# Patient Record
Sex: MALE
[De-identification: names, ages, dates, MRNs, and addresses within clinical notes are randomized per-mention and may not be internally consistent; named-entity substitution may affect disease eponyms.]

---

## 2018-03-28 ENCOUNTER — APPOINTMENT (OUTPATIENT)
Dept: UROLOGY | Facility: CLINIC | Age: 83
End: 2018-03-28
Payer: MEDICARE

## 2018-03-28 VITALS
HEIGHT: 70 IN | BODY MASS INDEX: 31.5 KG/M2 | HEART RATE: 74 BPM | OXYGEN SATURATION: 95 % | WEIGHT: 220 LBS | SYSTOLIC BLOOD PRESSURE: 132 MMHG | DIASTOLIC BLOOD PRESSURE: 72 MMHG | TEMPERATURE: 97.8 F

## 2018-03-28 PROCEDURE — 51798 US URINE CAPACITY MEASURE: CPT

## 2018-03-28 PROCEDURE — 99213 OFFICE O/P EST LOW 20 MIN: CPT

## 2018-03-30 ENCOUNTER — MESSAGE (OUTPATIENT)
Age: 83
End: 2018-03-30

## 2018-03-30 LAB — PSA SERPL-MCNC: 6.77 NG/ML

## 2018-04-25 ENCOUNTER — APPOINTMENT (OUTPATIENT)
Dept: NUCLEAR MEDICINE | Facility: IMAGING CENTER | Age: 83
End: 2018-04-25
Payer: MEDICARE

## 2018-04-25 ENCOUNTER — OUTPATIENT (OUTPATIENT)
Dept: OUTPATIENT SERVICES | Facility: HOSPITAL | Age: 83
LOS: 1 days | End: 2018-04-25
Payer: COMMERCIAL

## 2018-04-25 ENCOUNTER — APPOINTMENT (OUTPATIENT)
Dept: CT IMAGING | Facility: IMAGING CENTER | Age: 83
End: 2018-04-25
Payer: MEDICARE

## 2018-04-25 DIAGNOSIS — R97.20 ELEVATED PROSTATE SPECIFIC ANTIGEN [PSA]: ICD-10-CM

## 2018-04-25 DIAGNOSIS — C61 MALIGNANT NEOPLASM OF PROSTATE: ICD-10-CM

## 2018-04-25 PROCEDURE — 78320: CPT | Mod: 26

## 2018-04-25 PROCEDURE — A9561: CPT

## 2018-04-25 PROCEDURE — 78306 BONE IMAGING WHOLE BODY: CPT | Mod: 26

## 2018-04-25 PROCEDURE — 78999 UNLISTED MISC PX DX NUC MED: CPT

## 2018-04-25 PROCEDURE — 74176 CT ABD & PELVIS W/O CONTRAST: CPT | Mod: 26

## 2018-04-25 PROCEDURE — 74176 CT ABD & PELVIS W/O CONTRAST: CPT

## 2018-04-25 PROCEDURE — 78306 BONE IMAGING WHOLE BODY: CPT

## 2018-04-30 ENCOUNTER — APPOINTMENT (OUTPATIENT)
Dept: UROLOGY | Facility: CLINIC | Age: 83
End: 2018-04-30
Payer: MEDICARE

## 2018-04-30 VITALS
SYSTOLIC BLOOD PRESSURE: 140 MMHG | WEIGHT: 220 LBS | BODY MASS INDEX: 31.5 KG/M2 | OXYGEN SATURATION: 96 % | HEIGHT: 70 IN | TEMPERATURE: 98.2 F | HEART RATE: 74 BPM | DIASTOLIC BLOOD PRESSURE: 70 MMHG | RESPIRATION RATE: 16 BRPM

## 2018-04-30 PROCEDURE — 99213 OFFICE O/P EST LOW 20 MIN: CPT

## 2018-05-14 ENCOUNTER — APPOINTMENT (OUTPATIENT)
Dept: UROLOGY | Facility: CLINIC | Age: 83
End: 2018-05-14
Payer: MEDICARE

## 2018-05-14 VITALS
DIASTOLIC BLOOD PRESSURE: 88 MMHG | TEMPERATURE: 98 F | BODY MASS INDEX: 31.5 KG/M2 | WEIGHT: 220 LBS | HEIGHT: 70 IN | SYSTOLIC BLOOD PRESSURE: 146 MMHG

## 2018-05-14 PROCEDURE — 96402 CHEMO HORMON ANTINEOPL SQ/IM: CPT

## 2018-05-14 RX ORDER — ALLOPURINOL 100 MG/1
100 TABLET ORAL
Refills: 0 | Status: ACTIVE | COMMUNITY

## 2018-05-14 RX ORDER — LEUPROLIDE ACETATE 30 MG
30 KIT INTRAMUSCULAR
Qty: 1 | Refills: 0 | Status: COMPLETED | OUTPATIENT
Start: 2018-05-14

## 2018-05-18 ENCOUNTER — APPOINTMENT (OUTPATIENT)
Dept: UROLOGY | Facility: CLINIC | Age: 83
End: 2018-05-18

## 2018-09-14 ENCOUNTER — APPOINTMENT (OUTPATIENT)
Dept: UROLOGY | Facility: CLINIC | Age: 83
End: 2018-09-14
Payer: MEDICARE

## 2018-09-14 ENCOUNTER — OUTPATIENT (OUTPATIENT)
Dept: OUTPATIENT SERVICES | Facility: HOSPITAL | Age: 83
LOS: 1 days | End: 2018-09-14
Payer: COMMERCIAL

## 2018-09-14 VITALS — RESPIRATION RATE: 18 BRPM | DIASTOLIC BLOOD PRESSURE: 77 MMHG | HEART RATE: 80 BPM | SYSTOLIC BLOOD PRESSURE: 167 MMHG

## 2018-09-14 PROCEDURE — 96402 CHEMO HORMON ANTINEOPL SQ/IM: CPT

## 2018-09-14 PROCEDURE — 99213 OFFICE O/P EST LOW 20 MIN: CPT

## 2018-09-14 RX ORDER — LEUPROLIDE ACETATE 30 MG
30 KIT INTRAMUSCULAR
Qty: 1 | Refills: 0 | Status: COMPLETED | OUTPATIENT
Start: 2018-09-14

## 2018-09-14 RX ORDER — LEUPROLIDE ACETATE 30 MG
30 KIT INTRAMUSCULAR
Qty: 1 | Refills: 3 | Status: COMPLETED | OUTPATIENT
Start: 2018-09-14 | End: 2018-09-14

## 2018-09-14 RX ADMIN — LEUPROLIDE ACETATE 0 MG: KIT at 00:00

## 2018-09-18 DIAGNOSIS — R97.21 RISING PSA FOLLOWING TREATMENT FOR MALIGNANT NEOPLASM OF PROSTATE: ICD-10-CM

## 2018-09-18 DIAGNOSIS — C61 MALIGNANT NEOPLASM OF PROSTATE: ICD-10-CM

## 2019-01-18 ENCOUNTER — APPOINTMENT (OUTPATIENT)
Dept: UROLOGY | Facility: CLINIC | Age: 84
End: 2019-01-18
Payer: MEDICARE

## 2019-01-18 ENCOUNTER — APPOINTMENT (OUTPATIENT)
Dept: UROLOGY | Facility: CLINIC | Age: 84
End: 2019-01-18

## 2019-01-18 ENCOUNTER — OUTPATIENT (OUTPATIENT)
Dept: OUTPATIENT SERVICES | Facility: HOSPITAL | Age: 84
LOS: 1 days | End: 2019-01-18
Payer: COMMERCIAL

## 2019-01-18 ENCOUNTER — MEDICATION RENEWAL (OUTPATIENT)
Age: 84
End: 2019-01-18

## 2019-01-18 VITALS — SYSTOLIC BLOOD PRESSURE: 173 MMHG | DIASTOLIC BLOOD PRESSURE: 91 MMHG | HEART RATE: 87 BPM | TEMPERATURE: 98.4 F

## 2019-01-18 PROCEDURE — 99213 OFFICE O/P EST LOW 20 MIN: CPT

## 2019-01-18 PROCEDURE — 96402 CHEMO HORMON ANTINEOPL SQ/IM: CPT

## 2019-01-18 RX ORDER — LEUPROLIDE ACETATE 30 MG
30 KIT INTRAMUSCULAR
Qty: 1 | Refills: 3 | Status: COMPLETED | OUTPATIENT
Start: 2019-01-18 | End: 2019-01-18

## 2019-01-18 RX ORDER — LEUPROLIDE ACETATE 30 MG
30 KIT INTRAMUSCULAR
Qty: 1 | Refills: 0 | Status: COMPLETED | OUTPATIENT
Start: 2019-01-18

## 2019-01-18 RX ADMIN — LEUPROLIDE ACETATE 0 MG: KIT at 00:00

## 2019-01-18 NOTE — ASSESSMENT
[FreeTextEntry1] : to get Lupron q 4 m today \par will get labs to eval bone disease, sugar and psa /testosterone\par rtc 4 magain for lupron

## 2019-01-18 NOTE — HISTORY OF PRESENT ILLNESS
[FreeTextEntry1] : wanted to notify  me of new dx of DM on insulin\par feels related to poor diet durinjg Holidays\par started Lurpon May 2018 and has been fine until now \par no luts except frequency relATED To fluids\par no hematuria or bowel c/o or blood in stool \par some arthritis throughjout body but states resolves with movement

## 2019-01-18 NOTE — PHYSICAL EXAM
[General Appearance - Well Developed] : well developed [General Appearance - Well Nourished] : well nourished [Normal Appearance] : normal appearance [Well Groomed] : well groomed [General Appearance - In No Acute Distress] : no acute distress

## 2019-01-22 DIAGNOSIS — C61 MALIGNANT NEOPLASM OF PROSTATE: ICD-10-CM

## 2019-01-22 DIAGNOSIS — R35.0 FREQUENCY OF MICTURITION: ICD-10-CM

## 2019-01-23 LAB
ALBUMIN SERPL ELPH-MCNC: 4.6 G/DL
ALP BLD-CCNC: 123 U/L
ALT SERPL-CCNC: 33 U/L
ANION GAP SERPL CALC-SCNC: 15 MMOL/L
APPEARANCE: CLEAR
AST SERPL-CCNC: 28 U/L
BACTERIA: NEGATIVE
BILIRUB SERPL-MCNC: 0.3 MG/DL
BILIRUBIN URINE: NEGATIVE
BLOOD URINE: NEGATIVE
BUN SERPL-MCNC: 12 MG/DL
CALCIUM SERPL-MCNC: 9.5 MG/DL
CHLORIDE SERPL-SCNC: 97 MMOL/L
CO2 SERPL-SCNC: 27 MMOL/L
COLOR: YELLOW
CREAT SERPL-MCNC: 1.11 MG/DL
GLUCOSE QUALITATIVE U: 1000 MG/DL
GLUCOSE SERPL-MCNC: 408 MG/DL
HYALINE CASTS: 0 /LPF
KETONES URINE: ABNORMAL
LEUKOCYTE ESTERASE URINE: NEGATIVE
MICROSCOPIC-UA: NORMAL
NITRITE URINE: NEGATIVE
PH URINE: 5.5
POTASSIUM SERPL-SCNC: 4 MMOL/L
PROT SERPL-MCNC: 7.7 G/DL
PROTEIN URINE: 30 MG/DL
RED BLOOD CELLS URINE: 1 /HPF
SODIUM SERPL-SCNC: 139 MMOL/L
SPECIFIC GRAVITY URINE: 1.04
SQUAMOUS EPITHELIAL CELLS: 0 /HPF
TESTOST SERPL-MCNC: 16.8 NG/DL
UROBILINOGEN URINE: NEGATIVE MG/DL
WHITE BLOOD CELLS URINE: 1 /HPF

## 2019-01-28 LAB — PSA SERPL-MCNC: 0.03 NG/ML

## 2019-05-24 ENCOUNTER — OUTPATIENT (OUTPATIENT)
Dept: OUTPATIENT SERVICES | Facility: HOSPITAL | Age: 84
LOS: 1 days | End: 2019-05-24
Payer: COMMERCIAL

## 2019-05-24 ENCOUNTER — APPOINTMENT (OUTPATIENT)
Dept: UROLOGY | Facility: CLINIC | Age: 84
End: 2019-05-24
Payer: MEDICARE

## 2019-05-24 DIAGNOSIS — R35.0 FREQUENCY OF MICTURITION: ICD-10-CM

## 2019-05-24 PROCEDURE — 99214 OFFICE O/P EST MOD 30 MIN: CPT

## 2019-05-24 PROCEDURE — 96402 CHEMO HORMON ANTINEOPL SQ/IM: CPT

## 2019-05-24 RX ORDER — LEUPROLIDE ACETATE 30 MG
30 KIT INTRAMUSCULAR
Qty: 1 | Refills: 3 | Status: COMPLETED | OUTPATIENT
Start: 2019-05-24 | End: 2019-05-24

## 2019-05-24 RX ORDER — LEUPROLIDE ACETATE 30 MG
30 KIT INTRAMUSCULAR
Qty: 1 | Refills: 0 | Status: COMPLETED | OUTPATIENT
Start: 2019-05-24

## 2019-05-24 RX ADMIN — LEUPROLIDE ACETATE 0 MG: KIT at 00:00

## 2019-05-24 NOTE — ASSESSMENT
[FreeTextEntry1] : patient with pca and rising psa - to 6.77\par begun on lupron q 4m\par now one year of ADT and psa down to 0.03\par tolerating meds \par cont meds q 4m \par in f/u psa and urine and Testosteone\par to see PCP re: elevated sugar and need for change in DM meds

## 2019-05-24 NOTE — HISTORY OF PRESENT ILLNESS
[FreeTextEntry1] : patient with hx of pca and rising psa \par now on Lupron q 4m and tolerating meds\par some hot flahses but no marked weigh tgain or luts\par BM ok \par HBA1C elevcated by report and this was seen on last lab and urine whchh patient was notified by letter to reviewe with PCP \par ;Latest PSA ( Jan 2019)= 0.03 and Testosterone = 16, PSA down from 6.77\par now one year of Lupron

## 2019-05-24 NOTE — PHYSICAL EXAM
[General Appearance - Well Developed] : well developed [Normal Appearance] : normal appearance [General Appearance - Well Nourished] : well nourished [General Appearance - In No Acute Distress] : no acute distress [Well Groomed] : well groomed [Abdomen Soft] : soft [Abdomen Tenderness] : non-tender [] : no hepato-splenomegaly [Abdomen Mass (___ Cm)] : no abdominal mass palpated [Abdomen Hernia] : no hernia was discovered [Costovertebral Angle Tenderness] : no ~M costovertebral angle tenderness [Cervical Lymph Nodes Enlarged Posterior Bilaterally] : posterior cervical [Supraclavicular Lymph Nodes Enlarged Bilaterally] : supraclavicular [Cervical Lymph Nodes Enlarged Anterior Bilaterally] : anterior cervical

## 2019-06-03 DIAGNOSIS — R35.1 NOCTURIA: ICD-10-CM

## 2019-06-03 DIAGNOSIS — C61 MALIGNANT NEOPLASM OF PROSTATE: ICD-10-CM

## 2019-10-18 ENCOUNTER — APPOINTMENT (OUTPATIENT)
Dept: UROLOGY | Facility: CLINIC | Age: 84
End: 2019-10-18
Payer: MEDICARE

## 2019-10-18 ENCOUNTER — OUTPATIENT (OUTPATIENT)
Dept: OUTPATIENT SERVICES | Facility: HOSPITAL | Age: 84
LOS: 1 days | End: 2019-10-18
Payer: COMMERCIAL

## 2019-10-18 VITALS
DIASTOLIC BLOOD PRESSURE: 83 MMHG | TEMPERATURE: 98 F | RESPIRATION RATE: 16 BRPM | SYSTOLIC BLOOD PRESSURE: 167 MMHG | HEART RATE: 73 BPM

## 2019-10-18 DIAGNOSIS — R35.0 FREQUENCY OF MICTURITION: ICD-10-CM

## 2019-10-18 PROCEDURE — 96402 CHEMO HORMON ANTINEOPL SQ/IM: CPT

## 2019-10-18 PROCEDURE — 99213 OFFICE O/P EST LOW 20 MIN: CPT

## 2019-10-18 RX ORDER — LEUPROLIDE ACETATE 45 MG
45 KIT INTRAMUSCULAR
Qty: 1 | Refills: 0 | Status: COMPLETED | OUTPATIENT
Start: 2019-10-18

## 2019-10-18 RX ORDER — LEUPROLIDE ACETATE 30 MG
30 KIT INTRAMUSCULAR
Qty: 1 | Refills: 3 | Status: COMPLETED | OUTPATIENT
Start: 2019-10-18 | End: 2019-10-18

## 2019-10-18 RX ADMIN — LEUPROLIDE ACETATE 0 MG: KIT at 00:00

## 2019-10-18 NOTE — PHYSICAL EXAM
[General Appearance - Well Nourished] : well nourished [General Appearance - Well Developed] : well developed [Normal Appearance] : normal appearance [General Appearance - In No Acute Distress] : no acute distress [Well Groomed] : well groomed [Cervical Lymph Nodes Enlarged Anterior Bilaterally] : anterior cervical [Cervical Lymph Nodes Enlarged Posterior Bilaterally] : posterior cervical [Supraclavicular Lymph Nodes Enlarged Bilaterally] : supraclavicular [FreeTextEntry1] : breeast soft , no masses felt,  not tender

## 2019-10-18 NOTE — HISTORY OF PRESENT ILLNESS
[FreeTextEntry1] : Tolerating hormone\par no hot flashes but some breast growth-not tender\par nocturia 2 + due to fluids taken in day\par no blood in urine or stool \par eating wel;l scott OK \par

## 2019-10-19 ENCOUNTER — TRANSCRIPTION ENCOUNTER (OUTPATIENT)
Age: 84
End: 2019-10-19

## 2019-10-21 LAB — PSA SERPL-MCNC: 0.02 NG/ML

## 2019-10-29 DIAGNOSIS — R35.1 NOCTURIA: ICD-10-CM

## 2019-10-29 DIAGNOSIS — C61 MALIGNANT NEOPLASM OF PROSTATE: ICD-10-CM

## 2020-02-21 ENCOUNTER — APPOINTMENT (OUTPATIENT)
Dept: UROLOGY | Facility: CLINIC | Age: 85
End: 2020-02-21
Payer: MEDICARE

## 2020-02-21 ENCOUNTER — APPOINTMENT (OUTPATIENT)
Dept: UROLOGY | Facility: CLINIC | Age: 85
End: 2020-02-21

## 2020-02-21 ENCOUNTER — OUTPATIENT (OUTPATIENT)
Dept: OUTPATIENT SERVICES | Facility: HOSPITAL | Age: 85
LOS: 1 days | End: 2020-02-21
Payer: MEDICARE

## 2020-02-21 DIAGNOSIS — R35.0 FREQUENCY OF MICTURITION: ICD-10-CM

## 2020-02-21 PROCEDURE — 96402 CHEMO HORMON ANTINEOPL SQ/IM: CPT

## 2020-02-21 PROCEDURE — 96402U: CUSTOM | Mod: NC

## 2020-02-21 RX ORDER — LEUPROLIDE ACETATE 30 MG
30 KIT INTRAMUSCULAR
Qty: 1 | Refills: 0 | Status: COMPLETED | OUTPATIENT
Start: 2020-02-21

## 2020-02-21 RX ORDER — LEUPROLIDE ACETATE 30 MG
30 KIT INTRAMUSCULAR
Qty: 1 | Refills: 0 | Status: COMPLETED | OUTPATIENT
Start: 2020-02-21 | End: 2020-02-21

## 2020-02-21 RX ADMIN — LEUPROLIDE ACETATE 0 MG: KIT at 00:00

## 2020-02-27 DIAGNOSIS — C61 MALIGNANT NEOPLASM OF PROSTATE: ICD-10-CM

## 2020-06-12 ENCOUNTER — APPOINTMENT (OUTPATIENT)
Dept: UROLOGY | Facility: CLINIC | Age: 85
End: 2020-06-12

## 2020-06-19 ENCOUNTER — APPOINTMENT (OUTPATIENT)
Dept: UROLOGY | Facility: CLINIC | Age: 85
End: 2020-06-19
Payer: MEDICARE

## 2020-06-19 ENCOUNTER — OUTPATIENT (OUTPATIENT)
Dept: OUTPATIENT SERVICES | Facility: HOSPITAL | Age: 85
LOS: 1 days | End: 2020-06-19
Payer: COMMERCIAL

## 2020-06-19 VITALS — TEMPERATURE: 97.3 F

## 2020-06-19 DIAGNOSIS — R35.0 FREQUENCY OF MICTURITION: ICD-10-CM

## 2020-06-19 PROCEDURE — 96402 CHEMO HORMON ANTINEOPL SQ/IM: CPT

## 2020-06-19 PROCEDURE — 99213 OFFICE O/P EST LOW 20 MIN: CPT

## 2020-06-19 RX ORDER — LEUPROLIDE ACETATE 30 MG
30 KIT INTRAMUSCULAR
Qty: 1 | Refills: 0 | Status: COMPLETED | OUTPATIENT
Start: 2020-06-15 | End: 2020-06-19

## 2020-06-19 RX ORDER — LEUPROLIDE ACETATE 30 MG
30 KIT INTRAMUSCULAR
Qty: 1 | Refills: 0 | Status: COMPLETED | OUTPATIENT
Start: 2020-06-19

## 2020-06-19 RX ADMIN — LEUPROLIDE ACETATE 30 MG: KIT at 00:00

## 2020-06-19 NOTE — HISTORY OF PRESENT ILLNESS
[FreeTextEntry1] : patient her for f/u\par on Luporn 30 mg\par seen last in Oct 2019 and psa tdone at that time \par new c/o some edema lower extrem with ruddiness of skin \par no fever or hematuria\par no bowel c/o or blood in stool \par weight stable\par no N/V \par to see PCP next week

## 2020-06-19 NOTE — ASSESSMENT
[FreeTextEntry1] : patietn on Lupron for rising psa aftr pca treatmnet with RT 2011\par psa low \par now with c/o LE edema and ruddiness of skin suggestive of venous stasis\par to see PCP to discuss bnext week\par will get psa , Test and CMP today \par no LUTS a bother\par Lupron given by nusring today ( 30 mg) \par to rtc for lupron again 4m and exam in 8 months

## 2020-06-19 NOTE — PHYSICAL EXAM
[General Appearance - Well Developed] : well developed [General Appearance - Well Nourished] : well nourished [Normal Appearance] : normal appearance [Well Groomed] : well groomed [General Appearance - In No Acute Distress] : no acute distress [Abdomen Tenderness] : non-tender [Abdomen Soft] : soft [] : no hepato-splenomegaly [Costovertebral Angle Tenderness] : no ~M costovertebral angle tenderness [Abdomen Hernia] : no hernia was discovered [Abdomen Mass (___ Cm)] : no abdominal mass palpated [Rectal Exam - Rectum] : digital rectal exam was normal [Prostate Tenderness] : the prostate was not tender [Prostate Enlargement] : the prostate was not enlarged [No Prostate Nodules] : no prostate nodules [Prostate Size ___ gm] : prostate size [unfilled] gm [Cervical Lymph Nodes Enlarged Posterior Bilaterally] : posterior cervical [Supraclavicular Lymph Nodes Enlarged Bilaterally] : supraclavicular [Cervical Lymph Nodes Enlarged Anterior Bilaterally] : anterior cervical

## 2020-06-22 LAB
ALBUMIN SERPL ELPH-MCNC: 4.2 G/DL
ALP BLD-CCNC: 86 U/L
ALT SERPL-CCNC: 33 U/L
ANION GAP SERPL CALC-SCNC: 18 MMOL/L
AST SERPL-CCNC: 33 U/L
BILIRUB SERPL-MCNC: 0.2 MG/DL
BUN SERPL-MCNC: 18 MG/DL
CALCIUM SERPL-MCNC: 9.1 MG/DL
CHLORIDE SERPL-SCNC: 98 MMOL/L
CO2 SERPL-SCNC: 25 MMOL/L
CREAT SERPL-MCNC: 1.19 MG/DL
GLUCOSE SERPL-MCNC: 211 MG/DL
POTASSIUM SERPL-SCNC: 3.7 MMOL/L
PROT SERPL-MCNC: 7 G/DL
PSA SERPL-MCNC: 0.07 NG/ML
SODIUM SERPL-SCNC: 141 MMOL/L
TESTOST SERPL-MCNC: 3.4 NG/DL

## 2020-06-24 DIAGNOSIS — C61 MALIGNANT NEOPLASM OF PROSTATE: ICD-10-CM

## 2020-07-30 ENCOUNTER — APPOINTMENT (OUTPATIENT)
Dept: WOUND CARE | Facility: CLINIC | Age: 85
End: 2020-07-30
Payer: MEDICARE

## 2020-07-30 VITALS — HEIGHT: 70 IN | WEIGHT: 216 LBS | BODY MASS INDEX: 30.92 KG/M2 | TEMPERATURE: 97.4 F

## 2020-07-30 DIAGNOSIS — B35.3 TINEA PEDIS: ICD-10-CM

## 2020-07-30 DIAGNOSIS — L30.9 DERMATITIS, UNSPECIFIED: ICD-10-CM

## 2020-07-30 PROCEDURE — 99203 OFFICE O/P NEW LOW 30 MIN: CPT

## 2020-07-30 NOTE — PHYSICAL EXAM
[0] : left 0 [Ankle Swelling (On Exam)] : present [Ankle Swelling Bilaterally] : bilaterally  [FreeTextEntry1] : chronic venous stasis changes to legs [de-identified] : scaly white patches on medial Left arch and on lateral aspect of left heel, on distal tips of plantar toes, Right plantar medial arch, thickened discolored dystrophic nails of hallux + 5th toe bilateral

## 2020-07-30 NOTE — HISTORY OF PRESENT ILLNESS
[FreeTextEntry1] : 84M presents to clinic - referred here by Dr Ng for his left foot wound which has closed. Pt states that he has had Left medial arch wound for over 6 months. Started as red itchy dots. First saw a doctor virtually in April who prescribed a medication for athletes foot but patient states condition worsened. His wound opened up about 2 months ago on that spot and he started applying betamethasone cream and clotrimazole which helped. Has tried unna boot but made it worse.

## 2020-07-30 NOTE — ASSESSMENT
[FreeTextEntry1] : 84M with Left foot plaques \par --itchy white patches on left foot; no open wounds on feet\par -shave biopsy performed of site - sent for fungal culture \par -continue using betamethasone and clotrimazole cream daily; instructed to saran wrap + socks on feet after applying at nights\par -likely tinea pedis; if no improvement recommend tissue biopsy \par -return in 2 weeks

## 2020-08-13 ENCOUNTER — APPOINTMENT (OUTPATIENT)
Dept: WOUND CARE | Facility: CLINIC | Age: 85
End: 2020-08-13
Payer: MEDICARE

## 2020-08-13 DIAGNOSIS — B35.3 TINEA PEDIS: ICD-10-CM

## 2020-08-13 PROCEDURE — 99213 OFFICE O/P EST LOW 20 MIN: CPT

## 2020-08-13 NOTE — ASSESSMENT
[FreeTextEntry1] : 84M with Left foot plaques \par - itchy white patches on left foot; no open wounds on feet\par - fungal culture showing candida\par -continue using betamethasone and clotrimazole cream daily; instructed to saran wrap + socks on feet after applying at nights\par - pt sees improvement of the ichy and scaling skin since using topical creams \par - return in PRN

## 2020-08-13 NOTE — PHYSICAL EXAM
[0] : right 0 [Ankle Swelling (On Exam)] : present [Ankle Swelling Bilaterally] : bilaterally  [Please See PDF for [employer]] : Please See PDF for [employer]. [de-identified] : scaly white patches on medial Left arch and on lateral aspect of left heel, on distal tips of plantar toes, Right plantar medial arch, thickened discolored dystrophic nails of hallux + 5th toe bilateral [FreeTextEntry1] : chronic venous stasis changes to legs

## 2020-08-13 NOTE — HISTORY OF PRESENT ILLNESS
[FreeTextEntry1] : 84M presents to clinic - he has healed the open wound to LF medial arch wound. Has been improving to ichy scaling pedal skin after using topical creams. Jonnie F/C/N/V/SOB

## 2020-08-27 LAB — FUNGUS SPEC CULT ORG #8: ABNORMAL

## 2020-10-30 ENCOUNTER — APPOINTMENT (OUTPATIENT)
Dept: UROLOGY | Facility: CLINIC | Age: 85
End: 2020-10-30

## 2020-12-04 ENCOUNTER — APPOINTMENT (OUTPATIENT)
Dept: UROLOGY | Facility: CLINIC | Age: 85
End: 2020-12-04
Payer: MEDICARE

## 2020-12-04 VITALS
BODY MASS INDEX: 32.07 KG/M2 | DIASTOLIC BLOOD PRESSURE: 84 MMHG | WEIGHT: 224 LBS | SYSTOLIC BLOOD PRESSURE: 166 MMHG | TEMPERATURE: 97.6 F | HEART RATE: 73 BPM | HEIGHT: 70 IN

## 2020-12-04 PROCEDURE — 99072 ADDL SUPL MATRL&STAF TM PHE: CPT

## 2020-12-04 PROCEDURE — 99213 OFFICE O/P EST LOW 20 MIN: CPT

## 2020-12-04 NOTE — PHYSICAL EXAM
[General Appearance - Well Developed] : well developed [General Appearance - Well Nourished] : well nourished [Normal Appearance] : normal appearance [Well Groomed] : well groomed [General Appearance - In No Acute Distress] : no acute distress [Abdomen Soft] : soft [Abdomen Tenderness] : non-tender [] : no hepato-splenomegaly [Abdomen Mass (___ Cm)] : no abdominal mass palpated [Abdomen Hernia] : no hernia was discovered [Costovertebral Angle Tenderness] : no ~M costovertebral angle tenderness [Edema] : no peripheral edema [Normal Station and Gait] : the gait and station were normal for the patient's age [FreeTextEntry1] : no spinal tenderness [Cervical Lymph Nodes Enlarged Posterior Bilaterally] : posterior cervical [Cervical Lymph Nodes Enlarged Anterior Bilaterally] : anterior cervical

## 2020-12-04 NOTE — HISTORY OF PRESENT ILLNESS
INTERVAL HISTORY:  Seen at bedside; no new changes from nursing overnight.    No Known Allergies      VITAL SIGNS:  Vital Signs Last 24 Hrs  T(C): 37, Max: 37 (04-22 @ 12:24)  T(F): 98.6, Max: 98.6 (04-22 @ 12:24)  HR: 98 (76 - 103)  BP: 113/62 (108/51 - 140/71)  BP(mean): 78 (67 - 91)  RR: 98 (13 - 98)  SpO2: 100% (100% - 100%)    PHYSICAL EXAMINATION:  General: Well-developed, well nourished, in no acute distress.  Eyes: Conjunctiva and sclera clear.  Neurologic:  - Mental Status:  Awake and alert; flat affect; speaks Creole.  - Cranial Nerves: Pupil 3 mm reactive; + EOMI; no facial.  - Motor:  Symmetric and spontaneous throughout Normal muscle bulk and tone throughout.  - Reflexes:  1+ throughout  - Sensory:  Withdrawals throughout.  - Plantars:  Withdrawal.    MEDS:  MEDICATIONS  (STANDING):  insulin lispro (HumaLOG) corrective regimen sliding scale  SubCutaneous Before meals and at bedtime  dextrose 5%. 1000milliLiter(s) IV Continuous <Continuous>  dextrose 50% Injectable 12.5Gram(s) IV Push once  dextrose 50% Injectable 25Gram(s) IV Push once  dextrose 50% Injectable 25Gram(s) IV Push once  aspirin 325milliGRAM(s) Oral daily  atorvastatin 20milliGRAM(s) Oral at bedtime  donepezil 10milliGRAM(s) Oral at bedtime  amLODIPine   Tablet 10milliGRAM(s) Oral daily  heparin  Injectable 5000Unit(s) SubCutaneous every 8 hours  timolol 0.5% Solution 1Drop(s) Both EYES every 12 hours  brimonidine 0.2% Ophthalmic Solution 1Drop(s) Both EYES every 12 hours  risperiDONE   Tablet 1milliGRAM(s) Oral two times a day  LORazepam   Injectable 1milliGRAM(s) IV Push once    MEDICATIONS  (PRN):  dextrose Gel 1Dose(s) Oral once PRN Blood Glucose LESS THAN 70 milliGRAM(s)/deciLiter  glucagon  Injectable 1milliGRAM(s) IntraMuscular once PRN Glucose <70 milliGRAM(s)/deciLiter  acetaminophen   Tablet. 650milliGRAM(s) Oral every 6 hours PRN Headache or Bodyache  haloperidol    Injectable 5milliGRAM(s) IntraMuscular every 6 hours PRN Agitation      LABS:                          13.0   10.3  )-----------( 304      ( 22 Apr 2017 08:33 )             36.7     04-22    134<L>  |  96<L>  |  13.0  ----------------------------<  252<H>  3.8   |  28.0  |  0.79    Ca    9.8      22 Apr 2017 08:33            RADIOLOGY & ADDITIONAL STUDIES:      MRA head/neck - Pending.    IMPRESSION:  Pontine CVA. [FreeTextEntry1] : patient known to me from [facility]\par hx of pca s/p RT 2011\par PSA ( 10/2015): 0.5\par PSA ( 10/2016): 0.9\par PSA ( 03/2018): 6.05\par \par PSA started 2018 for risjng psa to 7 after RT 2011 and Lupron started\par held on Oct 2020 and psa now to compare\par \par June  psa - 0.07\par \par denies sxs of weight loss , bone pain , no LUTS

## 2020-12-04 NOTE — ASSESSMENT
[FreeTextEntry1] : rising psa after RT\par treated with Lupron \par wanted to hold due to fatigue\par will chjeckl psa today \par if stabe-- cont to hold\par rtc 6m

## 2020-12-07 LAB
ALBUMIN SERPL ELPH-MCNC: 4.5 G/DL
ALP BLD-CCNC: 85 U/L
ALT SERPL-CCNC: 25 U/L
ANION GAP SERPL CALC-SCNC: 13 MMOL/L
AST SERPL-CCNC: 21 U/L
BILIRUB SERPL-MCNC: 0.2 MG/DL
BUN SERPL-MCNC: 14 MG/DL
CALCIUM SERPL-MCNC: 9.4 MG/DL
CHLORIDE SERPL-SCNC: 104 MMOL/L
CO2 SERPL-SCNC: 26 MMOL/L
CREAT SERPL-MCNC: 1.08 MG/DL
GLUCOSE SERPL-MCNC: 126 MG/DL
POTASSIUM SERPL-SCNC: 4 MMOL/L
PROT SERPL-MCNC: 7.3 G/DL
PSA SERPL-MCNC: 0.09 NG/ML
SODIUM SERPL-SCNC: 143 MMOL/L

## 2021-06-18 ENCOUNTER — APPOINTMENT (OUTPATIENT)
Dept: UROLOGY | Facility: CLINIC | Age: 86
End: 2021-06-18
Payer: MEDICARE

## 2021-06-18 PROCEDURE — 99214 OFFICE O/P EST MOD 30 MIN: CPT

## 2021-06-18 NOTE — HISTORY OF PRESENT ILLNESS
[FreeTextEntry1] : patent with hx of pca \par treated with RT in 2011\par due to recurrence of psa - was begun on Lupron \par however c/o some side effects and wanted to stop\par last Lupron held on  OCT 2020\par f/u PSA remained low at 0.09 ( dec 2020) from 0.07 ( june 2020)\par no new c/o\par no luts a bother\par no blood in urine or stool \par weight stable \par

## 2021-06-18 NOTE — ASSESSMENT
[FreeTextEntry1] : checkpsa today \par no luts a bother\par if psa remains stable- cont to hold Lupron \par cont to check psa q 6m --due again - dec 2020

## 2021-06-18 NOTE — PHYSICAL EXAM
[General Appearance - Well Developed] : well developed [General Appearance - Well Nourished] : well nourished [Normal Appearance] : normal appearance [Well Groomed] : well groomed [General Appearance - In No Acute Distress] : no acute distress [Abdomen Soft] : soft [Abdomen Tenderness] : non-tender [] : no hepato-splenomegaly [Abdomen Hernia] : no hernia was discovered [Abdomen Mass (___ Cm)] : no abdominal mass palpated [Costovertebral Angle Tenderness] : no ~M costovertebral angle tenderness [FreeTextEntry1] : obese [Cervical Lymph Nodes Enlarged Posterior Bilaterally] : posterior cervical [Cervical Lymph Nodes Enlarged Anterior Bilaterally] : anterior cervical [Supraclavicular Lymph Nodes Enlarged Bilaterally] : supraclavicular

## 2021-06-21 LAB — PSA SERPL-MCNC: 0.2 NG/ML

## 2021-10-22 ENCOUNTER — APPOINTMENT (OUTPATIENT)
Dept: UROLOGY | Facility: CLINIC | Age: 86
End: 2021-10-22
Payer: MEDICARE

## 2021-10-22 PROCEDURE — 99214 OFFICE O/P EST MOD 30 MIN: CPT

## 2021-10-22 NOTE — PHYSICAL EXAM
[General Appearance - Well Developed] : well developed [Normal Appearance] : normal appearance [General Appearance - Well Nourished] : well nourished [Well Groomed] : well groomed [General Appearance - In No Acute Distress] : no acute distress [Prostate Enlargement] : the prostate was not enlarged [Prostate Tenderness] : the prostate was not tender [No Prostate Nodules] : no prostate nodules [FreeTextEntry1] : no evid of recurrence on rectal exam

## 2021-10-22 NOTE — ASSESSMENT
[FreeTextEntry1] : PSA and Testosterone today \par cathryn call with results\par if slow rise -- can cont to follow or restart hormones as intermittent therapy\par no other c/o \par

## 2021-10-22 NOTE — HISTORY OF PRESENT ILLNESS
[FreeTextEntry1] : kacy here for repeat psa \par hx of RT for pca 2011 \par lupron started due to rising psa \par held in Oct 2020 for side effects and psa followed q3-6 m\par slow rise and recent psa of 0.2 ( june 2021)\par no meds for luts\par nobowek issues\par no blood in urine or stoon \par weight stable\par

## 2021-10-25 LAB
PSA SERPL-MCNC: 0.28 NG/ML
TESTOST SERPL-MCNC: 184 NG/DL

## 2022-03-04 ENCOUNTER — APPOINTMENT (OUTPATIENT)
Dept: UROLOGY | Facility: CLINIC | Age: 87
End: 2022-03-04
Payer: MEDICARE

## 2022-03-04 PROCEDURE — 99214 OFFICE O/P EST MOD 30 MIN: CPT

## 2022-03-04 NOTE — PHYSICAL EXAM
[General Appearance - Well Developed] : well developed [General Appearance - Well Nourished] : well nourished [Normal Appearance] : normal appearance [Well Groomed] : well groomed [General Appearance - In No Acute Distress] : no acute distress [Abdomen Soft] : soft [Abdomen Tenderness] : non-tender [] : no hepato-splenomegaly [Abdomen Mass (___ Cm)] : no abdominal mass palpated [Abdomen Hernia] : no hernia was discovered [Costovertebral Angle Tenderness] : no ~M costovertebral angle tenderness [FreeTextEntry1] : no spinal or hip pain  [No Palpable Adenopathy] : no palpable adenopathy

## 2022-03-04 NOTE — HISTORY OF PRESENT ILLNESS
[FreeTextEntry1] : patient here for f/u of psa \par hx of pca RT 2011 on Lupron for recurrence of psa \par due to side effects- on hold since last q 4m shot in June 2020\par f/u psa was 0.2 and Oct 2021 : 0.28 with Testosterone - 184\par doing well off lupron \par no weight changes\par no bowel issues \par no blood in urine or stool\par no LUTS \par good water intake \par no bone pain

## 2022-03-04 NOTE — ASSESSMENT
[FreeTextEntry1] : patient with pca \par s/p RT 2-011\par lupron for recurrence of psa\par held June 2020\par psa stable at 0.2\par cathryn check today \par cont q 6m

## 2022-03-07 LAB
ANION GAP SERPL CALC-SCNC: 15 MMOL/L
BUN SERPL-MCNC: 16 MG/DL
CALCIUM SERPL-MCNC: 9.4 MG/DL
CHLORIDE SERPL-SCNC: 104 MMOL/L
CO2 SERPL-SCNC: 26 MMOL/L
CREAT SERPL-MCNC: 1.3 MG/DL
EGFR: 54 ML/MIN/1.73M2
GLUCOSE SERPL-MCNC: 51 MG/DL
POTASSIUM SERPL-SCNC: 4 MMOL/L
PSA SERPL-MCNC: 0.55 NG/ML
SODIUM SERPL-SCNC: 145 MMOL/L
TESTOST SERPL-MCNC: 170 NG/DL

## 2022-07-08 ENCOUNTER — APPOINTMENT (OUTPATIENT)
Dept: UROLOGY | Facility: CLINIC | Age: 87
End: 2022-07-08

## 2022-07-08 DIAGNOSIS — R35.1 NOCTURIA: ICD-10-CM

## 2022-07-08 PROCEDURE — 99214 OFFICE O/P EST MOD 30 MIN: CPT

## 2022-07-08 NOTE — PHYSICAL EXAM
[General Appearance - Well Developed] : well developed [General Appearance - Well Nourished] : well nourished [Normal Appearance] : normal appearance [Well Groomed] : well groomed [General Appearance - In No Acute Distress] : no acute distress [Abdomen Soft] : soft [Abdomen Tenderness] : non-tender [] : no hepato-splenomegaly [Abdomen Mass (___ Cm)] : no abdominal mass palpated [Abdomen Hernia] : no hernia was discovered [Costovertebral Angle Tenderness] : no ~M costovertebral angle tenderness [FreeTextEntry1] : pvr- 42 ml  [No Palpable Adenopathy] : no palpable adenopathy

## 2022-07-08 NOTE — ASSESSMENT
[FreeTextEntry1] : patient with hx of pca \par s/p RT 2011 with rising psa \par placed on ADT but c/o side effects \par stopped in June 2020 and psa folllowed\par recent psa 0.55 up from 0.2\par here for repeat psa and f/u:\par 1- check urine\par 2- check psa\par 3- pvr low at 42 ml \par 4- discussed if psa cont to rise - consider restarting ADT or see colleague for further management

## 2022-07-08 NOTE — HISTORY OF PRESENT ILLNESS
[FreeTextEntry1] : patient with hx of pca \par s/p RT 2011 with rising psa \par placed on ADT but c/o side effects \par stopped in June 2020 and psa folllowed\par recent psa 0.55 up from 0.2\par here for repeat psa and f/u:

## 2022-07-13 LAB
APPEARANCE: CLEAR
BACTERIA: NEGATIVE
BILIRUBIN URINE: NEGATIVE
BLOOD URINE: NEGATIVE
COLOR: NORMAL
GLUCOSE QUALITATIVE U: ABNORMAL
HYALINE CASTS: 1 /LPF
KETONES URINE: NEGATIVE
LEUKOCYTE ESTERASE URINE: NEGATIVE
MICROSCOPIC-UA: NORMAL
NITRITE URINE: NEGATIVE
PH URINE: 6
PROTEIN URINE: ABNORMAL
PSA SERPL-MCNC: 1.4 NG/ML
RED BLOOD CELLS URINE: 1 /HPF
SPECIFIC GRAVITY URINE: 1.02
SQUAMOUS EPITHELIAL CELLS: 0 /HPF
UROBILINOGEN URINE: NORMAL
WHITE BLOOD CELLS URINE: 1 /HPF

## 2022-08-16 ENCOUNTER — APPOINTMENT (OUTPATIENT)
Dept: UROLOGY | Facility: CLINIC | Age: 87
End: 2022-08-16

## 2022-08-16 VITALS
WEIGHT: 218 LBS | BODY MASS INDEX: 33.04 KG/M2 | HEIGHT: 68 IN | DIASTOLIC BLOOD PRESSURE: 94 MMHG | HEART RATE: 87 BPM | SYSTOLIC BLOOD PRESSURE: 181 MMHG | RESPIRATION RATE: 17 BRPM

## 2022-08-16 DIAGNOSIS — Z87.891 PERSONAL HISTORY OF NICOTINE DEPENDENCE: ICD-10-CM

## 2022-08-16 LAB
PSA SERPL-MCNC: 1.9 NG/ML
TESTOST SERPL-MCNC: 188 NG/DL

## 2022-08-16 PROCEDURE — 99214 OFFICE O/P EST MOD 30 MIN: CPT

## 2022-08-16 RX ORDER — METOPROLOL TARTRATE 100 MG/1
100 TABLET, FILM COATED ORAL
Refills: 0 | Status: COMPLETED | COMMUNITY
End: 2022-08-16

## 2022-08-16 RX ORDER — LANCETS 28 GAUGE
EACH MISCELLANEOUS
Qty: 200 | Refills: 0 | Status: DISCONTINUED | COMMUNITY
Start: 2022-08-01

## 2022-08-16 RX ORDER — ISOPROPYL ALCOHOL 70 ML/100ML
70 SWAB TOPICAL
Qty: 100 | Refills: 0 | Status: DISCONTINUED | COMMUNITY
Start: 2022-08-11

## 2022-08-16 RX ORDER — INSULIN GLARGINE 100 [IU]/ML
100 INJECTION, SOLUTION SUBCUTANEOUS
Qty: 15 | Refills: 0 | Status: DISCONTINUED | COMMUNITY
Start: 2022-08-01

## 2022-08-16 RX ORDER — NIFEDIPINE 90 MG/1
90 TABLET, EXTENDED RELEASE ORAL
Refills: 0 | Status: COMPLETED | COMMUNITY
End: 2022-08-16

## 2022-08-16 RX ORDER — BLOOD SUGAR DIAGNOSTIC
STRIP MISCELLANEOUS
Qty: 200 | Refills: 0 | Status: DISCONTINUED | COMMUNITY
Start: 2022-08-01

## 2022-08-16 RX ORDER — IBUPROFEN 600 MG/1
600 TABLET ORAL
Qty: 90 | Refills: 0 | Status: DISCONTINUED | COMMUNITY
Start: 2022-08-01

## 2022-08-16 RX ORDER — BICALUTAMIDE 50 MG/1
50 TABLET ORAL DAILY
Qty: 21 | Refills: 0 | Status: COMPLETED | COMMUNITY
Start: 2018-04-30 | End: 2022-08-16

## 2022-08-16 RX ORDER — TAMSULOSIN HYDROCHLORIDE 0.4 MG/1
0.4 CAPSULE ORAL
Refills: 0 | Status: COMPLETED | COMMUNITY
End: 2022-08-16

## 2022-08-16 RX ORDER — GLIPIZIDE AND METFORMIN HYDROCHLORIDE 5; 500 MG/1; MG/1
5-500 TABLET, FILM COATED ORAL
Qty: 360 | Refills: 0 | Status: DISCONTINUED | COMMUNITY
Start: 2022-07-25

## 2022-08-16 RX ORDER — LEUPROLIDE ACETATE 30 MG
30 KIT INTRAMUSCULAR
Qty: 1 | Refills: 0 | Status: COMPLETED | OUTPATIENT
Start: 2019-10-18 | End: 2022-08-16

## 2022-08-16 RX ORDER — CLOBETASOL PROPIONATE 0.5 MG/G
0.05 OINTMENT TOPICAL
Qty: 60 | Refills: 0 | Status: DISCONTINUED | COMMUNITY
Start: 2022-08-02

## 2022-09-07 ENCOUNTER — APPOINTMENT (OUTPATIENT)
Dept: UROLOGY | Facility: CLINIC | Age: 87
End: 2022-09-07

## 2022-09-20 ENCOUNTER — APPOINTMENT (OUTPATIENT)
Dept: UROLOGY | Facility: CLINIC | Age: 87
End: 2022-09-20

## 2022-09-20 DIAGNOSIS — E11.40 TYPE 2 DIABETES MELLITUS WITH DIABETIC NEUROPATHY, UNSPECIFIED: ICD-10-CM

## 2022-09-20 DIAGNOSIS — R97.20 ELEVATED PROSTATE, SPECIFIC ANTIGEN [PSA]: ICD-10-CM

## 2022-10-07 ENCOUNTER — APPOINTMENT (OUTPATIENT)
Dept: UROLOGY | Facility: CLINIC | Age: 87
End: 2022-10-07

## 2022-10-07 ENCOUNTER — OUTPATIENT (OUTPATIENT)
Dept: OUTPATIENT SERVICES | Facility: HOSPITAL | Age: 87
LOS: 1 days | End: 2022-10-07
Payer: MEDICARE

## 2022-10-07 DIAGNOSIS — R35.0 FREQUENCY OF MICTURITION: ICD-10-CM

## 2022-10-07 PROCEDURE — 99213 OFFICE O/P EST LOW 20 MIN: CPT | Mod: 25

## 2022-10-07 PROCEDURE — 96402U: CUSTOM | Mod: NC

## 2022-10-07 PROCEDURE — 96402 CHEMO HORMON ANTINEOPL SQ/IM: CPT

## 2022-10-07 RX ORDER — LEUPROLIDE ACETATE 45 MG/.375ML
45 INJECTION, SUSPENSION, EXTENDED RELEASE SUBCUTANEOUS
Qty: 1 | Refills: 0 | Status: COMPLETED | OUTPATIENT
Start: 2022-10-07 | End: 2022-10-07

## 2022-10-07 RX ORDER — LEUPROLIDE ACETATE 45 MG/.375ML
45 INJECTION, SUSPENSION, EXTENDED RELEASE SUBCUTANEOUS
Refills: 0 | Status: COMPLETED | OUTPATIENT
Start: 2022-10-07

## 2022-10-07 RX ADMIN — LEUPROLIDE ACETATE 0 MG: KIT SUBCUTANEOUS at 00:00

## 2022-10-10 DIAGNOSIS — C61 MALIGNANT NEOPLASM OF PROSTATE: ICD-10-CM

## 2023-02-03 ENCOUNTER — INPATIENT (INPATIENT)
Facility: HOSPITAL | Age: 88
LOS: 2 days | Discharge: HOME | End: 2023-02-06
Attending: HOSPITALIST | Admitting: HOSPITALIST
Payer: MEDICARE

## 2023-02-03 VITALS
RESPIRATION RATE: 16 BRPM | HEIGHT: 68 IN | WEIGHT: 214.07 LBS | HEART RATE: 82 BPM | TEMPERATURE: 98 F | SYSTOLIC BLOOD PRESSURE: 200 MMHG | OXYGEN SATURATION: 98 % | DIASTOLIC BLOOD PRESSURE: 98 MMHG

## 2023-02-03 LAB
ALBUMIN SERPL ELPH-MCNC: 3.6 G/DL — SIGNIFICANT CHANGE UP (ref 3.3–5)
ALP SERPL-CCNC: 97 U/L — SIGNIFICANT CHANGE UP (ref 40–120)
ALT FLD-CCNC: 22 U/L — SIGNIFICANT CHANGE UP (ref 12–78)
ANION GAP SERPL CALC-SCNC: 10 MMOL/L — SIGNIFICANT CHANGE UP (ref 5–17)
APPEARANCE UR: CLEAR — SIGNIFICANT CHANGE UP
APTT BLD: 22.7 SEC — LOW (ref 27.5–35.5)
AST SERPL-CCNC: 30 U/L — SIGNIFICANT CHANGE UP (ref 15–37)
BASOPHILS # BLD AUTO: 0.02 K/UL — SIGNIFICANT CHANGE UP (ref 0–0.2)
BASOPHILS NFR BLD AUTO: 0.2 % — SIGNIFICANT CHANGE UP (ref 0–2)
BILIRUB SERPL-MCNC: 0.5 MG/DL — SIGNIFICANT CHANGE UP (ref 0.2–1.2)
BILIRUB UR-MCNC: NEGATIVE — SIGNIFICANT CHANGE UP
BUN SERPL-MCNC: 12 MG/DL — SIGNIFICANT CHANGE UP (ref 7–23)
CALCIUM SERPL-MCNC: 8.8 MG/DL — SIGNIFICANT CHANGE UP (ref 8.5–10.1)
CHLORIDE SERPL-SCNC: 99 MMOL/L — SIGNIFICANT CHANGE UP (ref 96–108)
CO2 SERPL-SCNC: 26 MMOL/L — SIGNIFICANT CHANGE UP (ref 22–31)
COLOR SPEC: YELLOW — SIGNIFICANT CHANGE UP
CREAT SERPL-MCNC: 1.43 MG/DL — HIGH (ref 0.5–1.3)
DIFF PNL FLD: ABNORMAL
EGFR: 48 ML/MIN/1.73M2 — LOW
EOSINOPHIL # BLD AUTO: 0.02 K/UL — SIGNIFICANT CHANGE UP (ref 0–0.5)
EOSINOPHIL NFR BLD AUTO: 0.2 % — SIGNIFICANT CHANGE UP (ref 0–6)
FLUAV AG NPH QL: SIGNIFICANT CHANGE UP
FLUBV AG NPH QL: SIGNIFICANT CHANGE UP
GLUCOSE SERPL-MCNC: 234 MG/DL — HIGH (ref 70–99)
GLUCOSE UR QL: 1000 MG/DL
HCT VFR BLD CALC: 39.2 % — SIGNIFICANT CHANGE UP (ref 39–50)
HGB BLD-MCNC: 12.3 G/DL — LOW (ref 13–17)
IMM GRANULOCYTES NFR BLD AUTO: 0.3 % — SIGNIFICANT CHANGE UP (ref 0–0.9)
INR BLD: 1.02 RATIO — SIGNIFICANT CHANGE UP (ref 0.88–1.16)
KETONES UR-MCNC: ABNORMAL
LEUKOCYTE ESTERASE UR-ACNC: NEGATIVE — SIGNIFICANT CHANGE UP
LYMPHOCYTES # BLD AUTO: 1.68 K/UL — SIGNIFICANT CHANGE UP (ref 1–3.3)
LYMPHOCYTES # BLD AUTO: 16.7 % — SIGNIFICANT CHANGE UP (ref 13–44)
MAGNESIUM SERPL-MCNC: 1.7 MG/DL — SIGNIFICANT CHANGE UP (ref 1.6–2.6)
MCHC RBC-ENTMCNC: 22.4 PG — LOW (ref 27–34)
MCHC RBC-ENTMCNC: 31.4 G/DL — LOW (ref 32–36)
MCV RBC AUTO: 71.3 FL — LOW (ref 80–100)
MONOCYTES # BLD AUTO: 0.76 K/UL — SIGNIFICANT CHANGE UP (ref 0–0.9)
MONOCYTES NFR BLD AUTO: 7.6 % — SIGNIFICANT CHANGE UP (ref 2–14)
NEUTROPHILS # BLD AUTO: 7.55 K/UL — HIGH (ref 1.8–7.4)
NEUTROPHILS NFR BLD AUTO: 75 % — SIGNIFICANT CHANGE UP (ref 43–77)
NITRITE UR-MCNC: NEGATIVE — SIGNIFICANT CHANGE UP
NRBC # BLD: 0 /100 WBCS — SIGNIFICANT CHANGE UP (ref 0–0)
NT-PROBNP SERPL-SCNC: 128 PG/ML — SIGNIFICANT CHANGE UP (ref 0–450)
PH UR: 6.5 — SIGNIFICANT CHANGE UP (ref 5–8)
PLATELET # BLD AUTO: 270 K/UL — SIGNIFICANT CHANGE UP (ref 150–400)
POTASSIUM SERPL-MCNC: 4.4 MMOL/L — SIGNIFICANT CHANGE UP (ref 3.5–5.3)
POTASSIUM SERPL-SCNC: 4.4 MMOL/L — SIGNIFICANT CHANGE UP (ref 3.5–5.3)
PROT SERPL-MCNC: 7.9 GM/DL — SIGNIFICANT CHANGE UP (ref 6–8.3)
PROT UR-MCNC: 500 MG/DL
PROTHROM AB SERPL-ACNC: 12.3 SEC — SIGNIFICANT CHANGE UP (ref 10.5–13.4)
RBC # BLD: 5.5 M/UL — SIGNIFICANT CHANGE UP (ref 4.2–5.8)
RBC # FLD: 17.1 % — HIGH (ref 10.3–14.5)
RBC CASTS # UR COMP ASSIST: SIGNIFICANT CHANGE UP /HPF (ref 0–4)
SARS-COV-2 RNA SPEC QL NAA+PROBE: SIGNIFICANT CHANGE UP
SODIUM SERPL-SCNC: 135 MMOL/L — SIGNIFICANT CHANGE UP (ref 135–145)
SP GR SPEC: 1.01 — SIGNIFICANT CHANGE UP (ref 1.01–1.02)
TROPONIN I, HIGH SENSITIVITY RESULT: 20.8 NG/L — SIGNIFICANT CHANGE UP
TSH SERPL-MCNC: 1.78 UIU/ML — SIGNIFICANT CHANGE UP (ref 0.36–3.74)
UROBILINOGEN FLD QL: NEGATIVE MG/DL — SIGNIFICANT CHANGE UP
WBC # BLD: 10.06 K/UL — SIGNIFICANT CHANGE UP (ref 3.8–10.5)
WBC # FLD AUTO: 10.06 K/UL — SIGNIFICANT CHANGE UP (ref 3.8–10.5)
WBC UR QL: NEGATIVE — SIGNIFICANT CHANGE UP

## 2023-02-03 PROCEDURE — 99223 1ST HOSP IP/OBS HIGH 75: CPT

## 2023-02-03 PROCEDURE — 70450 CT HEAD/BRAIN W/O DYE: CPT | Mod: 26,MA

## 2023-02-03 PROCEDURE — 71045 X-RAY EXAM CHEST 1 VIEW: CPT | Mod: 26

## 2023-02-03 PROCEDURE — 93010 ELECTROCARDIOGRAM REPORT: CPT | Mod: 76

## 2023-02-03 PROCEDURE — 99285 EMERGENCY DEPT VISIT HI MDM: CPT

## 2023-02-03 RX ORDER — MECLIZINE HCL 12.5 MG
50 TABLET ORAL ONCE
Refills: 0 | Status: COMPLETED | OUTPATIENT
Start: 2023-02-03 | End: 2023-02-03

## 2023-02-03 RX ORDER — AMLODIPINE BESYLATE 2.5 MG/1
10 TABLET ORAL ONCE
Refills: 0 | Status: COMPLETED | OUTPATIENT
Start: 2023-02-03 | End: 2023-02-03

## 2023-02-03 RX ADMIN — Medication 50 MILLIGRAM(S): at 20:21

## 2023-02-03 NOTE — ED ADULT NURSE NOTE - ED STAT RN HANDOFF DETAILS
pt admitted, inform receiving nurse to update the wife for the room number once pt has a room to the floor. report given to JESSICA Browne.

## 2023-02-03 NOTE — ED ADULT TRIAGE NOTE - CHIEF COMPLAINT QUOTE
Patient c/o vomiting, nausea and generalized weakness since yesterday. Patient did received 4th Covid shot. B/P 200/98 patient did not take medications today  hx HTN, DM, enlarged prostate

## 2023-02-03 NOTE — ED ADULT NURSE NOTE - NS ED NURSE RECORD ANOTHER HT AND WT
Patient returned to ED. Bedside report received from [facility].  Patient to remain flat until 2100     Awilda Fine RN  10/13/22 2017 Yes

## 2023-02-03 NOTE — ED ADULT NURSE NOTE - OBJECTIVE STATEMENT
Pt alert and oriented x4, endorsing nausea/ vomiting, headache and dizziness x1 days. Denies abdominal pain, chest pain, SOB, diarrhea, fever, chills, dysuria. PMH HTN, Prostate CA. Hypertensive in triage, did not take BP medication due to vomiting. Pt alert and oriented x4, endorsing nausea/ vomiting, headache and dizziness x1 days. Denies abdominal pain, chest pain, SOB, diarrhea, fever, chills, dysuria. PMH HTN, Diabetes, Prostate CA. Hypertensive in triage, did not take BP medication due to vomiting. No facial droop, steady gait, moving all extremities. Pt alert and oriented x4, endorsing nausea/ vomiting, headache and dizziness x1 days. Denies abdominal pain, chest pain, SOB, diarrhea, fever, chills, dysuria. PMH HTN, Diabetes, Prostate CA. Hypertensive in triage, did not take BP medication due to vomiting. No facial droop, moving all extremities, speech is clear, no vision changes.

## 2023-02-03 NOTE — ED PROVIDER NOTE - OBJECTIVE STATEMENT
87m hx htn, dm pw loss of balance since yesterday afternoon around 1pm. assd with some vomiting. no cough, no fever, no chills. pt denies slurred speech, weakness, sensory loss, confusion, vision loss. comes in with spouse.

## 2023-02-03 NOTE — ED PROVIDER NOTE - PHYSICAL EXAMINATION
Gen: Alert, NAD  Head: NC, AT   Eyes: PERRL, EOMI, normal lids/conjunctiva  ENT: normal hearing, patent oropharynx without erythema/exudate, uvula midline  Neck: supple, no tenderness, Trachea midline  Pulm: Bilateral BS, normal resp effort, no wheeze/stridor/retractions  CV: RRR, no M/R/G, 2+ radial and dp pulses bl, no edema  Abd: soft, NT/ND, +BS, no hepatosplenomegaly  Mskel: extremities x4 with normal ROM and no joint effusions. no ctl spine ttp.   Skin: no rash, no bruising   Neuro: AAOx3, no sensory/motor deficits, CN 2-12 intact. finger to nose intact bl. gait unstable Gen: Alert, NAD  Head: NC, AT   Eyes: PERRL, EOMI, normal lids/conjunctiva  ENT: diminished hearing, patent oropharynx without erythema/exudate, uvula midline  Neck: supple, no tenderness, Trachea midline  Pulm: Bilateral BS, normal resp effort, no wheeze/stridor/retractions  CV: RRR, no M/R/G, 2+ radial and dp pulses bl, no edema  Abd: soft, NT/ND, +BS, no hepatosplenomegaly  Mskel: extremities x4 with normal ROM and no joint effusions. no ctl spine ttp.   Skin: no rash, no bruising   Neuro: AAOx3, no sensory/motor deficits, CN 2-12 intact. finger to nose intact bl. gait unstable

## 2023-02-03 NOTE — ED PROVIDER NOTE - CLINICAL SUMMARY MEDICAL DECISION MAKING FREE TEXT BOX
pt pw loss of balance. concern for cerebellar cva vs peripheral process. given age and risk factors and inability to walk, will need to admit  I read ekg as nsr rate 83, no st elevation or depression, qtc 458, narrow qrs, normal axis. pt pw loss of balance. concern for cerebellar cva vs peripheral process. given age and risk factors and inability to walk, will need to admit  I read ekg as nsr rate 83, no st elevation or depression, qtc 458, narrow qrs, normal axis.  labs reassuring. ct head reassuring. ok for admit to floor, rule out cva.

## 2023-02-04 DIAGNOSIS — R26.2 DIFFICULTY IN WALKING, NOT ELSEWHERE CLASSIFIED: ICD-10-CM

## 2023-02-04 DIAGNOSIS — R42 DIZZINESS AND GIDDINESS: ICD-10-CM

## 2023-02-04 DIAGNOSIS — I16.1 HYPERTENSIVE EMERGENCY: ICD-10-CM

## 2023-02-04 DIAGNOSIS — E11.9 TYPE 2 DIABETES MELLITUS WITHOUT COMPLICATIONS: ICD-10-CM

## 2023-02-04 DIAGNOSIS — N17.9 ACUTE KIDNEY FAILURE, UNSPECIFIED: ICD-10-CM

## 2023-02-04 LAB — GLUCOSE BLDC GLUCOMTR-MCNC: 161 MG/DL — HIGH (ref 70–99)

## 2023-02-04 PROCEDURE — 93306 TTE W/DOPPLER COMPLETE: CPT | Mod: 26

## 2023-02-04 RX ORDER — ONDANSETRON 8 MG/1
4 TABLET, FILM COATED ORAL EVERY 8 HOURS
Refills: 0 | Status: DISCONTINUED | OUTPATIENT
Start: 2023-02-04 | End: 2023-02-06

## 2023-02-04 RX ORDER — INSULIN LISPRO 100/ML
VIAL (ML) SUBCUTANEOUS
Refills: 0 | Status: DISCONTINUED | OUTPATIENT
Start: 2023-02-04 | End: 2023-02-06

## 2023-02-04 RX ORDER — MECLIZINE HCL 12.5 MG
25 TABLET ORAL EVERY 8 HOURS
Refills: 0 | Status: DISCONTINUED | OUTPATIENT
Start: 2023-02-04 | End: 2023-02-06

## 2023-02-04 RX ORDER — LANOLIN ALCOHOL/MO/W.PET/CERES
3 CREAM (GRAM) TOPICAL AT BEDTIME
Refills: 0 | Status: DISCONTINUED | OUTPATIENT
Start: 2023-02-04 | End: 2023-02-06

## 2023-02-04 RX ORDER — DEXTROSE 50 % IN WATER 50 %
15 SYRINGE (ML) INTRAVENOUS ONCE
Refills: 0 | Status: DISCONTINUED | OUTPATIENT
Start: 2023-02-04 | End: 2023-02-06

## 2023-02-04 RX ORDER — GLUCAGON INJECTION, SOLUTION 0.5 MG/.1ML
1 INJECTION, SOLUTION SUBCUTANEOUS ONCE
Refills: 0 | Status: DISCONTINUED | OUTPATIENT
Start: 2023-02-04 | End: 2023-02-06

## 2023-02-04 RX ORDER — ENOXAPARIN SODIUM 100 MG/ML
40 INJECTION SUBCUTANEOUS EVERY 24 HOURS
Refills: 0 | Status: DISCONTINUED | OUTPATIENT
Start: 2023-02-04 | End: 2023-02-06

## 2023-02-04 RX ORDER — SODIUM CHLORIDE 9 MG/ML
1000 INJECTION, SOLUTION INTRAVENOUS
Refills: 0 | Status: DISCONTINUED | OUTPATIENT
Start: 2023-02-04 | End: 2023-02-06

## 2023-02-04 RX ORDER — AMLODIPINE BESYLATE 2.5 MG/1
10 TABLET ORAL DAILY
Refills: 0 | Status: DISCONTINUED | OUTPATIENT
Start: 2023-02-04 | End: 2023-02-06

## 2023-02-04 RX ORDER — ACETAMINOPHEN 500 MG
975 TABLET ORAL ONCE
Refills: 0 | Status: COMPLETED | OUTPATIENT
Start: 2023-02-04 | End: 2023-02-04

## 2023-02-04 RX ORDER — DEXTROSE 50 % IN WATER 50 %
25 SYRINGE (ML) INTRAVENOUS ONCE
Refills: 0 | Status: DISCONTINUED | OUTPATIENT
Start: 2023-02-04 | End: 2023-02-06

## 2023-02-04 RX ORDER — ACETAMINOPHEN 500 MG
650 TABLET ORAL EVERY 6 HOURS
Refills: 0 | Status: DISCONTINUED | OUTPATIENT
Start: 2023-02-04 | End: 2023-02-06

## 2023-02-04 RX ADMIN — AMLODIPINE BESYLATE 10 MILLIGRAM(S): 2.5 TABLET ORAL at 05:58

## 2023-02-04 RX ADMIN — ENOXAPARIN SODIUM 40 MILLIGRAM(S): 100 INJECTION SUBCUTANEOUS at 05:58

## 2023-02-04 RX ADMIN — Medication 975 MILLIGRAM(S): at 01:21

## 2023-02-04 RX ADMIN — Medication 975 MILLIGRAM(S): at 00:45

## 2023-02-04 RX ADMIN — Medication 0.2 MILLIGRAM(S): at 00:02

## 2023-02-04 RX ADMIN — AMLODIPINE BESYLATE 10 MILLIGRAM(S): 2.5 TABLET ORAL at 00:02

## 2023-02-04 NOTE — H&P ADULT - NSHPPHYSICALEXAM_GEN_ALL_CORE
constitutional: NAD AAOx3  HEENT: PERRLA EOMI  CV: RRR S1S2  Pulm: CTA b/l  GI: soft nontender nondistended + BS   Neuro: CN II-XII grossly intact   Musculoskeletal: no pedal edema or calf tenderness b/l

## 2023-02-04 NOTE — CHART NOTE - NSCHARTNOTEFT_GEN_A_CORE
88 yo m hx htn, dm presenting due to loss of balance associated with n/v since yesterday afternoon around 1pm. describes spinning sensation. no recent illness, no ear fullness, no change in vision. denies f/c, cp, palpitations, abd pain, focal weakness, numbness, h/a. aao x 3. initial bp 200/98. ct head no acute pathology. labs significant for creat 1.43 no past labs to compare. given amlodipine 10, clonidine 0.2 and meclizine 50 in ed.   Today , patient report improvement in vertigo   TTE- 1. Left ventricular ejection fraction, by visual estimation, is 70 to   75%.   2. Hyperdynamic global left ventricular systolic function.   3. Trace mitral valve regurgitation.   4. Estimated pulmonary artery systolic pressure is 42.9 mmHg assuming a   right atrial pressure of 5 mmHg, which is consistent with mild pulmonary   hypertension.    MRI - pending     .  LABS:                         12.3   10.06 )-----------( 270      ( 2023 20:15 )             39.2     02-    135  |  99  |  12  ----------------------------<  234<H>  4.4   |  26  |  1.43<H>    Ca    8.8      2023 20:15  Mg     1.7     02-    TPro  7.9  /  Alb  3.6  /  TBili  0.5  /  DBili  x   /  AST  30  /  ALT  22  /  AlkPhos  97  02-03    PT/INR - ( 2023 21:20 )   PT: 12.3 sec;   INR: 1.02 ratio         PTT - ( 2023 21:20 )  PTT:22.7 sec  Urinalysis Basic - ( 2023 20:15 )    Color: Yellow / Appearance: Clear / S.010 / pH: x  Gluc: x / Ketone: Trace  / Bili: Negative / Urobili: Negative mg/dL   Blood: x / Protein: 500 mg/dL / Nitrite: Negative   Leuk Esterase: Negative / RBC: 0-2 /HPF / WBC Negative   Sq Epi: x / Non Sq Epi: x / Bacteria: x                RADIOLOGY, EKG & ADDITIONAL TESTS: Reviewed.     .  VITAL SIGNS:  T(C): 36.6 (23 @ 16:16), Max: 37.2 (23 @ 02:04)  T(F): 97.9 (23 @ 16:16), Max: 98.9 (23 @ 02:04)  HR: 75 (23 @ 16:16) (60 - 83)  BP: 138/80 (23 @ 16:16) (108/67 - 200/100)  BP(mean): --  RR: 18 (23 @ 16:16) (15 - 18)  SpO2: 95% (23 @ 16:16) (95% - 100%)  Wt(kg): --    Physical exam unchanged

## 2023-02-04 NOTE — PATIENT PROFILE ADULT - FALL HARM RISK - HARM RISK INTERVENTIONS

## 2023-02-04 NOTE — H&P ADULT - HISTORY OF PRESENT ILLNESS
87m hx htn, dm pw loss of balance since yesterday afternoon around 1pm. assd with some vomiting. no cough, no fever, no chills. pt denies slurred speech, weakness, sensory loss, confusion, vision loss. comes in with spouse. ct head (-) for acute pathology  this is an 88 yo m hx htn, dm presenting due to loss of balance associated with n/v since yesterday afternoon around 1pm. describes spinning sensation. no recent illness, no ear fullness, no change in vision. denies f/c, cp, palpitations, abd pain, focal weakness, numbness, h/a. aao x 3. initial bp 200/98. ct head no acute pathology. labs significant for creat 1.43 no past labs to compare. given amlodipine 10, clonidine 0.2 and meclizine 50 in ed.

## 2023-02-04 NOTE — H&P ADULT - ASSESSMENT
r/o cva  gait impairment   vertigo   htn emergency   DM2  tino vs ckd   -mr brain   -telemetry  -check tsh, lipid panel, a1c  -sart asa 81d. cw statin   -cw home rotvasc 10, hold lisinoptil bc unsure if pt has tino or ckd  -iss bgm   -lovenox ppx, dm diet    this is an 88 yo m hx htn, dm presenting due to loss of balance associated with n/v since yesterday afternoon around 1pm. describes spinning sensation. no recent illness, no ear fullness, no change in vision. denies f/c, cp, palpitations, abd pain, focal weakness, numbness, h/a. aao x 3. initial bp 200/98. ct head no acute pathology. labs significant for creat 1.43 no past labs to compare. given amlodipine 10, clonidine 0.2 and meclizine 50 in ed.     r/o cva  gait impairment   vertigo   htn emergency   DM2  tino vs ckd   -mr brain   -telemetry  -check tsh, lipid panel, a1c  -start asa 81d. cw statin   -cw home rnorvasc 10, hold lisinoptil bc unsure if pt has tino or ckd  -iss bgm   -lovenox ppx, dm diet

## 2023-02-05 DIAGNOSIS — Z29.9 ENCOUNTER FOR PROPHYLACTIC MEASURES, UNSPECIFIED: ICD-10-CM

## 2023-02-05 LAB
A1C WITH ESTIMATED AVERAGE GLUCOSE RESULT: 7.2 % — HIGH (ref 4–5.6)
ALBUMIN SERPL ELPH-MCNC: 3.4 G/DL — SIGNIFICANT CHANGE UP (ref 3.3–5)
ALP SERPL-CCNC: 95 U/L — SIGNIFICANT CHANGE UP (ref 40–120)
ALT FLD-CCNC: 29 U/L — SIGNIFICANT CHANGE UP (ref 12–78)
ANION GAP SERPL CALC-SCNC: 12 MMOL/L — SIGNIFICANT CHANGE UP (ref 5–17)
APPEARANCE UR: CLEAR — SIGNIFICANT CHANGE UP
AST SERPL-CCNC: 21 U/L — SIGNIFICANT CHANGE UP (ref 15–37)
BASOPHILS # BLD AUTO: 0.05 K/UL — SIGNIFICANT CHANGE UP (ref 0–0.2)
BASOPHILS NFR BLD AUTO: 0.5 % — SIGNIFICANT CHANGE UP (ref 0–2)
BILIRUB SERPL-MCNC: 0.4 MG/DL — SIGNIFICANT CHANGE UP (ref 0.2–1.2)
BILIRUB UR-MCNC: NEGATIVE — SIGNIFICANT CHANGE UP
BUN SERPL-MCNC: 26 MG/DL — HIGH (ref 7–23)
CALCIUM SERPL-MCNC: 8.8 MG/DL — SIGNIFICANT CHANGE UP (ref 8.5–10.1)
CHLORIDE SERPL-SCNC: 105 MMOL/L — SIGNIFICANT CHANGE UP (ref 96–108)
CHOLEST SERPL-MCNC: 182 MG/DL — SIGNIFICANT CHANGE UP
CO2 SERPL-SCNC: 25 MMOL/L — SIGNIFICANT CHANGE UP (ref 22–31)
COLOR SPEC: YELLOW — SIGNIFICANT CHANGE UP
CREAT SERPL-MCNC: 1.86 MG/DL — HIGH (ref 0.5–1.3)
DIFF PNL FLD: ABNORMAL
EGFR: 35 ML/MIN/1.73M2 — LOW
EOSINOPHIL # BLD AUTO: 0.45 K/UL — SIGNIFICANT CHANGE UP (ref 0–0.5)
EOSINOPHIL NFR BLD AUTO: 4.3 % — SIGNIFICANT CHANGE UP (ref 0–6)
ESTIMATED AVERAGE GLUCOSE: 160 MG/DL — HIGH (ref 68–114)
GLUCOSE BLDC GLUCOMTR-MCNC: 255 MG/DL — HIGH (ref 70–99)
GLUCOSE SERPL-MCNC: 285 MG/DL — HIGH (ref 70–99)
GLUCOSE UR QL: 100 MG/DL
HCT VFR BLD CALC: 38.8 % — LOW (ref 39–50)
HDLC SERPL-MCNC: 40 MG/DL — LOW
HGB BLD-MCNC: 11.9 G/DL — LOW (ref 13–17)
IMM GRANULOCYTES NFR BLD AUTO: 0.4 % — SIGNIFICANT CHANGE UP (ref 0–0.9)
KETONES UR-MCNC: NEGATIVE — SIGNIFICANT CHANGE UP
LEUKOCYTE ESTERASE UR-ACNC: NEGATIVE — SIGNIFICANT CHANGE UP
LIPID PNL WITH DIRECT LDL SERPL: 104 MG/DL — HIGH
LYMPHOCYTES # BLD AUTO: 2.42 K/UL — SIGNIFICANT CHANGE UP (ref 1–3.3)
LYMPHOCYTES # BLD AUTO: 23.1 % — SIGNIFICANT CHANGE UP (ref 13–44)
MAGNESIUM SERPL-MCNC: 1.7 MG/DL — SIGNIFICANT CHANGE UP (ref 1.6–2.6)
MCHC RBC-ENTMCNC: 22.5 PG — LOW (ref 27–34)
MCHC RBC-ENTMCNC: 30.7 G/DL — LOW (ref 32–36)
MCV RBC AUTO: 73.5 FL — LOW (ref 80–100)
MONOCYTES # BLD AUTO: 0.97 K/UL — HIGH (ref 0–0.9)
MONOCYTES NFR BLD AUTO: 9.3 % — SIGNIFICANT CHANGE UP (ref 2–14)
NEUTROPHILS # BLD AUTO: 6.53 K/UL — SIGNIFICANT CHANGE UP (ref 1.8–7.4)
NEUTROPHILS NFR BLD AUTO: 62.4 % — SIGNIFICANT CHANGE UP (ref 43–77)
NITRITE UR-MCNC: NEGATIVE — SIGNIFICANT CHANGE UP
NON HDL CHOLESTEROL: 141 MG/DL — HIGH
NRBC # BLD: 0 /100 WBCS — SIGNIFICANT CHANGE UP (ref 0–0)
PH UR: 6 — SIGNIFICANT CHANGE UP (ref 5–8)
PHOSPHATE SERPL-MCNC: 3.2 MG/DL — SIGNIFICANT CHANGE UP (ref 2.5–4.5)
PLATELET # BLD AUTO: 332 K/UL — SIGNIFICANT CHANGE UP (ref 150–400)
POTASSIUM SERPL-MCNC: 3.2 MMOL/L — LOW (ref 3.5–5.3)
POTASSIUM SERPL-SCNC: 3.2 MMOL/L — LOW (ref 3.5–5.3)
PROT SERPL-MCNC: 7.4 GM/DL — SIGNIFICANT CHANGE UP (ref 6–8.3)
PROT UR-MCNC: 500 MG/DL
RBC # BLD: 5.28 M/UL — SIGNIFICANT CHANGE UP (ref 4.2–5.8)
RBC # FLD: 17.2 % — HIGH (ref 10.3–14.5)
RBC CASTS # UR COMP ASSIST: SIGNIFICANT CHANGE UP /HPF (ref 0–4)
SODIUM SERPL-SCNC: 142 MMOL/L — SIGNIFICANT CHANGE UP (ref 135–145)
SP GR SPEC: 1.02 — SIGNIFICANT CHANGE UP (ref 1.01–1.02)
TRIGL SERPL-MCNC: 187 MG/DL — HIGH
TSH SERPL-MCNC: 1.93 UU/ML — SIGNIFICANT CHANGE UP (ref 0.36–3.74)
UROBILINOGEN FLD QL: NEGATIVE MG/DL — SIGNIFICANT CHANGE UP
WBC # BLD: 10.46 K/UL — SIGNIFICANT CHANGE UP (ref 3.8–10.5)
WBC # FLD AUTO: 10.46 K/UL — SIGNIFICANT CHANGE UP (ref 3.8–10.5)
WBC UR QL: SIGNIFICANT CHANGE UP

## 2023-02-05 PROCEDURE — 70551 MRI BRAIN STEM W/O DYE: CPT | Mod: 26

## 2023-02-05 PROCEDURE — 99232 SBSQ HOSP IP/OBS MODERATE 35: CPT

## 2023-02-05 RX ORDER — SODIUM CHLORIDE 9 MG/ML
1000 INJECTION, SOLUTION INTRAVENOUS
Refills: 0 | Status: DISCONTINUED | OUTPATIENT
Start: 2023-02-05 | End: 2023-02-06

## 2023-02-05 RX ORDER — POTASSIUM CHLORIDE 20 MEQ
40 PACKET (EA) ORAL ONCE
Refills: 0 | Status: COMPLETED | OUTPATIENT
Start: 2023-02-05 | End: 2023-02-05

## 2023-02-05 RX ADMIN — AMLODIPINE BESYLATE 10 MILLIGRAM(S): 2.5 TABLET ORAL at 05:13

## 2023-02-05 RX ADMIN — SODIUM CHLORIDE 75 MILLILITER(S): 9 INJECTION, SOLUTION INTRAVENOUS at 20:09

## 2023-02-05 RX ADMIN — Medication 40 MILLIEQUIVALENT(S): at 12:47

## 2023-02-05 RX ADMIN — ENOXAPARIN SODIUM 40 MILLIGRAM(S): 100 INJECTION SUBCUTANEOUS at 05:13

## 2023-02-05 NOTE — PHYSICAL THERAPY INITIAL EVALUATION ADULT - ADDITIONAL COMMENTS
Pt states he lives with his spouse in a PH, 1 threshold step to enter no HRs and resides on main level. Pt states he is independent with ADL's and functional mobility using a straight cane.

## 2023-02-05 NOTE — PROGRESS NOTE ADULT - PROBLEM SELECTOR PLAN 2
pos BPV   Will check MRI to eval for central vertigo  MR- No acute intracranial hemorrhage, acute infarction, extra-axial fluid   collection or hydrocephalus.

## 2023-02-05 NOTE — PHYSICAL THERAPY INITIAL EVALUATION ADULT - PERTINENT HX OF CURRENT PROBLEM, REHAB EVAL
86 yo m hx htn, dm presenting due to loss of balance associated with n/v since yesterday afternoon around 1pm. describes spinning sensation. no recent illness, no ear fullness, no change in vision. denies f/c, cp, palpitations, abd pain, focal weakness, numbness, h/a. aao x 3. initial bp 200/98. ct head no acute pathology. labs significant for creat 1.43 no past labs to compare. given amlodipine 10, clonidine 0.2 and meclizine 50 in ed.

## 2023-02-05 NOTE — PROGRESS NOTE ADULT - ASSESSMENT
this is an 86 yo m hx htn, dm presenting due to loss of balance associated with n/v since yesterday afternoon around 1pm. describes spinning sensation. no recent illness, no ear fullness, no change in vision. denies f/c, cp, palpitations, abd pain, focal weakness, numbness, h/a. aao x 3. initial bp 200/98. ct head no acute pathology. labs significant for creat 1.43 no past labs to compare. given amlodipine 10, clonidine 0.2 and meclizine 50 in ed.     r/o cva  gait impairment   vertigo   htn emergency   DM2  tino vs ckd   -mr brain   -telemetry  -check tsh, lipid panel, a1c  -start asa 81d. cw statin   -cw home rnorvasc 10, hold lisinoptil bc unsure if pt has tino or ckd  -iss bgm   -lovenox ppx, dm diet

## 2023-02-05 NOTE — PROGRESS NOTE ADULT - SUBJECTIVE AND OBJECTIVE BOX
Medicine Progress Note    Patient is a 87y old  Male who presents with a chief complaint of     SUBJECTIVE / OVERNIGHT EVENTS:  no events , vertigo improving with Meclizine   Pending MRI to rule out central vertigo         MEDICATIONS  (STANDING):  amLODIPine   Tablet 10 milliGRAM(s) Oral daily  dextrose 5%. 1000 milliLiter(s) (50 mL/Hr) IV Continuous <Continuous>  dextrose 50% Injectable 25 Gram(s) IV Push once  enoxaparin Injectable 40 milliGRAM(s) SubCutaneous every 24 hours  glucagon  Injectable 1 milliGRAM(s) IntraMuscular once  insulin lispro (ADMELOG) corrective regimen sliding scale   SubCutaneous three times a day before meals    MEDICATIONS  (PRN):  acetaminophen     Tablet .. 650 milliGRAM(s) Oral every 6 hours PRN Temp greater or equal to 38C (100.4F), Mild Pain (1 - 3)  aluminum hydroxide/magnesium hydroxide/simethicone Suspension 30 milliLiter(s) Oral every 4 hours PRN Dyspepsia  dextrose Oral Gel 15 Gram(s) Oral once PRN Blood Glucose LESS THAN 70 milliGRAM(s)/deciliter  meclizine 25 milliGRAM(s) Oral every 8 hours PRN Dizziness  melatonin 3 milliGRAM(s) Oral at bedtime PRN Insomnia  ondansetron Injectable 4 milliGRAM(s) IV Push every 8 hours PRN Nausea and/or Vomiting    CAPILLARY BLOOD GLUCOSE        I&O's Summary      PHYSICAL EXAM:  Vital Signs Last 24 Hrs  T(C): 37.1 (2023 11:27), Max: 37.2 (2023 23:01)  T(F): 98.7 (2023 11:27), Max: 98.9 (2023 23:01)  HR: 74 (2023 11:27) (70 - 79)  BP: 146/71 (2023 11:27) (146/71 - 177/91)  BP(mean): --  RR: 18 (2023 11:27) (16 - 24)  SpO2: 95% (2023 11:27) (95% - 98%)    Parameters below as of 2023 11:27  Patient On (Oxygen Delivery Method): room air      CONSTITUTIONAL: NAD,  ENMT: Moist oral mucosa, no pharyngeal injection or exudates;   RESPIRATORY: Normal respiratory effort; lungs are clear to auscultation bilaterally  CARDIOVASCULAR: Regular rate and rhythm, normal S1 and S2,; No lower extremity edema;   ABDOMEN: Nontender to palpation, normoactive bowel sounds, no rebound/guarding;   PSYCH: A+O to person, place, and time; affect appropriate  NEUROLOGY: CN 2-12 are intact and symmetric; no gross sensory deficits   SKIN: No rashes; no palpable lesions    LABS:                        11.9   10.46 )-----------( 332      ( 2023 09:17 )             38.8     02-05    142  |  105  |  26<H>  ----------------------------<  285<H>  3.2<L>   |  25  |  1.86<H>    Ca    8.8      2023 09:17  Phos  3.2     02-05  Mg     1.7     02-05    TPro  7.4  /  Alb  3.4  /  TBili  0.4  /  DBili  x   /  AST  21  /  ALT  29  /  AlkPhos  95  02-05    PT/INR - ( 2023 21:20 )   PT: 12.3 sec;   INR: 1.02 ratio         PTT - ( 2023 21:20 )  PTT:22.7 sec      Urinalysis Basic - ( 2023 20:15 )    Color: Yellow / Appearance: Clear / S.010 / pH: x  Gluc: x / Ketone: Trace  / Bili: Negative / Urobili: Negative mg/dL   Blood: x / Protein: 500 mg/dL / Nitrite: Negative   Leuk Esterase: Negative / RBC: 0-2 /HPF / WBC Negative   Sq Epi: x / Non Sq Epi: x / Bacteria: x            RADIOLOGY & ADDITIONAL TESTS:  Imaging from Last 24 Hours:    Electrocardiogram/QTc Interval:    COORDINATION OF CARE:  Care Discussed with Consultants/Other Providers:

## 2023-02-06 ENCOUNTER — TRANSCRIPTION ENCOUNTER (OUTPATIENT)
Age: 88
End: 2023-02-06

## 2023-02-06 VITALS
DIASTOLIC BLOOD PRESSURE: 83 MMHG | RESPIRATION RATE: 18 BRPM | TEMPERATURE: 99 F | SYSTOLIC BLOOD PRESSURE: 164 MMHG | HEART RATE: 79 BPM | OXYGEN SATURATION: 93 %

## 2023-02-06 LAB
A1C WITH ESTIMATED AVERAGE GLUCOSE RESULT: 7.1 % — HIGH (ref 4–5.6)
ALBUMIN SERPL ELPH-MCNC: 3.2 G/DL — LOW (ref 3.3–5)
ALP SERPL-CCNC: 87 U/L — SIGNIFICANT CHANGE UP (ref 40–120)
ALT FLD-CCNC: 24 U/L — SIGNIFICANT CHANGE UP (ref 12–78)
ANION GAP SERPL CALC-SCNC: 9 MMOL/L — SIGNIFICANT CHANGE UP (ref 5–17)
AST SERPL-CCNC: 19 U/L — SIGNIFICANT CHANGE UP (ref 15–37)
BASOPHILS # BLD AUTO: 0.03 K/UL — SIGNIFICANT CHANGE UP (ref 0–0.2)
BASOPHILS NFR BLD AUTO: 0.4 % — SIGNIFICANT CHANGE UP (ref 0–2)
BILIRUB SERPL-MCNC: 0.4 MG/DL — SIGNIFICANT CHANGE UP (ref 0.2–1.2)
BUN SERPL-MCNC: 24 MG/DL — HIGH (ref 7–23)
CALCIUM SERPL-MCNC: 8.8 MG/DL — SIGNIFICANT CHANGE UP (ref 8.5–10.1)
CHLORIDE SERPL-SCNC: 105 MMOL/L — SIGNIFICANT CHANGE UP (ref 96–108)
CO2 SERPL-SCNC: 29 MMOL/L — SIGNIFICANT CHANGE UP (ref 22–31)
CREAT ?TM UR-MCNC: 136 MG/DL — SIGNIFICANT CHANGE UP
CREAT SERPL-MCNC: 1.54 MG/DL — HIGH (ref 0.5–1.3)
EGFR: 44 ML/MIN/1.73M2 — LOW
EOSINOPHIL # BLD AUTO: 0.43 K/UL — SIGNIFICANT CHANGE UP (ref 0–0.5)
EOSINOPHIL NFR BLD AUTO: 5.1 % — SIGNIFICANT CHANGE UP (ref 0–6)
ESTIMATED AVERAGE GLUCOSE: 157 MG/DL — HIGH (ref 68–114)
GLUCOSE BLDC GLUCOMTR-MCNC: 197 MG/DL — HIGH (ref 70–99)
GLUCOSE SERPL-MCNC: 190 MG/DL — HIGH (ref 70–99)
HCT VFR BLD CALC: 36.7 % — LOW (ref 39–50)
HGB BLD-MCNC: 11.6 G/DL — LOW (ref 13–17)
IMM GRANULOCYTES NFR BLD AUTO: 0.6 % — SIGNIFICANT CHANGE UP (ref 0–0.9)
LYMPHOCYTES # BLD AUTO: 2.01 K/UL — SIGNIFICANT CHANGE UP (ref 1–3.3)
LYMPHOCYTES # BLD AUTO: 23.8 % — SIGNIFICANT CHANGE UP (ref 13–44)
MCHC RBC-ENTMCNC: 22.6 PG — LOW (ref 27–34)
MCHC RBC-ENTMCNC: 31.6 G/DL — LOW (ref 32–36)
MCV RBC AUTO: 71.5 FL — LOW (ref 80–100)
MONOCYTES # BLD AUTO: 0.96 K/UL — HIGH (ref 0–0.9)
MONOCYTES NFR BLD AUTO: 11.4 % — SIGNIFICANT CHANGE UP (ref 2–14)
NEUTROPHILS # BLD AUTO: 4.95 K/UL — SIGNIFICANT CHANGE UP (ref 1.8–7.4)
NEUTROPHILS NFR BLD AUTO: 58.7 % — SIGNIFICANT CHANGE UP (ref 43–77)
NRBC # BLD: 0 /100 WBCS — SIGNIFICANT CHANGE UP (ref 0–0)
OSMOLALITY UR: 549 MOSM/KG — SIGNIFICANT CHANGE UP (ref 50–1200)
PLATELET # BLD AUTO: 306 K/UL — SIGNIFICANT CHANGE UP (ref 150–400)
POTASSIUM SERPL-MCNC: 3.1 MMOL/L — LOW (ref 3.5–5.3)
POTASSIUM SERPL-SCNC: 3.1 MMOL/L — LOW (ref 3.5–5.3)
PROT ?TM UR-MCNC: 214 MG/DL — HIGH (ref 0–12)
PROT SERPL-MCNC: 6.8 GM/DL — SIGNIFICANT CHANGE UP (ref 6–8.3)
PROT/CREAT UR-RTO: 1.6 RATIO — HIGH (ref 0–0.2)
RBC # BLD: 5.13 M/UL — SIGNIFICANT CHANGE UP (ref 4.2–5.8)
RBC # FLD: 17.1 % — HIGH (ref 10.3–14.5)
SODIUM SERPL-SCNC: 143 MMOL/L — SIGNIFICANT CHANGE UP (ref 135–145)
SODIUM UR-SCNC: 72 MMOL/L — SIGNIFICANT CHANGE UP
WBC # BLD: 8.43 K/UL — SIGNIFICANT CHANGE UP (ref 3.8–10.5)
WBC # FLD AUTO: 8.43 K/UL — SIGNIFICANT CHANGE UP (ref 3.8–10.5)

## 2023-02-06 PROCEDURE — 99239 HOSP IP/OBS DSCHRG MGMT >30: CPT

## 2023-02-06 RX ORDER — POTASSIUM CHLORIDE 20 MEQ
40 PACKET (EA) ORAL EVERY 4 HOURS
Refills: 0 | Status: DISCONTINUED | OUTPATIENT
Start: 2023-02-06 | End: 2023-02-06

## 2023-02-06 RX ORDER — AMLODIPINE BESYLATE 2.5 MG/1
1 TABLET ORAL
Qty: 30 | Refills: 0
Start: 2023-02-06 | End: 2023-03-07

## 2023-02-06 RX ORDER — MECLIZINE HCL 12.5 MG
1 TABLET ORAL
Qty: 90 | Refills: 0
Start: 2023-02-06 | End: 2023-03-07

## 2023-02-06 RX ADMIN — Medication 1: at 09:13

## 2023-02-06 RX ADMIN — ENOXAPARIN SODIUM 40 MILLIGRAM(S): 100 INJECTION SUBCUTANEOUS at 05:44

## 2023-02-06 RX ADMIN — AMLODIPINE BESYLATE 10 MILLIGRAM(S): 2.5 TABLET ORAL at 05:44

## 2023-02-06 RX ADMIN — Medication 40 MILLIEQUIVALENT(S): at 13:31

## 2023-02-06 NOTE — DISCHARGE NOTE PROVIDER - PROVIDER TOKENS
FREE:[LAST:[karthikeyan],FIRST:[],PHONE:[(   )    -],FAX:[(   )    -],ADDRESS:[dr napier [facility] physicians]]

## 2023-02-06 NOTE — DISCHARGE NOTE PROVIDER - NSDCFUSCHEDAPPT_GEN_ALL_CORE_FT
Duarte Daniels  [facility] Physician [facility]  UROLOGY [address]  Scheduled Appointment: 04/07/2023

## 2023-02-06 NOTE — DISCHARGE NOTE PROVIDER - NSDCMRMEDTOKEN_GEN_ALL_CORE_FT
amLODIPine 10 mg oral tablet: 1 tab(s) orally once a day  meclizine 25 mg oral tablet: 1 tab(s) orally every 8 hours, As needed, Dizziness  Out patient PT : Outpatient Vestibular physical therapy   dx: vertigo

## 2023-02-06 NOTE — DISCHARGE NOTE PROVIDER - NSDCCPCAREPLAN_GEN_ALL_CORE_FT
PRINCIPAL DISCHARGE DIAGNOSIS  Diagnosis: Vertigo  Assessment and Plan of Treatment: please follow up with your primary care doctor Andry      SECONDARY DISCHARGE DIAGNOSES  Diagnosis: DM2 (diabetes mellitus, type 2)  Assessment and Plan of Treatment:     Diagnosis: KIRBY (acute kidney injury)  Assessment and Plan of Treatment:     Diagnosis: Hypertensive emergency  Assessment and Plan of Treatment:

## 2023-02-06 NOTE — DISCHARGE NOTE PROVIDER - HOSPITAL COURSE
HPI:  this is an 88 yo m hx htn, dm presenting due to loss of balance associated with n/v since yesterday afternoon around 1pm. describes spinning sensation. no recent illness, no ear fullness, no change in vision. denies f/c, cp, palpitations, abd pain, focal weakness, numbness, h/a. aao x 3. initial bp 200/98. ct head no acute pathology. labs significant for creat 1.43 no past labs to compare. given amlodipine 10, clonidine 0.2 and meclizine 50 in ed.  (04 Feb 2023 03:07)    this is an 88 yo m hx htn, dm presenting due to loss of balance associated with n/v since yesterday afternoon around 1pm. describes spinning sensation. no recent illness, no ear fullness, no change in vision. denies f/c, cp, palpitations, abd pain, focal weakness, numbness, h/a. aao x 3. initial bp 200/98. ct head no acute pathology. labs significant for creat 1.43 no past labs to compare. given amlodipine 10, clonidine 0.2 and meclizine 50 in ed.     r/o cva  gait impairment   vertigo   htn emergency   DM2  kirby vs ckd   -mr brain   -telemetry  -check tsh, lipid panel, a1c  -start asa 81d. cw statin   -cw home rnorvasc 10, hold lisinoptil bc unsure if pt has kirby or ckd  -iss bgm   -lovenox ppx, dm diet        Problem/Plan - 1:  ·  Problem: Impaired ambulation.   ·  Plan: PT eval - Vestibular rehab.     Problem/Plan - 2:  ·  Problem: Vertigo.   ·  Plan: pos BPV   Will check MRI to eval for central vertigo  MR- No acute intracranial hemorrhage, acute infarction, extra-axial fluid   collection or hydrocephalus.     Problem/Plan - 3:  ·  Problem: Hypertensive emergency.   ·  Plan: BP better after starting BP meds   c/w Amlodipine 10 mg for now.     Problem/Plan - 4:  ·  Problem: DM2 (diabetes mellitus, type 2).   ·  Plan: check HB a1C  ISS , FS titration.     Problem/Plan - 5:  ·  Problem: KIRBY (acute kidney injury).   ·  Plan: Unknown baseline   check urine lytes , urine prot/cr ratio   IVF.     Problem/Plan - 6:  ·  Problem: Need for prophylactic measure.      Problem/Plan - 7:  ·  Problem: R/O CVA (cerebrovascular accident).   ·  Plan: ruled out   TTE- mild pulm HTN.

## 2023-02-06 NOTE — DISCHARGE NOTE PROVIDER - CARE PROVIDER_API CALL
dr dr karthikeyan napier [facility] physicians  Phone: (   )    -  Fax: (   )    -  Follow Up Time:

## 2023-02-06 NOTE — DISCHARGE NOTE NURSING/CASE MANAGEMENT/SOCIAL WORK - PATIENT PORTAL LINK FT
You can access the FollowMyHealth Patient Portal offered by [facility] by registering at the following website: [email or web address]. By joining [facility]’s FollowMyHealth portal, you will also be able to view your health information using other applications (apps) compatible with our system.

## 2023-02-09 DIAGNOSIS — E11.9 TYPE 2 DIABETES MELLITUS WITHOUT COMPLICATIONS: ICD-10-CM

## 2023-02-09 DIAGNOSIS — I16.1 HYPERTENSIVE EMERGENCY: ICD-10-CM

## 2023-02-09 DIAGNOSIS — I10 ESSENTIAL (PRIMARY) HYPERTENSION: ICD-10-CM

## 2023-02-09 DIAGNOSIS — R11.2 NAUSEA WITH VOMITING, UNSPECIFIED: ICD-10-CM

## 2023-02-09 DIAGNOSIS — R42 DIZZINESS AND GIDDINESS: ICD-10-CM

## 2023-02-09 DIAGNOSIS — N17.9 ACUTE KIDNEY FAILURE, UNSPECIFIED: ICD-10-CM

## 2023-02-09 DIAGNOSIS — Z20.822 CONTACT WITH AND (SUSPECTED) EXPOSURE TO COVID-19: ICD-10-CM

## 2023-02-09 DIAGNOSIS — R26.9 UNSPECIFIED ABNORMALITIES OF GAIT AND MOBILITY: ICD-10-CM

## 2023-04-07 ENCOUNTER — APPOINTMENT (OUTPATIENT)
Dept: UROLOGY | Facility: CLINIC | Age: 88
End: 2023-04-07
Payer: MEDICARE

## 2023-04-07 ENCOUNTER — OUTPATIENT (OUTPATIENT)
Dept: OUTPATIENT SERVICES | Facility: HOSPITAL | Age: 88
LOS: 1 days | End: 2023-04-07
Payer: MEDICARE

## 2023-04-07 VITALS
OXYGEN SATURATION: 96 % | SYSTOLIC BLOOD PRESSURE: 186 MMHG | HEART RATE: 98 BPM | DIASTOLIC BLOOD PRESSURE: 75 MMHG | HEIGHT: 68 IN | WEIGHT: 209 LBS | BODY MASS INDEX: 31.67 KG/M2 | TEMPERATURE: 98 F

## 2023-04-07 PROCEDURE — 96402 CHEMO HORMON ANTINEOPL SQ/IM: CPT

## 2023-04-07 PROCEDURE — 99213 OFFICE O/P EST LOW 20 MIN: CPT | Mod: 25

## 2023-04-07 PROCEDURE — 96402U: CUSTOM | Mod: NC

## 2023-04-07 RX ORDER — LEUPROLIDE ACETATE 45 MG/.375ML
45 INJECTION, SUSPENSION, EXTENDED RELEASE SUBCUTANEOUS
Refills: 0 | Status: COMPLETED | OUTPATIENT
Start: 2023-04-07

## 2023-04-07 RX ORDER — LEUPROLIDE ACETATE 45 MG/.375ML
45 INJECTION, SUSPENSION, EXTENDED RELEASE SUBCUTANEOUS
Qty: 1 | Refills: 0 | Status: COMPLETED | OUTPATIENT
Start: 2023-04-07 | End: 2023-04-07

## 2023-04-07 RX ADMIN — LEUPROLIDE ACETATE 45 MG: KIT SUBCUTANEOUS at 00:00

## 2023-04-09 NOTE — HISTORY OF PRESENT ILLNESS
[FreeTextEntry1] : Mike Curran returns to the office today.  He is an 87-year-old man with history of prostate cancer originally treated in 2011 with radiation therapy.  Follow-up PSA levels had indicated recurrent disease and he had been on androgen deprivation therapy for several years.  In 2020 that was discontinued and in follow-up his PSA level was observed to be increased and last year in August it was 1.9 ng/mL, up from 0.55 in March.  Because of that fairly significant velocity and doubling time I recommended that we restart the androgen deprivation therapy which was done 6 months ago.  He now presents for follow-up.\par \par Other medical issues that have been recent for the patient include development of vertigo for which she was hospitalized and ultimately recommended to undergo physical therapy which he is currently doing.\par \par He reports urinating without difficulty and has no dysuria, hesitancy, or sensation of incomplete emptying.  He denies urgency or urge incontinence.  \par

## 2023-04-09 NOTE — ASSESSMENT
[FreeTextEntry1] : Repeat PSA level today shows a decrease from 1.9 down to 0.8 ng/mL.  The measure testosterone also shows a level of 8.8 ng/dL indicative of effective suppression of testosterone production.  The decrease in PSA is reasonable but ideally it would be even further decrease.  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  We will see how his next PSA level fares in about 6 months before considering any other treatment but we may refer him to medical oncology if the PSA shows any evidence of increase over the next 6 months.\par \par

## 2023-04-09 NOTE — LETTER BODY
[Dear  ___] : Dear  [unfilled], [Courtesy Letter:] : I had the pleasure of seeing your patient, [unfilled], in my office today. [Please see my note below.] : Please see my note below. [FreeTextEntry2] : Mat Wilde MD\par [address]\[place] [address] [FreeTextEntry3] : Sincerely,\par \par \par \par \par Duarte Daniels MD, FACS\par Chief of Urology, [facility]\par  of Urology\par Director of Robotic and Laparoscopic Surgery\par [facility]\par \par [facility]\par [address] [place] [address]\[place] [address]\par P: [phone]\par F: [phone]\par [facility]

## 2023-04-09 NOTE — PHYSICAL EXAM
[General Appearance - Well Developed] : well developed [General Appearance - Well Nourished] : well nourished [Normal Appearance] : normal appearance [Well Groomed] : well groomed [General Appearance - In No Acute Distress] : no acute distress [Abdomen Soft] : soft [Costovertebral Angle Tenderness] : no ~M costovertebral angle tenderness [Abdomen Tenderness] : non-tender [Urethral Meatus] : meatus normal [Urinary Bladder Findings] : the bladder was normal on palpation [Scrotum] : the scrotum was normal [Testes Mass (___cm)] : there were no testicular masses [Edema] : no peripheral edema [] : no respiratory distress [Respiration, Rhythm And Depth] : normal respiratory rhythm and effort [Exaggerated Use Of Accessory Muscles For Inspiration] : no accessory muscle use [Oriented To Time, Place, And Person] : oriented to person, place, and time [Affect] : the affect was normal [Mood] : the mood was normal [Normal Station and Gait] : the gait and station were normal for the patient's age [Not Anxious] : not anxious [No Focal Deficits] : no focal deficits [No Palpable Adenopathy] : no palpable adenopathy

## 2023-04-10 DIAGNOSIS — C61 MALIGNANT NEOPLASM OF PROSTATE: ICD-10-CM

## 2023-04-10 LAB
PSA SERPL-MCNC: 0.82 NG/ML
TESTOST SERPL-MCNC: 8.8 NG/DL

## 2023-10-04 PROBLEM — N40.1 BENIGN LOCALIZED HYPERPLASIA OF PROSTATE WITH URINARY OBSTRUCTION: Status: ACTIVE | Noted: 2022-08-16

## 2023-10-10 ENCOUNTER — APPOINTMENT (OUTPATIENT)
Dept: UROLOGY | Facility: CLINIC | Age: 88
End: 2023-10-10
Payer: MEDICARE

## 2023-10-10 VITALS
OXYGEN SATURATION: 95 % | TEMPERATURE: 98.5 F | HEART RATE: 80 BPM | SYSTOLIC BLOOD PRESSURE: 188 MMHG | DIASTOLIC BLOOD PRESSURE: 94 MMHG | RESPIRATION RATE: 17 BRPM

## 2023-10-10 DIAGNOSIS — N40.1 BENIGN PROSTATIC HYPERPLASIA WITH LOWER URINARY TRACT SYMPMS: ICD-10-CM

## 2023-10-10 DIAGNOSIS — N13.8 BENIGN PROSTATIC HYPERPLASIA WITH LOWER URINARY TRACT SYMPMS: ICD-10-CM

## 2023-10-10 PROCEDURE — 99213 OFFICE O/P EST LOW 20 MIN: CPT

## 2023-10-11 LAB
PSA SERPL-MCNC: 1.92 NG/ML
TESTOST SERPL-MCNC: 9.2 NG/DL

## 2024-01-25 ENCOUNTER — APPOINTMENT (OUTPATIENT)
Dept: UROLOGY | Facility: CLINIC | Age: 89
End: 2024-01-25
Payer: MEDICARE

## 2024-01-25 DIAGNOSIS — R97.21 RISING PSA FOLLOWING TREATMENT FOR MALIGNANT NEOPLASM OF PROSTATE: ICD-10-CM

## 2024-01-25 PROCEDURE — G2211 COMPLEX E/M VISIT ADD ON: CPT

## 2024-01-25 PROCEDURE — 99213 OFFICE O/P EST LOW 20 MIN: CPT

## 2024-01-27 LAB
PSA SERPL-MCNC: 4.11 NG/ML
TESTOST SERPL-MCNC: 3.5 NG/DL

## 2024-02-02 ENCOUNTER — OUTPATIENT (OUTPATIENT)
Dept: OUTPATIENT SERVICES | Facility: HOSPITAL | Age: 89
LOS: 1 days | Discharge: HOME | End: 2024-02-02

## 2024-02-02 DIAGNOSIS — C61 MALIGNANT NEOPLASM OF PROSTATE: ICD-10-CM

## 2024-02-12 ENCOUNTER — APPOINTMENT (OUTPATIENT)
Dept: HEMATOLOGY ONCOLOGY | Facility: CLINIC | Age: 89
End: 2024-02-12
Payer: MEDICARE

## 2024-02-12 ENCOUNTER — RESULT REVIEW (OUTPATIENT)
Age: 89
End: 2024-02-12

## 2024-02-12 VITALS
BODY MASS INDEX: 31.35 KG/M2 | HEIGHT: 68.9 IN | HEART RATE: 83 BPM | TEMPERATURE: 97.9 F | DIASTOLIC BLOOD PRESSURE: 83 MMHG | SYSTOLIC BLOOD PRESSURE: 184 MMHG | WEIGHT: 211.64 LBS | OXYGEN SATURATION: 94 % | RESPIRATION RATE: 16 BRPM

## 2024-02-12 LAB
ALBUMIN SERPL ELPH-MCNC: 4.7 G/DL
ALP BLD-CCNC: 127 U/L
ALT SERPL-CCNC: 22 U/L
ANION GAP SERPL CALC-SCNC: 15 MMOL/L
AST SERPL-CCNC: 27 U/L
BASOPHILS # BLD AUTO: 0.03 K/UL — SIGNIFICANT CHANGE UP (ref 0–0.2)
BASOPHILS NFR BLD AUTO: 0.3 % — SIGNIFICANT CHANGE UP (ref 0–2)
BILIRUB SERPL-MCNC: 0.3 MG/DL
BUN SERPL-MCNC: 19 MG/DL
CALCIUM SERPL-MCNC: 9.8 MG/DL
CHLORIDE SERPL-SCNC: 96 MMOL/L
CO2 SERPL-SCNC: 26 MMOL/L
CREAT SERPL-MCNC: 1.42 MG/DL
EGFR: 48 ML/MIN/1.73M2
EOSINOPHIL # BLD AUTO: 0.29 K/UL — SIGNIFICANT CHANGE UP (ref 0–0.5)
EOSINOPHIL NFR BLD AUTO: 3.1 % — SIGNIFICANT CHANGE UP (ref 0–6)
GLUCOSE SERPL-MCNC: 194 MG/DL
HCT VFR BLD CALC: 38.1 % — LOW (ref 39–50)
HGB BLD-MCNC: 12 G/DL — LOW (ref 13–17)
IMM GRANULOCYTES NFR BLD AUTO: 0.8 % — SIGNIFICANT CHANGE UP (ref 0–0.9)
LYMPHOCYTES # BLD AUTO: 2.27 K/UL — SIGNIFICANT CHANGE UP (ref 1–3.3)
LYMPHOCYTES # BLD AUTO: 24.6 % — SIGNIFICANT CHANGE UP (ref 13–44)
MCHC RBC-ENTMCNC: 22.5 PG — LOW (ref 27–34)
MCHC RBC-ENTMCNC: 31.5 G/DL — LOW (ref 32–36)
MCV RBC AUTO: 71.5 FL — LOW (ref 80–100)
MONOCYTES # BLD AUTO: 0.8 K/UL — SIGNIFICANT CHANGE UP (ref 0–0.9)
MONOCYTES NFR BLD AUTO: 8.7 % — SIGNIFICANT CHANGE UP (ref 2–14)
NEUTROPHILS # BLD AUTO: 5.75 K/UL — SIGNIFICANT CHANGE UP (ref 1.8–7.4)
NEUTROPHILS NFR BLD AUTO: 62.5 % — SIGNIFICANT CHANGE UP (ref 43–77)
NRBC # BLD: 0 /100 WBCS — SIGNIFICANT CHANGE UP (ref 0–0)
PLATELET # BLD AUTO: 294 K/UL — SIGNIFICANT CHANGE UP (ref 150–400)
POTASSIUM SERPL-SCNC: 4.3 MMOL/L
PROT SERPL-MCNC: 7.5 G/DL
RBC # BLD: 5.33 M/UL — SIGNIFICANT CHANGE UP (ref 4.2–5.8)
RBC # FLD: 17.2 % — HIGH (ref 10.3–14.5)
SODIUM SERPL-SCNC: 137 MMOL/L
WBC # BLD: 9.21 K/UL — SIGNIFICANT CHANGE UP (ref 3.8–10.5)
WBC # FLD AUTO: 9.21 K/UL — SIGNIFICANT CHANGE UP (ref 3.8–10.5)

## 2024-02-12 PROCEDURE — 99205 OFFICE O/P NEW HI 60 MIN: CPT

## 2024-02-12 NOTE — REVIEW OF SYSTEMS
[Incontinence] : incontinence [Difficulty Walking] : difficulty walking [Negative] : Gastrointestinal [Joint Pain] : no joint pain [Muscle Pain] : no muscle pain [Confused] : no confusion [Dizziness] : no dizziness [Insomnia] : no insomnia [Depression] : no depression [Hot Flashes] : no hot flashes [Muscle Weakness] : no muscle weakness [Easy Bleeding] : no tendency for easy bleeding [Easy Bruising] : no tendency for easy bruising [FreeTextEntry8] : +Nocturia [de-identified] : +Neuropathy b/l foot

## 2024-02-12 NOTE — HISTORY OF PRESENT ILLNESS
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  Pt was initially treated with radiation in 2011 for his prostate cancer.  4/25/2018 bone scan showed no evidence of bone metastasis.  CT a/p showed small left para-aortic nodes questionable significance, two punctate right lower lobe pulmonary nodules. PSA  3/4/22  0.55 7/8/22  1.40 8/16/22 1.90  10/7/2022 He developed recurrent disease and had been started on androgen deprivation therapy with Dr. Daniels. He reports an interval history of having developed vertigo and he was hospitalized briefly for that reason. He is following up with neurology to determine what may have caused the vertigo. It is not yet clear.  PSA 4/7/23  0.82    testosterone 8.8 10/10/23 1.92    testosterone  9.2 1/25/24  4.11   testosterone 3.5   He is here today to determine his current PSA level and then make a decision regarding reinitiation of the androgen deprivation.  As per patient, he has limited physical function with very little walk inside his house with a cane. He feels fine, denies any pain and gross hematuria.  [de-identified] : prostate adenocarcinoma

## 2024-02-12 NOTE — ASSESSMENT
[FreeTextEntry1] : 88 years old man with PMHx of DM2, HTN, and diagnosed as prostate cancer s/p radiation in 2011, PSA elevated to 1.9 in August 2022, started ADT in October 2022. His PSA nicki was 0.82 in April 2023, elevated PSA 1.93 in October, 4.11 in Jan 2024 while under castration level of testosterone. He has castration resistant prostate cancer.   I had a lengthy discussion with the patient and his wife regarding his cancer status and further management.   He will need CT chest/abdomen/pelvis and whole-body bone scan to understand his current cancer status. Depending on the finding, if he has no metastasis on images, he has m0CRPC. The standard of care is darolutamide vs enzalutamide vs apalutamide. However, enzalutamide and apalutamide both develop more CNS symptoms including fatigue, dizziness and loss of appetite given its BBB penetration. He may start darolutamide. If his images show metastasis, he has mCRPC. He will start abiraterone/prednisone in the setting of normal cardiac function givens its side effects including hypertension and fluid retention. Abiraterone may cause liver damage. Will return to this clinic after all work ups are done. His many questions were answered in full to his satisfaction.

## 2024-02-13 LAB
FERRITIN SERPL-MCNC: 90 NG/ML
FOLATE SERPL-MCNC: 9.6 NG/ML
IRON SATN MFR SERPL: 24 %
IRON SERPL-MCNC: 69 UG/DL
TIBC SERPL-MCNC: 293 UG/DL
UIBC SERPL-MCNC: 224 UG/DL
VIT B12 SERPL-MCNC: 350 PG/ML

## 2024-03-05 ENCOUNTER — OUTPATIENT (OUTPATIENT)
Dept: OUTPATIENT SERVICES | Facility: HOSPITAL | Age: 89
LOS: 1 days | End: 2024-03-05
Payer: MEDICARE

## 2024-03-05 ENCOUNTER — APPOINTMENT (OUTPATIENT)
Dept: CT IMAGING | Facility: IMAGING CENTER | Age: 89
End: 2024-03-05
Payer: MEDICARE

## 2024-03-05 DIAGNOSIS — Z00.8 ENCOUNTER FOR OTHER GENERAL EXAMINATION: ICD-10-CM

## 2024-03-05 PROCEDURE — 71250 CT THORAX DX C-: CPT | Mod: 26

## 2024-03-05 PROCEDURE — 74176 CT ABD & PELVIS W/O CONTRAST: CPT

## 2024-03-05 PROCEDURE — 71250 CT THORAX DX C-: CPT

## 2024-03-05 PROCEDURE — 74176 CT ABD & PELVIS W/O CONTRAST: CPT | Mod: 26

## 2024-03-11 ENCOUNTER — APPOINTMENT (OUTPATIENT)
Dept: NUCLEAR MEDICINE | Facility: IMAGING CENTER | Age: 89
End: 2024-03-11
Payer: MEDICARE

## 2024-03-11 ENCOUNTER — OUTPATIENT (OUTPATIENT)
Dept: OUTPATIENT SERVICES | Facility: HOSPITAL | Age: 89
LOS: 1 days | End: 2024-03-11
Payer: MEDICARE

## 2024-03-11 ENCOUNTER — RESULT REVIEW (OUTPATIENT)
Age: 89
End: 2024-03-11

## 2024-03-11 DIAGNOSIS — Z00.8 ENCOUNTER FOR OTHER GENERAL EXAMINATION: ICD-10-CM

## 2024-03-11 DIAGNOSIS — C61 MALIGNANT NEOPLASM OF PROSTATE: ICD-10-CM

## 2024-03-11 PROCEDURE — 78306 BONE IMAGING WHOLE BODY: CPT | Mod: 26

## 2024-03-11 PROCEDURE — 78830 RP LOCLZJ TUM SPECT W/CT 1: CPT | Mod: 26

## 2024-03-11 PROCEDURE — 78830 RP LOCLZJ TUM SPECT W/CT 1: CPT

## 2024-03-11 PROCEDURE — 78306 BONE IMAGING WHOLE BODY: CPT

## 2024-03-11 PROCEDURE — A9561: CPT

## 2024-03-25 ENCOUNTER — RESULT REVIEW (OUTPATIENT)
Age: 89
End: 2024-03-25

## 2024-03-25 ENCOUNTER — APPOINTMENT (OUTPATIENT)
Dept: HEMATOLOGY ONCOLOGY | Facility: CLINIC | Age: 89
End: 2024-03-25
Payer: MEDICARE

## 2024-03-25 VITALS
DIASTOLIC BLOOD PRESSURE: 88 MMHG | HEART RATE: 79 BPM | SYSTOLIC BLOOD PRESSURE: 198 MMHG | WEIGHT: 215.39 LBS | TEMPERATURE: 99 F | OXYGEN SATURATION: 93 % | BODY MASS INDEX: 31.9 KG/M2 | RESPIRATION RATE: 16 BRPM

## 2024-03-25 LAB
BASOPHILS # BLD AUTO: 0.05 K/UL — SIGNIFICANT CHANGE UP (ref 0–0.2)
BASOPHILS NFR BLD AUTO: 0.5 % — SIGNIFICANT CHANGE UP (ref 0–2)
EOSINOPHIL # BLD AUTO: 0.33 K/UL — SIGNIFICANT CHANGE UP (ref 0–0.5)
EOSINOPHIL NFR BLD AUTO: 3.4 % — SIGNIFICANT CHANGE UP (ref 0–6)
HCT VFR BLD CALC: 38.6 % — LOW (ref 39–50)
HGB BLD-MCNC: 12.1 G/DL — LOW (ref 13–17)
IMM GRANULOCYTES NFR BLD AUTO: 0.6 % — SIGNIFICANT CHANGE UP (ref 0–0.9)
LYMPHOCYTES # BLD AUTO: 2.36 K/UL — SIGNIFICANT CHANGE UP (ref 1–3.3)
LYMPHOCYTES # BLD AUTO: 24.7 % — SIGNIFICANT CHANGE UP (ref 13–44)
MCHC RBC-ENTMCNC: 22.6 PG — LOW (ref 27–34)
MCHC RBC-ENTMCNC: 31.3 G/DL — LOW (ref 32–36)
MCV RBC AUTO: 72.1 FL — LOW (ref 80–100)
MONOCYTES # BLD AUTO: 0.84 K/UL — SIGNIFICANT CHANGE UP (ref 0–0.9)
MONOCYTES NFR BLD AUTO: 8.8 % — SIGNIFICANT CHANGE UP (ref 2–14)
NEUTROPHILS # BLD AUTO: 5.93 K/UL — SIGNIFICANT CHANGE UP (ref 1.8–7.4)
NEUTROPHILS NFR BLD AUTO: 62 % — SIGNIFICANT CHANGE UP (ref 43–77)
NRBC # BLD: 0 /100 WBCS — SIGNIFICANT CHANGE UP (ref 0–0)
PLATELET # BLD AUTO: 304 K/UL — SIGNIFICANT CHANGE UP (ref 150–400)
RBC # BLD: 5.35 M/UL — SIGNIFICANT CHANGE UP (ref 4.2–5.8)
RBC # FLD: 17.7 % — HIGH (ref 10.3–14.5)
WBC # BLD: 9.57 K/UL — SIGNIFICANT CHANGE UP (ref 3.8–10.5)
WBC # FLD AUTO: 9.57 K/UL — SIGNIFICANT CHANGE UP (ref 3.8–10.5)

## 2024-03-25 PROCEDURE — 99214 OFFICE O/P EST MOD 30 MIN: CPT

## 2024-03-25 NOTE — HISTORY OF PRESENT ILLNESS
[Disease: _____________________] : Disease: [unfilled] [Date: ____________] : Patient's last distress assessment performed on [unfilled]. [1 - Distress Level] : Distress Level: 1 [de-identified] : Mike Curran is 88 years old man with PMHx of DM2, HTN, and prostate cancer presenting for initial evaluation of CRPC.  Pt was initially treated with radiation in 2011 for his prostate cancer.  4/25/2018 bone scan showed no evidence of bone metastasis.  CT a/p showed small left para-aortic nodes questionable significance, two punctate right lower lobe pulmonary nodules. PSA  3/4/22  0.55 7/8/22  1.40 8/16/22 1.90  10/7/2022 He developed recurrent disease and had been started on androgen deprivation therapy with Dr. Daniels. He reports an interval history of having developed vertigo and he was hospitalized briefly for that reason. He is following up with neurology to determine what may have caused the vertigo. It is not yet clear.  PSA 4/7/23  0.82    testosterone 8.8 10/10/23 1.92    testosterone  9.2 1/25/24  4.11   testosterone 3.5   He is here today to determine his current PSA level and then make a decision regarding reinitiation of the androgen deprivation.  As per patient, he has limited physical function with very little walk inside his house with a cane. He feels fine, denies any pain and gross hematuria.  [de-identified] : 3/5/24 CT cap showed No lymphadenopathy on the current exam. Previously referenced left para-aortic lymph nodes are markedly smaller on the current study. New 4 to 5 cm mixed lytic and sclerotic lesion involving the right iliac bone. 3/11/24 bone scan showed Findings suggestive of osseous metastasis in the right posterior ileum. Nonspecific increased uptake in the left femoral head. This is suspicious for osteonecrosis although degenerative/injury related changes or even metastasis may have a similar appearance. Report chronic lower back pain, otherwise feels fine, denies fatigue. He has bilateral feet numbness and tingling. Sometimes weak urine flow. Nocturia 0 to 3. Denies gross hematuria.  [de-identified] : prostate adenocarcinoma

## 2024-03-25 NOTE — ASSESSMENT
[FreeTextEntry1] : 88 years old man with PMHx of DM2, HTN, and diagnosed as prostate cancer s/p radiation in 2011, PSA elevated to 1.9 in August 2022, started ADT in October 2022. His PSA nicki was 0.82 in April 2023, elevated PSA 1.93 in October, 4.11 in Jan 2024 while under castration level of testosterone. He has castration resistant prostate cancer.   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 His images show metastasis, he has mCRPC. He will start abiraterone/prednisone in the setting of normal cardiac function given its side effects including hypertension and fluid retention. Abiraterone may cause liver damage. It seems his eligard inj is overdue, will contact Dr. Daniels's office. Will monitor his PSA and potentially add abiraterone next visit. Alternatively, he may be referred to Rad onc for oligometastasis given his age and potential AEs from systemic treatment.

## 2024-03-25 NOTE — REVIEW OF SYSTEMS
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

## 2024-03-26 LAB
ALBUMIN SERPL ELPH-MCNC: 4.6 G/DL
ALP BLD-CCNC: 118 U/L
ALT SERPL-CCNC: 14 U/L
ANION GAP SERPL CALC-SCNC: 18 MMOL/L
AST SERPL-CCNC: 19 U/L
BILIRUB SERPL-MCNC: 0.2 MG/DL
BUN SERPL-MCNC: 20 MG/DL
CALCIUM SERPL-MCNC: 9.5 MG/DL
CHLORIDE SERPL-SCNC: 98 MMOL/L
CO2 SERPL-SCNC: 24 MMOL/L
CREAT SERPL-MCNC: 1.53 MG/DL
EGFR: 44 ML/MIN/1.73M2
GLUCOSE SERPL-MCNC: 195 MG/DL
POTASSIUM SERPL-SCNC: 4.3 MMOL/L
PROT SERPL-MCNC: 7.6 G/DL
PSA SERPL-MCNC: 4.39 NG/ML
SODIUM SERPL-SCNC: 140 MMOL/L
TESTOST SERPL-MCNC: 11 NG/DL

## 2024-04-11 PROBLEM — C61 ADENOCARCINOMA OF PROSTATE: Status: ACTIVE | Noted: 2018-03-28

## 2024-04-11 NOTE — PHYSICAL EXAM
[Normal Appearance] : normal appearance [Well Groomed] : well groomed Home [General Appearance - In No Acute Distress] : no acute distress [Edema] : no peripheral edema [Respiration, Rhythm And Depth] : normal respiratory rhythm and effort [Exaggerated Use Of Accessory Muscles For Inspiration] : no accessory muscle use [Abdomen Soft] : soft [Abdomen Tenderness] : non-tender [Costovertebral Angle Tenderness] : no ~M costovertebral angle tenderness [Urinary Bladder Findings] : the bladder was normal on palpation [Normal Station and Gait] : the gait and station were normal for the patient's age [] : no rash [No Focal Deficits] : no focal deficits [Oriented To Time, Place, And Person] : oriented to person, place, and time [Affect] : the affect was normal [Mood] : the mood was normal [No Palpable Adenopathy] : no palpable adenopathy

## 2024-04-16 ENCOUNTER — APPOINTMENT (OUTPATIENT)
Dept: UROLOGY | Facility: CLINIC | Age: 89
End: 2024-04-16
Payer: MEDICARE

## 2024-04-16 ENCOUNTER — OUTPATIENT (OUTPATIENT)
Dept: OUTPATIENT SERVICES | Facility: HOSPITAL | Age: 89
LOS: 1 days | End: 2024-04-16
Payer: MEDICARE

## 2024-04-16 ENCOUNTER — APPOINTMENT (OUTPATIENT)
Dept: UROLOGY | Facility: CLINIC | Age: 89
End: 2024-04-16

## 2024-04-16 DIAGNOSIS — R35.0 FREQUENCY OF MICTURITION: ICD-10-CM

## 2024-04-16 DIAGNOSIS — C61 MALIGNANT NEOPLASM OF PROSTATE: ICD-10-CM

## 2024-04-16 PROCEDURE — 96402 CHEMO HORMON ANTINEOPL SQ/IM: CPT

## 2024-04-16 PROCEDURE — ZZZZZ: CPT

## 2024-04-16 PROCEDURE — 99213 OFFICE O/P EST LOW 20 MIN: CPT | Mod: 25

## 2024-04-16 RX ORDER — LEUPROLIDE ACETATE 45 MG/.375ML
45 INJECTION, SUSPENSION, EXTENDED RELEASE SUBCUTANEOUS
Refills: 0 | Status: COMPLETED | OUTPATIENT
Start: 2024-04-16

## 2024-04-16 RX ORDER — LEUPROLIDE ACETATE 45 MG/.375ML
45 INJECTION, SUSPENSION, EXTENDED RELEASE SUBCUTANEOUS
Qty: 1 | Refills: 0 | Status: COMPLETED | OUTPATIENT
Start: 2024-04-16 | End: 2024-04-16

## 2024-04-16 RX ADMIN — LEUPROLIDE ACETATE 0 MG: 45 INJECTION, SUSPENSION, EXTENDED RELEASE SUBCUTANEOUS at 00:00

## 2024-04-20 NOTE — LETTER BODY
[Dear  ___] : Dear  [unfilled], [Courtesy Letter:] : I had the pleasure of seeing your patient, [unfilled], in my office today. [Please see my note below.] : Please see my note below. [FreeTextEntry2] : Mat Wilde [address] [place] [address] [FreeTextEntry3] : Sincerely,      Duarte Daniels MD, FACS Director of Urology Services, [facility] Chief of Urology, [facility]  of Urology   [facility], [place] [id number] P: [phone] F: [phone] [facility]

## 2024-04-20 NOTE — ASSESSMENT
[FreeTextEntry1] : We discussed the ongoing importance of the androgen deprivation therapy and controlling his prostate cancer chronically to prevent development of symptomatic bone metastasis or other metastases that could potentially impact on organ function.  He had tolerated the leuprolide well in the past and had simply been overdue for it.    I reviewed side effects of androgen deprivation therapy again with him today in the office.  He is familiar given his prior utilization and agreed to have a new injection today.  He was injected today with 45 mg leuprolide after this discussion.  He will continue to follow-up with Dr. Contreras in medical oncology for more frequent evaluations given the new use of the abiraterone and prednisone.  He will have blood work done on a routine basis as well to assess liver function on the medication.  I expect his PSA will respond nicely to the reinitiation of treatment and he will have blood work done in the near future with Dr. Contreras.  The patient will follow-up again in 6 months for repeat leuprolide injection at that time.  At this point he is comfortable from a urinary perspective and has no current medication need for any urinary symptoms.

## 2024-04-20 NOTE — HISTORY OF PRESENT ILLNESS
[FreeTextEntry1] : Mike Curran returns to the office today.  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  He developed recurrent disease and had been started on androgen deprivation therapy.  He is here today for ongoing follow-up on the prostate cancer.  Last Eligard was April 2023, PSA in March was 4.39ng/mL.   He is following up with Dr. Contreras, he has one right iliac bone lesion with metastasis.  He has also been started on abiraterone and prednisone.  His PSA was seen to be 4.39 in March as noted.  He is currently overdue for leuprolide injection.

## 2024-04-22 DIAGNOSIS — C61 MALIGNANT NEOPLASM OF PROSTATE: ICD-10-CM

## 2024-04-25 ENCOUNTER — OUTPATIENT (OUTPATIENT)
Dept: OUTPATIENT SERVICES | Facility: HOSPITAL | Age: 89
LOS: 1 days | Discharge: HOME | End: 2024-04-25

## 2024-04-25 DIAGNOSIS — C61 MALIGNANT NEOPLASM OF PROSTATE: ICD-10-CM

## 2024-05-05 ENCOUNTER — NON-APPOINTMENT (OUTPATIENT)
Age: 89
End: 2024-05-05

## 2024-05-06 ENCOUNTER — RESULT REVIEW (OUTPATIENT)
Age: 89
End: 2024-05-06

## 2024-05-06 ENCOUNTER — APPOINTMENT (OUTPATIENT)
Dept: HEMATOLOGY ONCOLOGY | Facility: CLINIC | Age: 89
End: 2024-05-06
Payer: MEDICARE

## 2024-05-06 VITALS
SYSTOLIC BLOOD PRESSURE: 209 MMHG | HEART RATE: 76 BPM | OXYGEN SATURATION: 97 % | TEMPERATURE: 98.7 F | BODY MASS INDEX: 31.87 KG/M2 | DIASTOLIC BLOOD PRESSURE: 102 MMHG | WEIGHT: 215.17 LBS | RESPIRATION RATE: 16 BRPM

## 2024-05-06 LAB
BASOPHILS # BLD AUTO: 0.06 K/UL — SIGNIFICANT CHANGE UP (ref 0–0.2)
BASOPHILS NFR BLD AUTO: 0.6 % — SIGNIFICANT CHANGE UP (ref 0–2)
EOSINOPHIL # BLD AUTO: 0.43 K/UL — SIGNIFICANT CHANGE UP (ref 0–0.5)
EOSINOPHIL NFR BLD AUTO: 4.3 % — SIGNIFICANT CHANGE UP (ref 0–6)
HCT VFR BLD CALC: 36.8 % — LOW (ref 39–50)
HGB BLD-MCNC: 11.3 G/DL — LOW (ref 13–17)
IMM GRANULOCYTES NFR BLD AUTO: 0.5 % — SIGNIFICANT CHANGE UP (ref 0–0.9)
LYMPHOCYTES # BLD AUTO: 2.67 K/UL — SIGNIFICANT CHANGE UP (ref 1–3.3)
LYMPHOCYTES # BLD AUTO: 26.8 % — SIGNIFICANT CHANGE UP (ref 13–44)
MCHC RBC-ENTMCNC: 22.7 PG — LOW (ref 27–34)
MCHC RBC-ENTMCNC: 30.7 G/DL — LOW (ref 32–36)
MCV RBC AUTO: 74 FL — LOW (ref 80–100)
MONOCYTES # BLD AUTO: 0.9 K/UL — SIGNIFICANT CHANGE UP (ref 0–0.9)
MONOCYTES NFR BLD AUTO: 9 % — SIGNIFICANT CHANGE UP (ref 2–14)
NEUTROPHILS # BLD AUTO: 5.85 K/UL — SIGNIFICANT CHANGE UP (ref 1.8–7.4)
NEUTROPHILS NFR BLD AUTO: 58.8 % — SIGNIFICANT CHANGE UP (ref 43–77)
NRBC # BLD: 0 /100 WBCS — SIGNIFICANT CHANGE UP (ref 0–0)
PLATELET # BLD AUTO: 279 K/UL — SIGNIFICANT CHANGE UP (ref 150–400)
RBC # BLD: 4.97 M/UL — SIGNIFICANT CHANGE UP (ref 4.2–5.8)
RBC # FLD: 17.8 % — HIGH (ref 10.3–14.5)
WBC # BLD: 9.96 K/UL — SIGNIFICANT CHANGE UP (ref 3.8–10.5)
WBC # FLD AUTO: 9.96 K/UL — SIGNIFICANT CHANGE UP (ref 3.8–10.5)

## 2024-05-06 PROCEDURE — 99214 OFFICE O/P EST MOD 30 MIN: CPT

## 2024-05-06 RX ORDER — ABIRATERONE ACETATE 500 MG/1
500 TABLET, FILM COATED ORAL
Qty: 60 | Refills: 5 | Status: ACTIVE | COMMUNITY
Start: 2024-05-06 | End: 1900-01-01

## 2024-05-06 RX ORDER — PREDNISONE 5 MG/1
5 TABLET ORAL DAILY
Qty: 30 | Refills: 5 | Status: ACTIVE | COMMUNITY
Start: 2024-05-06 | End: 1900-01-01

## 2024-05-08 NOTE — REVIEW OF SYSTEMS
[Incontinence] : incontinence [Difficulty Walking] : difficulty walking [Joint Pain] : joint pain [Negative] : Eyes [Dysphagia] : no dysphagia [Odynophagia] : no odynophagia [Muscle Pain] : no muscle pain [Confused] : no confusion [Dizziness] : no dizziness [Insomnia] : no insomnia [Depression] : no depression [Hot Flashes] : no hot flashes [Muscle Weakness] : no muscle weakness [Easy Bleeding] : no tendency for easy bleeding [Easy Bruising] : no tendency for easy bruising [FreeTextEntry4] : chronic toothache [de-identified] : +Neuropathy b/l foot. Headaches [FreeTextEntry8] : +Nocturia

## 2024-05-08 NOTE — ASSESSMENT
[FreeTextEntry1] : 88 years old man with PMHx of DM2, HTN, and diagnosed as prostate cancer s/p radiation in 2011, PSA elevated to 1.9 in August 2022, started ADT in October 2022. His PSA nicki was 0.82 in April 2023, elevated PSA 1.93 in October, 4.11 in Jan 2024 while under castration level of testosterone. He has castration resistant prostate cancer.   He does have one right iliac bone lesion with metastasis. His images show metastasis, he has mCRPC. He was planned for and ordered to start abiraterone/prednisone in the setting of normal cardiac function given its side effects including hypertension and fluid retention. Abiraterone may cause liver damage. He is getting Eligard Q6 months and his last dose was 4/16/24, had apparently been overdue for it (although pt states he had gotten it in October 2023).  - Abiraterone/prednisone prescribed to pt. However, given uncontrolled HTN, I told pt to strictly not start those meds until BP is improved. Pt is on 3 meds for HTN and will f/u with his PCP Dr. Mat Wilde - As an alternative, could consider a referral to Rad onc for oligometastasis given his age and potential AEs from systemic treatment - Monitor PSA as well - Will obtain a dental referral (notes toothache) prior to starting Xgeva  Uncontrolled HTN - As noted above, pt on 3 BP meds, needs PCP f/u - Emphasized to eliminate salt from diet. Discussed if he develops worsening headache, change in vision, or CP/SOB to go to the ED.  RTC in 5 weeks  Patient seen with and plan discussed with Dr. Zhu Aryles Hedjar, MD, PGY-6 Hematology/Oncology Fellow [facility] I have participated in history collection, physical exam and making assessment/plan through his whole clinic visit. I also reviewed and edited the note.

## 2024-05-08 NOTE — HISTORY OF PRESENT ILLNESS
[Disease: _____________________] : Disease: [unfilled] [Date: ____________] : Patient's last distress assessment performed on [unfilled]. [1 - Distress Level] : Distress Level: 1 [de-identified] : Mike Curran is 88 years old man with PMHx of DM2, HTN, and prostate cancer presenting for initial evaluation of CRPC.  Pt was initially treated with radiation in 2011 for his prostate cancer.  4/25/2018 bone scan showed no evidence of bone metastasis.  CT a/p showed small left para-aortic nodes questionable significance, two punctate right lower lobe pulmonary nodules. PSA  3/4/22  0.55 7/8/22  1.40 8/16/22 1.90  10/7/2022 He developed recurrent disease and had been started on androgen deprivation therapy with Dr. Daniels. He reports an interval history of having developed vertigo and he was hospitalized briefly for that reason. He is following up with neurology to determine what may have caused the vertigo. It is not yet clear.  PSA 4/7/23  0.82    testosterone 8.8 10/10/23 1.92    testosterone  9.2 1/25/24  4.11   testosterone 3.5   He is here today to determine his current PSA level and then make a decision regarding reinitiation of the androgen deprivation.  As per patient, he has limited physical function with very little walk inside his house with a cane. He feels fine, denies any pain and gross hematuria.   3/5/24 CT C/A/P showed no lymphadenopathy on the current exam. Previously referenced left para-aortic lymph nodes are markedly smaller on the current study. New 4 to 5 cm mixed lytic and sclerotic lesion involving the right iliac bone. 3/11/24 bone scan showed Findings suggestive of osseous metastasis in the right posterior ileum. Nonspecific increased uptake in the left femoral head. This is suspicious for osteonecrosis although degenerative/injury related changes or even metastasis may have a similar appearance. Report chronic lower back pain, otherwise feels fine, denies fatigue. He has bilateral feet numbness and tingling. Sometimes weak urine flow. Nocturia 0 to 3. Denies gross hematuria.  [de-identified] : prostate adenocarcinoma  [de-identified] : 5/6/24: Patient presents for follow up. He notes generalized pains. He also has had mild headaches for some time. No vision changes. ROS is otherwise unchanged from previously. He denies CP or SOB. He notes he eats a diet with salty foods and processed foods. He takes furosemide, 20, amlodipine 10, and lisinopril 40 mg QD for BP and takes his meds daily. He denies missing his BP pills.

## 2024-05-15 ENCOUNTER — OUTPATIENT (OUTPATIENT)
Dept: OUTPATIENT SERVICES | Facility: HOSPITAL | Age: 89
LOS: 1 days | Discharge: HOME | End: 2024-05-15
Payer: MEDICARE

## 2024-05-15 LAB
ALBUMIN SERPL ELPH-MCNC: 4.4 G/DL
ALP BLD-CCNC: 131 U/L
ALT SERPL-CCNC: 18 U/L
ANION GAP SERPL CALC-SCNC: 16 MMOL/L
AST SERPL-CCNC: 23 U/L
BILIRUB SERPL-MCNC: 0.2 MG/DL
BUN SERPL-MCNC: 23 MG/DL
CALCIUM SERPL-MCNC: 9.3 MG/DL
CHLORIDE SERPL-SCNC: 100 MMOL/L
CO2 SERPL-SCNC: 26 MMOL/L
CREAT SERPL-MCNC: 1.53 MG/DL
EGFR: 44 ML/MIN/1.73M2
GLUCOSE SERPL-MCNC: 179 MG/DL
POTASSIUM SERPL-SCNC: 4.1 MMOL/L
PROT SERPL-MCNC: 7.4 G/DL
PSA SERPL-MCNC: 6.21 NG/ML
SODIUM SERPL-SCNC: 142 MMOL/L
TESTOST SERPL-MCNC: 9.4 NG/DL

## 2024-05-16 ENCOUNTER — APPOINTMENT (OUTPATIENT)
Dept: NEPHROLOGY | Facility: CLINIC | Age: 89
End: 2024-05-16

## 2024-05-28 PROBLEM — Z92.3 HISTORY OF RADIATION THERAPY: Status: RESOLVED | Noted: 2024-05-28 | Resolved: 2024-05-28

## 2024-05-29 ENCOUNTER — APPOINTMENT (OUTPATIENT)
Dept: RADIATION ONCOLOGY | Facility: CLINIC | Age: 89
End: 2024-05-29
Payer: MEDICARE

## 2024-05-29 VITALS
SYSTOLIC BLOOD PRESSURE: 209 MMHG | BODY MASS INDEX: 32.58 KG/M2 | RESPIRATION RATE: 16 BRPM | TEMPERATURE: 98.78 F | HEART RATE: 72 BPM | HEIGHT: 68 IN | OXYGEN SATURATION: 95 % | DIASTOLIC BLOOD PRESSURE: 101 MMHG | WEIGHT: 215 LBS

## 2024-05-29 DIAGNOSIS — G62.9 POLYNEUROPATHY, UNSPECIFIED: ICD-10-CM

## 2024-05-29 DIAGNOSIS — Z92.3 PERSONAL HISTORY OF IRRADIATION: ICD-10-CM

## 2024-05-29 DIAGNOSIS — Z86.69 PERSONAL HISTORY OF OTHER DISEASES OF THE NERVOUS SYSTEM AND SENSE ORGANS: ICD-10-CM

## 2024-05-29 DIAGNOSIS — Z23 ENCOUNTER FOR IMMUNIZATION: ICD-10-CM

## 2024-05-29 PROCEDURE — 99204 OFFICE O/P NEW MOD 45 MIN: CPT

## 2024-05-29 RX ORDER — MECLIZINE HYDROCHLORIDE 25 MG/1
25 TABLET, CHEWABLE ORAL
Refills: 0 | Status: DISCONTINUED | COMMUNITY
End: 2024-05-29

## 2024-05-29 RX ORDER — GLIPIZIDE AND METFORMIN HYDROCHLORIDE 5; 500 MG/1; MG/1
5-500 TABLET, FILM COATED ORAL
Refills: 0 | Status: ACTIVE | COMMUNITY

## 2024-05-29 RX ORDER — UREA 10 %
500 (27 MG) LOTION (ML) TOPICAL
Refills: 0 | Status: ACTIVE | COMMUNITY

## 2024-05-29 RX ORDER — PRIMIDONE 125 MG/1
125 TABLET ORAL
Refills: 0 | Status: ACTIVE | COMMUNITY

## 2024-05-29 NOTE — HISTORY OF PRESENT ILLNESS
[FreeTextEntry1] : Mr. Curran is an 88-year-old male who presents in consultation for consideration of radiation therapy.   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 Meyer)   PSA Trend:  2012 - 2016: PSA remained 0.2 to 0.94 ng/mL 3/1/18 - 6.5  5/2018 - started on Lupron injections 8/21/18 - 0.12  2019 - 2021: PSA remained less than 1.0 (off Lupron - last shot was 6/19/20) 7/8/22 - 1.40 8/16/22 - 1.90                                   Testosterone 188.0 ng/dL 10/2022 - Restarted on Lupron injections.  4/7/23 - 0.82                                     Testosterone 8.8 ng/dL 10/10/23 - 1.92  1/25/24 - 4.11                                   Testosterone 3.5 ng/dL 3/25/24 - 4.39                                   Testosterone 11.0 ng/dL 5/6/24 - 6.21                                     Testosterone 9.4 ng/dL  HPI: Mr. 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  He received radiation therapy at that time.  He continued to follow with Dr. Santos (urology) with PSA monitoring.  In March of 2018 he was found to have elevated PSA to 6.5 ng/mL, CT and Bone scan done.  4/25/18 - Bone Scan: No scan evidence of osseous metastasis. Degenerative disease in the spine and major joints, prominent in the shoulders. Mildly increased uptake in the right maxilla and left submandibular region probably related to dental process.   4/25/18 - CT A/P: Small left para-aortic lymph nodes questionable significance. Punctuate right lower lobe pulmonary nodule. No evidence of dominant enlarged adenopathy or bony lesions can be seen.   4/30/18 - saw Dr. Santos (urology) in follow up, recommended beginning on hormonal therapy.  5/14/18 - started on Lupron Injections with Dr. Santos (urology), continued on through 6/19/20.  In September 2020 when he was due for his next shot decision was made to stop the injections due to complaints of side effects and monitor the PSA.    8/2022- PSA 1.9 ng/mL. Due to rate of increase recommended to resume ADT, seen by Dr. Daniels (urology).  10/2022 - Restarted on Lupron injections   10/10/23 -saw Dr. Daniels (urology) in follow up. Last Lupron shot was 4/7/23.  Patient has not completed 1 year of androgen deprivation therapy for what was seen to be biochemical recurrence. His PSA had gone up from 0.09 up to 1.9. It has not come back down, and we will be rechecking the PSA level today to reassess. If his testosterone level and PSA levels both remain very low, we can probably use a strategy of intermittent androgen deprivation therapy versus just simple discontinuation moving forward. If he does not see further PSA decrease or if he develops increase in the future, we may restart ADT.   1/25/24 - saw Dr. Daniels (urology) in follow up. Last Lupron shot was 4/7/23.  PSA now 4.11 ng/mL. His testosterone level was also checked and found to remain castrate at 3.5. 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 Will be referring him to medical oncology and would recommend that we continue on with the androgen deprivation therapy as part of his overall treatment plan.   3/5/24 - CT C/A/P: No lymphadenopathy on the current exam. Previously referenced left para-aortic lymph nodes are markedly smaller on the current study. New 4 to 5 cm mixed lytic and sclerotic lesion involving the right iliac bone. Please see the dedicated bone scan report from the study on 3/11/2024 for additional information regarding the osseous structures.  3/11/24 - Bone Scan: Findings suggestive of osseous metastasis in the right posterior ileum. Nonspecific increased uptake in the left femoral head. This is suspicious for osteonecrosis although degenerative/injury related changes or even metastasis may have a similar appearance.  3/25/24 - saw Dr. Contreras ([facility]) in consultation for castration resistant prostate cancer. Recent scans reviewed. 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 His images show metastasis, he has mCRPC. He will start abiraterone/prednisone in the setting of normal cardiac function given its side effects including hypertension and fluid retention. Abiraterone may cause liver damage. It seems his eligard inj is overdue, will contact Dr. Daniels's office. Will monitor his PSA and potentially add abiraterone next visit. Alternatively, he may be referred to Rad onc for oligometastatic disease given age and potential AEs from systemic treatment.     4/16/24 - Received Eligard injection (45 mg) with Dr. Daniels (urology).   5/6/24 - saw Dr. Contreras ([facility]) in follow up.  To start on Abiraterone/Prednisone once his blood pressure has improved. To follow up with PCP Dr. Wilde. As an alternative, could consider a referral to Rad onc for oligometastasis given his age and potential AEs from systemic treatment. To obtain dental clearance prior to starting on Xgeva.   5/29/24 - presents in consultation for discussion of radiation therapy options.  Mr. Curran is feeling well today.  He denies any pain in his right hip.  Complains of neuropathy pain in his feet, especially with walking, uses a cane to assist with ambulation.  Blood pressure remains elevated, has been started on Metoprolol recently by PCP Dr. Wilde.  He has urinary frequency, urgency, nocturia, decreased stream.  Also, with complaints of diarrhea (chronic).  IPSS 28 / QOL 5 / EPIC-CP 25 (ED portion not completed). He states he is seeing nephrology tomorrow and next follow up with Dr. Contreras ([facility]) is 6/21/24.

## 2024-05-29 NOTE — REVIEW OF SYSTEMS
[Patient Intake Form Reviewed] : Patient intake form was reviewed [Fatigue] : fatigue [Loss of Hearing] : loss of hearing [Lower Ext Edema] : lower extremity edema [Shortness Of Breath] : shortness of breath [Diarrhea] : diarrhea [Nocturia] : nocturia [Urinary Frequency] : urinary frequency [IPSS Score (0-40): ___] : IPSS score: [unfilled] [EPIC-CP Score (0-60): ___] : EPIC-CP score: [unfilled] [Negative] : Heme/Lymph [Constipation: Grade 0] : Constipation: Grade 0 [Diarrhea: Grade 1 - Increase of <4 stools per day over baseline; mild increase in ostomy output compared to baseline] : Diarrhea: Grade 1 - Increase of <4 stools per day over baseline; mild increase in ostomy output compared to baseline [Hematuria: Grade 0] : Hematuria: Grade 0 [Urinary Incontinence: Grade 1 - Occasional (e.g., with coughing, sneezing, etc.), pads not indicated] : Urinary Incontinence: Grade 1 - Occasional (e.g., with coughing, sneezing, etc.), pads not indicated [Urinary Retention: Grade 1 - Urinary, suprapubic or intermittent catheter placement not indicated; able to void with some residual] : Urinary Retention: Grade 1 - Urinary, suprapubic or intermittent catheter placement not indicated; able to void with some residual [Urinary Tract Pain: Grade 0] : Urinary Tract Pain: Grade 0 [Urinary Urgency: Grade 1 - Present] : Urinary Urgency: Grade 1 - Present [Urinary Frequency: Grade 1 - Present] : Urinary Frequency: Grade 1 - Present [Chest Pain] : no chest pain [Palpitations] : no palpitations [Cough] : no cough [Constipation] : no constipation [FreeTextEntry4] : bilateral hearing aids  [FreeTextEntry8] : QOL 5  [de-identified] : peripheral neuropathy, uses cane to assist ambulation. Periodic headaches.

## 2024-05-29 NOTE — REASON FOR VISIT
[Consideration for Non-Curative Therapy] : consideration for non-curative therapy for prostate cancer [Spouse] : spouse

## 2024-05-29 NOTE — DISEASE MANAGEMENT
[Biopsy] : Patient had a biopsy on [7(4+3)] : Template Biopsy [place] Score: 7(4+3) [Radiation Therapy] : Radiation Therapy [Treatment with radiation therapy] : Treatment with radiation therapy [Treatment with androgen ablation] : Treatment with androgen ablation [EBRT] : EBRT [BiopsyDate] : 4/13/2011 [TotalPositiveCores] : 6 [MaxCoreInvolvement] : 90 [IV] : IV [RadiationCompletedDate] : 9/19/2011 [EBRTDose] : 8100cGy [EBRTFractions] : 45 [ADTStartedDate] : 5/2018 [FreeTextEntry1] : 3/2018 - had elevation in PSA, started on Lupron injections 5/14/2018. 10/2020 - stopped Lupron injections and PSA monitored.    8/2022- PSA 1.9 ng/mL. Due to rate of increase recommended to resume ADT.  10/2022 - 4/7/23: Lupron injections  4/2024 - mCRPC with bone mets.   Resumed Eligard (Lupron) injections and seen by [facility].

## 2024-05-29 NOTE — VITALS
[Maximal Pain Intensity: 8/10] : 8/10 [Least Pain Intensity: 2/10] : 2/10 [Pain Description/Quality: ___] : Pain description/quality: [unfilled] [Pain Duration: ___] : Pain duration: [unfilled] [Pain Location: ___] : Pain Location: [unfilled] [OTC] : OTC [80: Normal activity with effort; some signs or symptoms of disease.] : 80: Normal activity with effort; some signs or symptoms of disease.  [ECOG Performance Status: 2 - Ambulatory and capable of all self care but unable to carry out any work activities] : Performance Status: 2 - Ambulatory and capable of all self care but unable to carry out any work activities. Up and about more than 50% of waking hours [Date: ____________] : Patient's last distress assessment performed on [unfilled]. [3 - Distress Level] : Distress Level: 3 [Patient given social work contact information and resource sheet] : Patient was given social work contact information and resource sheet

## 2024-05-30 ENCOUNTER — APPOINTMENT (OUTPATIENT)
Dept: NEPHROLOGY | Facility: CLINIC | Age: 89
End: 2024-05-30
Payer: MEDICARE

## 2024-05-30 VITALS — HEART RATE: 63 BPM | SYSTOLIC BLOOD PRESSURE: 180 MMHG | DIASTOLIC BLOOD PRESSURE: 90 MMHG

## 2024-05-30 VITALS
DIASTOLIC BLOOD PRESSURE: 98 MMHG | OXYGEN SATURATION: 95 % | HEIGHT: 68 IN | SYSTOLIC BLOOD PRESSURE: 207 MMHG | WEIGHT: 215 LBS | HEART RATE: 65 BPM | BODY MASS INDEX: 32.58 KG/M2 | TEMPERATURE: 207.14 F

## 2024-05-30 DIAGNOSIS — N18.30 CHRONIC KIDNEY DISEASE, STAGE 3 UNSPECIFIED: ICD-10-CM

## 2024-05-30 DIAGNOSIS — I10 ESSENTIAL (PRIMARY) HYPERTENSION: ICD-10-CM

## 2024-05-30 DIAGNOSIS — R80.9 PROTEINURIA, UNSPECIFIED: ICD-10-CM

## 2024-05-30 PROCEDURE — G2211 COMPLEX E/M VISIT ADD ON: CPT

## 2024-05-30 PROCEDURE — 99205 OFFICE O/P NEW HI 60 MIN: CPT

## 2024-05-30 RX ORDER — PNV NO.95/FERROUS FUM/FOLIC AC 28MG-0.8MG
TABLET ORAL
Refills: 0 | Status: DISCONTINUED | COMMUNITY
End: 2024-05-30

## 2024-05-30 RX ORDER — METOPROLOL SUCCINATE 25 MG/1
25 TABLET, EXTENDED RELEASE ORAL
Refills: 0 | Status: DISCONTINUED | COMMUNITY
End: 2024-05-30

## 2024-05-30 RX ORDER — OLMESARTAN MEDOXOMIL / AMLODIPINE BESYLATE / HYDROCHLOROTHIAZIDE 40; 10; 25 MG/1; MG/1; MG/1
40-10-25 TABLET, FILM COATED ORAL DAILY
Qty: 90 | Refills: 3 | Status: ACTIVE | COMMUNITY
Start: 2024-05-30 | End: 1900-01-01

## 2024-05-30 RX ORDER — AMLODIPINE BESYLATE 10 MG/1
10 TABLET ORAL
Qty: 90 | Refills: 0 | Status: DISCONTINUED | COMMUNITY
Start: 2022-07-25 | End: 2024-05-30

## 2024-05-30 RX ORDER — FUROSEMIDE 20 MG/1
20 TABLET ORAL
Refills: 0 | Status: DISCONTINUED | COMMUNITY
End: 2024-05-30

## 2024-05-30 RX ORDER — LISINOPRIL 40 MG/1
40 TABLET ORAL
Refills: 0 | Status: DISCONTINUED | COMMUNITY
End: 2024-05-30

## 2024-05-30 NOTE — CONSULT LETTER
[Dear  ___] : Dear  [unfilled], [Courtesy Letter:] : I had the pleasure of seeing your patient, [unfilled], in my office today. [Please see my note below.] : Please see my note below. [Sincerely,] : Sincerely, [FreeTextEntry3] : Kamron Lantigua MD Nephrology [DrKarolyn  ___] : Dr. LEON [DrKarolyn ___] : Dr. LEON

## 2024-05-30 NOTE — HISTORY OF PRESENT ILLNESS
[FreeTextEntry1] : Contacts:  Dr. Mat Wilde (PCP)  Dr. Lucila Contreras (oncology)  Dr. Duarte Daniels (urology) Dr. Simona Gray (radiation oncology) ------------------------------------------------------------------------------- Problem List:  - Hypertension - Diabetes mellitus (diagnosed about 2010) - Hyperlipidemia - Gout - Prostate cancer with metastases  ------------------------------------------------------------------------------- HPI: [comes with wife] Mr. Curran is a 88 year old man with chronic kidney disease stage III, hypertension, diabetes mellitus, hyperlipidemia, gout, and prostate cancer, here for kidney evaluation and management.  Mr. Curran has had hypertension for some time, though notes that it has previously been well-controlled. Only in the last few months has it risen quite high and become rather uncontrolled. Review of [facility] EHR shows near controlled BP readings in 2018, but elevated to 160-170s since. This year, 2024, it has been particularly high.  He takes "Advil once in a while", whenever his head hurts. But he notes that his head hurts "all the time".  He was recently started on metoprolol XL 25mg given such high BP readings. He has been on lisinopril and amlodipine for some time. He also notes being on furosemide for "5 or 6 years", as he had some leg edema back then.  Mr. Curran notes that he sometimes sleeps well at night and other times not so well. He eats home-cooked most of the time, but takeout about twice per week. He eats [place] food, including ox tail, chicken, etc. His wife notes that he doesn't have the taste for many vegetables of late. He also no longer likes fish.  No dizziness/lightheadedness, but does get headaches since the last year. (He notes having sustained a fall in December 2023, whereupon he hit his head.) No chest pain or dyspnea. Has leg swelling. No dysuria, hematuria.   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   Former smoker, quite >15 years ago  Family History:  Father had asthma  Mother had diabetes and hypertension  No known history of renal disease, hematuria, proteinuria, ESRD, dialysis, transplant  ------------------------------------------------------------------------------- Allergies: NKDA  Medications:   Lisinopril 40mg daily  Amlodipine 10mg daily  Metoprolol XL 25mg daily  Furosemide 20mg daily  Allopurinol 100mg daily  Glipizide/metformin 5-500mg BID  Primidone 125mg at bedtime  Magnesium gluconate  ------------------------------------------------------------------------------- Physical Exam:   183/95, 64  178/84, 62   Gen: NAD, well-appearing  HEENT: Supple neck  Pulm: CTA  CV: RRR  Back: No spinal or CVA terndeness  Abd: +BS, soft; obese abdomen  UE: Warm, FROM, intact strength  LE: Warm, FROM, intact strength; + edema  Neuro: No focal deficits  Psych: Normal affect  Skin: Warm, dry 	 ------------------------------------------------------------------------------- Labs/Studies:   2024-05-06    142 | 100 | 23   ------------------< 179 Ca 9.3 eGFR 44   4.1 |  26 | 1.53    Albumin 4.4   2023-02-05 UPCR 1.6 g/g   2023-05-06 Hgb 11.3  2023-02-05   2023-02-05 HbA1c 7.2

## 2024-05-30 NOTE — ASSESSMENT
[FreeTextEntry1] :  Mr. Curran is a 88 year old man with chronic kidney disease stage III, hypertension, diabetes mellitus, hyperlipidemia, gout, and prostate cancer, here for kidney evaluation and management.  Mr. Curran has CKD III, with an episode of mild KIRYB back in February 2023. At that time he also had significant proteinuria, although this has not been reassessed since. I did not focus much on CKD today given the severity of his hypertension, which requires more prompt attention.  Mr. Curran has long-standing hypertension, which I assume has been uncontrolled for some time. That said, it has significantly worsened this year for unclear reasons. He has been taking quite a bit of ibuprofen, which could be contributing, and I suggested acetaminophen instead. (And while the latter can also contribute to hypertension, its effect is likely less pronounced that NSAIDs.)  Will make some antihypertensive changes today. I suspect that with the changes and the cessation of NSAIDs, the BP will improve. It will likely not get to ideal state -- that will require additional meds -- but it'll be a start.  CKD III with proteinuria - Continue RAASi - May be candidate for SGLT2i - Recheck kidney indices  Hypertension, uncontrolled - STOP lisinopril - STOP amlodipine - STOP metoprolol - STOP furosemide - START olmesartan-amlodipine-hydrochlorothiazide 40-10-25mg daily - Check renin and aldosterone levels - Likely candidate for mineralocorticoid receptor antagonist  DM: Maintain current regimen  Hyperlipidemia: Unclear why not on statin; address at future visits  Gout: Controlled   PLAN: - STOP lisinopril - STOP amlodipine - STOP metoprolol - STOP furosemide - START olmesartan-amlodipine-hydrochlorothiazide 40-10-25mg daily - Request that Dr. Contreras checks a "Basic Metabolic Panel" on the late June appt. - Come back to the office in 4 weeks (after Dr. Contreras)   Discussed importance of healthful habits, including physical activity/exercise and improvements in diet.  I spent a total of 60 minutes on this encounter.

## 2024-06-10 LAB
ALBUMIN SERPL ELPH-MCNC: 4.5 G/DL
ALDOSTERONE SERUM: 27.4 NG/DL
ANION GAP SERPL CALC-SCNC: 18 MMOL/L
APPEARANCE: CLEAR
BACTERIA: NEGATIVE /HPF
BILIRUBIN URINE: NEGATIVE
BLOOD URINE: NEGATIVE
BUN SERPL-MCNC: 25 MG/DL
CALCIUM SERPL-MCNC: 9.1 MG/DL
CALCIUM SERPL-MCNC: 9.1 MG/DL
CAST: 1 /LPF
CHLORIDE SERPL-SCNC: 98 MMOL/L
CHOLEST SERPL-MCNC: 172 MG/DL
CO2 SERPL-SCNC: 24 MMOL/L
COLOR: YELLOW
CREAT SERPL-MCNC: 1.51 MG/DL
CREAT SPEC-SCNC: 39 MG/DL
CREAT SPEC-SCNC: 39 MG/DL
CREAT/PROT UR: 1.9 RATIO
CYSTATIN C SERPL-MCNC: 1.48 MG/L
EGFR: 44 ML/MIN/1.73M2
EPITHELIAL CELLS: 0 /HPF
ESTIMATED AVERAGE GLUCOSE: 154 MG/DL
GFR/BSA.PRED SERPLBLD CYS-BASED-ARV: 42 ML/MIN/1.73M2
GLUCOSE QUALITATIVE U: NEGATIVE MG/DL
GLUCOSE SERPL-MCNC: 79 MG/DL
HBA1C MFR BLD HPLC: 7 %
HCT VFR BLD CALC: 40 %
HDLC SERPL-MCNC: 42 MG/DL
HGB BLD-MCNC: 11.9 G/DL
KETONES URINE: NEGATIVE MG/DL
LDLC SERPL CALC-MCNC: 99 MG/DL
LEUKOCYTE ESTERASE URINE: NEGATIVE
MCHC RBC-ENTMCNC: 22 PG
MCHC RBC-ENTMCNC: 29.8 GM/DL
MCV RBC AUTO: 74.1 FL
MICROALBUMIN 24H UR DL<=1MG/L-MCNC: 48.2 MG/DL
MICROALBUMIN/CREAT 24H UR-RTO: 1239 MG/G
MICROSCOPIC-UA: NORMAL
NITRITE URINE: NEGATIVE
NONHDLC SERPL-MCNC: 130 MG/DL
PARATHYROID HORMONE INTACT: 146 PG/ML
PH URINE: 6
PHOSPHATE SERPL-MCNC: 3.1 MG/DL
PLATELET # BLD AUTO: 354 K/UL
POTASSIUM SERPL-SCNC: 4.1 MMOL/L
PROT UR-MCNC: 75 MG/DL
PROTEIN URINE: 100 MG/DL
RBC # BLD: 5.4 M/UL
RBC # FLD: 17.9 %
RED BLOOD CELLS URINE: 1 /HPF
RENIN ACTIVITY, PLASMA: 0.55 NG/ML/HR
SODIUM SERPL-SCNC: 140 MMOL/L
SPECIFIC GRAVITY URINE: 1.01
TRIGL SERPL-MCNC: 177 MG/DL
URATE SERPL-MCNC: 6.2 MG/DL
UROBILINOGEN URINE: 0.2 MG/DL
WBC # FLD AUTO: 10.45 K/UL
WHITE BLOOD CELLS URINE: 0 /HPF

## 2024-06-17 ENCOUNTER — APPOINTMENT (OUTPATIENT)
Dept: NUCLEAR MEDICINE | Facility: IMAGING CENTER | Age: 89
End: 2024-06-17
Payer: MEDICARE

## 2024-06-17 ENCOUNTER — OUTPATIENT (OUTPATIENT)
Dept: OUTPATIENT SERVICES | Facility: HOSPITAL | Age: 89
LOS: 1 days | End: 2024-06-17
Payer: MEDICARE

## 2024-06-17 DIAGNOSIS — C61 MALIGNANT NEOPLASM OF PROSTATE: ICD-10-CM

## 2024-06-17 PROCEDURE — 78816 PET IMAGE W/CT FULL BODY: CPT

## 2024-06-17 PROCEDURE — 78816 PET IMAGE W/CT FULL BODY: CPT | Mod: 26

## 2024-06-17 PROCEDURE — A9595: CPT

## 2024-06-17 PROCEDURE — 78816 PET IMAGE W/CT FULL BODY: CPT | Mod: 26,76

## 2024-06-21 ENCOUNTER — RESULT REVIEW (OUTPATIENT)
Age: 89
End: 2024-06-21

## 2024-06-21 ENCOUNTER — APPOINTMENT (OUTPATIENT)
Dept: HEMATOLOGY ONCOLOGY | Facility: CLINIC | Age: 89
End: 2024-06-21
Payer: MEDICARE

## 2024-06-21 VITALS
HEART RATE: 78 BPM | RESPIRATION RATE: 15 BRPM | BODY MASS INDEX: 32.69 KG/M2 | DIASTOLIC BLOOD PRESSURE: 91 MMHG | OXYGEN SATURATION: 92 % | SYSTOLIC BLOOD PRESSURE: 167 MMHG | WEIGHT: 214.99 LBS | TEMPERATURE: 96.9 F

## 2024-06-21 DIAGNOSIS — C61 MALIGNANT NEOPLASM OF PROSTATE: ICD-10-CM

## 2024-06-21 LAB
BASOPHILS # BLD AUTO: 0.06 K/UL — SIGNIFICANT CHANGE UP (ref 0–0.2)
BASOPHILS NFR BLD AUTO: 0.5 % — SIGNIFICANT CHANGE UP (ref 0–2)
EOSINOPHIL # BLD AUTO: 1.18 K/UL — HIGH (ref 0–0.5)
EOSINOPHIL NFR BLD AUTO: 9.9 % — HIGH (ref 0–6)
HCT VFR BLD CALC: 36.8 % — LOW (ref 39–50)
HGB BLD-MCNC: 11.7 G/DL — LOW (ref 13–17)
IMM GRANULOCYTES NFR BLD AUTO: 0.9 % — SIGNIFICANT CHANGE UP (ref 0–0.9)
LYMPHOCYTES # BLD AUTO: 2.66 K/UL — SIGNIFICANT CHANGE UP (ref 1–3.3)
LYMPHOCYTES # BLD AUTO: 22.4 % — SIGNIFICANT CHANGE UP (ref 13–44)
MCHC RBC-ENTMCNC: 23 PG — LOW (ref 27–34)
MCHC RBC-ENTMCNC: 31.8 G/DL — LOW (ref 32–36)
MCV RBC AUTO: 72.3 FL — LOW (ref 80–100)
MONOCYTES # BLD AUTO: 0.96 K/UL — HIGH (ref 0–0.9)
MONOCYTES NFR BLD AUTO: 8.1 % — SIGNIFICANT CHANGE UP (ref 2–14)
NEUTROPHILS # BLD AUTO: 6.9 K/UL — SIGNIFICANT CHANGE UP (ref 1.8–7.4)
NEUTROPHILS NFR BLD AUTO: 58.2 % — SIGNIFICANT CHANGE UP (ref 43–77)
NRBC # BLD: 0 /100 WBCS — SIGNIFICANT CHANGE UP (ref 0–0)
PLATELET # BLD AUTO: 320 K/UL — SIGNIFICANT CHANGE UP (ref 150–400)
RBC # BLD: 5.09 M/UL — SIGNIFICANT CHANGE UP (ref 4.2–5.8)
RBC # FLD: 16.9 % — HIGH (ref 10.3–14.5)
WBC # BLD: 11.87 K/UL — HIGH (ref 3.8–10.5)
WBC # FLD AUTO: 11.87 K/UL — HIGH (ref 3.8–10.5)

## 2024-06-21 PROCEDURE — 99214 OFFICE O/P EST MOD 30 MIN: CPT

## 2024-06-21 PROCEDURE — G2211 COMPLEX E/M VISIT ADD ON: CPT

## 2024-06-21 NOTE — ASSESSMENT
[FreeTextEntry1] : 88 years old man with PMHx of DM2, HTN, and diagnosed as prostate cancer s/p radiation in 2011, PSA elevated to 1.9 in August 2022, started ADT in October 2022. His PSA nicki was 0.82 in April 2023, elevated PSA 1.93 in October, 4.11 in Jan 2024 while under castration level of testosterone. He has castration resistant prostate cancer.   He does have one right iliac bone lesion with metastasis. His images show metastasis, he has mCRPC. He was planned for and ordered to start abiraterone/prednisone in the setting of normal cardiac function given its side effects including hypertension and fluid retention. Abiraterone may cause liver damage. He is getting Eligard Q6 months and his last dose was 4/16/24, had apparently been overdue for it (although pt states he had gotten it in October 2023).  - Abiraterone/prednisone prescribed to pt. However, given uncontrolled HTN, I told pt to strictly not start those meds until BP is improved. Pt is on 3 meds for HTN and will f/u with his PCP Dr. Mat Wilde - As an alternative, could consider a referral to Rad onc for oligometastasis given his age and potential AEs from systemic treatment - Monitor PSA as well - Will obtain a dental referral (notes toothache) prior to starting Xgeva  Uncontrolled HTN - stopping lisinopril, amlodipine, metoprolol and furosemide, then start olmesartan-amlodipine-hydrocholorothiazide 40-10-25mg daily.  - Emphasized to eliminate salt from diet. Discussed if he develops worsening headache, change in vision, or CP/SOB to go to the ED.  RTC in 6 weeks

## 2024-06-21 NOTE — REVIEW OF SYSTEMS
[Fever] : no fever [Fatigue] : fatigue [Recent Change In Weight] : ~T no recent weight change [Dysphagia] : no dysphagia [Odynophagia] : no odynophagia [Muscle Pain] : no muscle pain [Confused] : no confusion [Dizziness] : no dizziness [Insomnia] : no insomnia [Depression] : no depression [Hot Flashes] : no hot flashes [Muscle Weakness] : no muscle weakness [Easy Bleeding] : no tendency for easy bleeding [Easy Bruising] : no tendency for easy bruising [FreeTextEntry4] : chronic toothache [de-identified] : +Neuropathy b/l foot. Headaches [FreeTextEntry8] : +Nocturia

## 2024-06-21 NOTE — HISTORY OF PRESENT ILLNESS
[de-identified] : Mike Curran is 88 years old man with PMHx of DM2, HTN, and prostate cancer presenting for initial evaluation of CRPC.  Pt was initially treated with radiation in 2011 for his prostate cancer.  4/25/2018 bone scan showed no evidence of bone metastasis.  CT a/p showed small left para-aortic nodes questionable significance, two punctate right lower lobe pulmonary nodules. PSA  3/4/22  0.55 7/8/22  1.40 8/16/22 1.90  10/7/2022 He developed recurrent disease and had been started on androgen deprivation therapy with Dr. Daniels. He reports an interval history of having developed vertigo and he was hospitalized briefly for that reason. He is following up with neurology to determine what may have caused the vertigo. It is not yet clear.  PSA 4/7/23  0.82    testosterone 8.8 10/10/23 1.92    testosterone  9.2 1/25/24  4.11   testosterone 3.5   He is here today to determine his current PSA level and then make a decision regarding reinitiation of the androgen deprivation.  As per patient, he has limited physical function with very little walk inside his house with a cane. He feels fine, denies any pain and gross hematuria.   3/5/24 CT C/A/P showed no lymphadenopathy on the current exam. Previously referenced left para-aortic lymph nodes are markedly smaller on the current study. New 4 to 5 cm mixed lytic and sclerotic lesion involving the right iliac bone. 3/11/24 bone scan showed Findings suggestive of osseous metastasis in the right posterior ileum. Nonspecific increased uptake in the left femoral head. This is suspicious for osteonecrosis although degenerative/injury related changes or even metastasis may have a similar appearance. Report chronic lower back pain, otherwise feels fine, denies fatigue. He has bilateral feet numbness and tingling. Sometimes weak urine flow. Nocturia 0 to 3. Denies gross hematuria.  [de-identified] : prostate adenocarcinoma  [de-identified] : 5/6/24: Patient presents for follow up. He notes generalized pains. He also has had mild headaches for some time. No vision changes. ROS is otherwise unchanged from previously. He denies CP or SOB. He notes he eats a diet with salty foods and processed foods. He takes furosemide, 20, amlodipine 10, and lisinopril 40 mg QD for BP and takes his meds daily. He denies missing his BP pills.  6/17/24 PSMA PET showed 1.  Numerous osseous metastases involving the axial and appendicular skeleton. 2.  Ill-defined, mild uptake in the prostate gland, nonspecific.  6/21/24 reports he has changed his blood pressure med by stopping lisinopril, amlodipine, metoprolol and furosemide, then start olmesartan-amlodipine-hydrocholorothiazide 40-10-25mg daily. His BP this AM at home 160s/86. He feels tired, denies hot flash and sweating. He has normal urination. Nocturia 0-2. He has had vertigo Feb 2023 on isacc.

## 2024-06-22 LAB
ALBUMIN SERPL ELPH-MCNC: 4.4 G/DL
ALP BLD-CCNC: 129 U/L
ALT SERPL-CCNC: 13 U/L
ANION GAP SERPL CALC-SCNC: 19 MMOL/L
AST SERPL-CCNC: 19 U/L
BILIRUB SERPL-MCNC: 0.2 MG/DL
BUN SERPL-MCNC: 31 MG/DL
CALCIUM SERPL-MCNC: 9.4 MG/DL
CHLORIDE SERPL-SCNC: 95 MMOL/L
CO2 SERPL-SCNC: 23 MMOL/L
CREAT SERPL-MCNC: 2.18 MG/DL
EGFR: 29 ML/MIN/1.73M2
GLUCOSE SERPL-MCNC: 126 MG/DL
POTASSIUM SERPL-SCNC: 3.7 MMOL/L
PROT SERPL-MCNC: 7.4 G/DL
PSA SERPL-MCNC: 10.3 NG/ML
SODIUM SERPL-SCNC: 137 MMOL/L

## 2024-06-23 DIAGNOSIS — C79.51 SECONDARY MALIGNANT NEOPLASM OF BONE: ICD-10-CM

## 2024-06-24 ENCOUNTER — NON-APPOINTMENT (OUTPATIENT)
Age: 89
End: 2024-06-24

## 2024-07-08 ENCOUNTER — NON-APPOINTMENT (OUTPATIENT)
Age: 89
End: 2024-07-08

## 2024-07-08 PROCEDURE — 77263 THER RADIOLOGY TX PLNG CPLX: CPT

## 2024-07-08 PROCEDURE — 77290 THER RAD SIMULAJ FIELD CPLX: CPT | Mod: 26

## 2024-07-15 PROCEDURE — 77307 TELETHX ISODOSE PLAN CPLX: CPT | Mod: 26

## 2024-07-15 PROCEDURE — 77334 RADIATION TREATMENT AID(S): CPT | Mod: 26

## 2024-07-26 ENCOUNTER — APPOINTMENT (OUTPATIENT)
Dept: NEPHROLOGY | Facility: CLINIC | Age: 89
End: 2024-07-26
Payer: MEDICARE

## 2024-07-26 VITALS
HEIGHT: 68 IN | OXYGEN SATURATION: 95 % | WEIGHT: 210 LBS | SYSTOLIC BLOOD PRESSURE: 186 MMHG | DIASTOLIC BLOOD PRESSURE: 94 MMHG | TEMPERATURE: 97.7 F | BODY MASS INDEX: 31.83 KG/M2 | HEART RATE: 72 BPM

## 2024-07-26 DIAGNOSIS — E26.09 OTHER PRIMARY HYPERALDOSTERONISM: ICD-10-CM

## 2024-07-26 DIAGNOSIS — I10 ESSENTIAL (PRIMARY) HYPERTENSION: ICD-10-CM

## 2024-07-26 DIAGNOSIS — N18.30 CHRONIC KIDNEY DISEASE, STAGE 3 UNSPECIFIED: ICD-10-CM

## 2024-07-26 DIAGNOSIS — R80.9 PROTEINURIA, UNSPECIFIED: ICD-10-CM

## 2024-07-26 PROCEDURE — G2211 COMPLEX E/M VISIT ADD ON: CPT

## 2024-07-26 PROCEDURE — 99215 OFFICE O/P EST HI 40 MIN: CPT

## 2024-07-26 RX ORDER — SPIRONOLACTONE 25 MG/1
25 TABLET ORAL
Qty: 90 | Refills: 3 | Status: ACTIVE | COMMUNITY
Start: 2024-07-26 | End: 1900-01-01

## 2024-07-26 NOTE — HISTORY OF PRESENT ILLNESS
[FreeTextEntry1] : Contacts: Dr. Mat Wilde (PCP) Dr. Lucila Contreras (oncology) Dr. Duarte Daniels (urology) Dr. 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 Curran is a 88 year old man with chronic kidney disease stage III, hypertension, diabetes mellitus, hyperlipidemia, gout, and prostate cancer, here for kidney evaluation and management.  Last saw Mr. Curran in May 2024, at which time we did the following:  - STOP lisinopril - STOP amlodipine - STOP metoprolol - STOP furosemide - START olmesartan-amlodipine-hydrochlorothiazide 40-10-25mg daily  Home BPs have remained high, between 150-170 systolic mostly.  He continues to have headaches, for which he is now taking Tylenol instead of Advil.  Starting radiation therapy next week.  No dizziness/lightheadedness, but does get headaches since the last year. (He notes having sustained a fall in December 2023, whereupon he hit his head.) No chest pain or dyspnea. Has leg swelling. No dysuria, hematuria.  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 Former smoker, quite >15 years ago  Family History: Father had asthma Mother had diabetes and hypertension No known history of renal disease, hematuria, proteinuria, ESRD, dialysis, transplant  ------------------------------------------------------------------------------- Allergies: NKDA  Medications:  Lisinopril 40mg daily Amlodipine 10mg daily Metoprolol XL 25mg daily Furosemide 20mg daily  Allopurinol 100mg daily Glipizide/metformin 5-500mg BID Primidone 125mg at bedtime Magnesium gluconate  ------------------------------------------------------------------------------- Physical Exam:  183/95, 73  Gen: NAD, well-appearing HEENT: Supple neck Pulm: CTA CV: RRR Back: No spinal or CVA terndeness Abd: +BS, soft; obese abdomen UE: Warm, FROM, intact strength LE: Warm, FROM, intact strength; + edema Neuro: No focal deficits Psych: Normal affect Skin: Warm, dry  ------------------------------------------------------------------------------- Labs/Studies:  2024-06-21  137 | 95 | 31 ------------------< 126 Ca 9.4 3.7 | 23 | 2.18  Albumin 4.4  Kidney trend 2024-06-21 SCr 2.18 (eGFR 29) 2024-05-30 SCr 1.51 (eGFR 44; Cystatin-C 1.48, eGFR 42) 2024-05-06 SCr 1.53 (eGFR 44) 2024-03-25 SCr 1.53 (eGFR 44) 2024-02-12 SCr 1.42 (eGFR 48) 2022-03-04 SCr 1.30 (eGFR 54)  2024-05-30 UACR 1239 mg/g  2024-05-30 UPCR 1.9 g/g 2023-02-05 UPCR 1.6 g/g  2024-06-21 Hgb 11.7 2024-05-30 LDL 99 2024-05-30 HbA1c 7  2024-05-30 Renin activity 0.550 2024-05-30 Aldosterone 27.4

## 2024-07-26 NOTE — ASSESSMENT
[FreeTextEntry1] :  Mr. Curran is a 88 year old man with chronic kidney disease stage III, hypertension, diabetes mellitus, hyperlipidemia, gout, and prostate cancer, here for kidney evaluation and management.  Mr. Curran has CKD III, with an episode of mild KIRBY back in February 2023. He has significantly elevated urine protein and albumin. Recent worsening of creatinine in setting of medication adjustments. Will recheck labs today.  Mr. Curran has long-standing hypertension, which has been uncontrolled of late. 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 Further testing is not warranted, and he would not want surgery, and therefore treatment with a mineralocorticoid receptor antagonist (MRA) such as spironolactone is warranted.   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 Contreras)   Discussed importance of healthful habits, including physical activity/exercise and improvements in diet.  I spent a total of 40 minutes on this encounter.

## 2024-07-29 PROCEDURE — 77280 THER RAD SIMULAJ FIELD SMPL: CPT | Mod: 26

## 2024-07-31 LAB
ALBUMIN SERPL ELPH-MCNC: 4.6 G/DL
ANION GAP SERPL CALC-SCNC: 19 MMOL/L
BUN SERPL-MCNC: 30 MG/DL
CALCIUM SERPL-MCNC: 9.8 MG/DL
CHLORIDE SERPL-SCNC: 100 MMOL/L
CO2 SERPL-SCNC: 22 MMOL/L
CREAT SERPL-MCNC: 1.7 MG/DL
CREAT SPEC-SCNC: 28 MG/DL
CYSTATIN C SERPL-MCNC: 1.74 MG/L
EGFR: 39 ML/MIN/1.73M2
GFR/BSA.PRED SERPLBLD CYS-BASED-ARV: 34 ML/MIN/1.73M2
GLUCOSE SERPL-MCNC: 81 MG/DL
MICROALBUMIN 24H UR DL<=1MG/L-MCNC: 17.5 MG/DL
MICROALBUMIN/CREAT 24H UR-RTO: 631 MG/G
PHOSPHATE SERPL-MCNC: 3 MG/DL
POTASSIUM SERPL-SCNC: 4.8 MMOL/L
SODIUM SERPL-SCNC: 140 MMOL/L

## 2024-08-01 PROCEDURE — 77427 RADIATION TX MANAGEMENT X5: CPT

## 2024-08-05 ENCOUNTER — NON-APPOINTMENT (OUTPATIENT)
Age: 89
End: 2024-08-05

## 2024-08-05 NOTE — DISEASE MANAGEMENT
[IV] : IV [Clinical] : TNM Stage: c [TTNM] : x [NTNM] : x [MTNM] : 1 [de-identified] : 1200 cGy [de-identified] : 2000 cGy [de-identified] : Right ilium

## 2024-08-05 NOTE — DISEASE MANAGEMENT
[IV] : IV [Clinical] : TNM Stage: c [TTNM] : x [NTNM] : x [MTNM] : 1 [de-identified] : 1200 cGy [de-identified] : 2000 cGy [de-identified] : Right ilium

## 2024-08-05 NOTE — REVIEW OF SYSTEMS
Urinary Tract Infections in Women  Urinary tract infections (UTIs) are most often caused by bacteria (germs). These bacteria enter the urinary tract. The bacteria may come from outside the body. Or they may travel from the skin outside the rectum or vagina into the urethra. Female anatomy makes it easier for bacteria from the bowel to enter a woman’s urinary tract, which is the most common source of UTI. This means women develop UTIs more often than men. Pain in or around the urinary tract is a common UTI symptom. But the only way to know for sure if you have a UTI for the health care provider to test your urine. The two tests that may be done are the urinalysis and urine culture.  Types of UTIs  · Cystitis: A bladder infection (cystitis) is the most common UTI in women. You may have urgent or frequent urination. You may also have pain, burning when you urinate, and bloody urine.  · Urethritis: This is an inflamed urethra, which is the tube that carries urine from the bladder to outside the body. You may have lower stomach or back pain. You may also have urgent or frequent urination.  · Pyelonephritis: This is a kidney infection. If not treated, it can be serious and damage your kidneys. In severe cases, you may be hospitalized. You may have a fever and lower back pain.  Medications to treat a UTI  Most UTIs are treated with antibiotics. These kill the bacteria. The length of time you need to take them depends on the type of infection. It may be as short as 3 days. If you have repeated UTIs, a low-dose antibiotic may be needed for several months. Take antibiotics exactly as directed. Don’t stop taking them until all of the medication is gone. If you stop taking the antibiotic too soon, the infection may not go away, and you may develop a resistance to the antibiotic. This can make it much harder to treat.  Lifestyle changes to treat and prevent UTIs  The lifestyle changes below will help get rid of your UTI. They  may also help prevent future UTIs.  · Drink plenty of fluids. This includes water, juice, or other caffeine-free drinks. Fluids help flush bacteria out of your body.  · Empty your bladder. Always empty your bladder when you feel the urge to urinate. And always urinate before going to sleep. Urine that stays in your bladder can lead to infection. Try to urinate before and after sex as well.  · Practice good personal hygiene. Wipe yourself from front to back after using the toilet. This helps keep bacteria from getting into the urethra.  · Use condoms during sex. These help prevent UTIs caused by sexually transmitted bacteria. Also, avoid using spermicides during sex. These can increase the risk of UTIs. Choose other forms of birth control instead. For women who tend to get UTIs after sex, a low-dose of a preventive antibiotic may be used. Be sure to discuss this option with your health care provider.  · Follow up with your health care provider as directed. He or she may test to make sure the infection has cleared. If necessary, additional treatment may be started.  © 8590-0796 [employer]. [address] [place] [address]. All rights reserved. This information is not intended as a substitute for professional medical care. Always follow your healthcare professional's instructions.         [Patient Intake Form Reviewed] : Patient intake form was reviewed [Fatigue] : fatigue [Loss of Hearing] : loss of hearing [Lower Ext Edema] : lower extremity edema [Shortness Of Breath] : shortness of breath [Diarrhea] : diarrhea [Nocturia] : nocturia [Urinary Frequency] : urinary frequency [IPSS Score (0-40): ___] : IPSS score: [unfilled] [EPIC-CP Score (0-60): ___] : EPIC-CP score: [unfilled] [Negative] : Heme/Lymph [Constipation: Grade 0] : Constipation: Grade 0 [Diarrhea: Grade 1 - Increase of <4 stools per day over baseline; mild increase in ostomy output compared to baseline] : Diarrhea: Grade 1 - Increase of <4 stools per day over baseline; mild increase in ostomy output compared to baseline [Hematuria: Grade 0] : Hematuria: Grade 0 [Urinary Incontinence: Grade 1 - Occasional (e.g., with coughing, sneezing, etc.), pads not indicated] : Urinary Incontinence: Grade 1 - Occasional (e.g., with coughing, sneezing, etc.), pads not indicated [Urinary Retention: Grade 1 - Urinary, suprapubic or intermittent catheter placement not indicated; able to void with some residual] : Urinary Retention: Grade 1 - Urinary, suprapubic or intermittent catheter placement not indicated; able to void with some residual [Urinary Tract Pain: Grade 0] : Urinary Tract Pain: Grade 0 [Urinary Urgency: Grade 1 - Present] : Urinary Urgency: Grade 1 - Present [Urinary Frequency: Grade 1 - Present] : Urinary Frequency: Grade 1 - Present [Chest Pain] : no chest pain [Palpitations] : no palpitations [Cough] : no cough [Constipation] : no constipation [FreeTextEntry4] : bilateral hearing aids  [FreeTextEntry8] : QOL 5  [de-identified] : peripheral neuropathy, uses cane to assist ambulation. Periodic headaches.  [Fatigue: Grade 1 - Fatigue relieved by rest] : Fatigue: Grade 1 - Fatigue relieved by rest [Urinary Tract Pain: Grade 1 - Mild pain] : Urinary Tract Pain: Grade 1 - Mild pain [Pruritus: Grade 0] : Pruritus: Grade 0 [Skin Hyperpigmentation: Grade 0] : Skin Hyperpigmentation: Grade 0 [Dermatitis Radiation: Grade 0] : Dermatitis Radiation: Grade 0

## 2024-08-05 NOTE — REASON FOR VISIT
[Routine On-Treatment] : a routine on-treatment visit for [Bone Metastasis] : bone metastasis [Prostate Cancer] : prostate cancer [Spouse] : spouse

## 2024-08-05 NOTE — HISTORY OF PRESENT ILLNESS
[FreeTextEntry1] : Mr. Curran is an 88-year-old male who presents for an on treatment visit.  He is accompanied by his wife for today's visit.    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 Meyer) 8/1/24 - started on RADIATION Therapy to RIGHT Ilium, 2000 cGy in 5 fx   PSA Trend: 2012 - 2016: PSA remained 0.2 to 0.94 ng/mL 3/1/18 - 6.5 5/2018 - started on Lupron injections 8/21/18 - 0.12 2019 - 2021: PSA remained less than 1.0 (off Lupron - last shot was 6/19/20) 7/8/22 - 1.40 8/16/22 - 1.90     Testosterone 188.0 ng/dL 10/2022 - Restarted on Lupron injections. 4/7/23 - 0.82     Testosterone 8.8 ng/dL 10/10/23 - 1.92 1/25/24 - 4.11     Testosterone 3.5 ng/dL 3/25/24 - 4.39     Testosterone 11.0 ng/dL 5/6/24 - 6.21     Testosterone 9.4 ng/dL 6/21/24- 10.30 ng/mL  HPI: Mr. Curran was diagnosed with Adenocarcinoma of the Prostate, Thurmond score 4+3=7, in 2011. He received radiation therapy at that time. He continued to follow with Dr. Santos (urology) with PSA monitoring. In March of 2018 he was found to have elevated PSA to 6.5 ng/mL, CT and Bone scan done.  4/25/18 - Bone Scan: No scan evidence of osseous metastasis. Degenerative disease in the spine and major joints, prominent in the shoulders. Mildly increased uptake in the right maxilla and left submandibular region probably related to dental process.  4/25/18 - CT A/P: Small left para-aortic lymph nodes questionable significance. Punctuate right lower lobe pulmonary nodule. No evidence of dominant enlarged adenopathy or bony lesions can be seen.  4/30/18 - saw Dr. Santos (urology) in follow up, recommended beginning on hormonal therapy. 5/14/18 - started on Lupron Injections with Dr. Santos (urology), continued on through 6/19/20. In September 2020 when he was due for his next shot decision was made to stop the injections due to complaints of side effects and monitor the PSA.  8/2022- PSA 1.9 ng/mL. Due to rate of increase recommended to resume ADT, seen by Dr. Daniels (urology).  10/2022 - Restarted on Lupron injections  10/10/23 -saw Dr. Daniels (urology) in follow up. Last Lupron shot was 4/7/23. Patient has not completed 1 year of androgen deprivation therapy for what was seen to be biochemical recurrence. His PSA had gone up from 0.09 up to 1.9. It has not come back down, and we will be rechecking the PSA level today to reassess. If his testosterone level and PSA levels both remain very low, we can probably use a strategy of intermittent androgen deprivation therapy versus just simple discontinuation moving forward. If he does not see further PSA decrease or if he develops increase in the future, we may restart ADT.  1/25/24 - saw Dr. Daniels (urology) in follow up. Last Lupron shot was 4/7/23. PSA now 4.11 ng/mL. His testosterone level was also checked and found to remain castrate at 3.5. 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 Will be referring him to medical oncology and would recommend that we continue on with the androgen deprivation therapy as part of his overall treatment plan.  3/5/24 - CT C/A/P: No lymphadenopathy on the current exam. Previously referenced left para-aortic lymph nodes are markedly smaller on the current study. New 4 to 5 cm mixed lytic and sclerotic lesion involving the right iliac bone. Please see the dedicated bone scan report from the study on 3/11/2024 for additional information regarding the osseous structures.  3/11/24 - Bone Scan: Findings suggestive of osseous metastasis in the right posterior ileum. Nonspecific increased uptake in the left femoral head. This is suspicious for osteonecrosis although degenerative/injury related changes or even metastasis may have a similar appearance.  3/25/24 - saw Dr. Contreras ([facility]) in consultation for castration resistant prostate cancer. Recent scans reviewed. 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 His images show metastasis, he has mCRPC. He will start abiraterone/prednisone in the setting of normal cardiac function given its side effects including hypertension and fluid retention. Abiraterone may cause liver damage. It seems his eligard inj is overdue, will contact Dr. Daniels's office. Will monitor his PSA and potentially add abiraterone next visit. Alternatively, he may be referred to Rad onc for oligometastatic disease given age and potential AEs from systemic treatment.  4/16/24 - Received Eligard injection (45 mg) with Dr. Daniels (urology).  5/6/24 - saw Dr. Contreras ([facility]) in follow up. To start on Abiraterone/Prednisone once his blood pressure has improved. To follow up with PCP Dr. Wilde. As an alternative, could consider a referral to Rad onc for oligometastasis given his age and potential AEs from systemic treatment. To obtain dental clearance prior to starting on Xgeva.  5/29/24 - presented in consultation for discussion of radiation therapy options. Mr. Curran is feeling well today. He denies any pain in his right hip. Complains of neuropathy pain in his feet, especially with walking, uses a cane to assist with ambulation. Blood pressure remains elevated, has been started on Metoprolol recently by PCP Dr. Wilde. He has urinary frequency, urgency, nocturia, decreased stream. Also, with complaints of diarrhea (chronic). IPSS 28 / QOL 5 / EPIC-CP 25 (ED portion not completed). He states he is seeing nephrology tomorrow and next follow up with Dr. Contreras ([facility]) is 6/21/24.  Reviewed the imaging together and plan some radiation to area of oligometastatic disease, since he is not currently able to take abiraterone since his blood pressure is poorly controlled. Suggested a PET scan to delineate the lesion better and to look for any additional lesions that could be treated with radiation therapy simultaneously. We discussed radiation using SBRT to the lesion 27Gy in 3 fractions. A 3D technique could be used treating 20Gy in 5 fractions, if a PET is not able to be obtained. We have discussed logistics and possible acute and late side effects in detail, and he has signed consent. He will return for simulation.  6/17/24: PSMA PET: numerous foci of increased osseous uptake including the calvarium, bilateral proximal humeri, left scapula, ribs, spine, pelvic bones, and bilateral femora. The previously identified sclerotic lesion in the right iliac demonstrates SUV 31.3.   6/21/24- presented to med onc (Dr. Contreras) for follow up. PSA had increased. Abiraterone/prednisone was held due to the patient's uncontrolled HTN.  7/26/24- presented to nephrology for follow up regarding elevated BUN/Cr. He was started on spironolactone 25 mg daily and is to follow up in 4 weeks.   8/1/24 - started on RADIATION Therapy to RIGHT Ilium, 2000 cGy in 5 fx   8/5/24: OTV fx 3/5 completed. Mr. Dee is doing well with treatment.  He has tiredness, states he goes home after the treatment and sleeps.  Denies any pain. He has some burring with urination, unsure when it started, if persists will discuss with MedOnc at his appointment on Friday.  Reinforced possible side effects of treatment. Discharge instructions given and PTE scheduled for 9/16/24. He has an appointment with Med Onc 8/9/24.

## 2024-08-05 NOTE — REVIEW OF SYSTEMS
[Patient Intake Form Reviewed] : Patient intake form was reviewed [Fatigue] : fatigue [Loss of Hearing] : loss of hearing [Lower Ext Edema] : lower extremity edema [Shortness Of Breath] : shortness of breath [Diarrhea] : diarrhea [Nocturia] : nocturia [Urinary Frequency] : urinary frequency [IPSS Score (0-40): ___] : IPSS score: [unfilled] [EPIC-CP Score (0-60): ___] : EPIC-CP score: [unfilled] [Negative] : Heme/Lymph [Constipation: Grade 0] : Constipation: Grade 0 [Diarrhea: Grade 1 - Increase of <4 stools per day over baseline; mild increase in ostomy output compared to baseline] : Diarrhea: Grade 1 - Increase of <4 stools per day over baseline; mild increase in ostomy output compared to baseline [Hematuria: Grade 0] : Hematuria: Grade 0 [Urinary Incontinence: Grade 1 - Occasional (e.g., with coughing, sneezing, etc.), pads not indicated] : Urinary Incontinence: Grade 1 - Occasional (e.g., with coughing, sneezing, etc.), pads not indicated [Urinary Retention: Grade 1 - Urinary, suprapubic or intermittent catheter placement not indicated; able to void with some residual] : Urinary Retention: Grade 1 - Urinary, suprapubic or intermittent catheter placement not indicated; able to void with some residual [Urinary Tract Pain: Grade 0] : Urinary Tract Pain: Grade 0 [Urinary Urgency: Grade 1 - Present] : Urinary Urgency: Grade 1 - Present [Urinary Frequency: Grade 1 - Present] : Urinary Frequency: Grade 1 - Present [Chest Pain] : no chest pain [Palpitations] : no palpitations [Cough] : no cough [Constipation] : no constipation [FreeTextEntry4] : bilateral hearing aids  [FreeTextEntry8] : QOL 5  [de-identified] : peripheral neuropathy, uses cane to assist ambulation. Periodic headaches.  [Fatigue: Grade 1 - Fatigue relieved by rest] : Fatigue: Grade 1 - Fatigue relieved by rest [Urinary Tract Pain: Grade 1 - Mild pain] : Urinary Tract Pain: Grade 1 - Mild pain [Pruritus: Grade 0] : Pruritus: Grade 0 [Skin Hyperpigmentation: Grade 0] : Skin Hyperpigmentation: Grade 0 [Dermatitis Radiation: Grade 0] : Dermatitis Radiation: Grade 0

## 2024-08-05 NOTE — HISTORY OF PRESENT ILLNESS
[FreeTextEntry1] : Mr. Curran is an 88-year-old male who presents for an on treatment visit.  He is accompanied by his wife for today's visit.    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 Meyer) 8/1/24 - started on RADIATION Therapy to RIGHT Ilium, 2000 cGy in 5 fx   PSA Trend: 2012 - 2016: PSA remained 0.2 to 0.94 ng/mL 3/1/18 - 6.5 5/2018 - started on Lupron injections 8/21/18 - 0.12 2019 - 2021: PSA remained less than 1.0 (off Lupron - last shot was 6/19/20) 7/8/22 - 1.40 8/16/22 - 1.90     Testosterone 188.0 ng/dL 10/2022 - Restarted on Lupron injections. 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 He received radiation therapy at that time. He continued to follow with Dr. Santos (urology) with PSA monitoring. In March of 2018 he was found to have elevated PSA to 6.5 ng/mL, CT and Bone scan done.  4/25/18 - Bone Scan: No scan evidence of osseous metastasis. Degenerative disease in the spine and major joints, prominent in the shoulders. Mildly increased uptake in the right maxilla and left submandibular region probably related to dental process.  4/25/18 - CT A/P: Small left para-aortic lymph nodes questionable significance. Punctuate right lower lobe pulmonary nodule. No evidence of dominant enlarged adenopathy or bony lesions can be seen.  4/30/18 - saw Dr. Santos (urology) in follow up, recommended beginning on hormonal therapy. 5/14/18 - started on Lupron Injections with Dr. Santos (urology), continued on through 6/19/20. In September 2020 when he was due for his next shot decision was made to stop the injections due to complaints of side effects and monitor the PSA.  8/2022- PSA 1.9 ng/mL. Due to rate of increase recommended to resume ADT, seen by Dr. Daniels (urology).  10/2022 - Restarted on Lupron injections  10/10/23 -saw Dr. Daniels (urology) in follow up. Last Lupron shot was 4/7/23. Patient has not completed 1 year of androgen deprivation therapy for what was seen to be biochemical recurrence. His PSA had gone up from 0.09 up to 1.9. It has not come back down, and we will be rechecking the PSA level today to reassess. If his testosterone level and PSA levels both remain very low, we can probably use a strategy of intermittent androgen deprivation therapy versus just simple discontinuation moving forward. If he does not see further PSA decrease or if he develops increase in the future, we may restart ADT.  1/25/24 - saw Dr. Daniels (urology) in follow up. Last Lupron shot was 4/7/23. PSA now 4.11 ng/mL. His testosterone level was also checked and found to remain castrate at 3.5. 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 Will be referring him to medical oncology and would recommend that we continue on with the androgen deprivation therapy as part of his overall treatment plan.  3/5/24 - CT C/A/P: No lymphadenopathy on the current exam. Previously referenced left para-aortic lymph nodes are markedly smaller on the current study. New 4 to 5 cm mixed lytic and sclerotic lesion involving the right iliac bone. Please see the dedicated bone scan report from the study on 3/11/2024 for additional information regarding the osseous structures.  3/11/24 - Bone Scan: Findings suggestive of osseous metastasis in the right posterior ileum. Nonspecific increased uptake in the left femoral head. This is suspicious for osteonecrosis although degenerative/injury related changes or even metastasis may have a similar appearance.  3/25/24 - saw Dr. Contreras ([facility]) in consultation for castration resistant prostate cancer. Recent scans reviewed. 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 His images show metastasis, he has mCRPC. He will start abiraterone/prednisone in the setting of normal cardiac function given its side effects including hypertension and fluid retention. Abiraterone may cause liver damage. It seems his eligard inj is overdue, will contact Dr. Daniels's office. Will monitor his PSA and potentially add abiraterone next visit. Alternatively, he may be referred to Rad onc for oligometastatic disease given age and potential AEs from systemic treatment.  4/16/24 - Received Eligard injection (45 mg) with Dr. Daniels (urology).  5/6/24 - saw Dr. Contreras ([facility]) in follow up. To start on Abiraterone/Prednisone once his blood pressure has improved. To follow up with PCP Dr. Wilde. As an alternative, could consider a referral to Rad onc for oligometastasis given his age and potential AEs from systemic treatment. To obtain dental clearance prior to starting on Xgeva.  5/29/24 - presented in consultation for discussion of radiation therapy options. Mr. Curran is feeling well today. He denies any pain in his right hip. Complains of neuropathy pain in his feet, especially with walking, uses a cane to assist with ambulation. Blood pressure remains elevated, has been started on Metoprolol recently by PCP Dr. Wilde. He has urinary frequency, urgency, nocturia, decreased stream. Also, with complaints of diarrhea (chronic). IPSS 28 / QOL 5 / EPIC-CP 25 (ED portion not completed). He states he is seeing nephrology tomorrow and next follow up with Dr. Contreras ([facility]) is 6/21/24.  Reviewed the imaging together and plan some radiation to area of oligometastatic disease, since he is not currently able to take abiraterone since his blood pressure is poorly controlled. Suggested a PET scan to delineate the lesion better and to look for any additional lesions that could be treated with radiation therapy simultaneously. We discussed radiation using SBRT to the lesion 27Gy in 3 fractions. A 3D technique could be used treating 20Gy in 5 fractions, if a PET is not able to be obtained. We have discussed logistics and possible acute and late side effects in detail, and he has signed consent. He will return for simulation.  6/17/24: PSMA PET: numerous foci of increased osseous uptake including the calvarium, bilateral proximal humeri, left scapula, ribs, spine, pelvic bones, and bilateral femora. The previously identified sclerotic lesion in the right iliac demonstrates SUV 31.3.   6/21/24- presented to med onc (Dr. Contreras) for follow up. PSA had increased. Abiraterone/prednisone was held due to the patient's uncontrolled HTN.  7/26/24- presented to nephrology for follow up regarding elevated BUN/Cr. He was started on spironolactone 25 mg daily and is to follow up in 4 weeks.   8/1/24 - started on RADIATION Therapy to RIGHT Ilium, 2000 cGy in 5 fx   8/5/24: OTV fx 3/5 completed. Mr. Dee is doing well with treatment.  He has tiredness, states he goes home after the treatment and sleeps.  Denies any pain. He has some burring with urination, unsure when it started, if persists will discuss with MedOnc at his appointment on Friday.  Reinforced possible side effects of treatment. Discharge instructions given and PTE scheduled for 9/16/24. He has an appointment with Med Onc 8/9/24.

## 2024-08-05 NOTE — DISEASE MANAGEMENT
[IV] : IV [Clinical] : TNM Stage: c [TTNM] : x [NTNM] : x [MTNM] : 1 [de-identified] : 1200 cGy [de-identified] : 2000 cGy [de-identified] : Right ilium

## 2024-08-05 NOTE — REVIEW OF SYSTEMS
[Patient Intake Form Reviewed] : Patient intake form was reviewed [Fatigue] : fatigue [Loss of Hearing] : loss of hearing [Lower Ext Edema] : lower extremity edema [Shortness Of Breath] : shortness of breath [Diarrhea] : diarrhea [Nocturia] : nocturia [Urinary Frequency] : urinary frequency [IPSS Score (0-40): ___] : IPSS score: [unfilled] [EPIC-CP Score (0-60): ___] : EPIC-CP score: [unfilled] [Negative] : Heme/Lymph [Constipation: Grade 0] : Constipation: Grade 0 [Diarrhea: Grade 1 - Increase of <4 stools per day over baseline; mild increase in ostomy output compared to baseline] : Diarrhea: Grade 1 - Increase of <4 stools per day over baseline; mild increase in ostomy output compared to baseline [Hematuria: Grade 0] : Hematuria: Grade 0 [Urinary Incontinence: Grade 1 - Occasional (e.g., with coughing, sneezing, etc.), pads not indicated] : Urinary Incontinence: Grade 1 - Occasional (e.g., with coughing, sneezing, etc.), pads not indicated [Urinary Retention: Grade 1 - Urinary, suprapubic or intermittent catheter placement not indicated; able to void with some residual] : Urinary Retention: Grade 1 - Urinary, suprapubic or intermittent catheter placement not indicated; able to void with some residual [Urinary Tract Pain: Grade 0] : Urinary Tract Pain: Grade 0 [Urinary Urgency: Grade 1 - Present] : Urinary Urgency: Grade 1 - Present [Urinary Frequency: Grade 1 - Present] : Urinary Frequency: Grade 1 - Present [Chest Pain] : no chest pain [Palpitations] : no palpitations [Cough] : no cough [Constipation] : no constipation [FreeTextEntry4] : bilateral hearing aids  [FreeTextEntry8] : QOL 5  [de-identified] : peripheral neuropathy, uses cane to assist ambulation. Periodic headaches.  [Fatigue: Grade 1 - Fatigue relieved by rest] : Fatigue: Grade 1 - Fatigue relieved by rest [Urinary Tract Pain: Grade 1 - Mild pain] : Urinary Tract Pain: Grade 1 - Mild pain [Pruritus: Grade 0] : Pruritus: Grade 0 [Skin Hyperpigmentation: Grade 0] : Skin Hyperpigmentation: Grade 0 [Dermatitis Radiation: Grade 0] : Dermatitis Radiation: Grade 0

## 2024-08-05 NOTE — HISTORY OF PRESENT ILLNESS
[FreeTextEntry1] : Mr. Curran is an 88-year-old male who presents for an on treatment visit.  He is accompanied by his wife for today's visit.    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 Meyer) 8/1/24 - started on RADIATION Therapy to RIGHT Ilium, 2000 cGy in 5 fx   PSA Trend: 2012 - 2016: PSA remained 0.2 to 0.94 ng/mL 3/1/18 - 6.5 5/2018 - started on Lupron injections 8/21/18 - 0.12 2019 - 2021: PSA remained less than 1.0 (off Lupron - last shot was 6/19/20) 7/8/22 - 1.40 8/16/22 - 1.90     Testosterone 188.0 ng/dL 10/2022 - Restarted on Lupron injections. 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 He received radiation therapy at that time. He continued to follow with Dr. Santos (urology) with PSA monitoring. In March of 2018 he was found to have elevated PSA to 6.5 ng/mL, CT and Bone scan done.  4/25/18 - Bone Scan: No scan evidence of osseous metastasis. Degenerative disease in the spine and major joints, prominent in the shoulders. Mildly increased uptake in the right maxilla and left submandibular region probably related to dental process.  4/25/18 - CT A/P: Small left para-aortic lymph nodes questionable significance. Punctuate right lower lobe pulmonary nodule. No evidence of dominant enlarged adenopathy or bony lesions can be seen.  4/30/18 - saw Dr. Santos (urology) in follow up, recommended beginning on hormonal therapy. 5/14/18 - started on Lupron Injections with Dr. Santos (urology), continued on through 6/19/20. In September 2020 when he was due for his next shot decision was made to stop the injections due to complaints of side effects and monitor the PSA.  8/2022- PSA 1.9 ng/mL. Due to rate of increase recommended to resume ADT, seen by Dr. Daniels (urology).  10/2022 - Restarted on Lupron injections  10/10/23 -saw Dr. Daniels (urology) in follow up. Last Lupron shot was 4/7/23. Patient has not completed 1 year of androgen deprivation therapy for what was seen to be biochemical recurrence. His PSA had gone up from 0.09 up to 1.9. It has not come back down, and we will be rechecking the PSA level today to reassess. If his testosterone level and PSA levels both remain very low, we can probably use a strategy of intermittent androgen deprivation therapy versus just simple discontinuation moving forward. If he does not see further PSA decrease or if he develops increase in the future, we may restart ADT.  1/25/24 - saw Dr. Daniels (urology) in follow up. Last Lupron shot was 4/7/23. PSA now 4.11 ng/mL. His testosterone level was also checked and found to remain castrate at 3.5. 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 Will be referring him to medical oncology and would recommend that we continue on with the androgen deprivation therapy as part of his overall treatment plan.  3/5/24 - CT C/A/P: No lymphadenopathy on the current exam. Previously referenced left para-aortic lymph nodes are markedly smaller on the current study. New 4 to 5 cm mixed lytic and sclerotic lesion involving the right iliac bone. Please see the dedicated bone scan report from the study on 3/11/2024 for additional information regarding the osseous structures.  3/11/24 - Bone Scan: Findings suggestive of osseous metastasis in the right posterior ileum. Nonspecific increased uptake in the left femoral head. This is suspicious for osteonecrosis although degenerative/injury related changes or even metastasis may have a similar appearance.  3/25/24 - saw Dr. Contreras ([facility]) in consultation for castration resistant prostate cancer. Recent scans reviewed. 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 His images show metastasis, he has mCRPC. He will start abiraterone/prednisone in the setting of normal cardiac function given its side effects including hypertension and fluid retention. Abiraterone may cause liver damage. It seems his eligard inj is overdue, will contact Dr. Daniels's office. Will monitor his PSA and potentially add abiraterone next visit. Alternatively, he may be referred to Rad onc for oligometastatic disease given age and potential AEs from systemic treatment.  4/16/24 - Received Eligard injection (45 mg) with Dr. Daniels (urology).  5/6/24 - saw Dr. Contreras ([facility]) in follow up. To start on Abiraterone/Prednisone once his blood pressure has improved. To follow up with PCP Dr. Wilde. As an alternative, could consider a referral to Rad onc for oligometastasis given his age and potential AEs from systemic treatment. To obtain dental clearance prior to starting on Xgeva.  5/29/24 - presented in consultation for discussion of radiation therapy options. Mr. Curran is feeling well today. He denies any pain in his right hip. Complains of neuropathy pain in his feet, especially with walking, uses a cane to assist with ambulation. Blood pressure remains elevated, has been started on Metoprolol recently by PCP Dr. Wilde. He has urinary frequency, urgency, nocturia, decreased stream. Also, with complaints of diarrhea (chronic). IPSS 28 / QOL 5 / EPIC-CP 25 (ED portion not completed). He states he is seeing nephrology tomorrow and next follow up with Dr. Contreras ([facility]) is 6/21/24.  Reviewed the imaging together and plan some radiation to area of oligometastatic disease, since he is not currently able to take abiraterone since his blood pressure is poorly controlled. Suggested a PET scan to delineate the lesion better and to look for any additional lesions that could be treated with radiation therapy simultaneously. We discussed radiation using SBRT to the lesion 27Gy in 3 fractions. A 3D technique could be used treating 20Gy in 5 fractions, if a PET is not able to be obtained. We have discussed logistics and possible acute and late side effects in detail, and he has signed consent. He will return for simulation.  6/17/24: PSMA PET: numerous foci of increased osseous uptake including the calvarium, bilateral proximal humeri, left scapula, ribs, spine, pelvic bones, and bilateral femora. The previously identified sclerotic lesion in the right iliac demonstrates SUV 31.3.   6/21/24- presented to med onc (Dr. Contreras) for follow up. PSA had increased. Abiraterone/prednisone was held due to the patient's uncontrolled HTN.  7/26/24- presented to nephrology for follow up regarding elevated BUN/Cr. He was started on spironolactone 25 mg daily and is to follow up in 4 weeks.   8/1/24 - started on RADIATION Therapy to RIGHT Ilium, 2000 cGy in 5 fx   8/5/24: OTV fx 3/5 completed. Mr. Dee is doing well with treatment.  He has tiredness, states he goes home after the treatment and sleeps.  Denies any pain. He has some burring with urination, unsure when it started, if persists will discuss with MedOnc at his appointment on Friday.  Reinforced possible side effects of treatment. Discharge instructions given and PTE scheduled for 9/16/24. He has an appointment with Med Onc 8/9/24.

## 2024-08-06 ENCOUNTER — OUTPATIENT (OUTPATIENT)
Dept: OUTPATIENT SERVICES | Facility: HOSPITAL | Age: 89
LOS: 1 days | Discharge: HOME | End: 2024-08-06

## 2024-08-06 DIAGNOSIS — C61 MALIGNANT NEOPLASM OF PROSTATE: ICD-10-CM

## 2024-08-09 ENCOUNTER — LABORATORY RESULT (OUTPATIENT)
Age: 89
End: 2024-08-09

## 2024-08-09 ENCOUNTER — RESULT REVIEW (OUTPATIENT)
Age: 89
End: 2024-08-09

## 2024-08-09 ENCOUNTER — NON-APPOINTMENT (OUTPATIENT)
Age: 89
End: 2024-08-09

## 2024-08-09 ENCOUNTER — APPOINTMENT (OUTPATIENT)
Dept: HEMATOLOGY ONCOLOGY | Facility: CLINIC | Age: 89
End: 2024-08-09

## 2024-08-09 PROBLEM — R19.7 DIARRHEA: Status: ACTIVE | Noted: 2024-08-09

## 2024-08-09 PROBLEM — R30.0 DYSURIA: Status: ACTIVE | Noted: 2024-08-09

## 2024-08-09 PROBLEM — Z76.89 REFERRAL OF PATIENT: Status: ACTIVE | Noted: 2024-08-09

## 2024-08-09 LAB
BASOPHILS # BLD AUTO: 0.04 K/UL — SIGNIFICANT CHANGE UP (ref 0–0.2)
BASOPHILS NFR BLD AUTO: 0.6 % — SIGNIFICANT CHANGE UP (ref 0–2)
EOSINOPHIL # BLD AUTO: 0.32 K/UL — SIGNIFICANT CHANGE UP (ref 0–0.5)
EOSINOPHIL NFR BLD AUTO: 4.4 % — SIGNIFICANT CHANGE UP (ref 0–6)
HCT VFR BLD CALC: 34 % — LOW (ref 39–50)
HGB BLD-MCNC: 10.6 G/DL — LOW (ref 13–17)
IMM GRANULOCYTES NFR BLD AUTO: 1.1 % — HIGH (ref 0–0.9)
LYMPHOCYTES # BLD AUTO: 1.15 K/UL — SIGNIFICANT CHANGE UP (ref 1–3.3)
LYMPHOCYTES # BLD AUTO: 15.8 % — SIGNIFICANT CHANGE UP (ref 13–44)
MCHC RBC-ENTMCNC: 22.9 PG — LOW (ref 27–34)
MCHC RBC-ENTMCNC: 31.2 G/DL — LOW (ref 32–36)
MCV RBC AUTO: 73.6 FL — LOW (ref 80–100)
MONOCYTES # BLD AUTO: 0.6 K/UL — SIGNIFICANT CHANGE UP (ref 0–0.9)
MONOCYTES NFR BLD AUTO: 8.3 % — SIGNIFICANT CHANGE UP (ref 2–14)
NEUTROPHILS # BLD AUTO: 5.08 K/UL — SIGNIFICANT CHANGE UP (ref 1.8–7.4)
NEUTROPHILS NFR BLD AUTO: 69.8 % — SIGNIFICANT CHANGE UP (ref 43–77)
NRBC # BLD: 0 /100 WBCS — SIGNIFICANT CHANGE UP (ref 0–0)
PLATELET # BLD AUTO: 278 K/UL — SIGNIFICANT CHANGE UP (ref 150–400)
RBC # BLD: 4.62 M/UL — SIGNIFICANT CHANGE UP (ref 4.2–5.8)
RBC # FLD: 17.2 % — HIGH (ref 10.3–14.5)
WBC # BLD: 7.27 K/UL — SIGNIFICANT CHANGE UP (ref 3.8–10.5)
WBC # FLD AUTO: 7.27 K/UL — SIGNIFICANT CHANGE UP (ref 3.8–10.5)

## 2024-08-09 PROCEDURE — G2211 COMPLEX E/M VISIT ADD ON: CPT

## 2024-08-09 PROCEDURE — 99215 OFFICE O/P EST HI 40 MIN: CPT

## 2024-08-09 NOTE — REVIEW OF SYSTEMS
[Fatigue] : fatigue [Incontinence] : incontinence [Joint Pain] : joint pain [Difficulty Walking] : difficulty walking [Negative] : Gastrointestinal [Fever] : no fever [Recent Change In Weight] : ~T no recent weight change [Dysphagia] : no dysphagia [Odynophagia] : no odynophagia [Muscle Pain] : no muscle pain [Confused] : no confusion [Dizziness] : no dizziness [Insomnia] : no insomnia [Depression] : no depression [Hot Flashes] : no hot flashes [Muscle Weakness] : no muscle weakness [Easy Bleeding] : no tendency for easy bleeding [Easy Bruising] : no tendency for easy bruising [FreeTextEntry4] : chronic toothache [FreeTextEntry8] : +Nocturia [de-identified] : +Neuropathy b/l foot. Headaches

## 2024-08-09 NOTE — REVIEW OF SYSTEMS
[Fatigue] : fatigue [Incontinence] : incontinence [Joint Pain] : joint pain [Difficulty Walking] : difficulty walking [Negative] : Gastrointestinal [Fever] : no fever [Recent Change In Weight] : ~T no recent weight change [Dysphagia] : no dysphagia [Odynophagia] : no odynophagia [Muscle Pain] : no muscle pain [Confused] : no confusion [Dizziness] : no dizziness [Insomnia] : no insomnia [Depression] : no depression [Hot Flashes] : no hot flashes [Muscle Weakness] : no muscle weakness [Easy Bleeding] : no tendency for easy bleeding [Easy Bruising] : no tendency for easy bruising [FreeTextEntry4] : chronic toothache [FreeTextEntry8] : +Nocturia [de-identified] : +Neuropathy b/l foot. Headaches

## 2024-08-09 NOTE — HISTORY OF PRESENT ILLNESS
[Disease: _____________________] : Disease: [unfilled] [Date: ____________] : Patient's last distress assessment performed on [unfilled]. [1 - Distress Level] : Distress Level: 1 [de-identified] : Mike Curran is 88 years old man with PMHx of DM2, HTN, and prostate cancer presenting for initial evaluation of CRPC.  Pt was initially treated with radiation in 2011 for his prostate cancer.  4/25/2018 bone scan showed no evidence of bone metastasis.  CT a/p showed small left para-aortic nodes questionable significance, two punctate right lower lobe pulmonary nodules. PSA  3/4/22  0.55 7/8/22  1.40 8/16/22 1.90  10/7/2022 He developed recurrent disease and had been started on androgen deprivation therapy with Dr. Daniels. He reports an interval history of having developed vertigo and he was hospitalized briefly for that reason. He is following up with neurology to determine what may have caused the vertigo. It is not yet clear.  PSA 4/7/23  0.82    testosterone 8.8 10/10/23 1.92    testosterone  9.2 1/25/24  4.11   testosterone 3.5   He is here today to determine his current PSA level and then make a decision regarding reinitiation of the androgen deprivation.  As per patient, he has limited physical function with very little walk inside his house with a cane. He feels fine, denies any pain and gross hematuria.   3/5/24 CT C/A/P showed no lymphadenopathy on the current exam. Previously referenced left para-aortic lymph nodes are markedly smaller on the current study. New 4 to 5 cm mixed lytic and sclerotic lesion involving the right iliac bone. 3/11/24 bone scan showed Findings suggestive of osseous metastasis in the right posterior ileum. Nonspecific increased uptake in the left femoral head. This is suspicious for osteonecrosis although degenerative/injury related changes or even metastasis may have a similar appearance. Report chronic lower back pain, otherwise feels fine, denies fatigue. He has bilateral feet numbness and tingling. Sometimes weak urine flow. Nocturia 0 to 3. Denies gross hematuria.  [de-identified] : prostate adenocarcinoma  [de-identified] : 5/6/24: Patient presents for follow up. He notes generalized pains. He also has had mild headaches for some time. No vision changes. ROS is otherwise unchanged from previously. He denies CP or SOB. He notes he eats a diet with salty foods and processed foods. He takes furosemide, 20, amlodipine 10, and lisinopril 40 mg QD for BP and takes his meds daily. He denies missing his BP pills.  6/17/24 PSMA PET showed 1.  Numerous osseous metastases involving the axial and appendicular skeleton. 2.  Ill-defined, mild uptake in the prostate gland, nonspecific.  6/21/24 reports he has changed his blood pressure med by stopping lisinopril, amlodipine, metoprolol and furosemide, then start olmesartan-amlodipine-hydrocholorothiazide 40-10-25mg daily. His BP this AM at home 160s/86. He feels tired, denies hot flash and sweating. He has normal urination. Nocturia 0-2. He has had vertigo Feb 2023 on isacc.   8/9/24: pt cane dependent. Not feeling well due to fatigue post 6 RDT on Wednesday. Endorses burning sensation during urination. Urine color is light tea, not cloudy, no blood. No pain on urination. Denies SOB, no bilateral swelling but with vertigo and uses OTC med Leandro to manage it. Appetite is good, maintaining his weight however with occ diarrhea.

## 2024-08-09 NOTE — ASSESSMENT
[FreeTextEntry1] : 88 years old man with PMHx of DM2, HTN, and diagnosed as prostate cancer s/p radiation in 2011, PSA elevated to 1.9 in August 2022, started ADT in October 2022. His PSA nicki was 0.82 in April 2023, elevated PSA 1.93 in October, 4.11 in Jan 2024 while under castration level of testosterone. He has castration resistant prostate cancer.   He does have one right iliac bone lesion with metastasis. His images show metastasis, he has mCRPC. He was planned for and ordered to start abiraterone/prednisone in the setting of normal cardiac function given its side effects including hypertension and fluid retention. Abiraterone may cause liver damage. He is getting Eligard Q6 months and his last dose was 4/16/24, had apparently been overdue for it (although pt states he had gotten it in October 2023).  PLAN  - Abiraterone/prednisone prescribed to pt. However, given uncontrolled HTN [172/84] I told pt to strictly not start those meds until BP is improved. Pt is on 3 meds for HTN and will f/u with his PCP Dr. Mat Wilde.  - As an alternative, could consider a referral to Rad onc for oligometastasis given his age and potential AEs from systemic treatment. Now s/p completion of RDT on 8/7/2. Continue to f/u with Dr. Gray RAD onc on 9/16/24.  - Monitor PSA as well  - Will obtain a dental referral (notes toothache) prior to starting Xgeva  Uncontrolled HTN - stopping lisinopril, amlodipine, metoprolol and furosemide, then start olmesartan-amlodipine-hydrocholorothiazide 40-10-25mg daily.   - Continue with spironolactone 25mg daily however, only took it once and misplaced the reminder as a result unable to continue until end of August. Pt to follow up with Dr. Lantigua on 8/15/24  - Emphasized to eliminate salt from diet. Discussed if he develops worsening headache, change in vision, or CP/SOB to go to the ED.  Diarrhea - pt to consider taking PO Mg every other day as oppose to daily given diarrhea and to minimize Milk intake.   Dysuria r/o UTI  - Will obtain UA & Urinae c/s  SW -Refer to SW for assistance with Homecare nurses Aide.  RTC in 6 weeks

## 2024-08-09 NOTE — HISTORY OF PRESENT ILLNESS
[Disease: _____________________] : Disease: [unfilled] [Date: ____________] : Patient's last distress assessment performed on [unfilled]. [1 - Distress Level] : Distress Level: 1 [de-identified] : Mike Curran is 88 years old man with PMHx of DM2, HTN, and prostate cancer presenting for initial evaluation of CRPC.  Pt was initially treated with radiation in 2011 for his prostate cancer.  4/25/2018 bone scan showed no evidence of bone metastasis.  CT a/p showed small left para-aortic nodes questionable significance, two punctate right lower lobe pulmonary nodules. PSA  3/4/22  0.55 7/8/22  1.40 8/16/22 1.90  10/7/2022 He developed recurrent disease and had been started on androgen deprivation therapy with Dr. Daniels. He reports an interval history of having developed vertigo and he was hospitalized briefly for that reason. He is following up with neurology to determine what may have caused the vertigo. It is not yet clear.  PSA 4/7/23  0.82    testosterone 8.8 10/10/23 1.92    testosterone  9.2 1/25/24  4.11   testosterone 3.5   He is here today to determine his current PSA level and then make a decision regarding reinitiation of the androgen deprivation.  As per patient, he has limited physical function with very little walk inside his house with a cane. He feels fine, denies any pain and gross hematuria.   3/5/24 CT C/A/P showed no lymphadenopathy on the current exam. Previously referenced left para-aortic lymph nodes are markedly smaller on the current study. New 4 to 5 cm mixed lytic and sclerotic lesion involving the right iliac bone. 3/11/24 bone scan showed Findings suggestive of osseous metastasis in the right posterior ileum. Nonspecific increased uptake in the left femoral head. This is suspicious for osteonecrosis although degenerative/injury related changes or even metastasis may have a similar appearance. Report chronic lower back pain, otherwise feels fine, denies fatigue. He has bilateral feet numbness and tingling. Sometimes weak urine flow. Nocturia 0 to 3. Denies gross hematuria.  [de-identified] : prostate adenocarcinoma  [de-identified] : 5/6/24: Patient presents for follow up. He notes generalized pains. He also has had mild headaches for some time. No vision changes. ROS is otherwise unchanged from previously. He denies CP or SOB. He notes he eats a diet with salty foods and processed foods. He takes furosemide, 20, amlodipine 10, and lisinopril 40 mg QD for BP and takes his meds daily. He denies missing his BP pills.  6/17/24 PSMA PET showed 1.  Numerous osseous metastases involving the axial and appendicular skeleton. 2.  Ill-defined, mild uptake in the prostate gland, nonspecific.  6/21/24 reports he has changed his blood pressure med by stopping lisinopril, amlodipine, metoprolol and furosemide, then start olmesartan-amlodipine-hydrocholorothiazide 40-10-25mg daily. His BP this AM at home 160s/86. He feels tired, denies hot flash and sweating. He has normal urination. Nocturia 0-2. He has had vertigo Feb 2023 on isacc.   8/9/24: pt cane dependent. Not feeling well due to fatigue post 6 RDT on Wednesday. Endorses burning sensation during urination. Urine color is light tea, not cloudy, no blood. No pain on urination. Denies SOB, no bilateral swelling but with vertigo and uses OTC med Leandro to manage it. Appetite is good, maintaining his weight however with occ diarrhea.

## 2024-08-15 ENCOUNTER — APPOINTMENT (OUTPATIENT)
Dept: NEPHROLOGY | Facility: CLINIC | Age: 89
End: 2024-08-15
Payer: MEDICARE

## 2024-08-15 VITALS
HEIGHT: 68 IN | DIASTOLIC BLOOD PRESSURE: 90 MMHG | OXYGEN SATURATION: 96 % | HEART RATE: 72 BPM | TEMPERATURE: 97.9 F | SYSTOLIC BLOOD PRESSURE: 180 MMHG

## 2024-08-15 VITALS — HEART RATE: 71 BPM | DIASTOLIC BLOOD PRESSURE: 89 MMHG | SYSTOLIC BLOOD PRESSURE: 159 MMHG

## 2024-08-15 DIAGNOSIS — E78.5 HYPERLIPIDEMIA, UNSPECIFIED: ICD-10-CM

## 2024-08-15 DIAGNOSIS — E26.09 OTHER PRIMARY HYPERALDOSTERONISM: ICD-10-CM

## 2024-08-15 DIAGNOSIS — N18.30 CHRONIC KIDNEY DISEASE, STAGE 3 UNSPECIFIED: ICD-10-CM

## 2024-08-15 DIAGNOSIS — I10 ESSENTIAL (PRIMARY) HYPERTENSION: ICD-10-CM

## 2024-08-15 DIAGNOSIS — E11.40 TYPE 2 DIABETES MELLITUS WITH DIABETIC NEUROPATHY, UNSPECIFIED: ICD-10-CM

## 2024-08-15 DIAGNOSIS — R80.9 PROTEINURIA, UNSPECIFIED: ICD-10-CM

## 2024-08-15 PROCEDURE — G2211 COMPLEX E/M VISIT ADD ON: CPT

## 2024-08-15 PROCEDURE — 99215 OFFICE O/P EST HI 40 MIN: CPT

## 2024-08-15 NOTE — HISTORY OF PRESENT ILLNESS
[FreeTextEntry1] : Contacts: Dr. Mat Wilde (PCP) Dr. Lucila Contreras (oncology) Dr. Duarte Daniels (urology) Dr. 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 Curran is a 88 year old man with chronic kidney disease stage III, hypertension, diabetes mellitus, hyperlipidemia, gout, and prostate cancer, here for kidney evaluation and management.  Last saw Mr. Curran in July 2024, at which time we started spironolactone.  As he was rushing to today's appointment, Mr. Curran did not take him morning medications. He also seems to have run out of his olmesartan-amlodipine-hydrochlorothiazide early (last took about 4 days ago). In speaking with pharmacist during this visit, the next refill will be available in 2 days (August 17).  He recently completed his last radiation treatment. He has been having some mild dysuria, but a recent U/A didn't show any evidence of infection.  No dizziness/lightheadedness, but does get headaches since the last year. (He notes having sustained a fall in December 2023, whereupon he hit his head.) No chest pain or dyspnea. Has leg swelling. No hematuria. He is tired.   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 Former smoker, quite >15 years ago  Family History: Father had asthma Mother had diabetes and hypertension No known history of renal disease, hematuria, proteinuria, ESRD, dialysis, transplant  ------------------------------------------------------------------------------- Allergies: NKDA  Medications:  Olmesartan-amlodipine-hydrochlorothiazide 40-10-25mg daily Spironolactone 25mg daily Allopurinol 100mg daily Glipizide/metformin 5-500mg x2 tablets BID Primidone 125mg at bedtime Magnesium gluconate 500mg every other day Meclizine 25mg as needed  ------------------------------------------------------------------------------- Physical Exam:  159/86 159/92  Gen: NAD, well-appearing HEENT: Supple neck Pulm: CTA CV: RRR Back: No spinal or CVA terndeness Abd: +BS, soft; obese abdomen UE: Warm, FROM LE: Warm, FROM, intact strength; small edema Neuro: No focal deficits Psych: Normal affect Skin: Warm, dry  ------------------------------------------------------------------------------- Labs/Studies:  2024-08-09  137 | 100 | 26 ------------------< 170 Ca 9 5.2 |  20 | 1.81  Albumin 4.3  Kidney trend 2024-08-09 SCr 1.81 (eGFR 36) 2024-07-26 SCr 1.70 (eGFR 39; Cystatin-C 1.74, eGFR 34) 2024-06-21 SCr 2.18 (eGFR 29) 2024-05-30 SCr 1.51 (eGFR 44; Cystatin-C 1.48, eGFR 42) 2024-05-06 SCr 1.53 (eGFR 44) 2024-03-25 SCr 1.53 (eGFR 44) 2024-02-12 SCr 1.42 (eGFR 48) 2022-03-04 SCr 1.30 (eGFR 54)  2024-07-26 UACR 631 mg/g 2024-05-30 UACR 1239 mg/g  2024-05-30 UPCR 1.9 g/g 2023-02-05 UPCR 1.6 g/g  2024-08-09 Hgb 10.6 2024-05-30 LDL 99 2024-05-30 HbA1c 7  2024-05-30 Renin activity 0.550 2024-05-30 Aldosterone 27.4

## 2024-08-15 NOTE — ASSESSMENT
[FreeTextEntry1] :  Mr. Curran is a 88 year old man with chronic kidney disease stage III, hypertension, diabetes mellitus, hyperlipidemia, gout, and prostate cancer, here for kidney evaluation and management.  Mr. Curran has CKD III, with an episode of mild KIRBY back in February 2023. He has significantly elevated urine protein/albumin.  Mr. Curran has long-standing hypertension, which has been uncontrolled of late. 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 We are forgoing additional testing, and treating with mineralocorticoid receptor antagonist (MRA). His blood pressure is much improved -- down 40 mmHg, on average -- but still needs some work.  CKD III with proteinuria - Continue RAASi - May be candidate for SGLT2i; defer for today given some confusion already about medications plus recent dysuria  Hypertension, uncontrolled with findings consistent with primary aldosteronism - Continue olmesartan-amlodipine-hydrochlorothiazide 40-10-25mg daily - Continue spironolactone 25mg daily  DM: Reduce dose of glipizide/metformin from 2 pills BID to 1 pill BID given eGFR/kidney function  Hyperlipidemia: Unclear why not on statin; address at future visits  Gout: Controlled   PLAN: - No changes today - Follow up in about 1 month   Discussed importance of healthful habits, including physical activity/exercise and improvements in diet.  I spent a total of 40 minutes on this encounter.

## 2024-08-23 ENCOUNTER — NON-APPOINTMENT (OUTPATIENT)
Age: 89
End: 2024-08-23

## 2024-09-12 ENCOUNTER — APPOINTMENT (OUTPATIENT)
Dept: NEPHROLOGY | Facility: CLINIC | Age: 89
End: 2024-09-12
Payer: MEDICARE

## 2024-09-12 VITALS — DIASTOLIC BLOOD PRESSURE: 75 MMHG | SYSTOLIC BLOOD PRESSURE: 140 MMHG | HEART RATE: 77 BPM

## 2024-09-12 VITALS
SYSTOLIC BLOOD PRESSURE: 160 MMHG | DIASTOLIC BLOOD PRESSURE: 79 MMHG | WEIGHT: 206 LBS | OXYGEN SATURATION: 94 % | HEIGHT: 68 IN | TEMPERATURE: 97.8 F | BODY MASS INDEX: 31.22 KG/M2 | HEART RATE: 76 BPM

## 2024-09-12 DIAGNOSIS — N18.30 CHRONIC KIDNEY DISEASE, STAGE 3 UNSPECIFIED: ICD-10-CM

## 2024-09-12 DIAGNOSIS — I10 ESSENTIAL (PRIMARY) HYPERTENSION: ICD-10-CM

## 2024-09-12 DIAGNOSIS — R80.9 PROTEINURIA, UNSPECIFIED: ICD-10-CM

## 2024-09-12 DIAGNOSIS — E26.09 OTHER PRIMARY HYPERALDOSTERONISM: ICD-10-CM

## 2024-09-12 DIAGNOSIS — E78.5 HYPERLIPIDEMIA, UNSPECIFIED: ICD-10-CM

## 2024-09-12 PROCEDURE — 99215 OFFICE O/P EST HI 40 MIN: CPT

## 2024-09-12 PROCEDURE — G2211 COMPLEX E/M VISIT ADD ON: CPT

## 2024-09-12 NOTE — HISTORY OF PRESENT ILLNESS
[FreeTextEntry1] : Contacts: Dr. Mat Wilde (PCP) Dr. Lucila Contreras (oncology) Dr. Duarte Daniels (urology) Dr. 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 Curran is a 88 year old man with chronic kidney disease stage III, hypertension, diabetes mellitus, hyperlipidemia, gout, and prostate cancer, here for kidney evaluation and management.  Last saw Mr. Curran in August 2024. At that time, he had run out of some of his antihypertensives. Today, he notes that he is taking all as prescribed. He is feeling well without complaints.  No dizziness/lightheadedness, but does get headaches since the last year. (He notes having sustained a fall in December 2023, whereupon he hit his head.) No chest pain or dyspnea. Has leg swelling. No hematuria. He is tired.  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 Former smoker, quite >15 years ago  Family History: Father had asthma Mother had diabetes and hypertension No known history of renal disease, hematuria, proteinuria, ESRD, dialysis, transplant  ------------------------------------------------------------------------------- Allergies: NKDA  Medications:  Olmesartan-amlodipine-hydrochlorothiazide 40-10-25mg daily Spironolactone 25mg daily Allopurinol 100mg daily Glipizide/metformin 5-500mg x1 tablet BID Primidone 125mg at bedtime Magnesium gluconate 500mg every other day Meclizine 25mg as needed  ------------------------------------------------------------------------------- Physical Exam:  135/75, 76 147/75, 80 139/76, 74  Gen: NAD, well-appearing HEENT: Supple neck Pulm: CTA CV: RRR Back: No spinal or CVA terndeness Abd: +BS, soft; obese abdomen UE: Warm, FROM LE: Warm, FROM, intact strength; small edema Neuro: No focal deficits Psych: Normal affect Skin: Warm, dry  ------------------------------------------------------------------------------- Labs/Studies:  2024-08-09  137 | 100 | 26 ------------------< 170 Ca 9 5.2 | 20 | 1.81  Albumin 4.3  Kidney trend 2024-08-09 SCr 1.81 (eGFR 36) 2024-07-26 SCr 1.70 (eGFR 39; Cystatin-C 1.74, eGFR 34) 2024-06-21 SCr 2.18 (eGFR 29) 2024-05-30 SCr 1.51 (eGFR 44; Cystatin-C 1.48, eGFR 42) 2024-05-06 SCr 1.53 (eGFR 44) 2024-03-25 SCr 1.53 (eGFR 44) 2024-02-12 SCr 1.42 (eGFR 48) 2022-03-04 SCr 1.30 (eGFR 54)  2024-07-26 UACR 631 mg/g 2024-05-30 UACR 1239 mg/g  2024-05-30 UPCR 1.9 g/g 2023-02-05 UPCR 1.6 g/g  2024-08-09 Hgb 10.6 2024-05-30 LDL 99 2024-05-30 HbA1c 7  2024-05-30 Renin activity 0.550 2024-05-30 Aldosterone 27.4

## 2024-09-12 NOTE — ASSESSMENT
[FreeTextEntry1] : . Mr. Curran is a 88 year old man with chronic kidney disease stage III, hypertension, diabetes mellitus, hyperlipidemia, gout, and prostate cancer, here for kidney evaluation and management.  Mr. Curran has CKD III, with an episode of mild KIRBY back in February 2023. He has significantly elevated urine protein/albumin.  Mr. 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 His blood pressure, while still imperfect, is down 70 mmHg from his peak! Importantly, his blood pressure is much better when checked properly (5 minutes rest, 2-3 measurements); I believe he has some degree of white coat effect.  CKD III with proteinuria - Continue RAASi - May be candidate for SGLT2i; defer for today  Hypertension, uncontrolled with findings consistent with primary aldosteronism - Continue olmesartan-amlodipine-hydrochlorothiazide 40-10-25mg daily - Continue spironolactone 25mg daily  DM: Continue reduced dose of glipizide/metformin (1 pill BID) given eGFR/kidney function  Hyperlipidemia: Unclear why not on statin; address at future visits as we are currenty focused on HTN management  Gout: Controlled   PLAN: - No changes today - Labs with Dr. Contreras - Follow up in about 2-3 months   Discussed importance of healthful habits, including physical activity/exercise and improvements in diet.  I spent a total of 40 minutes on this encounter.

## 2024-09-16 ENCOUNTER — APPOINTMENT (OUTPATIENT)
Dept: RADIATION ONCOLOGY | Facility: CLINIC | Age: 89
End: 2024-09-16
Payer: MEDICARE

## 2024-09-16 VITALS
OXYGEN SATURATION: 97 % | HEART RATE: 75 BPM | DIASTOLIC BLOOD PRESSURE: 88 MMHG | BODY MASS INDEX: 31.32 KG/M2 | RESPIRATION RATE: 22 BRPM | WEIGHT: 206 LBS | TEMPERATURE: 97 F | SYSTOLIC BLOOD PRESSURE: 176 MMHG

## 2024-09-16 PROCEDURE — G2211 COMPLEX E/M VISIT ADD ON: CPT

## 2024-09-16 PROCEDURE — 99024 POSTOP FOLLOW-UP VISIT: CPT

## 2024-09-16 NOTE — REVIEW OF SYSTEMS
[Loss of Hearing] : loss of hearing [Shortness Of Breath] : shortness of breath [SOB on Exertion] : shortness of breath during exertion [Diarrhea] : diarrhea [Nocturia] : nocturia [Urinary Frequency] : urinary frequency [IPSS Score (0-40): ___] : IPSS score: [unfilled] [EPIC-CP Score (0-60): ___] : EPIC-CP score: [unfilled] [Constipation: Grade 0] : Constipation: Grade 0 [Diarrhea: Grade 1 - Increase of <4 stools per day over baseline; mild increase in ostomy output compared to baseline] : Diarrhea: Grade 1 - Increase of <4 stools per day over baseline; mild increase in ostomy output compared to baseline [Proctitis: Grade 0] : Proctitis: Grade 0 [Rectal Pain: Grade 0] : Rectal Pain: Grade 0 [Hematuria: Grade 0] : Hematuria: Grade 0 [Urinary Incontinence: Grade 0] : Urinary Incontinence: Grade 0  [Urinary Retention: Grade 1 - Urinary, suprapubic or intermittent catheter placement not indicated; able to void with some residual] : Urinary Retention: Grade 1 - Urinary, suprapubic or intermittent catheter placement not indicated; able to void with some residual [Urinary Tract Pain: Grade 1 - Mild pain] : Urinary Tract Pain: Grade 1 - Mild pain [Urinary Urgency: Grade 1 - Present] : Urinary Urgency: Grade 1 - Present [Urinary Frequency: Grade 1 - Present] : Urinary Frequency: Grade 1 - Present [Fatigue: Grade 1 - Fatigue relieved by rest] : Fatigue: Grade 1 - Fatigue relieved by rest [Pruritus: Grade 0] : Pruritus: Grade 0 [Skin Hyperpigmentation: Grade 0] : Skin Hyperpigmentation: Grade 0 [Dermatitis Radiation: Grade 0] : Dermatitis Radiation: Grade 0 [Cough] : no cough [Constipation] : no constipation [FreeTextEntry4] : bilateral hearing aids  [FreeTextEntry8] : QOL 2 [FreeTextEntry9] : nocturia 5x

## 2024-09-16 NOTE — VITALS
[Maximal Pain Intensity: 0/10] : 0/10 [Least Pain Intensity: 0/10] : 0/10 [NoTreatment Scheduled] : no treatment scheduled [Maximal Pain Intensity: 5/10] : 5/10 [Pain Duration: ___] : Pain duration: [unfilled] [Pain Location: ___] : Pain Location: [unfilled] [OTC] : OTC [80: Normal activity with effort; some signs or symptoms of disease.] : 80: Normal activity with effort; some signs or symptoms of disease.  [ECOG Performance Status: 2 - Ambulatory and capable of all self care but unable to carry out any work activities] : Performance Status: 2 - Ambulatory and capable of all self care but unable to carry out any work activities. Up and about more than 50% of waking hours

## 2024-09-16 NOTE — HISTORY OF PRESENT ILLNESS
[FreeTextEntry1] : Mr. Curran is an 88-year-old male who presents for post treatment evaluation appointment.  He is accompanied by his wife for today's visit.    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 Meyer) 8/1/24 - 8/7/24: Received RADIATION Therapy to RIGHT Ilium, 2000 cGy in 5 fx   PSA Trend: 2012 - 2016: PSA remained 0.2 to 0.94 ng/mL 3/1/18 - 6.5 5/2018 - started on Lupron injections 8/21/18 - 0.12 2019 - 2021: PSA remained less than 1.0 (off Lupron - last shot was 6/19/20) 7/8/22 - 1.40 8/16/22 - 1.90     Testosterone 188.0 ng/dL 10/2022 - *Restarted on Lupron injections. 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 He received radiation therapy at that time. He continued to follow with Dr. Santos (urology) with PSA monitoring. In March of 2018 he was found to have elevated PSA to 6.5 ng/mL, CT and Bone scan done.  4/25/18 - Bone Scan: No scan evidence of osseous metastasis. Degenerative disease in the spine and major joints, prominent in the shoulders. Mildly increased uptake in the right maxilla and left submandibular region probably related to dental process.  4/25/18 - CT A/P: Small left para-aortic lymph nodes questionable significance. Punctuate right lower lobe pulmonary nodule. No evidence of dominant enlarged adenopathy or bony lesions can be seen.  4/30/18 - saw Dr. Santos (urology) in follow up, recommended beginning on hormonal therapy. 5/14/18 - started on Lupron Injections with Dr. Santos (urology), continued on through 6/19/20. In September 2020 when he was due for his next shot decision was made to stop the injections due to complaints of side effects and monitor the PSA.  8/2022- PSA 1.9 ng/mL. Due to rate of increase recommended to resume ADT, seen by Dr. Daniels (urology).  10/2022 - Restarted on Lupron injections  10/10/23 -saw Dr. Daniels (urology) in follow up. Last Lupron shot was 4/7/23. Patient has not completed 1 year of androgen deprivation therapy for what was seen to be biochemical recurrence. His PSA had gone up from 0.09 up to 1.9. It has not come back down, and we will be rechecking the PSA level today to reassess. If his testosterone level and PSA levels both remain very low, we can probably use a strategy of intermittent androgen deprivation therapy versus just simple discontinuation moving forward. If he does not see further PSA decrease or if he develops increase in the future, we may restart ADT.  1/25/24 - saw Dr. Daniels (urology) in follow up. Last Lupron shot was 4/7/23. PSA now 4.11 ng/mL. His testosterone level was also checked and found to remain castrate at 3.5. 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 Will be referring him to medical oncology and would recommend that we continue on with the androgen deprivation therapy as part of his overall treatment plan.  3/5/24 - CT C/A/P: No lymphadenopathy on the current exam. Previously referenced left para-aortic lymph nodes are markedly smaller on the current study. New 4 to 5 cm mixed lytic and sclerotic lesion involving the right iliac bone. Please see the dedicated bone scan report from the study on 3/11/2024 for additional information regarding the osseous structures.  3/11/24 - Bone Scan: Findings suggestive of osseous metastasis in the right posterior ileum. Nonspecific increased uptake in the left femoral head. This is suspicious for osteonecrosis although degenerative/injury related changes or even metastasis may have a similar appearance.  3/25/24 - saw Dr. Contreras ([facility]) in consultation for castration resistant prostate cancer. Recent scans reviewed. 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 His images show metastasis, he has mCRPC. He will start abiraterone/prednisone in the setting of normal cardiac function given its side effects including hypertension and fluid retention. Abiraterone may cause liver damage. It seems his eligard inj is overdue, will contact Dr. Daniels's office. Will monitor his PSA and potentially add abiraterone next visit. Alternatively, he may be referred to Rad onc for oligometastatic disease given age and potential AEs from systemic treatment.  4/16/24 - Received Eligard injection (45 mg) with Dr. Daniels (urology).  5/6/24 - saw Dr. Contreras ([facility]) in follow up. To start on Abiraterone/Prednisone once his blood pressure has improved. To follow up with PCP Dr. Wilde. As an alternative, could consider a referral to Rad onc for oligometastasis given his age and potential AEs from systemic treatment. To obtain dental clearance prior to starting on Xgeva.  5/29/24 - presented in consultation for discussion of radiation therapy options. Mr. Curran is feeling well today. He denies any pain in his right hip. Complains of neuropathy pain in his feet, especially with walking, uses a cane to assist with ambulation. Blood pressure remains elevated, has been started on Metoprolol recently by PCP Dr. Wilde. He has urinary frequency, urgency, nocturia, decreased stream. Also, with complaints of diarrhea (chronic). IPSS 28 / QOL 5 / EPIC-CP 25 (ED portion not completed). He states he is seeing nephrology tomorrow and next follow up with Dr. Contreras ([facility]) is 6/21/24.  Reviewed the imaging together and plan some radiation to area of oligometastatic disease, since he is not currently able to take abiraterone since his blood pressure is poorly controlled. Suggested a PET scan to delineate the lesion better and to look for any additional lesions that could be treated with radiation therapy simultaneously. We discussed radiation using SBRT to the lesion 27Gy in 3 fractions. A 3D technique could be used treating 20Gy in 5 fractions, if a PET is not able to be obtained. We have discussed logistics and possible acute and late side effects in detail, and he has signed consent. He will return for simulation.  6/17/24: PSMA PET: numerous foci of increased osseous uptake including the calvarium, bilateral proximal humeri, left scapula, ribs, spine, pelvic bones, and bilateral femora. The previously identified sclerotic lesion in the right iliac demonstrates SUV 31.3.   6/21/24- presented to med onc (Dr. Contreras) for follow up. PSA had increased. Abiraterone/prednisone was held due to the patient's uncontrolled HTN.  7/26/24- presented to nephrology for follow up regarding elevated BUN/Cr. He was started on spironolactone 25 mg daily and is to follow up in 4 weeks.   8/1/24 - 8/7/24: Received RADIATION Therapy to RIGHT Ilium, 2000 cGy in 5 fx   9/16/24 - presents for post treatment evaluation appointment. Mr. Curran is doing alright since completing his radiation therapy.  He states burning with urination has improved but is still there, nocturia of 5x.  No hematuria.  He has generalized aches/pain, especially when laying down, takes Tylenol prn.  No new bowel issues.   He continues to follow with Dr. Contreras ([facility]) last seen 8/9/24 with next visit on 9/27/24.  Continues on Eligard injections with Dr. Daniels (urology) next injection on 10/24/24. IPSS 22 / QOL 2 / EPIC-CP 17 (ED portion not completed)

## 2024-09-16 NOTE — HISTORY OF PRESENT ILLNESS
[FreeTextEntry1] : Mr. Curran is an 88-year-old male who presents for post treatment evaluation appointment.  He is accompanied by his wife for today's visit.    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 Meyer) 8/1/24 - 8/7/24: Received RADIATION Therapy to RIGHT Ilium, 2000 cGy in 5 fx   PSA Trend: 2012 - 2016: PSA remained 0.2 to 0.94 ng/mL 3/1/18 - 6.5 5/2018 - started on Lupron injections 8/21/18 - 0.12 2019 - 2021: PSA remained less than 1.0 (off Lupron - last shot was 6/19/20) 7/8/22 - 1.40 8/16/22 - 1.90     Testosterone 188.0 ng/dL 10/2022 - *Restarted on Lupron injections. 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 He received radiation therapy at that time. He continued to follow with Dr. Santos (urology) with PSA monitoring. In March of 2018 he was found to have elevated PSA to 6.5 ng/mL, CT and Bone scan done.  4/25/18 - Bone Scan: No scan evidence of osseous metastasis. Degenerative disease in the spine and major joints, prominent in the shoulders. Mildly increased uptake in the right maxilla and left submandibular region probably related to dental process.  4/25/18 - CT A/P: Small left para-aortic lymph nodes questionable significance. Punctuate right lower lobe pulmonary nodule. No evidence of dominant enlarged adenopathy or bony lesions can be seen.  4/30/18 - saw Dr. Santos (urology) in follow up, recommended beginning on hormonal therapy. 5/14/18 - started on Lupron Injections with Dr. Santos (urology), continued on through 6/19/20. In September 2020 when he was due for his next shot decision was made to stop the injections due to complaints of side effects and monitor the PSA.  8/2022- PSA 1.9 ng/mL. Due to rate of increase recommended to resume ADT, seen by Dr. Daniels (urology).  10/2022 - Restarted on Lupron injections  10/10/23 -saw Dr. Daniels (urology) in follow up. Last Lupron shot was 4/7/23. Patient has not completed 1 year of androgen deprivation therapy for what was seen to be biochemical recurrence. His PSA had gone up from 0.09 up to 1.9. It has not come back down, and we will be rechecking the PSA level today to reassess. If his testosterone level and PSA levels both remain very low, we can probably use a strategy of intermittent androgen deprivation therapy versus just simple discontinuation moving forward. If he does not see further PSA decrease or if he develops increase in the future, we may restart ADT.  1/25/24 - saw Dr. Daniels (urology) in follow up. Last Lupron shot was 4/7/23. PSA now 4.11 ng/mL. His testosterone level was also checked and found to remain castrate at 3.5. 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 Will be referring him to medical oncology and would recommend that we continue on with the androgen deprivation therapy as part of his overall treatment plan.  3/5/24 - CT C/A/P: No lymphadenopathy on the current exam. Previously referenced left para-aortic lymph nodes are markedly smaller on the current study. New 4 to 5 cm mixed lytic and sclerotic lesion involving the right iliac bone. Please see the dedicated bone scan report from the study on 3/11/2024 for additional information regarding the osseous structures.  3/11/24 - Bone Scan: Findings suggestive of osseous metastasis in the right posterior ileum. Nonspecific increased uptake in the left femoral head. This is suspicious for osteonecrosis although degenerative/injury related changes or even metastasis may have a similar appearance.  3/25/24 - saw Dr. Contreras ([facility]) in consultation for castration resistant prostate cancer. Recent scans reviewed. 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 His images show metastasis, he has mCRPC. He will start abiraterone/prednisone in the setting of normal cardiac function given its side effects including hypertension and fluid retention. Abiraterone may cause liver damage. It seems his eligard inj is overdue, will contact Dr. Daniels's office. Will monitor his PSA and potentially add abiraterone next visit. Alternatively, he may be referred to Rad onc for oligometastatic disease given age and potential AEs from systemic treatment.  4/16/24 - Received Eligard injection (45 mg) with Dr. Daniels (urology).  5/6/24 - saw Dr. Contreras ([facility]) in follow up. To start on Abiraterone/Prednisone once his blood pressure has improved. To follow up with PCP Dr. Wilde. As an alternative, could consider a referral to Rad onc for oligometastasis given his age and potential AEs from systemic treatment. To obtain dental clearance prior to starting on Xgeva.  5/29/24 - presented in consultation for discussion of radiation therapy options. Mr. Curran is feeling well today. He denies any pain in his right hip. Complains of neuropathy pain in his feet, especially with walking, uses a cane to assist with ambulation. Blood pressure remains elevated, has been started on Metoprolol recently by PCP Dr. Wilde. He has urinary frequency, urgency, nocturia, decreased stream. Also, with complaints of diarrhea (chronic). IPSS 28 / QOL 5 / EPIC-CP 25 (ED portion not completed). He states he is seeing nephrology tomorrow and next follow up with Dr. Contreras ([facility]) is 6/21/24.  Reviewed the imaging together and plan some radiation to area of oligometastatic disease, since he is not currently able to take abiraterone since his blood pressure is poorly controlled. Suggested a PET scan to delineate the lesion better and to look for any additional lesions that could be treated with radiation therapy simultaneously. We discussed radiation using SBRT to the lesion 27Gy in 3 fractions. A 3D technique could be used treating 20Gy in 5 fractions, if a PET is not able to be obtained. We have discussed logistics and possible acute and late side effects in detail, and he has signed consent. He will return for simulation.  6/17/24: PSMA PET: numerous foci of increased osseous uptake including the calvarium, bilateral proximal humeri, left scapula, ribs, spine, pelvic bones, and bilateral femora. The previously identified sclerotic lesion in the right iliac demonstrates SUV 31.3.   6/21/24- presented to med onc (Dr. Contreras) for follow up. PSA had increased. Abiraterone/prednisone was held due to the patient's uncontrolled HTN.  7/26/24- presented to nephrology for follow up regarding elevated BUN/Cr. He was started on spironolactone 25 mg daily and is to follow up in 4 weeks.   8/1/24 - 8/7/24: Received RADIATION Therapy to RIGHT Ilium, 2000 cGy in 5 fx   9/16/24 - presents for post treatment evaluation appointment. Mr. Curran is doing alright since completing his radiation therapy.  He states burning with urination has improved but is still there, nocturia of 5x.  No hematuria.  He has generalized aches/pain, especially when laying down, takes Tylenol prn.  No new bowel issues.   He continues to follow with Dr. Contreras ([facility]) last seen 8/9/24 with next visit on 9/27/24.  Continues on Eligard injections with Dr. Daniels (urology) next injection on 10/24/24. IPSS 22 / QOL 2 / EPIC-CP 17 (ED portion not completed)

## 2024-09-16 NOTE — DISEASE MANAGEMENT
[Clinical] : TNM Stage: c [Biopsy] : Patient had a biopsy on [7(4+3)] : Template Biopsy [place] Score: 7(4+3) [IV] : IV [Radiation Therapy] : Radiation Therapy [Treatment with radiation therapy] : Treatment with radiation therapy [EBRT] : EBRT [Treatment with androgen ablation] : Treatment with androgen ablation [TTNM] : x [NTNM] : x [MTNM] : 1 [BiopsyDate] : 4/13/2011 [TotalPositiveCores] : 6 [MaxCoreInvolvement] : 90 [RadiationCompletedDate] : 9/19/2011 [EBRTDose] : 8100cGy [EBRTFractions] : 45 [ADTStartedDate] : 5/2018 [FreeTextEntry1] : 3/2018 - had elevation in PSA, started on Lupron injections 5/14/2018. 10/2020 - stopped Lupron injections and PSA monitored.    8/2022- PSA 1.9 ng/mL. Due to rate of increase recommended to resume ADT.  10/2022 - 4/7/23: Lupron injections  4/2024 - mCRPC with bone mets.   Resumed Eligard (Lupron) injections and seen by [facility].

## 2024-09-16 NOTE — DISEASE MANAGEMENT
[Clinical] : TNM Stage: c [Biopsy] : Patient had a biopsy on [7(4+3)] : Template Biopsy [place] Score: 7(4+3) [IV] : IV [Radiation Therapy] : Radiation Therapy [Treatment with radiation therapy] : Treatment with radiation therapy [EBRT] : EBRT [Treatment with androgen ablation] : Treatment with androgen ablation [TTNM] : x [NTNM] : x [MTNM] : 1 [BiopsyDate] : 4/13/2011 [TotalPositiveCores] : 6 [MaxCoreInvolvement] : 90 [RadiationCompletedDate] : 9/19/2011 [EBRTDose] : 8100cGy [EBRTFractions] : 45 [ADTStartedDate] : 5/2018 [FreeTextEntry1] : 3/2018 - had elevation in PSA, started on Lupron injections 5/14/2018. 10/2020 - stopped Lupron injections and PSA monitored.    8/2022- PSA 1.9 ng/mL. Due to rate of increase recommended to resume ADT.  10/2022 - 4/7/23: Lupron injections  4/2024 - mCRPC with bone mets.   Resumed Eligard (Lupron) injections and seen by [facility].

## 2024-09-27 ENCOUNTER — RESULT REVIEW (OUTPATIENT)
Age: 89
End: 2024-09-27

## 2024-09-27 ENCOUNTER — APPOINTMENT (OUTPATIENT)
Dept: HEMATOLOGY ONCOLOGY | Facility: CLINIC | Age: 89
End: 2024-09-27
Payer: MEDICARE

## 2024-09-27 VITALS
WEIGHT: 212.06 LBS | HEART RATE: 78 BPM | TEMPERATURE: 97.3 F | RESPIRATION RATE: 18 BRPM | SYSTOLIC BLOOD PRESSURE: 161 MMHG | BODY MASS INDEX: 32.25 KG/M2 | OXYGEN SATURATION: 97 % | DIASTOLIC BLOOD PRESSURE: 79 MMHG

## 2024-09-27 DIAGNOSIS — C61 MALIGNANT NEOPLASM OF PROSTATE: ICD-10-CM

## 2024-09-27 LAB
BASOPHILS # BLD AUTO: 0.04 K/UL — SIGNIFICANT CHANGE UP (ref 0–0.2)
BASOPHILS NFR BLD AUTO: 0.4 % — SIGNIFICANT CHANGE UP (ref 0–2)
EOSINOPHIL # BLD AUTO: 0.62 K/UL — HIGH (ref 0–0.5)
EOSINOPHIL NFR BLD AUTO: 6.2 % — HIGH (ref 0–6)
HCT VFR BLD CALC: 33.1 % — LOW (ref 39–50)
HGB BLD-MCNC: 10.2 G/DL — LOW (ref 13–17)
IMM GRANULOCYTES NFR BLD AUTO: 1.7 % — HIGH (ref 0–0.9)
LYMPHOCYTES # BLD AUTO: 1.67 K/UL — SIGNIFICANT CHANGE UP (ref 1–3.3)
LYMPHOCYTES # BLD AUTO: 16.8 % — SIGNIFICANT CHANGE UP (ref 13–44)
MCHC RBC-ENTMCNC: 23.1 PG — LOW (ref 27–34)
MCHC RBC-ENTMCNC: 30.8 G/DL — LOW (ref 32–36)
MCV RBC AUTO: 74.9 FL — LOW (ref 80–100)
MONOCYTES # BLD AUTO: 0.98 K/UL — HIGH (ref 0–0.9)
MONOCYTES NFR BLD AUTO: 9.8 % — SIGNIFICANT CHANGE UP (ref 2–14)
NEUTROPHILS # BLD AUTO: 6.47 K/UL — SIGNIFICANT CHANGE UP (ref 1.8–7.4)
NEUTROPHILS NFR BLD AUTO: 65.1 % — SIGNIFICANT CHANGE UP (ref 43–77)
NRBC # BLD: 0 /100 WBCS — SIGNIFICANT CHANGE UP (ref 0–0)
PLATELET # BLD AUTO: 351 K/UL — SIGNIFICANT CHANGE UP (ref 150–400)
PSA SERPL-MCNC: 14.3 NG/ML
PSA SERPL-MCNC: 16.2 NG/ML
RBC # BLD: 4.42 M/UL — SIGNIFICANT CHANGE UP (ref 4.2–5.8)
RBC # FLD: 18 % — HIGH (ref 10.3–14.5)
WBC # BLD: 9.95 K/UL — SIGNIFICANT CHANGE UP (ref 3.8–10.5)
WBC # FLD AUTO: 9.95 K/UL — SIGNIFICANT CHANGE UP (ref 3.8–10.5)

## 2024-09-27 PROCEDURE — 99214 OFFICE O/P EST MOD 30 MIN: CPT

## 2024-09-27 PROCEDURE — G2211 COMPLEX E/M VISIT ADD ON: CPT

## 2024-09-27 NOTE — ASSESSMENT
[FreeTextEntry1] : 88 years old man with PMHx of DM2, HTN, and diagnosed as prostate cancer s/p radiation in 2011, PSA elevated to 1.9 in August 2022, started ADT in October 2022. His PSA nicki was 0.82 in April 2023, elevated PSA 1.93 in October, 4.11 in Jan 2024 while under castration level of testosterone. He has castration resistant prostate cancer.   He does have one right iliac bone lesion with metastasis. His images show metastasis, he has mCRPC. He was planned for and ordered to start abiraterone/prednisone in the setting of normal cardiac function given its side effects including hypertension and fluid retention. Abiraterone may cause liver damage. He is getting Eligard Q6 months and his last dose was 4/16/24, had apparently been overdue for it (although pt states he had gotten it in October 2023).  PLAN  - Abiraterone/prednisone prescribed to pt. However, given uncontrolled HTN [172/84] I told pt to strictly not start those meds until BP is improved. Pt is on 3 meds for HTN and will f/u with his PCP Dr. Mat Wilde. -continue ADT with Dr. Daniels  - As an alternative, could consider a referral to Rad onc for oligometastasis given his age and potential AEs from systemic treatment. Now s/p completion of RT on 8/7/2. His PSA on 9/18 was 14.3 with slight improvement, however, not much. Will discuss with Dr. Gray for possible RT  - Will obtain a dental referral (notes toothache) prior to starting Xgeva  Uncontrolled HTN - stopping lisinopril, amlodipine, metoprolol and furosemide, then start olmesartan-amlodipine-hydrocholorothiazide 40-10-25mg daily.   - Continue with spironolactone 25mg daily however, only took it once and misplaced the reminder as a result unable to continue until end of August. Pt to follow up with Dr. Lantigua on 8/15/24  - Emphasized to eliminate salt from diet. Discussed if he develops worsening headache, change in vision, or CP/SOB to go to the ED.  SW -Refer to SW for assistance with Homecare nurses Aide.  RTC in 6 weeks

## 2024-09-27 NOTE — REVIEW OF SYSTEMS
[Fatigue] : fatigue [Incontinence] : incontinence [Joint Pain] : joint pain [Difficulty Walking] : difficulty walking [Negative] : Gastrointestinal [Fever] : no fever [Recent Change In Weight] : ~T no recent weight change [Dysphagia] : no dysphagia [Odynophagia] : no odynophagia [Muscle Pain] : no muscle pain [Confused] : no confusion [Dizziness] : no dizziness [Insomnia] : no insomnia [Depression] : no depression [Hot Flashes] : no hot flashes [Muscle Weakness] : no muscle weakness [Easy Bleeding] : no tendency for easy bleeding [Easy Bruising] : no tendency for easy bruising [FreeTextEntry4] : chronic toothache [FreeTextEntry8] : +Nocturia [de-identified] : +Neuropathy b/l foot. Headaches

## 2024-09-27 NOTE — HISTORY OF PRESENT ILLNESS
[Disease: _____________________] : Disease: [unfilled] [Date: ____________] : Patient's last distress assessment performed on [unfilled]. [1 - Distress Level] : Distress Level: 1 [de-identified] : Mike Curran is 88 years old man with PMHx of DM2, HTN, and prostate cancer presenting for initial evaluation of CRPC.  Pt was initially treated with radiation in 2011 for his prostate cancer.  4/25/2018 bone scan showed no evidence of bone metastasis.  CT a/p showed small left para-aortic nodes questionable significance, two punctate right lower lobe pulmonary nodules. PSA  3/4/22  0.55 7/8/22  1.40 8/16/22 1.90  10/7/2022 He developed recurrent disease and had been started on androgen deprivation therapy with Dr. Daniels. He reports an interval history of having developed vertigo and he was hospitalized briefly for that reason. He is following up with neurology to determine what may have caused the vertigo. It is not yet clear.  PSA 4/7/23  0.82    testosterone 8.8 10/10/23 1.92    testosterone  9.2 1/25/24  4.11   testosterone 3.5   He is here today to determine his current PSA level and then make a decision regarding reinitiation of the androgen deprivation.  As per patient, he has limited physical function with very little walk inside his house with a cane. He feels fine, denies any pain and gross hematuria.   3/5/24 CT C/A/P showed no lymphadenopathy on the current exam. Previously referenced left para-aortic lymph nodes are markedly smaller on the current study. New 4 to 5 cm mixed lytic and sclerotic lesion involving the right iliac bone. 3/11/24 bone scan showed Findings suggestive of osseous metastasis in the right posterior ileum. Nonspecific increased uptake in the left femoral head. This is suspicious for osteonecrosis although degenerative/injury related changes or even metastasis may have a similar appearance. Report chronic lower back pain, otherwise feels fine, denies fatigue. He has bilateral feet numbness and tingling. Sometimes weak urine flow. Nocturia 0 to 3. Denies gross hematuria.  [de-identified] : prostate adenocarcinoma  [de-identified] : 5/6/24: Patient presents for follow up. He notes generalized pains. He also has had mild headaches for some time. No vision changes. ROS is otherwise unchanged from previously. He denies CP or SOB. He notes he eats a diet with salty foods and processed foods. He takes furosemide, 20, amlodipine 10, and lisinopril 40 mg QD for BP and takes his meds daily. He denies missing his BP pills.  6/17/24 PSMA PET showed 1.  Numerous osseous metastases involving the axial and appendicular skeleton. 2.  Ill-defined, mild uptake in the prostate gland, nonspecific.  6/21/24 reports he has changed his blood pressure med by stopping lisinopril, amlodipine, metoprolol and furosemide, then start olmesartan-amlodipine-hydrocholorothiazide 40-10-25mg daily. His BP this AM at home 160s/86. He feels tired, denies hot flash and sweating. He has normal urination. Nocturia 0-2. He has had vertigo Feb 2023 on isacc.   8/9/24: pt cane dependent. Not feeling well due to fatigue post 6 RDT on Wednesday. Endorses burning sensation during urination. Urine color is light tea, not cloudy, no blood. No pain on urination. Denies SOB, no bilateral swelling but with vertigo and uses OTC med Leandro to manage it. Appetite is good, maintaining his weight however with occ diarrhea.   9/27/24 reports shortness of breath on exertion, denies chest pain. Also endorse no fatigue/leg swelling/

## 2024-09-29 LAB
ALBUMIN SERPL ELPH-MCNC: 4.5 G/DL
ALP BLD-CCNC: 192 U/L
ALT SERPL-CCNC: 11 U/L
ANION GAP SERPL CALC-SCNC: 19 MMOL/L
AST SERPL-CCNC: 16 U/L
BILIRUB SERPL-MCNC: 0.2 MG/DL
BUN SERPL-MCNC: 36 MG/DL
CALCIUM SERPL-MCNC: 9.8 MG/DL
CHLORIDE SERPL-SCNC: 101 MMOL/L
CO2 SERPL-SCNC: 20 MMOL/L
CREAT SERPL-MCNC: 2.17 MG/DL
EGFR: 29 ML/MIN/1.73M2
GLUCOSE SERPL-MCNC: 65 MG/DL
POTASSIUM SERPL-SCNC: 5.3 MMOL/L
PROT SERPL-MCNC: 8 G/DL
SODIUM SERPL-SCNC: 140 MMOL/L

## 2024-10-24 ENCOUNTER — APPOINTMENT (OUTPATIENT)
Dept: UROLOGY | Facility: CLINIC | Age: 89
End: 2024-10-24
Payer: MEDICARE

## 2024-10-24 ENCOUNTER — OUTPATIENT (OUTPATIENT)
Dept: OUTPATIENT SERVICES | Facility: HOSPITAL | Age: 89
LOS: 1 days | End: 2024-10-24
Payer: MEDICARE

## 2024-10-24 VITALS
RESPIRATION RATE: 17 BRPM | HEART RATE: 77 BPM | DIASTOLIC BLOOD PRESSURE: 75 MMHG | SYSTOLIC BLOOD PRESSURE: 187 MMHG | TEMPERATURE: 97.3 F

## 2024-10-24 DIAGNOSIS — C61 MALIGNANT NEOPLASM OF PROSTATE: ICD-10-CM

## 2024-10-24 DIAGNOSIS — R35.0 FREQUENCY OF MICTURITION: ICD-10-CM

## 2024-10-24 PROCEDURE — 96402U: CUSTOM | Mod: NC

## 2024-10-24 PROCEDURE — 99213 OFFICE O/P EST LOW 20 MIN: CPT | Mod: 25

## 2024-10-24 PROCEDURE — 96402 CHEMO HORMON ANTINEOPL SQ/IM: CPT

## 2024-10-24 RX ORDER — LEUPROLIDE ACETATE 45 MG/.375ML
45 INJECTION, SUSPENSION, EXTENDED RELEASE SUBCUTANEOUS
Refills: 0 | Status: COMPLETED | OUTPATIENT
Start: 2024-10-24

## 2024-10-24 RX ORDER — LEUPROLIDE ACETATE 45 MG/.375ML
45 INJECTION, SUSPENSION, EXTENDED RELEASE SUBCUTANEOUS
Qty: 1 | Refills: 0 | Status: COMPLETED | OUTPATIENT
Start: 2024-10-24 | End: 2024-10-24

## 2024-10-24 RX ADMIN — LEUPROLIDE ACETATE 0 MG: 45 INJECTION, SUSPENSION, EXTENDED RELEASE SUBCUTANEOUS at 00:00

## 2024-10-28 DIAGNOSIS — C61 MALIGNANT NEOPLASM OF PROSTATE: ICD-10-CM

## 2024-11-05 ENCOUNTER — OUTPATIENT (OUTPATIENT)
Dept: OUTPATIENT SERVICES | Facility: HOSPITAL | Age: 89
LOS: 1 days | Discharge: HOME | End: 2024-11-05

## 2024-11-05 DIAGNOSIS — C61 MALIGNANT NEOPLASM OF PROSTATE: ICD-10-CM

## 2024-11-11 ENCOUNTER — RESULT REVIEW (OUTPATIENT)
Age: 89
End: 2024-11-11

## 2024-11-11 ENCOUNTER — APPOINTMENT (OUTPATIENT)
Dept: HEMATOLOGY ONCOLOGY | Facility: CLINIC | Age: 89
End: 2024-11-11
Payer: MEDICARE

## 2024-11-11 VITALS
SYSTOLIC BLOOD PRESSURE: 170 MMHG | WEIGHT: 206.35 LBS | OXYGEN SATURATION: 92 % | RESPIRATION RATE: 18 BRPM | TEMPERATURE: 97.1 F | HEART RATE: 94 BPM | DIASTOLIC BLOOD PRESSURE: 92 MMHG | BODY MASS INDEX: 31.38 KG/M2

## 2024-11-11 DIAGNOSIS — C61 MALIGNANT NEOPLASM OF PROSTATE: ICD-10-CM

## 2024-11-11 LAB
BASOPHILS # BLD AUTO: 0.07 K/UL — SIGNIFICANT CHANGE UP (ref 0–0.2)
BASOPHILS NFR BLD AUTO: 0.7 % — SIGNIFICANT CHANGE UP (ref 0–2)
EOSINOPHIL # BLD AUTO: 0.64 K/UL — HIGH (ref 0–0.5)
EOSINOPHIL NFR BLD AUTO: 6.6 % — HIGH (ref 0–6)
HCT VFR BLD CALC: 35 % — LOW (ref 39–50)
HGB BLD-MCNC: 10.7 G/DL — LOW (ref 13–17)
IMM GRANULOCYTES NFR BLD AUTO: 0.9 % — SIGNIFICANT CHANGE UP (ref 0–0.9)
LYMPHOCYTES # BLD AUTO: 1.68 K/UL — SIGNIFICANT CHANGE UP (ref 1–3.3)
LYMPHOCYTES # BLD AUTO: 17.4 % — SIGNIFICANT CHANGE UP (ref 13–44)
MCHC RBC-ENTMCNC: 23.3 PG — LOW (ref 27–34)
MCHC RBC-ENTMCNC: 30.6 G/DL — LOW (ref 32–36)
MCV RBC AUTO: 76.3 FL — LOW (ref 80–100)
MONOCYTES # BLD AUTO: 0.79 K/UL — SIGNIFICANT CHANGE UP (ref 0–0.9)
MONOCYTES NFR BLD AUTO: 8.2 % — SIGNIFICANT CHANGE UP (ref 2–14)
NEUTROPHILS # BLD AUTO: 6.41 K/UL — SIGNIFICANT CHANGE UP (ref 1.8–7.4)
NEUTROPHILS NFR BLD AUTO: 66.2 % — SIGNIFICANT CHANGE UP (ref 43–77)
NRBC # BLD: 0 /100 WBCS — SIGNIFICANT CHANGE UP (ref 0–0)
NRBC BLD-RTO: 0 /100 WBCS — SIGNIFICANT CHANGE UP (ref 0–0)
PLATELET # BLD AUTO: 341 K/UL — SIGNIFICANT CHANGE UP (ref 150–400)
RBC # BLD: 4.59 M/UL — SIGNIFICANT CHANGE UP (ref 4.2–5.8)
RBC # FLD: 17.7 % — HIGH (ref 10.3–14.5)
WBC # BLD: 9.68 K/UL — SIGNIFICANT CHANGE UP (ref 3.8–10.5)
WBC # FLD AUTO: 9.68 K/UL — SIGNIFICANT CHANGE UP (ref 3.8–10.5)

## 2024-11-11 PROCEDURE — 99214 OFFICE O/P EST MOD 30 MIN: CPT

## 2024-11-11 PROCEDURE — G2211 COMPLEX E/M VISIT ADD ON: CPT

## 2024-11-12 LAB
ALBUMIN SERPL ELPH-MCNC: 4.4 G/DL
ALP BLD-CCNC: 264 U/L
ALT SERPL-CCNC: 12 U/L
ANION GAP SERPL CALC-SCNC: 17 MMOL/L
AST SERPL-CCNC: 19 U/L
BILIRUB SERPL-MCNC: 0.2 MG/DL
BUN SERPL-MCNC: 29 MG/DL
CALCIUM SERPL-MCNC: 9.9 MG/DL
CHLORIDE SERPL-SCNC: 102 MMOL/L
CO2 SERPL-SCNC: 22 MMOL/L
CREAT SERPL-MCNC: 2.12 MG/DL
EGFR: 29 ML/MIN/1.73M2
GLUCOSE SERPL-MCNC: 103 MG/DL
POTASSIUM SERPL-SCNC: 4.6 MMOL/L
PROT SERPL-MCNC: 8 G/DL
PSA SERPL-MCNC: 17.8 NG/ML
SODIUM SERPL-SCNC: 141 MMOL/L
TESTOST SERPL-MCNC: <2.5 NG/DL

## 2024-12-03 ENCOUNTER — LABORATORY RESULT (OUTPATIENT)
Age: 89
End: 2024-12-03

## 2024-12-06 ENCOUNTER — APPOINTMENT (OUTPATIENT)
Dept: NEPHROLOGY | Facility: CLINIC | Age: 89
End: 2024-12-06
Payer: MEDICARE

## 2024-12-06 VITALS
BODY MASS INDEX: 31.22 KG/M2 | TEMPERATURE: 97.8 F | OXYGEN SATURATION: 94 % | HEIGHT: 68 IN | DIASTOLIC BLOOD PRESSURE: 78 MMHG | SYSTOLIC BLOOD PRESSURE: 163 MMHG | WEIGHT: 206 LBS | HEART RATE: 92 BPM

## 2024-12-06 VITALS — DIASTOLIC BLOOD PRESSURE: 77 MMHG | HEART RATE: 88 BPM | SYSTOLIC BLOOD PRESSURE: 141 MMHG

## 2024-12-06 DIAGNOSIS — N18.30 CHRONIC KIDNEY DISEASE, STAGE 3 UNSPECIFIED: ICD-10-CM

## 2024-12-06 DIAGNOSIS — E26.09 OTHER PRIMARY HYPERALDOSTERONISM: ICD-10-CM

## 2024-12-06 DIAGNOSIS — I10 ESSENTIAL (PRIMARY) HYPERTENSION: ICD-10-CM

## 2024-12-06 DIAGNOSIS — R80.9 PROTEINURIA, UNSPECIFIED: ICD-10-CM

## 2024-12-06 DIAGNOSIS — E78.5 HYPERLIPIDEMIA, UNSPECIFIED: ICD-10-CM

## 2024-12-06 PROCEDURE — 99215 OFFICE O/P EST HI 40 MIN: CPT

## 2024-12-06 PROCEDURE — G2211 COMPLEX E/M VISIT ADD ON: CPT

## 2024-12-06 RX ORDER — NEBIVOLOL 5 MG/1
5 TABLET ORAL
Qty: 90 | Refills: 3 | Status: ACTIVE | COMMUNITY
Start: 2024-12-06 | End: 1900-01-01

## 2024-12-25 PROBLEM — Z92.21 HISTORY OF ANTINEOPLASTIC THERAPY: Status: ACTIVE | Noted: 2024-12-25

## 2024-12-25 PROBLEM — Z79.818 ANDROGEN DEPRIVATION THERAPY: Status: ACTIVE | Noted: 2024-12-25

## 2024-12-30 ENCOUNTER — INPATIENT (INPATIENT)
Facility: HOSPITAL | Age: 89
LOS: 17 days | Discharge: NURSING, REHAB OR LONG-TERM CARE | End: 2025-01-17
Attending: INTERNAL MEDICINE | Admitting: INTERNAL MEDICINE
Payer: MEDICARE

## 2024-12-30 ENCOUNTER — APPOINTMENT (OUTPATIENT)
Dept: HEMATOLOGY ONCOLOGY | Facility: CLINIC | Age: 89
End: 2024-12-30

## 2024-12-30 ENCOUNTER — NON-APPOINTMENT (OUTPATIENT)
Age: 89
End: 2024-12-30

## 2024-12-30 VITALS
RESPIRATION RATE: 15 BRPM | TEMPERATURE: 99 F | WEIGHT: 203.05 LBS | SYSTOLIC BLOOD PRESSURE: 179 MMHG | OXYGEN SATURATION: 96 % | HEART RATE: 65 BPM | DIASTOLIC BLOOD PRESSURE: 95 MMHG

## 2024-12-30 DIAGNOSIS — Z79.818 LONG TERM (CURRENT) USE OF OTHER AGENTS AFFECTING ESTROGEN RECEPTORS AND ESTROGEN LEVELS: ICD-10-CM

## 2024-12-30 DIAGNOSIS — I10 ESSENTIAL (PRIMARY) HYPERTENSION: ICD-10-CM

## 2024-12-30 DIAGNOSIS — C61 MALIGNANT NEOPLASM OF PROSTATE: ICD-10-CM

## 2024-12-30 DIAGNOSIS — E16.2 HYPOGLYCEMIA, UNSPECIFIED: ICD-10-CM

## 2024-12-30 DIAGNOSIS — Z92.21 PERSONAL HISTORY OF ANTINEOPLASTIC CHEMOTHERAPY: ICD-10-CM

## 2024-12-30 LAB
ADD ON TEST-SPECIMEN IN LAB: SIGNIFICANT CHANGE UP
ADD ON TEST-SPECIMEN IN LAB: SIGNIFICANT CHANGE UP
ALBUMIN SERPL ELPH-MCNC: 4.2 G/DL — SIGNIFICANT CHANGE UP (ref 3.3–5)
ALP SERPL-CCNC: 170 U/L — HIGH (ref 40–120)
ALT FLD-CCNC: 9 U/L — SIGNIFICANT CHANGE UP (ref 4–41)
ANION GAP SERPL CALC-SCNC: 14 MMOL/L — SIGNIFICANT CHANGE UP (ref 7–14)
ANISOCYTOSIS BLD QL: SLIGHT — SIGNIFICANT CHANGE UP
AST SERPL-CCNC: 25 U/L — SIGNIFICANT CHANGE UP (ref 4–40)
BASOPHILS # BLD AUTO: 0 K/UL — SIGNIFICANT CHANGE UP (ref 0–0.2)
BASOPHILS NFR BLD AUTO: 0 % — SIGNIFICANT CHANGE UP (ref 0–2)
BILIRUB SERPL-MCNC: <0.2 MG/DL — SIGNIFICANT CHANGE UP (ref 0.2–1.2)
BLOOD GAS VENOUS COMPREHENSIVE RESULT: SIGNIFICANT CHANGE UP
BUN SERPL-MCNC: 44 MG/DL — HIGH (ref 7–23)
CALCIUM SERPL-MCNC: 9.8 MG/DL — SIGNIFICANT CHANGE UP (ref 8.4–10.5)
CHLORIDE SERPL-SCNC: 106 MMOL/L — SIGNIFICANT CHANGE UP (ref 98–107)
CO2 SERPL-SCNC: 22 MMOL/L — SIGNIFICANT CHANGE UP (ref 22–31)
CREAT SERPL-MCNC: 2 MG/DL — HIGH (ref 0.5–1.3)
EGFR: 32 ML/MIN/1.73M2 — LOW
EOSINOPHIL # BLD AUTO: 0.38 K/UL — SIGNIFICANT CHANGE UP (ref 0–0.5)
EOSINOPHIL NFR BLD AUTO: 3.5 % — SIGNIFICANT CHANGE UP (ref 0–6)
GLUCOSE BLDC GLUCOMTR-MCNC: 152 MG/DL — HIGH (ref 70–99)
GLUCOSE SERPL-MCNC: 82 MG/DL — SIGNIFICANT CHANGE UP (ref 70–99)
HCT VFR BLD CALC: 30.9 % — LOW (ref 39–50)
HGB BLD-MCNC: 9.7 G/DL — LOW (ref 13–17)
IANC: 8.05 K/UL — HIGH (ref 1.8–7.4)
LYMPHOCYTES # BLD AUTO: 0.67 K/UL — LOW (ref 1–3.3)
LYMPHOCYTES # BLD AUTO: 6.2 % — LOW (ref 13–44)
MANUAL SMEAR VERIFICATION: SIGNIFICANT CHANGE UP
MCHC RBC-ENTMCNC: 22.8 PG — LOW (ref 27–34)
MCHC RBC-ENTMCNC: 31.4 G/DL — LOW (ref 32–36)
MCV RBC AUTO: 72.7 FL — LOW (ref 80–100)
MICROCYTES BLD QL: SIGNIFICANT CHANGE UP
MONOCYTES # BLD AUTO: 0.86 K/UL — SIGNIFICANT CHANGE UP (ref 0–0.9)
MONOCYTES NFR BLD AUTO: 8 % — SIGNIFICANT CHANGE UP (ref 2–14)
NEUTROPHILS # BLD AUTO: 8 K/UL — HIGH (ref 1.8–7.4)
NEUTROPHILS NFR BLD AUTO: 73.4 % — SIGNIFICANT CHANGE UP (ref 43–77)
NEUTS BAND # BLD: 0.9 % — SIGNIFICANT CHANGE UP (ref 0–6)
OVALOCYTES BLD QL SMEAR: SLIGHT — SIGNIFICANT CHANGE UP
PLAT MORPH BLD: NORMAL — SIGNIFICANT CHANGE UP
PLATELET # BLD AUTO: 316 K/UL — SIGNIFICANT CHANGE UP (ref 150–400)
PLATELET COUNT - ESTIMATE: NORMAL — SIGNIFICANT CHANGE UP
POIKILOCYTOSIS BLD QL AUTO: SLIGHT — SIGNIFICANT CHANGE UP
POTASSIUM SERPL-MCNC: 4.8 MMOL/L — SIGNIFICANT CHANGE UP (ref 3.5–5.3)
POTASSIUM SERPL-SCNC: 4.8 MMOL/L — SIGNIFICANT CHANGE UP (ref 3.5–5.3)
PROT SERPL-MCNC: 7.7 G/DL — SIGNIFICANT CHANGE UP (ref 6–8.3)
RBC # BLD: 4.25 M/UL — SIGNIFICANT CHANGE UP (ref 4.2–5.8)
RBC # FLD: 16.6 % — HIGH (ref 10.3–14.5)
RBC BLD AUTO: NORMAL — SIGNIFICANT CHANGE UP
SODIUM SERPL-SCNC: 142 MMOL/L — SIGNIFICANT CHANGE UP (ref 135–145)
TROPONIN T, HIGH SENSITIVITY RESULT: 33 NG/L — SIGNIFICANT CHANGE UP
VARIANT LYMPHS # BLD: 8 % — HIGH (ref 0–6)
WBC # BLD: 10.77 K/UL — HIGH (ref 3.8–10.5)
WBC # FLD AUTO: 10.77 K/UL — HIGH (ref 3.8–10.5)

## 2024-12-30 PROCEDURE — 99223 1ST HOSP IP/OBS HIGH 75: CPT

## 2024-12-30 PROCEDURE — 71045 X-RAY EXAM CHEST 1 VIEW: CPT | Mod: 26

## 2024-12-30 RX ORDER — HYDRALAZINE HYDROCHLORIDE 10 MG/1
25 TABLET ORAL DAILY
Refills: 0 | Status: DISCONTINUED | OUTPATIENT
Start: 2024-12-30 | End: 2024-12-31

## 2024-12-30 RX ORDER — ISOSORBIDE MONONITRATE 120 MG
60 TABLET, EXTENDED RELEASE 24 HR ORAL DAILY
Refills: 0 | Status: DISCONTINUED | OUTPATIENT
Start: 2024-12-30 | End: 2024-12-31

## 2024-12-30 RX ORDER — ACETAMINOPHEN 80 MG/.8ML
650 SOLUTION/ DROPS ORAL ONCE
Refills: 0 | Status: COMPLETED | OUTPATIENT
Start: 2024-12-30 | End: 2024-12-30

## 2024-12-30 RX ORDER — LOSARTAN POTASSIUM 100 MG/1
100 TABLET, FILM COATED ORAL ONCE
Refills: 0 | Status: COMPLETED | OUTPATIENT
Start: 2024-12-30 | End: 2024-12-30

## 2024-12-30 RX ORDER — DEXTROSE MONOHYDRATE 25 G/50ML
50 INJECTION, SOLUTION INTRAVENOUS ONCE
Refills: 0 | Status: COMPLETED | OUTPATIENT
Start: 2024-12-30 | End: 2024-12-30

## 2024-12-30 RX ORDER — ISOSORBIDE MONONITRATE 120 MG
60 TABLET, EXTENDED RELEASE 24 HR ORAL DAILY
Refills: 0 | Status: DISCONTINUED | OUTPATIENT
Start: 2024-12-30 | End: 2024-12-30

## 2024-12-30 RX ADMIN — ACETAMINOPHEN 650 MILLIGRAM(S): 80 SOLUTION/ DROPS ORAL at 14:46

## 2024-12-30 RX ADMIN — ACETAMINOPHEN 650 MILLIGRAM(S): 80 SOLUTION/ DROPS ORAL at 15:30

## 2024-12-30 RX ADMIN — Medication 10 MILLIGRAM(S): at 14:39

## 2024-12-30 RX ADMIN — LOSARTAN POTASSIUM 100 MILLIGRAM(S): 100 TABLET, FILM COATED ORAL at 18:40

## 2024-12-30 RX ADMIN — DEXTROSE MONOHYDRATE 50 MILLILITER(S): 25 INJECTION, SOLUTION INTRAVENOUS at 10:17

## 2024-12-30 RX ADMIN — HYDRALAZINE HYDROCHLORIDE 25 MILLIGRAM(S): 10 TABLET ORAL at 23:34

## 2024-12-30 NOTE — ED CDU PROVIDER INITIAL DAY NOTE - CLINICAL SUMMARY MEDICAL DECISION MAKING FREE TEXT BOX
89-year-old male with past medical history of prostate cancer, diabetes, hypertension presented to the ED with hypoglycemia.  Patient is unsure of what happened.  Per discussions with main ED team according to patient's wife she started to hear him gurgling, she turned on the lights and was unable to wake the patient.  EMS was called and upon arrival his fingerstick was 41, he was given 1 amp of D50 with a repeat being 140.  Patient's wife also reports a similar occurrence yesterday where his fingerstick was in the 40s.  Admits to taking combined metformin/glipizide pill as well as Lantus.  Wife states that Lantus dose was increased from 10 units to 15 units 3 weeks ago.  Patient is currently reporting chest pain and back pain as well as headache.   In main ED patient was given 1 amp of D50, fingersticks trending 150, 132, 97, 141, 155, EKG NSR.  Given chest pain troponin checked, initial troponin 42, repeat 33.  Patient sent to CDU for endocrine consults, had been awaiting second troponin results, cardiac monitoring.

## 2024-12-30 NOTE — H&P ADULT - PROBLEM SELECTOR PLAN 4
Previously on Zytiga, now off 2/2 HTN  - continue allopurinol Previously on Zytiga, now off 2/2 HTN  - continue allopurinol  - reviewed outpatient oncology notes

## 2024-12-30 NOTE — ED ADULT NURSE NOTE - OBJECTIVE STATEMENT
Patient is a 89 y/o male received in room 2 complaining of hypoglycemia. Patient is A+Ox4 and is ambulatory to baseline. Patient has a past medical history of Type 2 Diabetes, HTN and prostate cancer. Patient is accompanied with wife at bedside. Patient's wife endorses that the patient was making strange gurgling noises when she woke up this morning and that his eyes were open, but he was unresponsive. Patient's wife endorses that EMS gave the patient 1 amp of D50W because his FS was 46, and it went up to 140. Patient endorses that her  has had 2-3 episodes of hypoglycemia within this week, however, after adding a few sugar cubes to his drink, she reports that his blood sugar would then go back to normal range. Patient endorses that he does not have any recollection of losing consciousness. Patient endorses that the last time that he took his diabetes and hypertension medication was yesterday around 12pm.  Patient denies any pain, shortness of breath, chest pain, dizziness, headache,  urinary symptoms, nausea, vomiting or diarrhea.     Upon assessment, neuro is intact, patient is speaking in full sentences, PERRLA present, and patient is speaking in full sentences. No bruises or head abrasions noted. Respirations are even and unlabored. Cardiac monitor initiated and normal sinus rhythm on the monitor. Abdomen is soft, nontender and nondistended. Skin is warm, consistent with skin tone, dry and intact. So signs of active bleeding notes. . 20G IV placed in left forearm, labs sent. Safety precautions implemented and maintained as per protocol, awaiting further MS orders. Plan of care ongoing.

## 2024-12-30 NOTE — H&P ADULT - NSHPLABSRESULTS_GEN_ALL_CORE
9.7    10.77 )-----------( 316      ( 30 Dec 2024 09:36 )             30.9     142  |  106  |  44[H]  ----------------------------<  82     12-30  4.8   |  22  |  2.00[H]    Ca    9.8      30 Dec 2024 09:36    TPro  7.7  /  Alb  4.2  /  TBili  <0.2  /  DBili  x   /  AST  25  /  ALT  9   /  AlkPhos  170[H]  12-30        09:36 - VBG - pH: 7.32  | pCO2: 49    | pO2: 21    | Lactate: 2.6            hs Troponin, T - 33 ng/L (12-30-24 @ 14:55)  hs Troponin, T - 42 ng/L (12-30-24 @ 09:36)          Hgb A1c: 7.0 (12-30-24 @ 09:36)          CXR reviewed: Clear lungs    EKG reviewed: sinus bradycardia with pvc

## 2024-12-30 NOTE — ED CDU PROVIDER INITIAL DAY NOTE - ATTENDING APP SHARED VISIT CONTRIBUTION OF CARE
I personally made the management plan, and take responsibility for the patient's management. I have discussed with and reviewed the PA's note and agree with the History, ROS, Physical Exam and MDM unless otherwise indicated. A brief summary of my personal evaluation and impression can be found below.    89-year-old male with a past medical history of prostate cancer, diabetes, hypertension, presenting due to concern of hypoglycemia at home.  The patient states he is tired and was not sure what happened.  According to his wife who was in bed with him she started hearing him gurgling at home.  She turned on the lights and tried to wake him up, the patient was unresponsive.  They called EMS who came and checked a fingerstick which was 41.  He was given 1 amp of D50 with a repeat being 140.  Patient also tolerated p.o.  According to wife he had a similar occurrence yesterday where his fingerstick was in the 40s.  Patient takes combined metformin and glipizide.  Denies fever, chills, chest pain, trouble breathing, dysuria.  According to patient he has decreased appetite recently compared to the past.    General: Well-appearing, NAD  Head: Atraumatic, normocephalic  Eyes: EOM grossly in tact, no scleral icterus  ENT: moist mucous membranes  Neurology: A&Ox 3 , nonfocal, BEEBE x 4  Respiratory: normal respiratory effort  CV: Extremities warm and well perfused  Abdominal: Soft, non-distended, non distended  Extremities: No edema  Skin: No rashes    given that the pt is on a sulfonylurea, will require 24 hours of monitoring w/ POCT glucose testing. Patient may require an endocrinology consult to alter his medications given that he has had multiple episodes of hypoglycemia. in ED Patient's fingersticks were 126 104, 77 received 1 amp of D50, then 150.  Will place patient in CDU for continuous monitoring of fingersticks, consider an endocrinology consult dispo pending workup.  Patient initially reported that he did not take insulin, however on additional review patient takes Lantus which was recently increased to 15 units.  May require lower dose of insulin.

## 2024-12-30 NOTE — ED CDU PROVIDER INITIAL DAY NOTE - OBJECTIVE STATEMENT
89-year-old male with past medical history of prostate cancer, diabetes, hypertension presented to the ED with hypoglycemia.  Patient is unsure of what happened.  Per discussions with main ED team according to patient's wife she started to hear him gurgling, she turned on the lights and was unable to wake the patient.  EMS was called and upon arrival his fingerstick was 41, he was given 1 amp of D50 with a repeat being 140.  Patient's wife also reports a similar occurrence yesterday where his fingerstick was in the 40s.  Admits to taking combined metformin/glipizide pill as well as Lantus.  Wife states that Lantus dose was increased from 10 units to 15 units 3 weeks ago.  Patient is currently reporting chest pain and back pain as well as headache.  Denies shortness of breath, palpitations, fever/chills, dysuria, leg pain or swelling, recent travel or illness or any other concerns  In main ED patient was given 1 amp of D50, fingersticks trending 150, 132, 97, 141, 155, EKG NSR.  Given chest pain troponin checked, initial troponin 42, repeat 33.  Patient sent to CDU for endocrine consults, had been awaiting second troponin results, cardiac monitoring. 89-year-old male with past medical history of prostate cancer, diabetes, hypertension presented to the ED with hypoglycemia.  Patient is unsure of what happened.  Per discussions with main ED team according to patient's wife she started to hear him gurgling, she turned on the lights and was unable to wake the patient.  EMS was called and upon arrival his fingerstick was 41, he was given 1 amp of D50 with a repeat being 140.  Patient's wife also reports a similar occurrence yesterday where his fingerstick was in the 40s.  Admits to taking combined metformin/glipizide pill as well as Lantus.  Wife states that Lantus dose was increased from 10 units to 15 units 3 weeks ago.  Patient is currently reporting chest pain and back pain as well as headache.  Denies shortness of breath, palpitations, fever/chills, dysuria, leg pain or swelling, recent travel or illness or any other concerns  In main ED patient was given 1 amp of D50, fingersticks trending 150, 132, 97, 141, 155, EKG NSR.  Given chest pain troponin checked, initial troponin 42, repeat 33.  Patient sent to CDU for endocrine consults, had been awaiting second troponin results, cardiac monitoring.  Confirmed home medications with patient's wife allopurinol 100 mg, glipizide/metformin 5–500 mg 2 tabs twice a day, Lantus 15 units at bedtime, meclizine, olmesartan/amlodipine/HCTZ 40 – 10 – 25 MGs

## 2024-12-30 NOTE — CONSULT NOTE ADULT - ASSESSMENT
89M with PMH of prostate CA, T2DM, HTn, HLD here for hypoglycemia sxs. In the ER, pt found to have cp with EKG w/ NSR and no significant ischemic changes. Pt was recommended to be admitted by Tele Doc Dr. Perea. Cardiology consulted for further evaluation inpt.  89M with PMH of prostate CA, T2DM, HTN, HLD, CKD, here for hypoglycemia sxs. In the ER, pt found to have cp with EKG w/ NSR and no significant ischemic changes. Pt was recommended to be admitted by Tele Doc Dr. Perea. Cardiology consulted for further evaluation inpt.     1. Cp, no sig EKG changes and neg trop   2. HTN   3. HLD   4. CKD     -cp possibly related to elevated, would rec resume home norvasc 10 mg qd   -add Imdur 60 mg QD and hydralazine 25 mg PRN for BP>140   -recommend inpt TTE and NST to r/o sig CAD, LV dysfunction or VHD   -admit to tele     Nitin Lee MD [facility]  Cardiology Attending, [facility]LOIS  Avaliable on [employer] Team

## 2024-12-30 NOTE — H&P ADULT - NSHPPHYSICALEXAM_GEN_ALL_CORE
Vital Signs Last 24 Hrs  T(C): 36.9 (30 Dec 2024 22:15), Max: 37.4 (30 Dec 2024 06:10)  T(F): 98.4 (30 Dec 2024 22:15), Max: 99.3 (30 Dec 2024 06:10)  HR: 64 (30 Dec 2024 22:15) (59 - 65)  BP: 174/87 (30 Dec 2024 22:15) (171/79 - 196/88)  BP(mean): --  RR: 16 (30 Dec 2024 22:15) (15 - 18)  SpO2: 97% (30 Dec 2024 22:15) (96% - 100%)    Parameters below as of 30 Dec 2024 22:15  Patient On (Oxygen Delivery Method): room air        PHYSICAL EXAM:  GENERAL: No Acute Distress  EYES: conjunctiva and sclera clear  ENMT: Moist mucous membranes   NECK: Supple  PULMONARY: Clear to auscultation bilaterally  CARDIAC: Regular rate and rhythm; No murmurs, rubs, or gallops  GASTROINTESTINAL: Abdomen soft, Nontender, Nondistended; Bowel sounds normal  EXTREMITIES:   No clubbing, cyanosis, or pedal edema  PSYCH: Normal Affect, Normal Behavior  NEUROLOGY:   - Mental status A&O x 3  SKIN: No rashes or lesions  MUSCULOSKELETAL: No joint swelling

## 2024-12-30 NOTE — ED PROVIDER NOTE - PHYSICAL EXAMINATION
DO Don, EM Attending: General: Well-appearing, NAD  Head: Atraumatic, normocephalic  Eyes: EOM grossly in tact, no scleral icterus  ENT: moist mucous membranes  Neurology: A&Ox 3 , nonfocal, BEEBE x 4  Respiratory: normal respiratory effort  CV: Extremities warm and well perfused  Abdominal: Soft, non-distended, non distended  Extremities: No edema  Skin: No rashes

## 2024-12-30 NOTE — CONSULT NOTE ADULT - ASSESSMENT
89-year-old male with a past medical history of prostate cancer, diabetes, hypertension, presenting due to concern of hypoglycemia at home.  The patient states he is tired and was not sure what happened.  According to his wife who was in bed with him she started hearing him gurgling at home.  She turned on the lights and tried to wake him up, the patient was unresponsive.  They called EMS who came and checked a fingerstick which was 41.  He was given 1 amp of D50 with a repeat being 140.  Patient also tolerated p.o.  According to wife he had a similar occurrence yesterday where his fingerstick was in the 40s.  Patient takes combined metformin and glipizide as well as lantus  Denies fever, chills, chest pain, trouble breathing, dysuria.  According to patient he has decreased appetite recently compared to the past.  endocrine called for further assistance  pt reports long hx of DM   he states lantus was increased from 12--> 15 units  checks FSBG in the am and afternoon  reports multiple lows in 30-40s     a1c 7.0          While inpatient  BG target 100-180 mg/dl,  FSBG at this time premeals and bedtime and 3am   if glucose over 200X2 than start   - Admelog  Low dose Correction Scale Premeal  - Hypoglycemia protocol in place   - Carb Consistent Diet   - Nutrition consult       check cpeptide with am labs     dc plan  TBD  stop glipizide given profound hypoglycemia   likely would put lantus in the am and low dose to avoid am hypoglycemia +metformin vs two orals based on cpeptide result    pt reporting decreased appetite - avoid glp-1 for now  willl need glucose tabs and glucoagon if dc on insulin with review of how to use it with wife and pt               2. HTN  goal BP in DM <130//80 for age  outpt mc/cr ratio          3. HLD  outpt lipid panel    pt reporting chest pain at exam which is new for him- d/w CDU for further eval       d/w KATERIN Browne re: plan of care       Lexy Maldonado MD  Attending Physician   Department of Endocrinology, Diabetes and Metabolism     weekdays: 9am to 5pm: [facility] or [facility] and or TEAMS     Nights and weekends: [phone]  Please note that this patient may be followed by a different provider tomorrow.   If no answer or after hours, please contact [phone].  For final dc reccomendations, please call [phone] or page the endocrine fellow on call. 89-year-old male with a past medical history of prostate cancer, diabetes, hypertension, presenting due to concern of hypoglycemia at home.  The patient states he is tired and was not sure what happened.  According to his wife who was in bed with him she started hearing him gurgling at home.  She turned on the lights and tried to wake him up, the patient was unresponsive.  They called EMS who came and checked a fingerstick which was 41.  He was given 1 amp of D50 with a repeat being 140.  Patient also tolerated p.o.  According to wife he had a similar occurrence yesterday where his fingerstick was in the 40s.  Patient takes combined metformin and glipizide as well as lantus  Denies fever, chills, chest pain, trouble breathing, dysuria.  According to patient he has decreased appetite recently compared to the past.  endocrine called for further assistance  pt reports long hx of DM   he states lantus was increased from 12--> 15 units  checks FSBG in the am and afternoon  reports multiple lows in 30-40s     a1c 7.0          While inpatient  BG target 100-200mg/dl,  FSBG at this time premeals and bedtime and 3am   if glucose over 200X2 than start   - Admelog  Low dose Correction Scale Premeal  - Hypoglycemia protocol in place   - Carb Consistent Diet   - Nutrition consult       check cpeptide with am labs     dc plan  TBD  no need for aggressive control in this 89 year old male pt   avoid hypoglycemia is the goal while at the same time avoid marked hyperglycemia, aim for 100-200 for age   stop glipizide given profound hypoglycemia   likely would put lantus in the am and low dose to avoid am hypoglycemia +metformin vs two orals based on cpeptide result    pt reporting decreased appetite - avoid glp-1 for now  willl need glucose tabs and glucoagon if dc on insulin with review of how to use it with wife and pt               2. HTN  goal BP in DM <130//80 for age  outpt mc/cr ratio          3. HLD  outpt lipid panel    pt reporting chest pain at exam which is new for him- d/w CDU for further eval       d/w KATERIN Browne re: plan of care       Lexy Maldonado MD  Attending Physician   Department of Endocrinology, Diabetes and Metabolism     weekdays: 9am to 5pm: [facility] or [facility] and or TEAMS     Nights and weekends: [phone]  Please note that this patient may be followed by a different provider tomorrow.   If no answer or after hours, please contact [phone].  For final dc reccomendations, please call [phone] or page the endocrine fellow on call.

## 2024-12-30 NOTE — H&P ADULT - PROBLEM SELECTOR PLAN 3
- continue olmesartan/amlodipine/hct  - continue aldactone - continue olmesartan/amlodipine/hct  - continue aldactone  - continue nebivolol  - reviewed outpatient nephrology notes

## 2024-12-30 NOTE — ED ADULT TRIAGE NOTE - CHIEF COMPLAINT QUOTE
alert oriented was making strange  noises was unresponsive FS was 46 1 amp D50W given by EMS repeat  also had a piece of sandwich  has no complaints but wife states he seems incoherent  blood sugar was in 40s yesterday also hx DM2 HTN prostate Ca alert oriented was making strange  noises was unresponsive FS was 46 1 amp D50W given by EMS repeat  ( 126 in triage) also had a piece of sandwich  has no complaints but wife states he seems incoherent  blood sugar was in 40s yesterday also hx DM2 HTN prostate Ca

## 2024-12-30 NOTE — ED ADULT NURSE NOTE - CHIEF COMPLAINT QUOTE
alert oriented was making strange  noises was unresponsive FS was 46 1 amp D50W given by EMS repeat  ( 126 in triage) also had a piece of sandwich  has no complaints but wife states he seems incoherent  blood sugar was in 40s yesterday also hx DM2 HTN prostate Ca

## 2024-12-30 NOTE — ED PROVIDER NOTE - NSICDXPASTMEDICALHX_GEN_ALL_CORE_FT
PAST MEDICAL HISTORY:  DM2 (diabetes mellitus, type 2)     HTN (hypertension)     Prostate cancer

## 2024-12-30 NOTE — H&P ADULT - HISTORY OF PRESENT ILLNESS
89 y/o M with pmh of prostate cancer, DM type 2, HTN, p/w hypoglycemia.  Pt reports his insulin glargine  dose was increased from 12 to 15 units recently.  Over the past few days, he reports getting low reading in the morning, but is able to alert his wife who gives him food to bring it  up.  Yesterday AM, pt's wife noticed he was confused and making gurgling sounds while in bed.  She checked FS which was 41.  EMS was called, and pt give D50 en route to ED.  Pt also reports intermittent L sided chest pain for the past several days.  Pain is positional, and improves when pt wears a support belt.  Pain is difficult for patient to describe quality.  No associated dyspnea, dizziness nausea or diaphoresis.  No recent fever or cough.      Pt was evaluated in the ED, and placed in the CDU for monitoring.  He is admitted for further w/u of chest pain and monitoring of glucose.

## 2024-12-30 NOTE — ED PROVIDER NOTE - CLINICAL SUMMARY MEDICAL DECISION MAKING FREE TEXT BOX
DO Don, EM Attendin-year-old male with a past medical history of prostate cancer, diabetes, hypertension, presenting due to concern of hypoglycemia at home.  Differential diagnosis includes but is not limited to Medication side effect, viral infection, decreased p.o. intake, kidney dysfunction. Patient will require every hour fingersticks until he is had 2 normal fingersticks, will then require acute 2-hour fingerstick if normal could proceed to every 4 and admission on the floor for 24 hours monitoring.  Patient may require an endocrinology consult to alter his medications given that he has had multiple episodes of hypoglycemia. will send vbg, cmp, viral swab, get cxr/ua. will require observation vs. admission

## 2024-12-30 NOTE — ED CDU PROVIDER DISPOSITION NOTE - ATTENDING CONTRIBUTION TO CARE
Patient s/o to me from dr. solares, give course as above decision made with patient to admit for further treatment and care. stable at time of admission.

## 2024-12-30 NOTE — ED CDU PROVIDER INITIAL DAY NOTE - PROGRESS NOTE DETAILS
Pt continues to have chest pain, trop 42 with repeat 33, EKG unchanged. Spoke with tele doc who accepts pt for admission. Pt aware of plan for admission Pt continues to have chest pain, trop 42 with repeat 33, EKG unchanged. Seen by endocrine attending, discussed possible admission, spoke with ED attending, planned for admission given hypoglycemia and now persistent chest pain with delta trop. Spoke with tele doc who accepts pt for admission. Pt aware of plan for admission

## 2024-12-30 NOTE — ED PROVIDER NOTE - ATTENDING CONTRIBUTION TO CARE
Don FRANCISCO: Please see the HPI, ROS, PE and MDM as authored by me. I personally made the management plan, and take responsibility for the patient's management.

## 2024-12-30 NOTE — H&P ADULT - PROBLEM SELECTOR PLAN 1
- reviewed endocrinology consult  - holding metformin/glipizide  - lantus held for now  - will f/u with endocrinology regarding outpatient therapy recs (lantus vs oral agent)

## 2024-12-30 NOTE — ED PROVIDER NOTE - OBJECTIVE STATEMENT
DO Don, EM Attendin-year-old male with a past medical history of prostate cancer, diabetes, hypertension, presenting due to concern of hypoglycemia at home.  The patient states he is tired and was not sure what happened.  According to his wife who was in bed with him she started hearing him gurgling at home.  She turned on the lights and tried to wake him up, the patient was unresponsive.  They called EMS who came and checked a fingerstick which was 41.  He was given 1 amp of D50 with a repeat being 140.  Patient also tolerated p.o.  According to wife he had a similar occurrence yesterday where his fingerstick was in the 40s.  Patient takes combined metformin and glipizide.  Denies fever, chills, chest pain, trouble breathing, dysuria.  According to patient he has decreased appetite recently compared to the past.

## 2024-12-30 NOTE — ED PROVIDER NOTE - PROGRESS NOTE DETAILS
Tania Rod MD (PGY-3 EM): discussed w. patient need for CDU 2/2 hypoglycemia and on sulfourea. patient agreeable to plan.

## 2024-12-30 NOTE — H&P ADULT - NSHPREVIEWOFSYSTEMS_GEN_ALL_CORE
Review of Systems:   CONSTITUTIONAL: No fever or chills  EYES: No eye pain, visual disturbances, or discharge  ENMT:  No difficulty hearing, no throat pain  NECK: No pain or stiffness  RESPIRATORY: No cough, No shortness of breath  CARDIOVASCULAR: chest pain, no palpitations, dizziness, or leg swelling  GASTROINTESTINAL: No abdominal pain, nausea, vomiting or diarrhea  GENITOURINARY: No dysuria, or hematuria  NEUROLOGICAL: No headaches, weakness, or numbness  SKIN: No rashes, or lesions   LYMPH NODES: No enlarged glands  ENDOCRINE: No heat or cold intolerance  MUSCULOSKELETAL: No joint pain or swelling  PSYCHIATRIC: No depression or anxiety  HEME/LYMPH: No easy bruising, or bleeding  ALLERGY AND IMMUNOLOGIC: No hives or eczema

## 2024-12-30 NOTE — ED CDU PROVIDER DISPOSITION NOTE - CLINICAL COURSE
Pt continues to have chest pain, trop 42 with repeat 33, EKG unchanged. Spoke with tele doc who accepts pt for admission. Pt aware of plan for admission 89-year-old male with past medical history of prostate cancer, diabetes, hypertension presented to the ED with hypoglycemia.  Patient is unsure of what happened.  Per discussions with main ED team according to patient's wife she started to hear him gurgling, she turned on the lights and was unable to wake the patient.  EMS was called and upon arrival his fingerstick was 41, he was given 1 amp of D50 with a repeat being 140.  Patient's wife also reports a similar occurrence yesterday where his fingerstick was in the 40s.  Admits to taking combined metformin/glipizide pill as well as Lantus.  Wife states that Lantus dose was increased from 10 units to 15 units 3 weeks ago.  Patient is currently reporting chest pain and back pain as well as headache.  Denies shortness of breath, palpitations, fever/chills, dysuria, leg pain or swelling, recent travel or illness or any other concerns  In main ED patient was given 1 amp of D50, fingersticks trending 150, 132, 97, 141, 155, EKG NSR.  Given chest pain troponin checked, initial troponin 42, repeat 33.  Patient sent to CDU for endocrine consults, had been awaiting second troponin results, cardiac monitoring.  Pt continues to have chest pain, trop 42 with repeat 33, EKG unchanged. Endocrine attending saw pt, discussed admission, spoke with ED attending, plan for admission. Spoke with tele doc who accepts pt for admission. Pt aware of plan for admission

## 2024-12-31 ENCOUNTER — RESULT REVIEW (OUTPATIENT)
Age: 89
End: 2024-12-31

## 2024-12-31 DIAGNOSIS — R07.9 CHEST PAIN, UNSPECIFIED: ICD-10-CM

## 2024-12-31 DIAGNOSIS — E11.649 TYPE 2 DIABETES MELLITUS WITH HYPOGLYCEMIA WITHOUT COMA: ICD-10-CM

## 2024-12-31 DIAGNOSIS — I10 ESSENTIAL (PRIMARY) HYPERTENSION: ICD-10-CM

## 2024-12-31 DIAGNOSIS — C61 MALIGNANT NEOPLASM OF PROSTATE: ICD-10-CM

## 2024-12-31 LAB
ANION GAP SERPL CALC-SCNC: 16 MMOL/L — HIGH (ref 7–14)
BUN SERPL-MCNC: 30 MG/DL — HIGH (ref 7–23)
C PEPTIDE SERPL-MCNC: 3 NG/ML — SIGNIFICANT CHANGE UP (ref 1.1–4.4)
CALCIUM SERPL-MCNC: 9.7 MG/DL — SIGNIFICANT CHANGE UP (ref 8.4–10.5)
CHLORIDE SERPL-SCNC: 101 MMOL/L — SIGNIFICANT CHANGE UP (ref 98–107)
CO2 SERPL-SCNC: 20 MMOL/L — LOW (ref 22–31)
CREAT SERPL-MCNC: 1.52 MG/DL — HIGH (ref 0.5–1.3)
EGFR: 44 ML/MIN/1.73M2 — LOW
GLUCOSE BLDC GLUCOMTR-MCNC: 182 MG/DL — HIGH (ref 70–99)
GLUCOSE BLDC GLUCOMTR-MCNC: 214 MG/DL — HIGH (ref 70–99)
GLUCOSE BLDC GLUCOMTR-MCNC: 277 MG/DL — HIGH (ref 70–99)
GLUCOSE BLDC GLUCOMTR-MCNC: 296 MG/DL — HIGH (ref 70–99)
GLUCOSE BLDC GLUCOMTR-MCNC: 342 MG/DL — HIGH (ref 70–99)
GLUCOSE SERPL-MCNC: 170 MG/DL — HIGH (ref 70–99)
HCT VFR BLD CALC: 32.3 % — LOW (ref 39–50)
HGB BLD-MCNC: 10.3 G/DL — LOW (ref 13–17)
MAGNESIUM SERPL-MCNC: 1.9 MG/DL — SIGNIFICANT CHANGE UP (ref 1.6–2.6)
MCHC RBC-ENTMCNC: 22.8 PG — LOW (ref 27–34)
MCHC RBC-ENTMCNC: 31.9 G/DL — LOW (ref 32–36)
MCV RBC AUTO: 71.6 FL — LOW (ref 80–100)
NRBC # BLD: 0 /100 WBCS — SIGNIFICANT CHANGE UP (ref 0–0)
NRBC # FLD: 0 K/UL — SIGNIFICANT CHANGE UP (ref 0–0)
PHOSPHATE SERPL-MCNC: 2.9 MG/DL — SIGNIFICANT CHANGE UP (ref 2.5–4.5)
PLATELET # BLD AUTO: 271 K/UL — SIGNIFICANT CHANGE UP (ref 150–400)
POTASSIUM SERPL-MCNC: 4.7 MMOL/L — SIGNIFICANT CHANGE UP (ref 3.5–5.3)
POTASSIUM SERPL-SCNC: 4.7 MMOL/L — SIGNIFICANT CHANGE UP (ref 3.5–5.3)
RBC # BLD: 4.51 M/UL — SIGNIFICANT CHANGE UP (ref 4.2–5.8)
RBC # FLD: 16.1 % — HIGH (ref 10.3–14.5)
SODIUM SERPL-SCNC: 137 MMOL/L — SIGNIFICANT CHANGE UP (ref 135–145)
WBC # BLD: 9.87 K/UL — SIGNIFICANT CHANGE UP (ref 3.8–10.5)
WBC # FLD AUTO: 9.87 K/UL — SIGNIFICANT CHANGE UP (ref 3.8–10.5)

## 2024-12-31 PROCEDURE — 99232 SBSQ HOSP IP/OBS MODERATE 35: CPT

## 2024-12-31 PROCEDURE — 93018 CV STRESS TEST I&R ONLY: CPT | Mod: GC

## 2024-12-31 PROCEDURE — 78451 HT MUSCLE IMAGE SPECT SING: CPT | Mod: 26

## 2024-12-31 PROCEDURE — 93016 CV STRESS TEST SUPVJ ONLY: CPT | Mod: GC

## 2024-12-31 PROCEDURE — 93306 TTE W/DOPPLER COMPLETE: CPT | Mod: 26

## 2024-12-31 PROCEDURE — 99233 SBSQ HOSP IP/OBS HIGH 50: CPT

## 2024-12-31 RX ORDER — ISOSORBIDE MONONITRATE 120 MG
60 TABLET, EXTENDED RELEASE 24 HR ORAL DAILY
Refills: 0 | Status: DISCONTINUED | OUTPATIENT
Start: 2025-01-01 | End: 2025-01-17

## 2024-12-31 RX ORDER — ALLOPURINOL 100 MG/1
100 TABLET ORAL DAILY
Refills: 0 | Status: DISCONTINUED | OUTPATIENT
Start: 2024-12-31 | End: 2025-01-17

## 2024-12-31 RX ORDER — ALLOPURINOL 100 MG/1
1 TABLET ORAL
Refills: 0 | DISCHARGE

## 2024-12-31 RX ORDER — NEBIVOLOL 5 MG/1
5 TABLET ORAL DAILY
Refills: 0 | Status: DISCONTINUED | OUTPATIENT
Start: 2024-12-31 | End: 2025-01-06

## 2024-12-31 RX ORDER — LOSARTAN POTASSIUM 100 MG/1
100 TABLET, FILM COATED ORAL DAILY
Refills: 0 | Status: DISCONTINUED | OUTPATIENT
Start: 2024-12-31 | End: 2025-01-09

## 2024-12-31 RX ORDER — INSULIN LISPRO 100/ML
VIAL (ML) SUBCUTANEOUS
Refills: 0 | Status: DISCONTINUED | OUTPATIENT
Start: 2024-12-31 | End: 2025-01-17

## 2024-12-31 RX ORDER — ASPIRIN 81 MG
81 TABLET, DELAYED RELEASE (ENTERIC COATED) ORAL DAILY
Refills: 0 | Status: DISCONTINUED | OUTPATIENT
Start: 2024-12-31 | End: 2025-01-17

## 2024-12-31 RX ORDER — ATORVASTATIN CALCIUM 40 MG/1
40 TABLET, FILM COATED ORAL AT BEDTIME
Refills: 0 | Status: DISCONTINUED | OUTPATIENT
Start: 2024-12-31 | End: 2025-01-17

## 2024-12-31 RX ORDER — INSULIN LISPRO 100/ML
2 VIAL (ML) SUBCUTANEOUS
Refills: 0 | Status: DISCONTINUED | OUTPATIENT
Start: 2024-12-31 | End: 2025-01-02

## 2024-12-31 RX ORDER — OLANZAPINE 15 MG/1
2.5 TABLET ORAL ONCE
Refills: 0 | Status: COMPLETED | OUTPATIENT
Start: 2024-12-31 | End: 2024-12-31

## 2024-12-31 RX ORDER — ISOSORBIDE MONONITRATE 120 MG
60 TABLET, EXTENDED RELEASE 24 HR ORAL ONCE
Refills: 0 | Status: COMPLETED | OUTPATIENT
Start: 2024-12-31 | End: 2024-12-31

## 2024-12-31 RX ORDER — HEPARIN SODIUM 1000 [USP'U]/ML
5000 INJECTION, SOLUTION INTRAVENOUS; SUBCUTANEOUS EVERY 8 HOURS
Refills: 0 | Status: DISCONTINUED | OUTPATIENT
Start: 2024-12-31 | End: 2025-01-17

## 2024-12-31 RX ORDER — INSULIN GLARGINE-YFGN 100 [IU]/ML
8 INJECTION, SOLUTION SUBCUTANEOUS AT BEDTIME
Refills: 0 | Status: DISCONTINUED | OUTPATIENT
Start: 2024-12-31 | End: 2025-01-02

## 2024-12-31 RX ORDER — GINKGO BILOBA 40 MG
3 CAPSULE ORAL AT BEDTIME
Refills: 0 | Status: DISCONTINUED | OUTPATIENT
Start: 2024-12-31 | End: 2025-01-17

## 2024-12-31 RX ORDER — SPIRONOLACTONE 50 MG/1
25 TABLET ORAL DAILY
Refills: 0 | Status: DISCONTINUED | OUTPATIENT
Start: 2024-12-31 | End: 2025-01-09

## 2024-12-31 RX ADMIN — Medication 10 MILLIGRAM(S): at 05:20

## 2024-12-31 RX ADMIN — LOSARTAN POTASSIUM 100 MILLIGRAM(S): 100 TABLET, FILM COATED ORAL at 13:06

## 2024-12-31 RX ADMIN — Medication 60 MILLIGRAM(S): at 17:38

## 2024-12-31 RX ADMIN — INSULIN GLARGINE-YFGN 8 UNIT(S): 100 INJECTION, SOLUTION SUBCUTANEOUS at 23:47

## 2024-12-31 RX ADMIN — HEPARIN SODIUM 5000 UNIT(S): 1000 INJECTION, SOLUTION INTRAVENOUS; SUBCUTANEOUS at 23:47

## 2024-12-31 RX ADMIN — HEPARIN SODIUM 5000 UNIT(S): 1000 INJECTION, SOLUTION INTRAVENOUS; SUBCUTANEOUS at 05:19

## 2024-12-31 RX ADMIN — Medication 81 MILLIGRAM(S): at 17:17

## 2024-12-31 RX ADMIN — NEBIVOLOL 5 MILLIGRAM(S): 5 TABLET ORAL at 06:22

## 2024-12-31 RX ADMIN — OLANZAPINE 2.5 MILLIGRAM(S): 15 TABLET ORAL at 22:48

## 2024-12-31 RX ADMIN — Medication 2 UNIT(S): at 16:58

## 2024-12-31 RX ADMIN — ALLOPURINOL 100 MILLIGRAM(S): 100 TABLET ORAL at 13:06

## 2024-12-31 RX ADMIN — HEPARIN SODIUM 5000 UNIT(S): 1000 INJECTION, SOLUTION INTRAVENOUS; SUBCUTANEOUS at 13:07

## 2024-12-31 RX ADMIN — SPIRONOLACTONE 25 MILLIGRAM(S): 50 TABLET ORAL at 05:20

## 2024-12-31 RX ADMIN — Medication 3: at 16:58

## 2024-12-31 NOTE — PROGRESS NOTE ADULT - SUBJECTIVE AND OBJECTIVE BOX
Chief Complaint: hypoglcemia    Interval Events : Pt seen and examined at bedside. Pt states he lives with his wife and was able to inject his insulin at home. Was having many low blood sugars in the morning. Feeling better now.     MEDICATIONS  (STANDING):  allopurinol 100 milliGRAM(s) Oral daily  amLODIPine   Tablet 10 milliGRAM(s) Oral daily  heparin   Injectable 5000 Unit(s) SubCutaneous every 8 hours  hydrochlorothiazide 25 milliGRAM(s) Oral daily  insulin glargine Injectable (LANTUS) 8 Unit(s) SubCutaneous at bedtime  insulin lispro (ADMELOG) corrective regimen sliding scale   SubCutaneous three times a day before meals  insulin lispro Injectable (ADMELOG) 2 Unit(s) SubCutaneous three times a day before meals  losartan 100 milliGRAM(s) Oral daily  nebivolol 5 milliGRAM(s) Oral daily  spironolactone 25 milliGRAM(s) Oral daily      MEDICATIONS  (PRN):      Allergies    No Known Allergies    Intolerances      Review of Systems:  Eyes: No blurry vision  Cardiovascular: No chest pain, palpitations  Respiratory: No SOB, no cough  GI: No nausea, vomiting, abdominal pain  : No dysuria    ALL OTHER SYSTEMS REVIEWED AND NEGATIVE        VITALS: T(C): 36.9 (12-31-24 @ 05:15)  T(F): 98.5 (12-31-24 @ 05:15), Max: 98.5 (12-30-24 @ 18:20)  HR: 75 (12-31-24 @ 07:22) (60 - 75)  BP: 145/75 (12-31-24 @ 07:22) (145/75 - 196/88)  RR:  (16 - 18)  SpO2:  (97% - 100%)  Wt(kg): --      Physical Exam:   GENERAL: NAD, well-developed  EYES: No proptosis  HEENT:  Atraumatic, Normocephalic  RESPIRATORY: non labored breathing     CAPILLARY BLOOD GLUCOSE    POCT Blood Glucose.: 214 mg/dL (31 Dec 2024 11:29)  POCT Blood Glucose.: 182 mg/dL (31 Dec 2024 06:52)  POCT Blood Glucose.: 152 mg/dL (30 Dec 2024 22:29)  POCT Blood Glucose.: 155 mg/dL (30 Dec 2024 16:12)      12-31    137  |  101  |  30[H]  ----------------------------<  170[H]  4.7   |  20[L]  |  1.52[H]    eGFR: 44[L]    Ca    9.7      12-31  Mg     1.90     12-31  Phos  2.9     12-31    TPro  7.7  /  Alb  4.2  /  TBili  <0.2  /  DBili  x   /  AST  25  /  ALT  9   /  AlkPhos  170[H]  12-30      A1C with Estimated Average Glucose Result: 7.0 % (12-30-24 @ 09:36)      Thyroid Function Tests:

## 2024-12-31 NOTE — PROGRESS NOTE ADULT - ASSESSMENT
89M with PMH of prostate CA, T2DM, HTN, HLD, CKD, here for hypoglycemia sxs. In the ER, pt found to have cp with EKG w/ NSR and no significant ischemic changes. Pt was recommended to be admitted by Tele Doc Dr. Perea. Cardiology consulted for further evaluation inpt.     1. Cp, +NST and TTE reviewed   2. HTN   3. HLD   4. CKD     -given +NST, plan for LHC on Thurs   -bp control, cont norvasc, imduri, losartan, and Hctz     Nitin Lee MD [facility]  Cardiology Attending, [facility]/LOIS  Avaliable on [employer] Team

## 2024-12-31 NOTE — CONSULT NOTE ADULT - ASSESSMENT
88 y/o M with pmh of prostate cancer, DM type 2, HTN, p/w hypoglycemia.  Pt reports his insulin glargine  dose was increased from 12 to 15 units recently.  Over the past few days, he reports getting low reading in the morning, but is able to alert his wife who gives him food to bring it  up.  Yesterday AM, pt's wife noticed he was confused and making gurgling sounds while in bed.  She checked FS which was 41.  EMS was called, and pt give D50 en route to ED.  Pt also reports intermittent L sided chest pain for the past several days.  Pain is positional, and improves when pt wears a support belt.  Pain is difficult for patient to describe quality.  No associated dyspnea, dizziness nausea or diaphoresis.  No recent fever or cough.    Pt was evaluated in the ED, and placed in the CDU for monitoring.  He is admitted for further w/u of chest pain and monitoring of glucose.    Planned for Cardiac cath om Thursday.

## 2024-12-31 NOTE — PROGRESS NOTE ADULT - SUBJECTIVE AND OBJECTIVE BOX
Cardiology Attending Follow-up Note     Patient seen and examined at bedside.    Overnight Events:     no significant complaints   s/p NST     REVIEW OF SYSTEMS:  Constitutional:     [x ] negative [ ] fevers [ ] chills [ ] weight loss [ ] weight gain  HEENT:                  [x ] negative [ ] dry eyes [ ] eye irritation [ ] postnasal drip [ ] nasal congestion  CV:                         [ x] negative  [ ] chest pain [ ] orthopnea [ ] palpitations [ ] murmur  Resp:                     [ x] negative [ ] cough [ ] shortness of breath [ ] dyspnea [ ] wheezing [ ] sputum [ ]hemoptysis  GI:                          [ x] negative [ ] nausea [ ] vomiting [ ] diarrhea [ ] constipation [ ] abd pain [ ] dysphagia   :                        [ x] negative [ ] dysuria [ ] nocturia [ ] hematuria [ ] increased urinary frequency  Musculoskeletal: [ x] negative [ ] back pain [ ] myalgias [ ] arthralgias [ ] fracture  Skin:                       [ x] negative [ ] rash [ ] itch  Neurological:        [x ] negative [ ] headache [ ] dizziness [ ] syncope [ ] weakness [ ] numbness  Psychiatric:           [ x] negative [ ] anxiety [ ] depression  Endocrine:            [ x] negative [ ] diabetes [ ] thyroid problem  Heme/Lymph:      [ x] negative [ ] anemia [ ] bleeding problem  Allergic/Immune: [ x] negative [ ] itchy eyes [ ] nasal discharge [ ] hives [ ] angioedema    [ x] All other systems negative  [ ] Unable to assess ROS due to    Current Meds:  allopurinol 100 milliGRAM(s) Oral daily  amLODIPine   Tablet 10 milliGRAM(s) Oral daily  aspirin enteric coated 81 milliGRAM(s) Oral daily  atorvastatin 40 milliGRAM(s) Oral at bedtime  heparin   Injectable 5000 Unit(s) SubCutaneous every 8 hours  hydrochlorothiazide 25 milliGRAM(s) Oral daily  insulin glargine Injectable (LANTUS) 8 Unit(s) SubCutaneous at bedtime  insulin lispro (ADMELOG) corrective regimen sliding scale   SubCutaneous three times a day before meals  insulin lispro Injectable (ADMELOG) 2 Unit(s) SubCutaneous three times a day before meals  losartan 100 milliGRAM(s) Oral daily  nebivolol 5 milliGRAM(s) Oral daily  spironolactone 25 milliGRAM(s) Oral daily      PAST MEDICAL & SURGICAL HISTORY:  HTN (hypertension)      DM2 (diabetes mellitus, type 2)      Prostate cancer      No significant past surgical history          Vitals:  T(F): 98.2 (12-31), Max: 98.5 (12-31)  HR: 65 (12-31) (57 - 75)  BP: 157/60 (12-31) (145/72 - 180/90)  RR: 18 (12-31)  SpO2: 98% (12-31)  I&O's Summary      Physical Exam:  Appearance: No acute distress  HENT: No JVD   Cardiovascular: RRR, S1/S2, no murmurs  Respiratory: CTABL  Gastrointestinal: soft, NT ND, +BS  Musculoskeletal: No clubbing, no edema   Neurologic: Non-focal  Skin: No rashes, ecchymoses, or cyanosis                          10.3   9.87  )-----------( 271      ( 31 Dec 2024 05:30 )             32.3     12-31    137  |  101  |  30[H]  ----------------------------<  170[H]  4.7   |  20[L]  |  1.52[H]    Ca    9.7      31 Dec 2024 05:30  Phos  2.9     12-31  Mg     1.90     12-31    TPro  7.7  /  Alb  4.2  /  TBili  <0.2  /  DBili  x   /  AST  25  /  ALT  9   /  AlkPhos  170[H]  12-30                  Cardiovascular Testings:        CONCLUSIONS:      1. Left ventricular systolic function is normal with an ejection fraction visually estimated at 50 to 55 %.   2. There is mild (grade 1) left ventricular diastolic dysfunction.   3. Left atrium is normalin size.   4. The right atrium is normal in size.   5. Normal right ventricular cavity size, with normal wall thickness, and normal right ventricular systolic function.   6. Trileaflet aortic valve with normal systolic excursion. There is calcification of the aortic valve leaflets.   7. Moderate to severe pulmonic regurgitation.   8. No pericardial effusion seen.    Conclusions:   1. Qualitative Perfusion:      - medium-sized, moderate to severe defect(s) in the inferior and inferolateral wall suggestive of CAD.   2. The left ventricle is mildly decreased in function and normal in size. The time activity curve suggests diastolic dysfunction.. The post stress left ventricular EF is 45 %. The stress end diastolic volume is 71 ml and systolic volume is 39 ml.   3. Hypokinesis of the inferoseptal wall.   4. The RV was not well visualized.

## 2024-12-31 NOTE — PROGRESS NOTE ADULT - ASSESSMENT
89-year-old male with a past medical history of prostate cancer, diabetes, hypertension, presenting due to concern of hypoglycemia at home.  The patient states he is tired and was not sure what happened.  According to his wife who was in bed with him she started hearing him gurgling at home.  She turned on the lights and tried to wake him up, the patient was unresponsive.  They called EMS who came and checked a fingerstick which was 41.  He was given 1 amp of D50 with a repeat being 140.  Patient also tolerated p.o.  According to wife he had a similar occurrence yesterday where his fingerstick was in the 40s.  Patient takes combined metformin and glipizide as well as lantus  Denies fever, chills, chest pain, trouble breathing, dysuria.  According to patient he has decreased appetite recently compared to the past.  endocrine called for further assistance  pt reports long hx of DM   he states lantus was increased from 12--> 15 units  checks FSBG in the am and afternoon  reports multiple lows in 30-40s   eGFR: 44    #Poorly controlled T2DM with hyperglycemia  - HbA1c: 7.0  - Home Regimen: combined metformin and glipizide as well as lantus 12-15 units  - Endocrinologist: unknown    INPATIENT PLAN:   - Goal -180 mg/dl: FS trending upwards  - Start Lantus 8 units SQ at bedtime  - Start Admelog 2 units SQ TID AC, hold if NPO or not eating a meal.   - start low dose Admelog correction scale at mealtimes  - Fingerstick BG before meals, bedtime and 3am  - Carbohydrate consistent diet  - RD consult  - Hold oral DM agents while inpatient  - check c-peptide with am labs - test added on to today's labs.     DISCHARGE PLAN:   - TBD  - no need for aggressive control in an 89 year old male pt   - avoiding hypoglycemia is the goal while at the same time avoiding marked hyperglycemia, aim for 100-200 for age   - stop glipizide given profound hypoglycemia   - likely would put Lantus in the am and low dose to avoid am hypoglycemia +metformin vs two orals based on cpeptide result    - pt reporting decreased appetite - avoid glp-1 for now  - will need glucose tabs and glucagon if dc on insulin with review of how to use it with wife and pt   - Patient to call doctor with persistent high or low BG at home.   - Ensure patient has glucometer, test strips and lancets on discharge.  - Recommend routine outpatient ophthalmology and podiatry follow up.     #HTN  - Outpatient goal BP <130//80 for age  - Can check urine albumin/creatinine outpatient  - Management per primary team.    #HLD  - LDL goal < 70      SIRENA Garcia-BC  Nurse Practitioner  Division of Endocrinology  Contact on TEAMS    If out of hospital/unavailable when paged, please note: patient will be cared for by another provider on the endocrine service.  For urgent concerns: call the endocrine answering service for assistance to reach covering provider ([phone]). For non-urgent matters: please email [email or web address] for assistance.

## 2024-12-31 NOTE — PATIENT PROFILE ADULT - NSPROMEDSBROUGHTTOHOSP_GEN_A_NUR
DC instructions and f/u care reviewed w/pt.   
Dr. Alatorre at bedside to update on POC.   
Per Blood bank pt had rhogam last on 5/12/24 and it is good for 3 months or 30 ml of fetal whole blood loss. Dr. Alatorre updated and instructed to DC order.   
no

## 2025-01-01 LAB
ANION GAP SERPL CALC-SCNC: 16 MMOL/L — HIGH (ref 7–14)
BUN SERPL-MCNC: 44 MG/DL — HIGH (ref 7–23)
CALCIUM SERPL-MCNC: 9.4 MG/DL — SIGNIFICANT CHANGE UP (ref 8.4–10.5)
CHLORIDE SERPL-SCNC: 101 MMOL/L — SIGNIFICANT CHANGE UP (ref 98–107)
CHOLEST SERPL-MCNC: 130 MG/DL — SIGNIFICANT CHANGE UP
CO2 SERPL-SCNC: 18 MMOL/L — LOW (ref 22–31)
CREAT SERPL-MCNC: 1.83 MG/DL — HIGH (ref 0.5–1.3)
EGFR: 35 ML/MIN/1.73M2 — LOW
GLUCOSE BLDC GLUCOMTR-MCNC: 214 MG/DL — HIGH (ref 70–99)
GLUCOSE BLDC GLUCOMTR-MCNC: 224 MG/DL — HIGH (ref 70–99)
GLUCOSE BLDC GLUCOMTR-MCNC: 241 MG/DL — HIGH (ref 70–99)
GLUCOSE BLDC GLUCOMTR-MCNC: 354 MG/DL — HIGH (ref 70–99)
GLUCOSE SERPL-MCNC: 210 MG/DL — HIGH (ref 70–99)
HCT VFR BLD CALC: 32.8 % — LOW (ref 39–50)
HDLC SERPL-MCNC: 36 MG/DL — LOW
HGB BLD-MCNC: 10.5 G/DL — LOW (ref 13–17)
LIPID PNL WITH DIRECT LDL SERPL: 67 MG/DL — SIGNIFICANT CHANGE UP
MAGNESIUM SERPL-MCNC: 2.1 MG/DL — SIGNIFICANT CHANGE UP (ref 1.6–2.6)
MCHC RBC-ENTMCNC: 22.5 PG — LOW (ref 27–34)
MCHC RBC-ENTMCNC: 32 G/DL — SIGNIFICANT CHANGE UP (ref 32–36)
MCV RBC AUTO: 70.2 FL — LOW (ref 80–100)
NON HDL CHOLESTEROL: 94 MG/DL — SIGNIFICANT CHANGE UP
NRBC # BLD: 0 /100 WBCS — SIGNIFICANT CHANGE UP (ref 0–0)
NRBC # FLD: 0 K/UL — SIGNIFICANT CHANGE UP (ref 0–0)
PHOSPHATE SERPL-MCNC: 3.4 MG/DL — SIGNIFICANT CHANGE UP (ref 2.5–4.5)
PLATELET # BLD AUTO: 294 K/UL — SIGNIFICANT CHANGE UP (ref 150–400)
POTASSIUM SERPL-MCNC: 4.6 MMOL/L — SIGNIFICANT CHANGE UP (ref 3.5–5.3)
POTASSIUM SERPL-SCNC: 4.6 MMOL/L — SIGNIFICANT CHANGE UP (ref 3.5–5.3)
RBC # BLD: 4.67 M/UL — SIGNIFICANT CHANGE UP (ref 4.2–5.8)
RBC # FLD: 16 % — HIGH (ref 10.3–14.5)
SODIUM SERPL-SCNC: 135 MMOL/L — SIGNIFICANT CHANGE UP (ref 135–145)
TRIGL SERPL-MCNC: 154 MG/DL — HIGH
WBC # BLD: 9.52 K/UL — SIGNIFICANT CHANGE UP (ref 3.8–10.5)
WBC # FLD AUTO: 9.52 K/UL — SIGNIFICANT CHANGE UP (ref 3.8–10.5)

## 2025-01-01 PROCEDURE — 99232 SBSQ HOSP IP/OBS MODERATE 35: CPT

## 2025-01-01 RX ORDER — INSULIN LISPRO 100/ML
3 VIAL (ML) SUBCUTANEOUS ONCE
Refills: 0 | Status: COMPLETED | OUTPATIENT
Start: 2025-01-01 | End: 2025-01-01

## 2025-01-01 RX ORDER — SODIUM CHLORIDE 9 MG/ML
1000 INJECTION, SOLUTION INTRAMUSCULAR; INTRAVENOUS; SUBCUTANEOUS
Refills: 0 | Status: DISCONTINUED | OUTPATIENT
Start: 2025-01-01 | End: 2025-01-02

## 2025-01-01 RX ADMIN — LOSARTAN POTASSIUM 100 MILLIGRAM(S): 100 TABLET, FILM COATED ORAL at 05:15

## 2025-01-01 RX ADMIN — ALLOPURINOL 100 MILLIGRAM(S): 100 TABLET ORAL at 15:38

## 2025-01-01 RX ADMIN — HEPARIN SODIUM 5000 UNIT(S): 1000 INJECTION, SOLUTION INTRAVENOUS; SUBCUTANEOUS at 05:15

## 2025-01-01 RX ADMIN — NEBIVOLOL 5 MILLIGRAM(S): 5 TABLET ORAL at 05:15

## 2025-01-01 RX ADMIN — SPIRONOLACTONE 25 MILLIGRAM(S): 50 TABLET ORAL at 05:14

## 2025-01-01 RX ADMIN — INSULIN GLARGINE-YFGN 8 UNIT(S): 100 INJECTION, SOLUTION SUBCUTANEOUS at 22:00

## 2025-01-01 RX ADMIN — Medication 81 MILLIGRAM(S): at 15:38

## 2025-01-01 RX ADMIN — ATORVASTATIN CALCIUM 40 MILLIGRAM(S): 40 TABLET, FILM COATED ORAL at 22:01

## 2025-01-01 RX ADMIN — Medication 2 UNIT(S): at 12:59

## 2025-01-01 RX ADMIN — Medication 2 UNIT(S): at 18:32

## 2025-01-01 RX ADMIN — Medication 2: at 18:32

## 2025-01-01 RX ADMIN — Medication 3 UNIT(S): at 21:59

## 2025-01-01 RX ADMIN — Medication 2: at 12:59

## 2025-01-01 RX ADMIN — Medication 10 MILLIGRAM(S): at 05:14

## 2025-01-01 RX ADMIN — HEPARIN SODIUM 5000 UNIT(S): 1000 INJECTION, SOLUTION INTRAVENOUS; SUBCUTANEOUS at 22:01

## 2025-01-01 RX ADMIN — Medication 60 MILLIGRAM(S): at 15:38

## 2025-01-01 RX ADMIN — HEPARIN SODIUM 5000 UNIT(S): 1000 INJECTION, SOLUTION INTRAVENOUS; SUBCUTANEOUS at 15:39

## 2025-01-01 NOTE — CHART NOTE - NSCHARTNOTEFT_GEN_A_CORE
informed by RN pt refusing evening meds, and somewhat agitated and upset. Patient seen ad assessed at bedside, resting comfortable NAD noted, AO x 2 ( name and date), reports he is in the hotel and doesn't like the noise that coming from his bed or table. He remembers being in the emergency room but cant remember why he came, and refuses to cooperate with bedtime care ( FS/VS/ Meds), called pts wife Varsha - s/w pt and reports that he seems confused keeps saying  he is not [facility] and that he is in the hotel. Pt is likely sundowning ( has not slept in ER as per pt) Zyprexa 2.5 IM x 1, melatonin ordered, will c/t monitor.  Vital Signs Last 24 Hrs  T(C): 36.8 (31 Dec 2024 18:00), Max: 36.9 (31 Dec 2024 01:22)  T(F): 98.2 (31 Dec 2024 18:00), Max: 98.5 (31 Dec 2024 05:15)  HR: 66 (31 Dec 2024 22:00) (57 - 75)  BP: 154/76 (31 Dec 2024 22:00) (145/72 - 180/90)  RR: 18 (31 Dec 2024 22:00) (16 - 20)  SpO2: 98% (31 Dec 2024 22:00) (95% - 100%)

## 2025-01-01 NOTE — PROGRESS NOTE ADULT - SUBJECTIVE AND OBJECTIVE BOX
Date of Service  : 01-01-25     INTERVAL HPI/OVERNIGHT EVENTS: I feel fine.   Vital Signs Last 24 Hrs  T(C): 37.3 (01 Jan 2025 20:33), Max: 37.3 (01 Jan 2025 20:33)  T(F): 99.1 (01 Jan 2025 20:33), Max: 99.1 (01 Jan 2025 20:33)  HR: 73 (01 Jan 2025 20:33) (61 - 73)  BP: 143/73 (01 Jan 2025 20:33) (142/73 - 154/76)  BP(mean): --  RR: 17 (01 Jan 2025 20:33) (16 - 18)  SpO2: 95% (01 Jan 2025 20:33) (95% - 99%)    Parameters below as of 01 Jan 2025 15:30  Patient On (Oxygen Delivery Method): room air      I&O's Summary    MEDICATIONS  (STANDING):  allopurinol 100 milliGRAM(s) Oral daily  amLODIPine   Tablet 10 milliGRAM(s) Oral daily  aspirin enteric coated 81 milliGRAM(s) Oral daily  atorvastatin 40 milliGRAM(s) Oral at bedtime  heparin   Injectable 5000 Unit(s) SubCutaneous every 8 hours  hydrochlorothiazide 25 milliGRAM(s) Oral daily  insulin glargine Injectable (LANTUS) 8 Unit(s) SubCutaneous at bedtime  insulin lispro (ADMELOG) corrective regimen sliding scale   SubCutaneous three times a day before meals  insulin lispro Injectable (ADMELOG) 2 Unit(s) SubCutaneous three times a day before meals  insulin lispro Injectable (ADMELOG). 3 Unit(s) SubCutaneous once  isosorbide   mononitrate ER Tablet (IMDUR) 60 milliGRAM(s) Oral daily  losartan 100 milliGRAM(s) Oral daily  nebivolol 5 milliGRAM(s) Oral daily  spironolactone 25 milliGRAM(s) Oral daily    MEDICATIONS  (PRN):  melatonin 3 milliGRAM(s) Oral at bedtime PRN Insomnia    LABS:                        10.5   9.52  )-----------( 294      ( 01 Jan 2025 05:30 )             32.8     01-01    135  |  101  |  44[H]  ----------------------------<  210[H]  4.6   |  18[L]  |  1.83[H]    Ca    9.4      01 Jan 2025 05:30  Phos  3.4     01-01  Mg     2.10     01-01        Urinalysis Basic - ( 01 Jan 2025 05:30 )    Color: x / Appearance: x / SG: x / pH: x  Gluc: 210 mg/dL / Ketone: x  / Bili: x / Urobili: x   Blood: x / Protein: x / Nitrite: x   Leuk Esterase: x / RBC: x / WBC x   Sq Epi: x / Non Sq Epi: x / Bacteria: x      CAPILLARY BLOOD GLUCOSE      POCT Blood Glucose.: 354 mg/dL (01 Jan 2025 21:33)  POCT Blood Glucose.: 241 mg/dL (01 Jan 2025 17:41)  POCT Blood Glucose.: 214 mg/dL (01 Jan 2025 12:30)  POCT Blood Glucose.: 224 mg/dL (01 Jan 2025 08:15)  POCT Blood Glucose.: 277 mg/dL (31 Dec 2024 23:44)        Urinalysis Basic - ( 01 Jan 2025 05:30 )    Color: x / Appearance: x / SG: x / pH: x  Gluc: 210 mg/dL / Ketone: x  / Bili: x / Urobili: x   Blood: x / Protein: x / Nitrite: x   Leuk Esterase: x / RBC: x / WBC x   Sq Epi: x / Non Sq Epi: x / Bacteria: x      REVIEW OF SYSTEMS:  CONSTITUTIONAL: No fever, weight loss, or fatigue  EYES: No eye pain, visual disturbances, or discharge  ENMT:  No difficulty hearing, tinnitus, vertigo; No sinus or throat pain  NECK: No pain or stiffness  RESPIRATORY: No cough, wheezing, chills or hemoptysis; No shortness of breath  CARDIOVASCULAR: No chest pain, palpitations, dizziness, or leg swelling  GASTROINTESTINAL: No abdominal or epigastric pain. No nausea, vomiting, or hematemesis; No diarrhea or constipation. No melena or hematochezia.  GENITOURINARY: No dysuria, frequency, hematuria, or incontinence  NEUROLOGICAL: No headaches, memory loss, loss of strength, numbness, or tremors    Consultant(s) Notes Reviewed:  [x ] YES  [ ] NO    PHYSICAL EXAM:  GENERAL: NAD, well-groomed, well-developed,not in any distress ,  HEAD:  Atraumatic, Normocephalic  NECK: Supple, No JVD, Normal thyroid  NERVOUS SYSTEM:  Alert & Oriented X3, No focal deficit   CHEST/LUNG: Good air entry bilateral with no  rales, rhonchi, wheezing, or rubs  HEART: Regular rate and rhythm; No murmurs, rubs, or gallops  ABDOMEN: Soft, Nontender, Nondistended; Bowel sounds present  EXTREMITIES:  2+ Peripheral Pulses, No clubbing, cyanosis, or edema    Care Discussed with Consultants/Other Providers [ x] YES  [ ] NO

## 2025-01-01 NOTE — PROGRESS NOTE ADULT - ASSESSMENT
89M with PMH of prostate CA, T2DM, HTN, HLD, CKD, here for hypoglycemia sxs. In the ER, pt found to have cp with EKG w/ NSR and no significant ischemic changes. Pt was recommended to be admitted by Tele Doc Dr. Perea. Cardiology consulted for further evaluation inpt.     1. Cp, +NST and TTE reviewed   2. HTN   3. HLD   4. CKD     -given +NST, plan for [facility] tomorrow, r/b/a d/w pt and family   -bp control, cont norvasc, imduri, losartan, and Hctz     Nitin Lee MD [facility]  Cardiology Attending, Jenn-NS/LOIS  Avaliable on [facility] Team

## 2025-01-01 NOTE — PROGRESS NOTE ADULT - SUBJECTIVE AND OBJECTIVE BOX
Cardiology Attending Follow-up Note     Patient seen and examined at bedside.    Overnight Events:     bp better   no cardiac complaints     REVIEW OF SYSTEMS:  Constitutional:     [x ] negative [ ] fevers [ ] chills [ ] weight loss [ ] weight gain  HEENT:                  [x ] negative [ ] dry eyes [ ] eye irritation [ ] postnasal drip [ ] nasal congestion  CV:                         [ x] negative  [ ] chest pain [ ] orthopnea [ ] palpitations [ ] murmur  Resp:                     [ x] negative [ ] cough [ ] shortness of breath [ ] dyspnea [ ] wheezing [ ] sputum [ ]hemoptysis  GI:                          [ x] negative [ ] nausea [ ] vomiting [ ] diarrhea [ ] constipation [ ] abd pain [ ] dysphagia   :                        [ x] negative [ ] dysuria [ ] nocturia [ ] hematuria [ ] increased urinary frequency  Musculoskeletal: [ x] negative [ ] back pain [ ] myalgias [ ] arthralgias [ ] fracture  Skin:                       [ x] negative [ ] rash [ ] itch  Neurological:        [x ] negative [ ] headache [ ] dizziness [ ] syncope [ ] weakness [ ] numbness  Psychiatric:           [ x] negative [ ] anxiety [ ] depression  Endocrine:            [ x] negative [ ] diabetes [ ] thyroid problem  Heme/Lymph:      [ x] negative [ ] anemia [ ] bleeding problem  Allergic/Immune: [ x] negative [ ] itchy eyes [ ] nasal discharge [ ] hives [ ] angioedema    [ x] All other systems negative  [ ] Unable to assess ROS due to    Current Meds:  allopurinol 100 milliGRAM(s) Oral daily  amLODIPine   Tablet 10 milliGRAM(s) Oral daily  aspirin enteric coated 81 milliGRAM(s) Oral daily  atorvastatin 40 milliGRAM(s) Oral at bedtime  heparin   Injectable 5000 Unit(s) SubCutaneous every 8 hours  hydrochlorothiazide 25 milliGRAM(s) Oral daily  insulin glargine Injectable (LANTUS) 8 Unit(s) SubCutaneous at bedtime  insulin lispro (ADMELOG) corrective regimen sliding scale   SubCutaneous three times a day before meals  insulin lispro Injectable (ADMELOG) 2 Unit(s) SubCutaneous three times a day before meals  isosorbide   mononitrate ER Tablet (IMDUR) 60 milliGRAM(s) Oral daily  losartan 100 milliGRAM(s) Oral daily  melatonin 3 milliGRAM(s) Oral at bedtime PRN  nebivolol 5 milliGRAM(s) Oral daily  spironolactone 25 milliGRAM(s) Oral daily      PAST MEDICAL & SURGICAL HISTORY:  HTN (hypertension)      DM2 (diabetes mellitus, type 2)      Prostate cancer      No significant past surgical history          Vitals:  T(F): 98.3 (), Max: 98.4 ()  HR: 62 () (61 - 70)  BP: 142/73 () (142/73 - 154/76)  RR: 16 ()  SpO2: 98% ()  I&O's Summary      Physical Exam:  Appearance: No acute distress  HENT: No JVD   Cardiovascular: RRR, S1/S2, no murmurs  Respiratory: CTABL  Gastrointestinal: soft, NT ND, +BS  Musculoskeletal: No clubbing, no edema   Neurologic: Non-focal  Skin: No rashes, ecchymoses, or cyanosis                          10.5   9.52  )-----------( 294      ( 2025 05:30 )             32.8         135  |  101  |  44[H]  ----------------------------<  210[H]  4.6   |  18[L]  |  1.83[H]    Ca    9.4      2025 05:30  Phos  3.4       Mg     2.10                   Total Cholesterol: 130  LDL: --  HDL: 36  T        Cardiovascular Testings:     CONCLUSIONS:      1. Left ventricular systolic function is normal with an ejection fraction visually estimated at 50 to 55 %.   2. There is mild (grade 1) left ventricular diastolic dysfunction.   3. Left atrium is normalin size.   4. The right atrium is normal in size.   5. Normal right ventricular cavity size, with normal wall thickness, and normal right ventricular systolic function.   6. Trileaflet aortic valve with normal systolic excursion. There is calcification of the aortic valve leaflets.   7. Moderate to severe pulmonic regurgitation.   8. No pericardial effusion seen.    Conclusions:   1. Qualitative Perfusion:      - medium-sized, moderate to severe defect(s) in the inferior and inferolateral wall suggestive of CAD.   2. The left ventricle is mildly decreased in function and normal in size. The time activity curve suggests diastolic dysfunction.. The post stress left ventricular EF is 45 %. The stress end diastolic volume is 71 ml and systolic volume is 39 ml.   3. Hypokinesis of the inferoseptal wall.   4. The RV was not well visualized.

## 2025-01-01 NOTE — PROGRESS NOTE ADULT - ASSESSMENT
89 y/o M with pmh of prostate cancer, DM type 2, HTN, p/w hypoglycemia.  Pt reports his insulin glargine  dose was increased from 12 to 15 units recently.  Over the past few days, he reports getting low reading in the morning, but is able to alert his wife who gives him food to bring it  up.  Yesterday AM, pt's wife noticed he was confused and making gurgling sounds while in bed.  She checked FS which was 41.  EMS was called, and pt give D50 en route to ED.  Pt also reports intermittent L sided chest pain for the past several days.  Pain is positional, and improves when pt wears a support belt.  Pain is difficult for patient to describe quality.  No associated dyspnea, dizziness nausea or diaphoresis.  No recent fever or cough.    Pt was evaluated in the ED, and placed in the CDU for monitoring.  He is admitted for further w/u of chest pain and monitoring of glucose.    Planned for Cardiac cath om Thursday.          Problem/Recommendation - 1:  ·  Problem: Chest pain with abnormal stress test .   ·  Recommendation: Planned for cardiac cath tomorrow if creatinine better.   Will start on Aspirin and statin.  Cardiology help appreciated.   < from: TTE W or WO Ultrasound Enhancing Agent (12.31.24 @ 07:33) >    CONCLUSIONS:      1. Left ventricular systolic function is normal with an ejection fraction visually estimated at 50 to 55 %.      < from: Nuclear Stress Test-Exercise.. (12.31.24 @ 08:30) >     1. Qualitative Perfusion:      - medium-sized, moderate to severe defect(s) in the inferior and inferolateral wall suggestive of CAD.   2. The left ventricle is mildly decreased in function and normal in size. The time activity curve suggests diastolic dysfunction.. The post stress left ventricular EF is 45 %. The stress end diastolic volume is 71 ml and systolic volume is 39 ml.   3. Hypokinesis of the inferoseptal wall.     Problem/Recommendation - 2:  ·  Problem: Type 2 diabetes mellitus with hypoglycemia.   ·  Recommendation: Endo help appreciated.     Following  their recommendation.     Problem/Recommendation - 3:  ·  Problem: Essential hypertension.   ·  Recommendation: BP medications with hold parameters.     Problem/Recommendation - 4:  ·  Problem: CKD with KIRBY .   ·  Recommendation: Trending Creatinine .  Renal consulted.      Problem/Recommendation - 5:  ·  Problem: Prostate cancer.   ·  Recommendation: Out patient Follow Up.

## 2025-01-01 NOTE — CHART NOTE - NSCHARTNOTEFT_GEN_A_CORE
Nephrology consulted for KIRBY/CKD  SCR was 1.4-1.8 in 2023 based on chart review so at least CKD 3 likely due to long standing T2DM and HTN  Previously had UPCR of 1.6 can repeat likely secondary to T2DM  Can send Urine Urea, Urine Creatinine and Urine NA  Can obtain a renal sonogram  Avoid nephrotoxins, contrast  Keep MAP>65  Continue current BP meds, relatively controlled on current medication  Full Consult note to follow Nephrology consulted for KIRBY/CKD  SCR was 1.4-1.8 in 2023 based on chart review so at least CKD 3 likely due to long standing T2DM and HTN  Previously had UPCR of 1.6 can repeat likely secondary to T2DM  Can send Urine Urea, Urine Creatinine and Urine NA  Can obtain a renal sonogram  Avoid nephrotoxins, contrast  Keep MAP>65  Continue current BP meds, relatively controlled on current medication  Planned for cardiac cath per cardio note given pos NST- Will recommend hydration with NS 75 cc hour 6 hours pre and 6 hours post  Full Consult note to follow

## 2025-01-02 LAB
ADD ON TEST-SPECIMEN IN LAB: SIGNIFICANT CHANGE UP
ANION GAP SERPL CALC-SCNC: 15 MMOL/L — HIGH (ref 7–14)
APPEARANCE UR: CLEAR — SIGNIFICANT CHANGE UP
APTT BLD: 30.4 SEC — SIGNIFICANT CHANGE UP (ref 24.5–35.6)
BILIRUB UR-MCNC: NEGATIVE — SIGNIFICANT CHANGE UP
BUN SERPL-MCNC: 47 MG/DL — HIGH (ref 7–23)
CALCIUM SERPL-MCNC: 9.6 MG/DL — SIGNIFICANT CHANGE UP (ref 8.4–10.5)
CHLORIDE SERPL-SCNC: 99 MMOL/L — SIGNIFICANT CHANGE UP (ref 98–107)
CO2 SERPL-SCNC: 21 MMOL/L — LOW (ref 22–31)
COLOR SPEC: YELLOW — SIGNIFICANT CHANGE UP
CREAT SERPL-MCNC: 2.01 MG/DL — HIGH (ref 0.5–1.3)
DIFF PNL FLD: NEGATIVE — SIGNIFICANT CHANGE UP
EGFR: 31 ML/MIN/1.73M2 — LOW
FLUAV AG NPH QL: SIGNIFICANT CHANGE UP
FLUBV AG NPH QL: SIGNIFICANT CHANGE UP
GLUCOSE BLDC GLUCOMTR-MCNC: 186 MG/DL — HIGH (ref 70–99)
GLUCOSE BLDC GLUCOMTR-MCNC: 206 MG/DL — HIGH (ref 70–99)
GLUCOSE BLDC GLUCOMTR-MCNC: 218 MG/DL — HIGH (ref 70–99)
GLUCOSE BLDC GLUCOMTR-MCNC: 231 MG/DL — HIGH (ref 70–99)
GLUCOSE BLDC GLUCOMTR-MCNC: 256 MG/DL — HIGH (ref 70–99)
GLUCOSE BLDC GLUCOMTR-MCNC: 279 MG/DL — HIGH (ref 70–99)
GLUCOSE SERPL-MCNC: 239 MG/DL — HIGH (ref 70–99)
GLUCOSE UR QL: NEGATIVE MG/DL — SIGNIFICANT CHANGE UP
HCT VFR BLD CALC: 32 % — LOW (ref 39–50)
HGB BLD-MCNC: 10 G/DL — LOW (ref 13–17)
INR BLD: 1.08 RATIO — SIGNIFICANT CHANGE UP (ref 0.85–1.16)
KETONES UR-MCNC: NEGATIVE MG/DL — SIGNIFICANT CHANGE UP
LEUKOCYTE ESTERASE UR-ACNC: NEGATIVE — SIGNIFICANT CHANGE UP
MAGNESIUM SERPL-MCNC: 2.2 MG/DL — SIGNIFICANT CHANGE UP (ref 1.6–2.6)
MCHC RBC-ENTMCNC: 22.1 PG — LOW (ref 27–34)
MCHC RBC-ENTMCNC: 31.3 G/DL — LOW (ref 32–36)
MCV RBC AUTO: 70.8 FL — LOW (ref 80–100)
NITRITE UR-MCNC: NEGATIVE — SIGNIFICANT CHANGE UP
NRBC # BLD: 0 /100 WBCS — SIGNIFICANT CHANGE UP (ref 0–0)
NRBC # FLD: 0 K/UL — SIGNIFICANT CHANGE UP (ref 0–0)
NT-PROBNP SERPL-SCNC: 169 PG/ML — SIGNIFICANT CHANGE UP
PH UR: 5.5 — SIGNIFICANT CHANGE UP (ref 5–8)
PHOSPHATE SERPL-MCNC: 3.9 MG/DL — SIGNIFICANT CHANGE UP (ref 2.5–4.5)
PLATELET # BLD AUTO: 297 K/UL — SIGNIFICANT CHANGE UP (ref 150–400)
POTASSIUM SERPL-MCNC: 4.1 MMOL/L — SIGNIFICANT CHANGE UP (ref 3.5–5.3)
POTASSIUM SERPL-SCNC: 4.1 MMOL/L — SIGNIFICANT CHANGE UP (ref 3.5–5.3)
PROT UR-MCNC: 30 MG/DL
PROTHROM AB SERPL-ACNC: 12.9 SEC — SIGNIFICANT CHANGE UP (ref 9.9–13.4)
RBC # BLD: 4.52 M/UL — SIGNIFICANT CHANGE UP (ref 4.2–5.8)
RBC # FLD: 16.4 % — HIGH (ref 10.3–14.5)
RSV RNA NPH QL NAA+NON-PROBE: SIGNIFICANT CHANGE UP
SARS-COV-2 RNA SPEC QL NAA+PROBE: DETECTED
SODIUM SERPL-SCNC: 135 MMOL/L — SIGNIFICANT CHANGE UP (ref 135–145)
SP GR SPEC: 1.02 — SIGNIFICANT CHANGE UP (ref 1–1.03)
UROBILINOGEN FLD QL: 0.2 MG/DL — SIGNIFICANT CHANGE UP (ref 0.2–1)
WBC # BLD: 10.14 K/UL — SIGNIFICANT CHANGE UP (ref 3.8–10.5)
WBC # FLD AUTO: 10.14 K/UL — SIGNIFICANT CHANGE UP (ref 3.8–10.5)

## 2025-01-02 PROCEDURE — G0545: CPT

## 2025-01-02 PROCEDURE — 99232 SBSQ HOSP IP/OBS MODERATE 35: CPT

## 2025-01-02 PROCEDURE — 99233 SBSQ HOSP IP/OBS HIGH 50: CPT

## 2025-01-02 PROCEDURE — 99222 1ST HOSP IP/OBS MODERATE 55: CPT

## 2025-01-02 PROCEDURE — 71045 X-RAY EXAM CHEST 1 VIEW: CPT | Mod: 26

## 2025-01-02 RX ORDER — REMDESIVIR 5 MG/ML
200 INJECTION INTRAVENOUS EVERY 24 HOURS
Refills: 0 | Status: COMPLETED | OUTPATIENT
Start: 2025-01-02 | End: 2025-01-02

## 2025-01-02 RX ORDER — INSULIN LISPRO 100/ML
4 VIAL (ML) SUBCUTANEOUS
Refills: 0 | Status: DISCONTINUED | OUTPATIENT
Start: 2025-01-02 | End: 2025-01-03

## 2025-01-02 RX ORDER — INSULIN GLARGINE-YFGN 100 [IU]/ML
10 INJECTION, SOLUTION SUBCUTANEOUS AT BEDTIME
Refills: 0 | Status: DISCONTINUED | OUTPATIENT
Start: 2025-01-02 | End: 2025-01-03

## 2025-01-02 RX ORDER — IPRATROPIUM BROMIDE AND ALBUTEROL SULFATE .5; 2.5 MG/3ML; MG/3ML
3 SOLUTION RESPIRATORY (INHALATION) ONCE
Refills: 0 | Status: COMPLETED | OUTPATIENT
Start: 2025-01-02 | End: 2025-01-02

## 2025-01-02 RX ORDER — REMDESIVIR 5 MG/ML
100 INJECTION INTRAVENOUS EVERY 24 HOURS
Refills: 0 | Status: COMPLETED | OUTPATIENT
Start: 2025-01-03 | End: 2025-01-04

## 2025-01-02 RX ORDER — INSULIN LISPRO 100/ML
VIAL (ML) SUBCUTANEOUS AT BEDTIME
Refills: 0 | Status: DISCONTINUED | OUTPATIENT
Start: 2025-01-02 | End: 2025-01-17

## 2025-01-02 RX ORDER — REMDESIVIR 5 MG/ML
INJECTION INTRAVENOUS
Refills: 0 | Status: COMPLETED | OUTPATIENT
Start: 2025-01-02 | End: 2025-01-04

## 2025-01-02 RX ADMIN — Medication 3: at 07:23

## 2025-01-02 RX ADMIN — INSULIN GLARGINE-YFGN 10 UNIT(S): 100 INJECTION, SOLUTION SUBCUTANEOUS at 21:21

## 2025-01-02 RX ADMIN — IPRATROPIUM BROMIDE AND ALBUTEROL SULFATE 3 MILLILITER(S): .5; 2.5 SOLUTION RESPIRATORY (INHALATION) at 04:49

## 2025-01-02 RX ADMIN — Medication 10 MILLIGRAM(S): at 05:46

## 2025-01-02 RX ADMIN — ATORVASTATIN CALCIUM 40 MILLIGRAM(S): 40 TABLET, FILM COATED ORAL at 21:20

## 2025-01-02 RX ADMIN — Medication 60 MILLIGRAM(S): at 12:34

## 2025-01-02 RX ADMIN — NEBIVOLOL 5 MILLIGRAM(S): 5 TABLET ORAL at 05:47

## 2025-01-02 RX ADMIN — Medication 1: at 17:44

## 2025-01-02 RX ADMIN — HEPARIN SODIUM 5000 UNIT(S): 1000 INJECTION, SOLUTION INTRAVENOUS; SUBCUTANEOUS at 21:20

## 2025-01-02 RX ADMIN — REMDESIVIR 200 MILLIGRAM(S): 5 INJECTION INTRAVENOUS at 21:24

## 2025-01-02 RX ADMIN — Medication 3 MILLIGRAM(S): at 21:20

## 2025-01-02 RX ADMIN — ALLOPURINOL 100 MILLIGRAM(S): 100 TABLET ORAL at 12:33

## 2025-01-02 RX ADMIN — Medication 2: at 12:32

## 2025-01-02 RX ADMIN — HEPARIN SODIUM 5000 UNIT(S): 1000 INJECTION, SOLUTION INTRAVENOUS; SUBCUTANEOUS at 05:47

## 2025-01-02 RX ADMIN — HEPARIN SODIUM 5000 UNIT(S): 1000 INJECTION, SOLUTION INTRAVENOUS; SUBCUTANEOUS at 13:31

## 2025-01-02 RX ADMIN — Medication 81 MILLIGRAM(S): at 12:34

## 2025-01-02 RX ADMIN — LOSARTAN POTASSIUM 100 MILLIGRAM(S): 100 TABLET, FILM COATED ORAL at 05:46

## 2025-01-02 RX ADMIN — Medication 4 UNIT(S): at 17:44

## 2025-01-02 RX ADMIN — SPIRONOLACTONE 25 MILLIGRAM(S): 50 TABLET ORAL at 05:46

## 2025-01-02 NOTE — PROGRESS NOTE ADULT - ASSESSMENT
88 y/o M with pmh of prostate cancer, DM type 2, HTN, p/w hypoglycemia.  Pt reports his insulin glargine  dose was increased from 12 to 15 units recently.  Over the past few days, he reports getting low reading in the morning, but is able to alert his wife who gives him food to bring it  up.  Yesterday AM, pt's wife noticed he was confused and making gurgling sounds while in bed.  She checked FS which was 41.  EMS was called, and pt give D50 en route to ED.  Pt also reports intermittent L sided chest pain for the past several days.  Pain is positional, and improves when pt wears a support belt.  Pain is difficult for patient to describe quality.  No associated dyspnea, dizziness nausea or diaphoresis.  No recent fever or cough.    Pt was evaluated in the ED, and placed in the CDU for monitoring.  He is admitted for further w/u of chest pain and monitoring of glucose.    Planned for Cardiac cath om Thursday.          Problem/Recommendation - 1:  ·  Problem: Chest pain with abnormal stress test .   ·  Recommendation: Planned for cardiac cath .   Will start on Aspirin and statin.  Cardiology help appreciated.   < from: TTE W or WO Ultrasound Enhancing Agent (12.31.24 @ 07:33) >    CONCLUSIONS:      1. Left ventricular systolic function is normal with an ejection fraction visually estimated at 50 to 55 %.      < from: Nuclear Stress Test-Exercise.. (12.31.24 @ 08:30) >     1. Qualitative Perfusion:      - medium-sized, moderate to severe defect(s) in the inferior and inferolateral wall suggestive of CAD.   2. The left ventricle is mildly decreased in function and normal in size. The time activity curve suggests diastolic dysfunction.. The post stress left ventricular EF is 45 %. The stress end diastolic volume is 71 ml and systolic volume is 39 ml.   3. Hypokinesis of the inferoseptal wall.     Problem/Recommendation - 2:  ·  Problem: Type 2 diabetes mellitus with hypoglycemia.   ·  Recommendation: Endo help appreciated.     Following  their recommendation.     Problem/Recommendation - 3:  ·  Problem: Essential hypertension.   ·  Recommendation: BP medications with hold parameters.     Problem/Recommendation - 4:  ·  Problem: CKD with KIRBY .   ·  Recommendation: Trending Creatinine .  Renal help appreciated.       Problem/Recommendation - 5:  ·  Problem: Prostate cancer.   ·  Recommendation: Out patient Follow Up.       Problem/Recommendation - 6:  ·  Problem: COVID 19 infection .   ·  Recommendation: ID consulted. .    Tried to call his wife but unable to leave message.

## 2025-01-02 NOTE — PROGRESS NOTE ADULT - ASSESSMENT
89-year-old male with a past medical history of prostate cancer, diabetes, hypertension, presenting due to concern of hypoglycemia at home.  The patient states he is tired and was not sure what happened.  According to his wife who was in bed with him she started hearing him gurgling at home.  She turned on the lights and tried to wake him up, the patient was unresponsive.  They called EMS who came and checked a fingerstick which was 41.  He was given 1 amp of D50 with a repeat being 140.  Patient also tolerated p.o.  According to wife he had a similar occurrence yesterday where his fingerstick was in the 40s.  Patient takes combined metformin and glipizide as well as lantus  Denies fever, chills, chest pain, trouble breathing, dysuria.  According to patient he has decreased appetite recently compared to the past.  endocrine called for further assistance  pt reports long hx of DM   he states lantus was increased from 12--> 15 units  checks FSBG in the am and afternoon  reports multiple lows in 30-40s   eGFR: 44    #Poorly controlled T2DM with hyperglycemia  - HbA1c: 7.0  - Home Regimen: combined metformin and glipizide as well as lantus 12-15 units  - Endocrinologist: unknown    INPATIENT PLAN:   - Goal -180 mg/dl: Fasting FS above goal.   - Increase Lantus to 10 units SQ at bedtime  - Increase Admelog to 4 units SQ TID AC, hold if NPO or not eating a meal.   - continue low dose Admelog correction scale at mealtimes  - start low dose Admelog correction scale at bedtime  - Fingerstick BG before meals, bedtime and 3am  - Carbohydrate consistent diet  - RD consult  - Hold oral DM agents while inpatient  - C-Peptide, Serum: 3.0 ng/mL (12-31-24 @ 05:30)---glucose 170 at same time- pt is making adequate endogenous insulin.    DISCHARGE PLAN:   - TBD  - no need for aggressive control in an 89 year old male pt   - avoiding hypoglycemia is the goal while at the same time avoiding marked hyperglycemia, aim for 100-200 for age   - stop glipizide given profound hypoglycemia   - likely would put Lantus in the am and low dose to avoid am hypoglycemia +metformin vs two orals based on cpeptide result    - pt reporting decreased appetite - avoid glp-1 for now  - will need glucose tabs and glucagon if dc on insulin with review of how to use it with wife and pt   - Patient to call doctor with persistent high or low BG at home.   - Ensure patient has glucometer, test strips and lancets on discharge.  - Recommend routine outpatient ophthalmology and podiatry follow up.     #HTN  - Outpatient goal BP <130//80 for age  - Can check urine albumin/creatinine outpatient  - Management per primary team.    #HLD  - LDL goal < 70  - On atorvastatin 40mg at bedtime.       SIRENA Garcia-BC  Nurse Practitioner  Division of Endocrinology  Contact on TEAMS    If out of hospital/unavailable when paged, please note: patient will be cared for by another provider on the endocrine service.  For urgent concerns: call the endocrine answering service for assistance to reach covering provider ([phone]). For non-urgent matters: please email [email or web address] for assistance.

## 2025-01-02 NOTE — PROGRESS NOTE ADULT - ASSESSMENT
89M with PMH of prostate CA, T2DM, HTN, HLD, CKD, here for hypoglycemia sxs. In the ER, pt found to have cp with EKG w/ NSR and no significant ischemic changes. Pt was recommended to be admitted by Tele Doc Dr. Perea. Cardiology consulted for further evaluation inpt.     1. Cp, +NST and TTE reviewed   2. HTN   3. HLD   4. CKD     -given +NST, initially planned for [facility], deferred for now given COVID+ and rising Cr   -bp control, cont norvasc, imduri, losartan, and Hctz   -ID eval for covid in elderly     Nitin Lee MD [facility]  Cardiology Attending, Jenn-NS/LOIS  Avaliable on "[employer] Team

## 2025-01-02 NOTE — PROGRESS NOTE ADULT - SUBJECTIVE AND OBJECTIVE BOX
Chief Complaint: T2DM    Interval Events: Pt seen and examined at bedside.  Increased confusion noted yesterday and found to be Covid +.    MEDICATIONS  (STANDING):  allopurinol 100 milliGRAM(s) Oral daily  amLODIPine   Tablet 10 milliGRAM(s) Oral daily  aspirin enteric coated 81 milliGRAM(s) Oral daily  atorvastatin 40 milliGRAM(s) Oral at bedtime  heparin   Injectable 5000 Unit(s) SubCutaneous every 8 hours  hydrochlorothiazide 25 milliGRAM(s) Oral daily  insulin glargine Injectable (LANTUS) 10 Unit(s) SubCutaneous at bedtime  insulin lispro (ADMELOG) corrective regimen sliding scale   SubCutaneous at bedtime  insulin lispro (ADMELOG) corrective regimen sliding scale   SubCutaneous three times a day before meals  insulin lispro Injectable (ADMELOG) 4 Unit(s) SubCutaneous three times a day before meals  isosorbide   mononitrate ER Tablet (IMDUR) 60 milliGRAM(s) Oral daily  losartan 100 milliGRAM(s) Oral daily  nebivolol 5 milliGRAM(s) Oral daily  spironolactone 25 milliGRAM(s) Oral daily    MEDICATIONS  (PRN):  melatonin 3 milliGRAM(s) Oral at bedtime PRN Insomnia      Allergies    No Known Allergies    Intolerances      Review of Systems:  Eyes: No blurry vision  Cardiovascular: No chest pain, palpitations  Respiratory: No SOB, no cough  GI: No nausea, vomiting, abdominal pain  : No dysuria    ALL OTHER SYSTEMS REVIEWED AND NEGATIVE    VITALS: T(C): 36.9 (01-02-25 @ 11:51)  T(F): 98.4 (01-02-25 @ 11:51), Max: 99.1 (01-01-25 @ 20:33)  HR: 71 (01-02-25 @ 11:51) (62 - 75)  BP: 148/81 (01-02-25 @ 11:51) (138/72 - 156/77)  RR:  (16 - 20)  SpO2:  (95% - 98%)  Wt(kg): --      Physical Exam:   GENERAL: NAD, well-developed  EYES: No proptosis  HEENT:  Atraumatic, Normocephalic  RESPIRATORY: non labored breathing   GI: Non distended    CAPILLARY BLOOD GLUCOSE      POCT Blood Glucose.: 206 mg/dL (02 Jan 2025 12:21)  POCT Blood Glucose.: 231 mg/dL (02 Jan 2025 08:24)  POCT Blood Glucose.: 279 mg/dL (02 Jan 2025 07:10)  POCT Blood Glucose.: 256 mg/dL (02 Jan 2025 03:12)  POCT Blood Glucose.: 354 mg/dL (01 Jan 2025 21:33)  POCT Blood Glucose.: 241 mg/dL (01 Jan 2025 17:41)      01-02    135  |  99  |  47[H]  ----------------------------<  239[H]  4.1   |  21[L]  |  2.01[H]    eGFR: 31[L]    Ca    9.6      01-02  Mg     2.20     01-02  Phos  3.9     01-02        A1C with Estimated Average Glucose Result: 7.0 % (12-30-24 @ 09:36)      Thyroid Function Tests:

## 2025-01-02 NOTE — PROGRESS NOTE ADULT - SUBJECTIVE AND OBJECTIVE BOX
Date of Service  : 25     INTERVAL HPI/OVERNIGHT EVENTS: I feel fine. I want to eat.   Vital Signs Last 24 Hrs  T(C): 36.9 (2025 11:51), Max: 37.3 (2025 20:33)  T(F): 98.4 (2025 11:51), Max: 99.1 (2025 20:33)  HR: 71 (2025 11:51) (67 - 75)  BP: 148/81 (2025 11:51) (138/72 - 156/77)  BP(mean): --  RR: 18 (2025 11:51) (17 - 20)  SpO2: 96% (2025 11:51) (95% - 97%)    Parameters below as of 2025 11:51  Patient On (Oxygen Delivery Method): room air      I&O's Summary    2025 07:01  -  2025 07:00  --------------------------------------------------------  IN: 0 mL / OUT: 370 mL / NET: -370 mL      MEDICATIONS  (STANDING):  allopurinol 100 milliGRAM(s) Oral daily  amLODIPine   Tablet 10 milliGRAM(s) Oral daily  aspirin enteric coated 81 milliGRAM(s) Oral daily  atorvastatin 40 milliGRAM(s) Oral at bedtime  heparin   Injectable 5000 Unit(s) SubCutaneous every 8 hours  hydrochlorothiazide 25 milliGRAM(s) Oral daily  insulin glargine Injectable (LANTUS) 10 Unit(s) SubCutaneous at bedtime  insulin lispro (ADMELOG) corrective regimen sliding scale   SubCutaneous at bedtime  insulin lispro (ADMELOG) corrective regimen sliding scale   SubCutaneous three times a day before meals  insulin lispro Injectable (ADMELOG) 4 Unit(s) SubCutaneous three times a day before meals  isosorbide   mononitrate ER Tablet (IMDUR) 60 milliGRAM(s) Oral daily  losartan 100 milliGRAM(s) Oral daily  nebivolol 5 milliGRAM(s) Oral daily  spironolactone 25 milliGRAM(s) Oral daily    MEDICATIONS  (PRN):  melatonin 3 milliGRAM(s) Oral at bedtime PRN Insomnia    LABS:                        10.0   10.14 )-----------( 297      ( 2025 04:25 )             32.0     01-02    135  |  99  |  47[H]  ----------------------------<  239[H]  4.1   |  21[L]  |  2.01[H]    Ca    9.6      2025 04:25  Phos  3.9     01-02  Mg     2.20     01-02      PT/INR - ( 2025 04:25 )   PT: 12.9 sec;   INR: 1.08 ratio         PTT - ( 2025 04:25 )  PTT:30.4 sec  Urinalysis Basic - ( 2025 04:27 )    Color: Yellow / Appearance: Clear / S.019 / pH: x  Gluc: x / Ketone: Negative mg/dL  / Bili: Negative / Urobili: 0.2 mg/dL   Blood: x / Protein: 30 mg/dL / Nitrite: Negative   Leuk Esterase: Negative / RBC: 0 /HPF / WBC 0 /HPF   Sq Epi: x / Non Sq Epi: 0 /HPF / Bacteria: Negative /HPF      CAPILLARY BLOOD GLUCOSE      POCT Blood Glucose.: 206 mg/dL (2025 12:21)  POCT Blood Glucose.: 231 mg/dL (2025 08:24)  POCT Blood Glucose.: 279 mg/dL (2025 07:10)  POCT Blood Glucose.: 256 mg/dL (2025 03:12)  POCT Blood Glucose.: 354 mg/dL (2025 21:33)  POCT Blood Glucose.: 241 mg/dL (2025 17:41)        Urinalysis Basic - ( 2025 04:27 )    Color: Yellow / Appearance: Clear / S.019 / pH: x  Gluc: x / Ketone: Negative mg/dL  / Bili: Negative / Urobili: 0.2 mg/dL   Blood: x / Protein: 30 mg/dL / Nitrite: Negative   Leuk Esterase: Negative / RBC: 0 /HPF / WBC 0 /HPF   Sq Epi: x / Non Sq Epi: 0 /HPF / Bacteria: Negative /HPF      REVIEW OF SYSTEMS:  CONSTITUTIONAL: No fever, weight loss, or fatigue  EYES: No eye pain, visual disturbances, or discharge  ENMT:  No difficulty hearing, tinnitus, vertigo; No sinus or throat pain  NECK: No pain or stiffness  RESPIRATORY: No cough, wheezing, chills or hemoptysis; No shortness of breath  CARDIOVASCULAR: No chest pain, palpitations, dizziness, or leg swelling  GASTROINTESTINAL: No abdominal or epigastric pain. No nausea, vomiting, or hematemesis; No diarrhea or constipation. No melena or hematochezia.  GENITOURINARY: No dysuria, frequency, hematuria, or incontinence  NEUROLOGICAL: No headaches, memory loss, loss of strength, numbness, or tremors    Consultant(s) Notes Reviewed:  [x ] YES  [ ] NO    PHYSICAL EXAM:  GENERAL: NAD, well-groomed, well-developed,not in any distress ,  HEAD:  Atraumatic, Normocephalic  NECK: Supple, No JVD, Normal thyroid  NERVOUS SYSTEM:  Alert & Oriented X3, No focal deficit   CHEST/LUNG: Good air entry bilateral with no  rales, rhonchi, wheezing, or rubs  HEART: Regular rate and rhythm; No murmurs, rubs, or gallops  ABDOMEN: Soft, Nontender, Nondistended; Bowel sounds present  EXTREMITIES:  2+ Peripheral Pulses, No clubbing, cyanosis, or edema    Care Discussed with Consultants/Other Providers [ x] YES  [ ] NO

## 2025-01-02 NOTE — CONSULT NOTE ADULT - ASSESSMENT
89 yo man with diabetes admitted with hypoglycemia   noted to have expiratory wheeze today - covid+    afebrile   saturating 95% on RA   slight nasal congestion   CXR clear     Covid+ mild- moderate   DM  Renal insufficiency     Suggest;  Remdesivir x 3 days to prevent progression to severe covid   Remdesivir 200 mg iv x 1 then 100 mg iv on days 2 and 3   airborne / contact precautions

## 2025-01-02 NOTE — PROGRESS NOTE ADULT - SUBJECTIVE AND OBJECTIVE BOX
Cardiology Attending Follow-up Note     Patient seen and examined at bedside.    Overnight Events:     LHC cancelled due to COVID+ and Cr   no CP     REVIEW OF SYSTEMS:  Constitutional:     [x ] negative [ ] fevers [ ] chills [ ] weight loss [ ] weight gain  HEENT:                  [x ] negative [ ] dry eyes [ ] eye irritation [ ] postnasal drip [ ] nasal congestion  CV:                         [ x] negative  [ ] chest pain [ ] orthopnea [ ] palpitations [ ] murmur  Resp:                     [ x] negative [ ] cough [ ] shortness of breath [ ] dyspnea [ ] wheezing [ ] sputum [ ]hemoptysis  GI:                          [ x] negative [ ] nausea [ ] vomiting [ ] diarrhea [ ] constipation [ ] abd pain [ ] dysphagia   :                        [ x] negative [ ] dysuria [ ] nocturia [ ] hematuria [ ] increased urinary frequency  Musculoskeletal: [ x] negative [ ] back pain [ ] myalgias [ ] arthralgias [ ] fracture  Skin:                       [ x] negative [ ] rash [ ] itch  Neurological:        [x ] negative [ ] headache [ ] dizziness [ ] syncope [ ] weakness [ ] numbness  Psychiatric:           [ x] negative [ ] anxiety [ ] depression  Endocrine:            [ x] negative [ ] diabetes [ ] thyroid problem  Heme/Lymph:      [ x] negative [ ] anemia [ ] bleeding problem  Allergic/Immune: [ x] negative [ ] itchy eyes [ ] nasal discharge [ ] hives [ ] angioedema    [ x] All other systems negative  [ ] Unable to assess ROS due to    Current Meds:  allopurinol 100 milliGRAM(s) Oral daily  amLODIPine   Tablet 10 milliGRAM(s) Oral daily  aspirin enteric coated 81 milliGRAM(s) Oral daily  atorvastatin 40 milliGRAM(s) Oral at bedtime  heparin   Injectable 5000 Unit(s) SubCutaneous every 8 hours  hydrochlorothiazide 25 milliGRAM(s) Oral daily  insulin glargine Injectable (LANTUS) 10 Unit(s) SubCutaneous at bedtime  insulin lispro (ADMELOG) corrective regimen sliding scale   SubCutaneous at bedtime  insulin lispro (ADMELOG) corrective regimen sliding scale   SubCutaneous three times a day before meals  insulin lispro Injectable (ADMELOG) 4 Unit(s) SubCutaneous three times a day before meals  isosorbide   mononitrate ER Tablet (IMDUR) 60 milliGRAM(s) Oral daily  losartan 100 milliGRAM(s) Oral daily  melatonin 3 milliGRAM(s) Oral at bedtime PRN  nebivolol 5 milliGRAM(s) Oral daily  remdesivir  IVPB   IV Intermittent   remdesivir  IVPB 200 milliGRAM(s) IV Intermittent every 24 hours  spironolactone 25 milliGRAM(s) Oral daily      PAST MEDICAL & SURGICAL HISTORY:  HTN (hypertension)      DM2 (diabetes mellitus, type 2)      Prostate cancer      No significant past surgical history          Vitals:  T(F): 98.3 (01-02), Max: 99.1 (01-01)  HR: 86 (01-02) (67 - 86)  BP: 143/77 (01-02) (138/72 - 156/77)  RR: 17 (01-02)  SpO2: 95% (01-02)  I&O's Summary    01 Jan 2025 07:01  -  02 Jan 2025 07:00  --------------------------------------------------------  IN: 0 mL / OUT: 370 mL / NET: -370 mL        Physical Exam:  Appearance: No acute distress  HENT: No JVD   Cardiovascular: RRR, S1/S2, no murmurs  Respiratory: CTABL  Gastrointestinal: soft, NT ND, +BS  Musculoskeletal: No clubbing, no edema   Neurologic: Non-focal  Skin: No rashes, ecchymoses, or cyanosis                          10.0   10.14 )-----------( 297      ( 02 Jan 2025 04:25 )             32.0     01-02    135  |  99  |  47[H]  ----------------------------<  239[H]  4.1   |  21[L]  |  2.01[H]    Ca    9.6      02 Jan 2025 04:25  Phos  3.9     01-02  Mg     2.20     01-02      PT/INR - ( 02 Jan 2025 04:25 )   PT: 12.9 sec;   INR: 1.08 ratio         PTT - ( 02 Jan 2025 04:25 )  PTT:30.4 sec              Cardiovascular Testings:   CONCLUSIONS:      1. Left ventricular systolic function is normal with an ejection fraction visually estimated at 50 to 55 %.   2. There is mild (grade 1) left ventricular diastolic dysfunction.   3. Left atrium is normalin size.   4. The right atrium is normal in size.   5. Normal right ventricular cavity size, with normal wall thickness, and normal right ventricular systolic function.   6. Trileaflet aortic valve with normal systolic excursion. There is calcification of the aortic valve leaflets.   7. Moderate to severe pulmonic regurgitation.   8. No pericardial effusion seen.    Conclusions:   1. Qualitative Perfusion:      - medium-sized, moderate to severe defect(s) in the inferior and inferolateral wall suggestive of CAD.   2. The left ventricle is mildly decreased in function and normal in size. The time activity curve suggests diastolic dysfunction.. The post stress left ventricular EF is 45 %. The stress end diastolic volume is 71 ml and systolic volume is 39 ml.   3. Hypokinesis of the inferoseptal wall.   4. The RV was not well visualized.

## 2025-01-02 NOTE — CHART NOTE - NSCHARTNOTEFT_GEN_A_CORE
Notified by RN pt wheezing. Patient seen and assessed at bedside, resting comfortable NAD noted, denies any complaints at this time, no Chest pain no SOB, no abdominal pain, no dysuria, no fever no chills. Foul smelling urine noted at bedside, pt used urinal overnight. On lung exam + expiratory wheeze noted heriberto Right >  Left, diminished bibasilar breath sounds. STAT CXR ordered, Duoneb x 1, UA, swab for RSV/Flu/Covid, placed , will c/t monitor.  Vital Signs Last 24 Hrs  T(C): 36.9 (02 Jan 2025 03:20), Max: 37.3 (01 Jan 2025 20:33)  T(F): 98.5 (02 Jan 2025 03:20), Max: 99.1 (01 Jan 2025 20:33)  HR: 71 (02 Jan 2025 03:20) (61 - 73)  BP: 142/67 (02 Jan 2025 03:20) (138/72 - 153/65)  RR: 20 (02 Jan 2025 03:20) (16 - 20)  SpO2: 95% (02 Jan 2025 03:20) (95% - 98%)

## 2025-01-02 NOTE — CONSULT NOTE ADULT - ASSESSMENT
89 y/o M with pmh of prostate cancer, DM type 2, HTN, p/w hypoglycemia. +NST planning for angio. nephrology consulted for elevated scr    CKD stage 3B  scr seems to be fluctuating ~ 1.5-1.8  scr slightly worsen today  +covid   on hctz, losartan, spironolactone  monitor for now  possible plan for LHC recommend ns @ 75cc/hr x6 hrs before and 6 hrs after angio  monitor    proteinuria  mild  check urine p/c ratio    covid  care per team    htn  controlled  monitor    cp  +NST  planning for possible angio  f/u cardio

## 2025-01-03 LAB
ANION GAP SERPL CALC-SCNC: 15 MMOL/L — HIGH (ref 7–14)
BUN SERPL-MCNC: 50 MG/DL — HIGH (ref 7–23)
CALCIUM SERPL-MCNC: 9.5 MG/DL — SIGNIFICANT CHANGE UP (ref 8.4–10.5)
CHLORIDE SERPL-SCNC: 100 MMOL/L — SIGNIFICANT CHANGE UP (ref 98–107)
CO2 SERPL-SCNC: 22 MMOL/L — SIGNIFICANT CHANGE UP (ref 22–31)
CREAT SERPL-MCNC: 1.9 MG/DL — HIGH (ref 0.5–1.3)
EGFR: 34 ML/MIN/1.73M2 — LOW
GLUCOSE BLDC GLUCOMTR-MCNC: 199 MG/DL — HIGH (ref 70–99)
GLUCOSE BLDC GLUCOMTR-MCNC: 217 MG/DL — HIGH (ref 70–99)
GLUCOSE BLDC GLUCOMTR-MCNC: 259 MG/DL — HIGH (ref 70–99)
GLUCOSE BLDC GLUCOMTR-MCNC: 326 MG/DL — HIGH (ref 70–99)
GLUCOSE SERPL-MCNC: 256 MG/DL — HIGH (ref 70–99)
HCT VFR BLD CALC: 32.3 % — LOW (ref 39–50)
HGB BLD-MCNC: 10.3 G/DL — LOW (ref 13–17)
MAGNESIUM SERPL-MCNC: 2.3 MG/DL — SIGNIFICANT CHANGE UP (ref 1.6–2.6)
MCHC RBC-ENTMCNC: 22.6 PG — LOW (ref 27–34)
MCHC RBC-ENTMCNC: 31.9 G/DL — LOW (ref 32–36)
MCV RBC AUTO: 71 FL — LOW (ref 80–100)
NRBC # BLD: 0 /100 WBCS — SIGNIFICANT CHANGE UP (ref 0–0)
NRBC # FLD: 0 K/UL — SIGNIFICANT CHANGE UP (ref 0–0)
PHOSPHATE SERPL-MCNC: 4.7 MG/DL — HIGH (ref 2.5–4.5)
PLATELET # BLD AUTO: 302 K/UL — SIGNIFICANT CHANGE UP (ref 150–400)
POTASSIUM SERPL-MCNC: 4.6 MMOL/L — SIGNIFICANT CHANGE UP (ref 3.5–5.3)
POTASSIUM SERPL-SCNC: 4.6 MMOL/L — SIGNIFICANT CHANGE UP (ref 3.5–5.3)
RBC # BLD: 4.55 M/UL — SIGNIFICANT CHANGE UP (ref 4.2–5.8)
RBC # FLD: 16 % — HIGH (ref 10.3–14.5)
SODIUM SERPL-SCNC: 137 MMOL/L — SIGNIFICANT CHANGE UP (ref 135–145)
WBC # BLD: 10.69 K/UL — HIGH (ref 3.8–10.5)
WBC # FLD AUTO: 10.69 K/UL — HIGH (ref 3.8–10.5)

## 2025-01-03 PROCEDURE — G0545: CPT

## 2025-01-03 PROCEDURE — 99232 SBSQ HOSP IP/OBS MODERATE 35: CPT

## 2025-01-03 RX ORDER — INSULIN LISPRO 100/ML
5 VIAL (ML) SUBCUTANEOUS
Refills: 0 | Status: DISCONTINUED | OUTPATIENT
Start: 2025-01-03 | End: 2025-01-04

## 2025-01-03 RX ORDER — ACETAMINOPHEN 80 MG/.8ML
1000 SOLUTION/ DROPS ORAL ONCE
Refills: 0 | Status: DISCONTINUED | OUTPATIENT
Start: 2025-01-03 | End: 2025-01-03

## 2025-01-03 RX ORDER — INSULIN GLARGINE-YFGN 100 [IU]/ML
14 INJECTION, SOLUTION SUBCUTANEOUS AT BEDTIME
Refills: 0 | Status: DISCONTINUED | OUTPATIENT
Start: 2025-01-03 | End: 2025-01-04

## 2025-01-03 RX ORDER — INSULIN GLARGINE-YFGN 100 [IU]/ML
12 INJECTION, SOLUTION SUBCUTANEOUS AT BEDTIME
Refills: 0 | Status: DISCONTINUED | OUTPATIENT
Start: 2025-01-03 | End: 2025-01-03

## 2025-01-03 RX ADMIN — Medication 81 MILLIGRAM(S): at 10:12

## 2025-01-03 RX ADMIN — Medication 4: at 12:53

## 2025-01-03 RX ADMIN — SPIRONOLACTONE 25 MILLIGRAM(S): 50 TABLET ORAL at 05:30

## 2025-01-03 RX ADMIN — Medication 5 UNIT(S): at 12:39

## 2025-01-03 RX ADMIN — LOSARTAN POTASSIUM 100 MILLIGRAM(S): 100 TABLET, FILM COATED ORAL at 05:30

## 2025-01-03 RX ADMIN — INSULIN GLARGINE-YFGN 14 UNIT(S): 100 INJECTION, SOLUTION SUBCUTANEOUS at 23:39

## 2025-01-03 RX ADMIN — Medication 60 MILLIGRAM(S): at 12:32

## 2025-01-03 RX ADMIN — Medication 3: at 09:10

## 2025-01-03 RX ADMIN — NEBIVOLOL 5 MILLIGRAM(S): 5 TABLET ORAL at 05:29

## 2025-01-03 RX ADMIN — Medication 5 UNIT(S): at 17:20

## 2025-01-03 RX ADMIN — Medication 10 MILLIGRAM(S): at 05:30

## 2025-01-03 RX ADMIN — ATORVASTATIN CALCIUM 40 MILLIGRAM(S): 40 TABLET, FILM COATED ORAL at 21:13

## 2025-01-03 RX ADMIN — HEPARIN SODIUM 5000 UNIT(S): 1000 INJECTION, SOLUTION INTRAVENOUS; SUBCUTANEOUS at 12:31

## 2025-01-03 RX ADMIN — Medication 3 MILLIGRAM(S): at 21:13

## 2025-01-03 RX ADMIN — Medication 1: at 17:19

## 2025-01-03 RX ADMIN — HEPARIN SODIUM 5000 UNIT(S): 1000 INJECTION, SOLUTION INTRAVENOUS; SUBCUTANEOUS at 21:13

## 2025-01-03 RX ADMIN — REMDESIVIR 200 MILLIGRAM(S): 5 INJECTION INTRAVENOUS at 21:13

## 2025-01-03 RX ADMIN — ALLOPURINOL 100 MILLIGRAM(S): 100 TABLET ORAL at 10:12

## 2025-01-03 RX ADMIN — Medication 5 UNIT(S): at 09:55

## 2025-01-03 RX ADMIN — HEPARIN SODIUM 5000 UNIT(S): 1000 INJECTION, SOLUTION INTRAVENOUS; SUBCUTANEOUS at 05:30

## 2025-01-03 NOTE — PROGRESS NOTE ADULT - ASSESSMENT
89 y/o M with pmh of prostate cancer, DM type 2, HTN, p/w hypoglycemia. +NST planning for angio. nephrology consulted for elevated scr    CKD stage 3B  scr seems to be fluctuating ~ 1.5-1.8  scr stable  +covid   on hctz, losartan, spironolactone  monitor for now  possible plan for LHC recommend ns @ 75cc/hr x6 hrs before and 6 hrs after angio  monitor    proteinuria  mild  check urine p/c ratio    covid  care per team    htn  controlled  monitor    cp  +NST  planning for possible angio  f/u cardio

## 2025-01-03 NOTE — PROGRESS NOTE ADULT - SUBJECTIVE AND OBJECTIVE BOX
Date of Service  : 01-03-25     INTERVAL HPI/OVERNIGHT EVENTS: I feel weak .  Vital Signs Last 24 Hrs  T(C): 36.7 (03 Jan 2025 17:24), Max: 37.1 (03 Jan 2025 03:00)  T(F): 98 (03 Jan 2025 17:24), Max: 98.7 (03 Jan 2025 03:00)  HR: 77 (03 Jan 2025 17:24) (60 - 77)  BP: 130/70 (03 Jan 2025 17:24) (130/70 - 159/83)  BP(mean): --  RR: 16 (03 Jan 2025 17:24) (16 - 19)  SpO2: 100% (03 Jan 2025 17:24) (96% - 100%)    Parameters below as of 03 Jan 2025 17:24  Patient On (Oxygen Delivery Method): room air      I&O's Summary    02 Jan 2025 07:01  -  03 Jan 2025 07:00  --------------------------------------------------------  IN: 0 mL / OUT: 500 mL / NET: -500 mL      MEDICATIONS  (STANDING):  allopurinol 100 milliGRAM(s) Oral daily  amLODIPine   Tablet 10 milliGRAM(s) Oral daily  aspirin enteric coated 81 milliGRAM(s) Oral daily  atorvastatin 40 milliGRAM(s) Oral at bedtime  heparin   Injectable 5000 Unit(s) SubCutaneous every 8 hours  hydrochlorothiazide 25 milliGRAM(s) Oral daily  insulin glargine Injectable (LANTUS) 14 Unit(s) SubCutaneous at bedtime  insulin lispro (ADMELOG) corrective regimen sliding scale   SubCutaneous at bedtime  insulin lispro (ADMELOG) corrective regimen sliding scale   SubCutaneous three times a day before meals  insulin lispro Injectable (ADMELOG) 5 Unit(s) SubCutaneous three times a day before meals  isosorbide   mononitrate ER Tablet (IMDUR) 60 milliGRAM(s) Oral daily  losartan 100 milliGRAM(s) Oral daily  nebivolol 5 milliGRAM(s) Oral daily  remdesivir  IVPB   IV Intermittent   remdesivir  IVPB 100 milliGRAM(s) IV Intermittent every 24 hours  spironolactone 25 milliGRAM(s) Oral daily    MEDICATIONS  (PRN):  melatonin 3 milliGRAM(s) Oral at bedtime PRN Insomnia    LABS:                        10.3   10.69 )-----------( 302      ( 03 Jan 2025 06:24 )             32.3     01-03    137  |  100  |  50[H]  ----------------------------<  256[H]  4.6   |  22  |  1.90[H]    Ca    9.5      03 Jan 2025 06:24  Phos  4.7     01-03  Mg     2.30     01-03      PT/INR - ( 02 Jan 2025 04:25 )   PT: 12.9 sec;   INR: 1.08 ratio         PTT - ( 02 Jan 2025 04:25 )  PTT:30.4 sec  Urinalysis Basic - ( 03 Jan 2025 06:24 )    Color: x / Appearance: x / SG: x / pH: x  Gluc: 256 mg/dL / Ketone: x  / Bili: x / Urobili: x   Blood: x / Protein: x / Nitrite: x   Leuk Esterase: x / RBC: x / WBC x   Sq Epi: x / Non Sq Epi: x / Bacteria: x      CAPILLARY BLOOD GLUCOSE      POCT Blood Glucose.: 199 mg/dL (03 Jan 2025 17:17)  POCT Blood Glucose.: 326 mg/dL (03 Jan 2025 12:35)  POCT Blood Glucose.: 259 mg/dL (03 Jan 2025 08:35)  POCT Blood Glucose.: 218 mg/dL (02 Jan 2025 21:10)        Urinalysis Basic - ( 03 Jan 2025 06:24 )    Color: x / Appearance: x / SG: x / pH: x  Gluc: 256 mg/dL / Ketone: x  / Bili: x / Urobili: x   Blood: x / Protein: x / Nitrite: x   Leuk Esterase: x / RBC: x / WBC x   Sq Epi: x / Non Sq Epi: x / Bacteria: x      REVIEW OF SYSTEMS:  CONSTITUTIONAL: No fever, weight loss, or fatigue  EYES: No eye pain, visual disturbances, or discharge  ENMT:  No difficulty hearing, tinnitus, vertigo; No sinus or throat pain  NECK: No pain or stiffness  RESPIRATORY: No cough, wheezing, chills or hemoptysis; No shortness of breath  CARDIOVASCULAR: No chest pain, palpitations, dizziness, or leg swelling  GASTROINTESTINAL: No abdominal or epigastric pain. No nausea, vomiting, or hematemesis; No diarrhea or constipation. No melena or hematochezia.  GENITOURINARY: No dysuria, frequency, hematuria, or incontinence  NEUROLOGICAL: No headaches, memory loss, loss of strength, numbness, or tremors    Consultant(s) Notes Reviewed:  [x ] YES  [ ] NO    PHYSICAL EXAM:  GENERAL: NAD, well-groomed, well-developed,not in any distress ,Oxygen   HEAD:  Atraumatic, Normocephalic  EYES: EOMI, PERRLA, conjunctiva and sclera clear  ENMT: No tonsillar erythema, exudates, or enlargement; Moist mucous membranes, Good dentition, No lesions  NECK: Supple, No JVD, Normal thyroid  NERVOUS SYSTEM:  Alert & Oriented X3, No focal deficit   CHEST/LUNG: Good air entry bilateral with no  rales, rhonchi, wheezing, or rubs  HEART: Regular rate and rhythm; No murmurs, rubs, or gallops  ABDOMEN: Soft, Nontender, Nondistended; Bowel sounds present  EXTREMITIES:  2+ Peripheral Pulses, No clubbing, cyanosis, or edema    Care Discussed with Consultants/Other Providers [ x] YES  [ ] NO

## 2025-01-03 NOTE — PROGRESS NOTE ADULT - SUBJECTIVE AND OBJECTIVE BOX
Follow Up:      Interval History/ROS:   feels okay  now requiring nasal O2 2L    Allergies  No Known Allergies        ANTIMICROBIALS:  remdesivir  IVPB    remdesivir  IVPB 100 every 24 hours      OTHER MEDS:  allopurinol 100 milliGRAM(s) Oral daily  amLODIPine   Tablet 10 milliGRAM(s) Oral daily  aspirin enteric coated 81 milliGRAM(s) Oral daily  atorvastatin 40 milliGRAM(s) Oral at bedtime  heparin   Injectable 5000 Unit(s) SubCutaneous every 8 hours  hydrochlorothiazide 25 milliGRAM(s) Oral daily  insulin glargine Injectable (LANTUS) 14 Unit(s) SubCutaneous at bedtime  insulin lispro (ADMELOG) corrective regimen sliding scale   SubCutaneous at bedtime  insulin lispro (ADMELOG) corrective regimen sliding scale   SubCutaneous three times a day before meals  insulin lispro Injectable (ADMELOG) 5 Unit(s) SubCutaneous three times a day before meals  isosorbide   mononitrate ER Tablet (IMDUR) 60 milliGRAM(s) Oral daily  losartan 100 milliGRAM(s) Oral daily  melatonin 3 milliGRAM(s) Oral at bedtime PRN  nebivolol 5 milliGRAM(s) Oral daily  spironolactone 25 milliGRAM(s) Oral daily      Vital Signs Last 24 Hrs  T(C): 36.7 (03 Jan 2025 17:24), Max: 37.1 (03 Jan 2025 03:00)  T(F): 98 (03 Jan 2025 17:24), Max: 98.7 (03 Jan 2025 03:00)  HR: 77 (03 Jan 2025 17:24) (60 - 77)  BP: 130/70 (03 Jan 2025 17:24) (125/75 - 159/83)  BP(mean): --  RR: 16 (03 Jan 2025 17:24) (16 - 19)  SpO2: 100% (03 Jan 2025 17:24) (95% - 100%)    Parameters below as of 03 Jan 2025 17:24  Patient On (Oxygen Delivery Method): room air        PHYSICAL EXAM:  Constitutional: Not in acute distress  Eyes: No icterus.  slight nasal congestion   Oral cavity: Clear, no lesions  Neck: Supple  RS: Chest clear ant  CVS: S1, S2   Abdomen: Soft.   Skin: No rash  Neuro: Alert, oriented to time/place/person  Cranial nerves 2-12 grossly normal. No focal abnormalities                          10.3   10.69 )-----------( 302      ( 03 Jan 2025 06:24 )             32.3       01-03    137  |  100  |  50[H]  ----------------------------<  256[H]  4.6   |  22  |  1.90[H]    Ca    9.5      03 Jan 2025 06:24  Phos  4.7     01-03  Mg     2.30     01-03        Urinalysis Basic - ( 03 Jan 2025 06:24 )    Color: x / Appearance: x / SG: x / pH: x  Gluc: 256 mg/dL / Ketone: x  / Bili: x / Urobili: x   Blood: x / Protein: x / Nitrite: x   Leuk Esterase: x / RBC: x / WBC x   Sq Epi: x / Non Sq Epi: x / Bacteria: x        MICROBIOLOGY:    FluA/FluB/RSV/COVID PCR (01.02.25 @ 04:56)   SARS-CoV-2 Result: Detected: This Respiratory Panel uses polymerase chain reaction (PCR) to detect for   influenza A; influenza B; respiratory syncytial virus; and SARS-CoV-2.   Test results should be correlated with clinical presentation, patient   history, and epidemiology.  Influenza A Result: NotDete  Influenza B Result: NotDetec  Resp Syn Virus Result: NotDetec        RADIOLOGY:    rad< from: Xray Chest 1 View- PORTABLE-Urgent (Xray Chest 1 View- PORTABLE-Urgent .) (01.02.25 @ 05:08) >    ACC: [id number] EXAM:  XR CHEST PORTABLE URGENT 1V   ORDERED BY: RENATE DON     PROCEDURE DATE:  01/02/2025          INTERPRETATION:  EXAMINATION: XR CHEST URGENT    CLINICAL INDICATION: wheezing    TECHNIQUE: Single frontal, portable view ofthe chest was obtained.    COMPARISON: Chest x-ray 12/30/2024.    FINDINGS:  Right hemidiaphragm elevation.  The heart is normal in size.  No focal consolidation.  There is no pneumothorax or pleural effusion.  No acute abnormalities in the visualized osseous structures.    IMPRESSION: Clear lungs.      --- End of Report ---    < end of copied text >

## 2025-01-03 NOTE — PROGRESS NOTE ADULT - SUBJECTIVE AND OBJECTIVE BOX
Cardiology Attending Follow-up Note     Patient seen and examined at bedside.    Overnight Events:     cr improving   on redemsir for covid     REVIEW OF SYSTEMS:  Constitutional:     [x ] negative [ ] fevers [ ] chills [ ] weight loss [ ] weight gain  HEENT:                  [x ] negative [ ] dry eyes [ ] eye irritation [ ] postnasal drip [ ] nasal congestion  CV:                         [ x] negative  [ ] chest pain [ ] orthopnea [ ] palpitations [ ] murmur  Resp:                     [ x] negative [ ] cough [ ] shortness of breath [ ] dyspnea [ ] wheezing [ ] sputum [ ]hemoptysis  GI:                          [ x] negative [ ] nausea [ ] vomiting [ ] diarrhea [ ] constipation [ ] abd pain [ ] dysphagia   :                        [ x] negative [ ] dysuria [ ] nocturia [ ] hematuria [ ] increased urinary frequency  Musculoskeletal: [ x] negative [ ] back pain [ ] myalgias [ ] arthralgias [ ] fracture  Skin:                       [ x] negative [ ] rash [ ] itch  Neurological:        [x ] negative [ ] headache [ ] dizziness [ ] syncope [ ] weakness [ ] numbness  Psychiatric:           [ x] negative [ ] anxiety [ ] depression  Endocrine:            [ x] negative [ ] diabetes [ ] thyroid problem  Heme/Lymph:      [ x] negative [ ] anemia [ ] bleeding problem  Allergic/Immune: [ x] negative [ ] itchy eyes [ ] nasal discharge [ ] hives [ ] angioedema    [ x] All other systems negative  [ ] Unable to assess ROS due to    Current Meds:  allopurinol 100 milliGRAM(s) Oral daily  amLODIPine   Tablet 10 milliGRAM(s) Oral daily  aspirin enteric coated 81 milliGRAM(s) Oral daily  atorvastatin 40 milliGRAM(s) Oral at bedtime  heparin   Injectable 5000 Unit(s) SubCutaneous every 8 hours  hydrochlorothiazide 25 milliGRAM(s) Oral daily  insulin glargine Injectable (LANTUS) 14 Unit(s) SubCutaneous at bedtime  insulin lispro (ADMELOG) corrective regimen sliding scale   SubCutaneous at bedtime  insulin lispro (ADMELOG) corrective regimen sliding scale   SubCutaneous three times a day before meals  insulin lispro Injectable (ADMELOG) 5 Unit(s) SubCutaneous three times a day before meals  isosorbide   mononitrate ER Tablet (IMDUR) 60 milliGRAM(s) Oral daily  losartan 100 milliGRAM(s) Oral daily  melatonin 3 milliGRAM(s) Oral at bedtime PRN  nebivolol 5 milliGRAM(s) Oral daily  remdesivir  IVPB   IV Intermittent   remdesivir  IVPB 100 milliGRAM(s) IV Intermittent every 24 hours  spironolactone 25 milliGRAM(s) Oral daily      PAST MEDICAL & SURGICAL HISTORY:  HTN (hypertension)      DM2 (diabetes mellitus, type 2)      Prostate cancer      No significant past surgical history          Vitals:  T(F): 98.4 (01-03), Max: 98.7 (01-03)  HR: 67 (01-03) (60 - 77)  BP: 147/76 (01-03) (130/70 - 159/83)  RR: 16 (01-03)  SpO2: 98% (01-03)  I&O's Summary    02 Jan 2025 07:01  -  03 Jan 2025 07:00  --------------------------------------------------------  IN: 0 mL / OUT: 500 mL / NET: -500 mL        Physical Exam:  Appearance: No acute distress  HENT: No JVD   Cardiovascular: RRR, S1/S2, no murmurs  Respiratory: CTABL  Gastrointestinal: soft, NT ND, +BS  Musculoskeletal: No clubbing, no edema   Neurologic: Non-focal  Skin: No rashes, ecchymoses, or cyanosis                          10.3   10.69 )-----------( 302      ( 03 Jan 2025 06:24 )             32.3     01-03    137  |  100  |  50[H]  ----------------------------<  256[H]  4.6   |  22  |  1.90[H]    Ca    9.5      03 Jan 2025 06:24  Phos  4.7     01-03  Mg     2.30     01-03      PT/INR - ( 02 Jan 2025 04:25 )   PT: 12.9 sec;   INR: 1.08 ratio         PTT - ( 02 Jan 2025 04:25 )  PTT:30.4 sec              Cardiovascular Testings:   CONCLUSIONS:      1. Left ventricular systolic function is normal with an ejection fraction visually estimated at 50 to 55 %.   2. There is mild (grade 1) left ventricular diastolic dysfunction.   3. Left atrium is normalin size.   4. The right atrium is normal in size.   5. Normal right ventricular cavity size, with normal wall thickness, and normal right ventricular systolic function.   6. Trileaflet aortic valve with normal systolic excursion. There is calcification of the aortic valve leaflets.   7. Moderate to severe pulmonic regurgitation.   8. No pericardial effusion seen.    Conclusions:   1. Qualitative Perfusion:      - medium-sized, moderate to severe defect(s) in the inferior and inferolateral wall suggestive of CAD.   2. The left ventricle is mildly decreased in function and normal in size. The time activity curve suggests diastolic dysfunction.. The post stress left ventricular EF is 45 %. The stress end diastolic volume is 71 ml and systolic volume is 39 ml.   3. Hypokinesis of the inferoseptal wall.   4. The RV was not well visualized.

## 2025-01-03 NOTE — PROGRESS NOTE ADULT - ASSESSMENT
89 y/o M with pmh of prostate cancer, DM type 2, HTN, p/w hypoglycemia.  Pt reports his insulin glargine  dose was increased from 12 to 15 units recently.  Over the past few days, he reports getting low reading in the morning, but is able to alert his wife who gives him food to bring it  up.  Yesterday AM, pt's wife noticed he was confused and making gurgling sounds while in bed.  She checked FS which was 41.  EMS was called, and pt give D50 en route to ED.  Pt also reports intermittent L sided chest pain for the past several days.  Pain is positional, and improves when pt wears a support belt.  Pain is difficult for patient to describe quality.  No associated dyspnea, dizziness nausea or diaphoresis.  No recent fever or cough.    Pt was evaluated in the ED, and placed in the CDU for monitoring.  He is admitted for further w/u of chest pain and monitoring of glucose.    Planned for Cardiac cath om Thursday.      Problem/Recommendation - 1:  ·  Problem: Chest pain with abnormal stress test .   ·  Recommendation: Awaiting cardiac cath .   Will start on Aspirin and statin.  Cardiology help appreciated.   < from: TTE W or WO Ultrasound Enhancing Agent (12.31.24 @ 07:33) >    CONCLUSIONS:      1. Left ventricular systolic function is normal with an ejection fraction visually estimated at 50 to 55 %.      < from: Nuclear Stress Test-Exercise.. (12.31.24 @ 08:30) >     1. Qualitative Perfusion:      - medium-sized, moderate to severe defect(s) in the inferior and inferolateral wall suggestive of CAD.   2. The left ventricle is mildly decreased in function and normal in size. The time activity curve suggests diastolic dysfunction.. The post stress left ventricular EF is 45 %. The stress end diastolic volume is 71 ml and systolic volume is 39 ml.   3. Hypokinesis of the inferoseptal wall.     Problem/Recommendation - 2:  ·  Problem: Type 2 diabetes mellitus with hypoglycemia.   ·  Recommendation: Endo help appreciated.     Following  their recommendation.     Problem/Recommendation - 3:  ·  Problem: Essential hypertension.   ·  Recommendation: BP medications with hold parameters.     Problem/Recommendation - 4:  ·  Problem: CKD with KIRBY .   ·  Recommendation: Trending Creatinine .  Renal help appreciated.       Problem/Recommendation - 5:  ·  Problem: Prostate cancer.   ·  Recommendation: Out patient Follow Up.       Problem/Recommendation - 6:  ·  Problem: COVID 19 infection with Hypoxia .   ·  Recommendation: ID help appreciated.   ON Remdisvir .    D/W  his wife in detail.

## 2025-01-03 NOTE — PROGRESS NOTE ADULT - ASSESSMENT
89M with PMH of prostate CA, T2DM, HTN, HLD, CKD, here for hypoglycemia sxs. In the ER, pt found to have cp with EKG w/ NSR and no significant ischemic changes. Pt was recommended to be admitted by Tele Doc Dr. Perea. Cardiology consulted for further evaluation inpt.     1. Cp, +NST and TTE reviewed   2. HTN   3. HLD   4. CKD     -given +NST, initially planned for LHC, deferred for now given COVID+ and rising Cr. cr improved, LHC on Monday   -bp control, cont norvasc, imduri, losartan, and Hctz   -ID eval for covid in elderly     Nitin Lee MD [facility]  Cardiology Attending, Jenn-NS/LOIS  Avaliable on [employer] Team

## 2025-01-03 NOTE — PROGRESS NOTE ADULT - SUBJECTIVE AND OBJECTIVE BOX
[facility]   MD CRISTI SESAY MD ANGELA WONG, PA    TEL:  OFFICE: [phone]  From 5pm-7am Answering Service [phone]    -- RENAL FOLLOW UP NOTE ---Date of Service 01-03-25 @ 13:51    Patient is a 89y old  Male who presents with a chief complaint of hypoglycemia (02 Jan 2025 19:21)      Patient seen and examined at bedside. No chest pain/sob, c/o some dry cough    VITALS:  T(F): 97.2 (01-03-25 @ 11:18), Max: 98.7 (01-03-25 @ 03:00)  HR: 70 (01-03-25 @ 11:18)  BP: 132/69 (01-03-25 @ 11:18)  RR: 18 (01-03-25 @ 11:18)  SpO2: 100% (01-03-25 @ 11:18)  Wt(kg): --    01-02 @ 07:01  -  01-03 @ 07:00  --------------------------------------------------------  IN: 0 mL / OUT: 500 mL / NET: -500 mL          PHYSICAL EXAM:  General: NAD  Neck: No JVD  Respiratory: CTAB, no wheezes, rales or rhonchi  Cardiovascular: S1, S2, RRR  Gastrointestinal: BS+, soft, NT/ND  Extremities: No peripheral edema    Hospital Medications:   MEDICATIONS  (STANDING):  allopurinol 100 milliGRAM(s) Oral daily  amLODIPine   Tablet 10 milliGRAM(s) Oral daily  aspirin enteric coated 81 milliGRAM(s) Oral daily  atorvastatin 40 milliGRAM(s) Oral at bedtime  heparin   Injectable 5000 Unit(s) SubCutaneous every 8 hours  hydrochlorothiazide 25 milliGRAM(s) Oral daily  insulin glargine Injectable (LANTUS) 12 Unit(s) SubCutaneous at bedtime  insulin lispro (ADMELOG) corrective regimen sliding scale   SubCutaneous at bedtime  insulin lispro (ADMELOG) corrective regimen sliding scale   SubCutaneous three times a day before meals  insulin lispro Injectable (ADMELOG) 5 Unit(s) SubCutaneous three times a day before meals  isosorbide   mononitrate ER Tablet (IMDUR) 60 milliGRAM(s) Oral daily  losartan 100 milliGRAM(s) Oral daily  nebivolol 5 milliGRAM(s) Oral daily  remdesivir  IVPB   IV Intermittent   remdesivir  IVPB 100 milliGRAM(s) IV Intermittent every 24 hours  spironolactone 25 milliGRAM(s) Oral daily      LABS:  01-03    137  |  100  |  50[H]  ----------------------------<  256[H]  4.6   |  22  |  1.90[H]    Ca    9.5      03 Jan 2025 06:24  Phos  4.7     01-03  Mg     2.30     01-03      Creatinine Trend: 1.90 <--, 2.01 <--, 1.83 <--, 1.52 <--, 2.00 <--    Phosphorus: 4.7 mg/dL (01-03 @ 06:24)                              10.3   10.69 )-----------( 302      ( 03 Jan 2025 06:24 )             32.3     Urine Studies:  Urinalysis - [01-03-25 @ 06:24]      Color  / Appearance  / SG  / pH       Gluc 256 / Ketone   / Bili  / Urobili        Blood  / Protein  / Leuk Est  / Nitrite       RBC  / WBC  / Hyaline  / Gran  / Sq Epi  / Non Sq Epi  / Bacteria       Lipid: chol 130, , HDL 36, LDL --      [01-01-25 @ 05:30]        RADIOLOGY & ADDITIONAL STUDIES:

## 2025-01-03 NOTE — PROGRESS NOTE ADULT - ASSESSMENT
89-year-old male with a past medical history of prostate cancer, diabetes, hypertension, presenting due to concern of hypoglycemia at home.  The patient states he is tired and was not sure what happened.  According to his wife who was in bed with him she started hearing him gurgling at home.  She turned on the lights and tried to wake him up, the patient was unresponsive.  They called EMS who came and checked a fingerstick which was 41.  He was given 1 amp of D50 with a repeat being 140.  Patient also tolerated p.o.  According to wife he had a similar occurrence yesterday where his fingerstick was in the 40s.  Patient takes combined metformin and glipizide as well as lantus  Denies fever, chills, chest pain, trouble breathing, dysuria.  According to patient he has decreased appetite recently compared to the past.  endocrine called for further assistance  pt reports long hx of DM   he states lantus was increased from 12--> 15 units  checks FSBG in the am and afternoon  reports multiple lows in 30-40s   eGFR: 44    #Poorly controlled T2DM with hyperglycemia  - HbA1c: 7.0  - Home Regimen: combined metformin and glipizide as well as lantus 12-15 units  - Endocrinologist: unknown    INPATIENT PLAN:   - Goal -180 mg/dl: Fasting and prandial FSs above goal. Pt with URI + covid.   - Increase Lantus to 14 units SQ at bedtime in the setting of infectious process.   - Increase Admelog to 5 units SQ TID AC, hold if NPO or not eating a meal.   - continue low dose Admelog correction scale at mealtimes  - start low dose Admelog correction scale at bedtime  - Fingerstick BG before meals, bedtime and 3am  - Carbohydrate consistent diet  - RD consult  - Hold oral DM agents while inpatient  - C-Peptide, Serum: 3.0 ng/mL (12-31-24 @ 05:30)---glucose 170- pt is making adequate endogenous insulin.  - eGFR: 34 mL/min/1.73m2 (01-03-25 @ 06:24)    DISCHARGE PLAN:   - Lantus ____ SQ at bedtime plus metformin 500 mg PO BID (egfr 34)   - stop glipizide given profound hypoglycemia   - pt reporting decreased appetite - avoid glp-1 for now  - Please send Rx for Glucose tabs, GVOKE hypopen, Baqsimi nasal spray or glucagon emergency kit for hypoglycemia risk.   - Patient to call doctor with persistent high or low BG at home.   - Ensure patient has glucometer, test strips and lancets on discharge.  - Recommend routine outpatient ophthalmology and podiatry follow up.       #HTN  - Outpatient goal BP <130//80 for age  - Can check urine albumin/creatinine outpatient  - Management per primary team.    #HLD  - LDL goal < 70  - On atorvastatin 40mg at bedtime.       SIRENA Garcia-BC  Nurse Practitioner  Division of Endocrinology  Contact on TEAMS    If out of hospital/unavailable when paged, please note: patient will be cared for by another provider on the endocrine service.  For urgent concerns: call the endocrine answering service for assistance to reach covering provider ([phone]). For non-urgent matters: please email [email or web address] for assistance.

## 2025-01-03 NOTE — PROGRESS NOTE ADULT - ASSESSMENT
88 yo man with diabetes admitted with hypoglycemia   noted to have expiratory wheeze today - covid+    afebrile   saturating 100% now on nasal O2 2L yesterday RA  slight nasal congestion   CXR clear     Covid+ mild- moderate   Requiring supplemental oxygen today  DM  Renal insufficiency     Suggest;   increase Remdesivir duration to 5 days rather than 3 days (originally plan)   monitor oxygen requirement   airborne / contact precautions

## 2025-01-03 NOTE — PROGRESS NOTE ADULT - SUBJECTIVE AND OBJECTIVE BOX
Chief Complaint: T2DM    Interval Events: Pt seen and examined at bedside. Patient confused to place and time. Reorientation provided.     MEDICATIONS  (STANDING):  allopurinol 100 milliGRAM(s) Oral daily  amLODIPine   Tablet 10 milliGRAM(s) Oral daily  aspirin enteric coated 81 milliGRAM(s) Oral daily  atorvastatin 40 milliGRAM(s) Oral at bedtime  heparin   Injectable 5000 Unit(s) SubCutaneous every 8 hours  hydrochlorothiazide 25 milliGRAM(s) Oral daily  insulin glargine Injectable (LANTUS) 14 Unit(s) SubCutaneous at bedtime  insulin lispro (ADMELOG) corrective regimen sliding scale   SubCutaneous at bedtime  insulin lispro (ADMELOG) corrective regimen sliding scale   SubCutaneous three times a day before meals  insulin lispro Injectable (ADMELOG) 5 Unit(s) SubCutaneous three times a day before meals  isosorbide   mononitrate ER Tablet (IMDUR) 60 milliGRAM(s) Oral daily  losartan 100 milliGRAM(s) Oral daily  nebivolol 5 milliGRAM(s) Oral daily  remdesivir  IVPB   IV Intermittent   remdesivir  IVPB 100 milliGRAM(s) IV Intermittent every 24 hours  spironolactone 25 milliGRAM(s) Oral daily    MEDICATIONS  (PRN):  melatonin 3 milliGRAM(s) Oral at bedtime PRN Insomnia      Allergies    No Known Allergies    Intolerances      Review of Systems:  Cardiovascular: No chest pain, palpitations  Respiratory: No SOB, + cough    VITALS: T(C): 36.2 (01-03-25 @ 11:18)  T(F): 97.2 (01-03-25 @ 11:18), Max: 98.7 (01-03-25 @ 03:00)  HR: 70 (01-03-25 @ 11:18) (60 - 86)  BP: 132/69 (01-03-25 @ 11:18) (125/75 - 159/83)  RR:  (17 - 19)  SpO2:  (95% - 100%)  Wt(kg): --      Physical Exam:   GENERAL: NAD, well-developed  EYES: No proptosis  HEENT:  Atraumatic, Normocephalic  RESPIRATORY: non labored breathing + cough  GI: Non distended  PSYCH: confused to place and time, calm and cooperative.     CAPILLARY BLOOD GLUCOSE    POCT Blood Glucose.: 326 mg/dL (03 Jan 2025 12:35)  POCT Blood Glucose.: 259 mg/dL (03 Jan 2025 08:35)  POCT Blood Glucose.: 218 mg/dL (02 Jan 2025 21:10)  POCT Blood Glucose.: 186 mg/dL (02 Jan 2025 17:21)      01-03    137  |  100  |  50[H]  ----------------------------<  256[H]  4.6   |  22  |  1.90[H]    eGFR: 34[L]    Ca    9.5      01-03  Mg     2.30     01-03  Phos  4.7     01-03    eGFR: 34 mL/min/1.73m2 (01-03-25 @ 06:24)    A1C with Estimated Average Glucose Result: 7.0 % (12-30-24 @ 09:36)      Thyroid Function Tests:

## 2025-01-04 LAB
ANION GAP SERPL CALC-SCNC: 18 MMOL/L — HIGH (ref 7–14)
BUN SERPL-MCNC: 58 MG/DL — HIGH (ref 7–23)
CALCIUM SERPL-MCNC: 9.3 MG/DL — SIGNIFICANT CHANGE UP (ref 8.4–10.5)
CHLORIDE SERPL-SCNC: 101 MMOL/L — SIGNIFICANT CHANGE UP (ref 98–107)
CO2 SERPL-SCNC: 19 MMOL/L — LOW (ref 22–31)
CREAT ?TM UR-MCNC: 128 MG/DL — SIGNIFICANT CHANGE UP
CREAT SERPL-MCNC: 1.92 MG/DL — HIGH (ref 0.5–1.3)
EGFR: 33 ML/MIN/1.73M2 — LOW
GLUCOSE BLDC GLUCOMTR-MCNC: 209 MG/DL — HIGH (ref 70–99)
GLUCOSE BLDC GLUCOMTR-MCNC: 225 MG/DL — HIGH (ref 70–99)
GLUCOSE BLDC GLUCOMTR-MCNC: 247 MG/DL — HIGH (ref 70–99)
GLUCOSE BLDC GLUCOMTR-MCNC: 263 MG/DL — HIGH (ref 70–99)
GLUCOSE SERPL-MCNC: 157 MG/DL — HIGH (ref 70–99)
HCT VFR BLD CALC: 35.5 % — LOW (ref 39–50)
HGB BLD-MCNC: 11.2 G/DL — LOW (ref 13–17)
MAGNESIUM SERPL-MCNC: 2.6 MG/DL — SIGNIFICANT CHANGE UP (ref 1.6–2.6)
MCHC RBC-ENTMCNC: 22.7 PG — LOW (ref 27–34)
MCHC RBC-ENTMCNC: 31.5 G/DL — LOW (ref 32–36)
MCV RBC AUTO: 71.9 FL — LOW (ref 80–100)
NRBC # BLD: 0 /100 WBCS — SIGNIFICANT CHANGE UP (ref 0–0)
NRBC # FLD: 0 K/UL — SIGNIFICANT CHANGE UP (ref 0–0)
PHOSPHATE SERPL-MCNC: 5 MG/DL — HIGH (ref 2.5–4.5)
PLATELET # BLD AUTO: 335 K/UL — SIGNIFICANT CHANGE UP (ref 150–400)
POTASSIUM SERPL-MCNC: 5.3 MMOL/L — SIGNIFICANT CHANGE UP (ref 3.5–5.3)
POTASSIUM SERPL-SCNC: 5.3 MMOL/L — SIGNIFICANT CHANGE UP (ref 3.5–5.3)
PROT ?TM UR-MCNC: 34 MG/DL — SIGNIFICANT CHANGE UP
PROT/CREAT UR-RTO: 0.3 RATIO — HIGH (ref 0–0.2)
RBC # BLD: 4.94 M/UL — SIGNIFICANT CHANGE UP (ref 4.2–5.8)
RBC # FLD: 16.3 % — HIGH (ref 10.3–14.5)
SODIUM SERPL-SCNC: 138 MMOL/L — SIGNIFICANT CHANGE UP (ref 135–145)
WBC # BLD: 10.12 K/UL — SIGNIFICANT CHANGE UP (ref 3.8–10.5)
WBC # FLD AUTO: 10.12 K/UL — SIGNIFICANT CHANGE UP (ref 3.8–10.5)

## 2025-01-04 PROCEDURE — 99232 SBSQ HOSP IP/OBS MODERATE 35: CPT

## 2025-01-04 PROCEDURE — 99233 SBSQ HOSP IP/OBS HIGH 50: CPT

## 2025-01-04 RX ORDER — INSULIN LISPRO 100/ML
6 VIAL (ML) SUBCUTANEOUS
Refills: 0 | Status: DISCONTINUED | OUTPATIENT
Start: 2025-01-04 | End: 2025-01-06

## 2025-01-04 RX ORDER — INSULIN GLARGINE-YFGN 100 [IU]/ML
17 INJECTION, SOLUTION SUBCUTANEOUS AT BEDTIME
Refills: 0 | Status: DISCONTINUED | OUTPATIENT
Start: 2025-01-04 | End: 2025-01-06

## 2025-01-04 RX ADMIN — Medication 5 UNIT(S): at 12:56

## 2025-01-04 RX ADMIN — HEPARIN SODIUM 5000 UNIT(S): 1000 INJECTION, SOLUTION INTRAVENOUS; SUBCUTANEOUS at 13:03

## 2025-01-04 RX ADMIN — Medication 60 MILLIGRAM(S): at 13:04

## 2025-01-04 RX ADMIN — REMDESIVIR 200 MILLIGRAM(S): 5 INJECTION INTRAVENOUS at 21:40

## 2025-01-04 RX ADMIN — LOSARTAN POTASSIUM 100 MILLIGRAM(S): 100 TABLET, FILM COATED ORAL at 06:15

## 2025-01-04 RX ADMIN — INSULIN GLARGINE-YFGN 17 UNIT(S): 100 INJECTION, SOLUTION SUBCUTANEOUS at 21:40

## 2025-01-04 RX ADMIN — Medication 5 UNIT(S): at 09:28

## 2025-01-04 RX ADMIN — ALLOPURINOL 100 MILLIGRAM(S): 100 TABLET ORAL at 10:31

## 2025-01-04 RX ADMIN — Medication 3: at 12:00

## 2025-01-04 RX ADMIN — Medication 6 UNIT(S): at 17:07

## 2025-01-04 RX ADMIN — Medication 81 MILLIGRAM(S): at 10:31

## 2025-01-04 RX ADMIN — Medication 10 MILLIGRAM(S): at 06:14

## 2025-01-04 RX ADMIN — SPIRONOLACTONE 25 MILLIGRAM(S): 50 TABLET ORAL at 06:15

## 2025-01-04 RX ADMIN — HEPARIN SODIUM 5000 UNIT(S): 1000 INJECTION, SOLUTION INTRAVENOUS; SUBCUTANEOUS at 21:42

## 2025-01-04 RX ADMIN — NEBIVOLOL 5 MILLIGRAM(S): 5 TABLET ORAL at 06:14

## 2025-01-04 RX ADMIN — Medication 2: at 17:06

## 2025-01-04 RX ADMIN — ATORVASTATIN CALCIUM 40 MILLIGRAM(S): 40 TABLET, FILM COATED ORAL at 21:42

## 2025-01-04 RX ADMIN — HEPARIN SODIUM 5000 UNIT(S): 1000 INJECTION, SOLUTION INTRAVENOUS; SUBCUTANEOUS at 06:15

## 2025-01-04 RX ADMIN — Medication 2: at 09:28

## 2025-01-04 NOTE — PROGRESS NOTE ADULT - ASSESSMENT
89-year-old male with a past medical history of prostate cancer, diabetes, hypertension, presenting due to concern of hypoglycemia at home.  The patient states he is tired and was not sure what happened.  According to his wife who was in bed with him she started hearing him gurgling at home.  She turned on the lights and tried to wake him up, the patient was unresponsive.  They called EMS who came and checked a fingerstick which was 41.  He was given 1 amp of D50 with a repeat being 140.  Patient also tolerated p.o.  According to wife he had a similar occurrence yesterday where his fingerstick was in the 40s.  Patient takes combined metformin and glipizide as well as lantus  Denies fever, chills, chest pain, trouble breathing, dysuria.  According to patient he has decreased appetite recently compared to the past.  endocrine called for further assistance  pt reports long hx of DM   he states lantus was increased from 12--> 15 units  checks FSBG in the am and afternoon  reports multiple lows in 30-40s   eGFR: 44    #Poorly controlled T2DM with hyperglycemia  - HbA1c: 7.0  - Home Regimen: combined metformin and glipizide as well as lantus 12-15 units  - Endocrinologist: unknown    INPATIENT PLAN:   - Goal -180 mg/dl: Fasting and prandial FSs above goal. Pt with URI + covid.   - Increase Lantus to 17 units SQ at bedtime in the setting of infectious process.   - Increase Admelog to 6 units SQ TID AC, hold if NPO or not eating a meal.   - continue low dose Admelog correction scale at mealtimes  - continue low dose Admelog correction scale at bedtime  - Fingerstick BG before meals, bedtime  - d/c 3am fingerstick  - Carbohydrate consistent diet  - RD consult  - Hold oral DM agents while inpatient  - C-Peptide, Serum: 3.0 ng/mL (12-31-24 @ 05:30)---glucose 170- pt is making adequate endogenous insulin.  - eGFR: 33 mL/min/1.73m2 (01-04-25 @ 07:56)    DISCHARGE PLAN:   - Lantus ____ SQ at bedtime plus metformin 500 mg PO BID (egfr 33-was previously on metformin)   - stop glipizide given profound hypoglycemia   - pt reporting decreased appetite - avoid glp-1 for now  - Please send Rx for Glucose tabs, GVOKE hypopen, Baqsimi nasal spray or glucagon emergency kit for hypoglycemia risk.   - Patient to call doctor with persistent high or low BG at home.   - Ensure patient has glucometer, test strips and lancets on discharge.  - Recommend routine outpatient ophthalmology and podiatry follow up.       #HTN  - Outpatient goal BP <130//80 for age  - Can check urine albumin/creatinine outpatient  - Management per primary team.    #HLD  - LDL goal < 70  - On atorvastatin 40mg at bedtime.       SIRENA Garcia-BC  Nurse Practitioner  Division of Endocrinology  Contact on TEAMS    If out of hospital/unavailable when paged, please note: patient will be cared for by another provider on the endocrine service.  For urgent concerns: call the endocrine answering service for assistance to reach covering provider ([phone]). For non-urgent matters: please email [email or web address] for assistance.

## 2025-01-04 NOTE — PROGRESS NOTE ADULT - ASSESSMENT
88 y/o M with pmh of prostate cancer, DM type 2, HTN, p/w hypoglycemia.  Pt reports his insulin glargine  dose was increased from 12 to 15 units recently.  Over the past few days, he reports getting low reading in the morning, but is able to alert his wife who gives him food to bring it  up.  Yesterday AM, pt's wife noticed he was confused and making gurgling sounds while in bed.  She checked FS which was 41.  EMS was called, and pt give D50 en route to ED.  Pt also reports intermittent L sided chest pain for the past several days.  Pain is positional, and improves when pt wears a support belt.  Pain is difficult for patient to describe quality.  No associated dyspnea, dizziness nausea or diaphoresis.  No recent fever or cough.    Pt was evaluated in the ED, and placed in the CDU for monitoring.  He is admitted for further w/u of chest pain and monitoring of glucose.    Planned for Cardiac cath om Thursday.      Problem/Recommendation - 1:  ·  Problem: Chest pain with abnormal stress test .   ·  Recommendation: Awaiting cardiac cath .   Will start on Aspirin and statin.  Cardiology help appreciated.   < from: TTE W or WO Ultrasound Enhancing Agent (12.31.24 @ 07:33) >    CONCLUSIONS:      1. Left ventricular systolic function is normal with an ejection fraction visually estimated at 50 to 55 %.      < from: Nuclear Stress Test-Exercise.. (12.31.24 @ 08:30) >     1. Qualitative Perfusion:      - medium-sized, moderate to severe defect(s) in the inferior and inferolateral wall suggestive of CAD.   2. The left ventricle is mildly decreased in function and normal in size. The time activity curve suggests diastolic dysfunction.. The post stress left ventricular EF is 45 %. The stress end diastolic volume is 71 ml and systolic volume is 39 ml.   3. Hypokinesis of the inferoseptal wall.     Problem/Recommendation - 2:  ·  Problem: Type 2 diabetes mellitus with hypoglycemia.   ·  Recommendation: Endo help appreciated.     Following  their recommendation.     Problem/Recommendation - 3:  ·  Problem: Essential hypertension.   ·  Recommendation: BP medications with hold parameters.     Problem/Recommendation - 4:  ·  Problem: CKD with KIRBY .   ·  Recommendation: Trending Creatinine .  Renal help appreciated.       Problem/Recommendation - 5:  ·  Problem: Prostate cancer.   ·  Recommendation: Out patient Follow Up.       Problem/Recommendation - 6:  ·  Problem: COVID 19 infection with Hypoxia .   ·  Recommendation: ID help appreciated.   ON Remdisvir .    D/W  his wife in detail.

## 2025-01-04 NOTE — PROGRESS NOTE ADULT - ASSESSMENT
89 y/o M with pmh of prostate cancer, DM type 2, HTN, p/w hypoglycemia. +NST planning for angio. nephrology consulted for elevated scr    CKD stage 3B  scr seems to be fluctuating ~ 1.5-1.8  S.Cr. relatively stable.  +covid   Continue on hctz, losartan, spironolactone.  monitor for now  Planned for LHC on 1/6 -- recommend ns @ 75cc/hr x6 hrs before and 6 hrs after angio  monitor     htn  Fluctuating; acceptable.  C/W current meds.  Low salt diet.  monitor    proteinuria  mild  check urine p/c ratio    covid  care per team    cp  +NST  planning for LHC on 1/6.  f/u cardio

## 2025-01-04 NOTE — PROGRESS NOTE ADULT - SUBJECTIVE AND OBJECTIVE BOX
Date of Service  : 01-04-25    INTERVAL HPI/OVERNIGHT EVENTS: I feel better.   Vital Signs Last 24 Hrs  T(C): 37.2 (04 Jan 2025 16:00), Max: 37.2 (04 Jan 2025 16:00)  T(F): 98.9 (04 Jan 2025 16:00), Max: 98.9 (04 Jan 2025 16:00)  HR: 68 (04 Jan 2025 16:00) (50 - 68)  BP: 148/77 (04 Jan 2025 16:00) (144/76 - 157/70)  BP(mean): --  RR: 16 (04 Jan 2025 16:00) (16 - 18)  SpO2: 100% (04 Jan 2025 16:00) (98% - 100%)    Parameters below as of 04 Jan 2025 16:00  Patient On (Oxygen Delivery Method): room air      I&O's Summary    03 Jan 2025 07:01  -  04 Jan 2025 07:00  --------------------------------------------------------  IN: 760 mL / OUT: 700 mL / NET: 60 mL      MEDICATIONS  (STANDING):  allopurinol 100 milliGRAM(s) Oral daily  amLODIPine   Tablet 10 milliGRAM(s) Oral daily  aspirin enteric coated 81 milliGRAM(s) Oral daily  atorvastatin 40 milliGRAM(s) Oral at bedtime  heparin   Injectable 5000 Unit(s) SubCutaneous every 8 hours  hydrochlorothiazide 25 milliGRAM(s) Oral daily  insulin glargine Injectable (LANTUS) 17 Unit(s) SubCutaneous at bedtime  insulin lispro (ADMELOG) corrective regimen sliding scale   SubCutaneous at bedtime  insulin lispro (ADMELOG) corrective regimen sliding scale   SubCutaneous three times a day before meals  insulin lispro Injectable (ADMELOG) 6 Unit(s) SubCutaneous three times a day before meals  isosorbide   mononitrate ER Tablet (IMDUR) 60 milliGRAM(s) Oral daily  losartan 100 milliGRAM(s) Oral daily  nebivolol 5 milliGRAM(s) Oral daily  remdesivir  IVPB   IV Intermittent   remdesivir  IVPB 100 milliGRAM(s) IV Intermittent every 24 hours  spironolactone 25 milliGRAM(s) Oral daily    MEDICATIONS  (PRN):  melatonin 3 milliGRAM(s) Oral at bedtime PRN Insomnia    LABS:                        11.2   10.12 )-----------( 335      ( 04 Jan 2025 07:56 )             35.5     01-04    138  |  101  |  58[H]  ----------------------------<  157[H]  5.3   |  19[L]  |  1.92[H]    Ca    9.3      04 Jan 2025 07:56  Phos  5.0     01-04  Mg     2.60     01-04        Urinalysis Basic - ( 04 Jan 2025 07:56 )    Color: x / Appearance: x / SG: x / pH: x  Gluc: 157 mg/dL / Ketone: x  / Bili: x / Urobili: x   Blood: x / Protein: x / Nitrite: x   Leuk Esterase: x / RBC: x / WBC x   Sq Epi: x / Non Sq Epi: x / Bacteria: x      CAPILLARY BLOOD GLUCOSE      POCT Blood Glucose.: 225 mg/dL (04 Jan 2025 17:05)  POCT Blood Glucose.: 263 mg/dL (04 Jan 2025 12:02)  POCT Blood Glucose.: 209 mg/dL (04 Jan 2025 08:22)  POCT Blood Glucose.: 217 mg/dL (03 Jan 2025 23:25)        Urinalysis Basic - ( 04 Jan 2025 07:56 )    Color: x / Appearance: x / SG: x / pH: x  Gluc: 157 mg/dL / Ketone: x  / Bili: x / Urobili: x   Blood: x / Protein: x / Nitrite: x   Leuk Esterase: x / RBC: x / WBC x   Sq Epi: x / Non Sq Epi: x / Bacteria: x      REVIEW OF SYSTEMS:  CONSTITUTIONAL: No fever, weight loss, or fatigue  EYES: No eye pain, visual disturbances, or discharge  ENMT:  No difficulty hearing, tinnitus, vertigo; No sinus or throat pain  NECK: No pain or stiffness  RESPIRATORY: No cough, wheezing, chills or hemoptysis; No shortness of breath  CARDIOVASCULAR: No chest pain, palpitations, dizziness, or leg swelling  GASTROINTESTINAL: No abdominal or epigastric pain. No nausea, vomiting, or hematemesis; No diarrhea or constipation. No melena or hematochezia.  GENITOURINARY: No dysuria, frequency, hematuria, or incontinence  NEUROLOGICAL: No headaches, memory loss, loss of strength, numbness, or tremors      RADIOLOGY & ADDITIONAL TESTS:    Consultant(s) Notes Reviewed:  [x ] YES  [ ] NO    PHYSICAL EXAM:  GENERAL: NAD, well-groomed, well-developed,not in any distress ,NC Oxygen   HEAD:  Atraumatic, Normocephalic  EYES: EOMI, PERRLA, conjunctiva and sclera clear  ENMT: No tonsillar erythema, exudates, or enlargement; Moist mucous membranes, Good dentition, No lesions  NECK: Supple, No JVD, Normal thyroid  NERVOUS SYSTEM:  Alert & Oriented X3, No focal deficit   CHEST/LUNG: Good air entry bilateral with no  rales, rhonchi, wheezing, or rubs  HEART: Regular rate and rhythm; No murmurs, rubs, or gallops  ABDOMEN: Soft, Nontender, Nondistended; Bowel sounds present  EXTREMITIES:  2+ Peripheral Pulses, No clubbing, cyanosis, or edema      Care Discussed with Consultants/Other Providers [ x] YES  [ ] NO

## 2025-01-04 NOTE — PROGRESS NOTE ADULT - SUBJECTIVE AND OBJECTIVE BOX
[facility]   MD CRISTI SESAY MD ANGELA WONG, PA QIAN CHEN, NP    TEL:  OFFICE: [phone]  From 5pm-7am Answering Service [phone]    -- RENAL FOLLOW UP NOTE ---Date of Service 01-04-25 @ 17:18    Patient is a 89y old  Male who presents with a chief complaint of hypoglycemia.    Patient seen and examined at bedside. No chest pain/SOB.    VITALS:  T(F): 98.4 (01-04-25 @ 11:28), Max: 98.4 (01-03-25 @ 21:20)  HR: 50 (01-04-25 @ 11:28)  BP: 154/75 (01-04-25 @ 11:28)  RR: 18 (01-04-25 @ 11:28)  SpO2: 100% (01-04-25 @ 11:28)  Wt(kg): --    01-03 @ 07:01  -  01-04 @ 07:00  --------------------------------------------------------  IN: 760 mL / OUT: 700 mL / NET: 60 mL      PHYSICAL EXAM:  General: NAD  Neck: No JVD  Respiratory: CTAB, no wheezes, rales or rhonchi  Cardiovascular: S1, S2, RRR  Gastrointestinal: BS+, soft, NT/ND  Extremities: No peripheral edema    Hospital Medications:   MEDICATIONS  (STANDING):  allopurinol 100 milliGRAM(s) Oral daily  amLODIPine   Tablet 10 milliGRAM(s) Oral daily  aspirin enteric coated 81 milliGRAM(s) Oral daily  atorvastatin 40 milliGRAM(s) Oral at bedtime  heparin   Injectable 5000 Unit(s) SubCutaneous every 8 hours  hydrochlorothiazide 25 milliGRAM(s) Oral daily  insulin glargine Injectable (LANTUS) 17 Unit(s) SubCutaneous at bedtime  insulin lispro (ADMELOG) corrective regimen sliding scale   SubCutaneous at bedtime  insulin lispro (ADMELOG) corrective regimen sliding scale   SubCutaneous three times a day before meals  insulin lispro Injectable (ADMELOG) 6 Unit(s) SubCutaneous three times a day before meals  isosorbide   mononitrate ER Tablet (IMDUR) 60 milliGRAM(s) Oral daily  losartan 100 milliGRAM(s) Oral daily  nebivolol 5 milliGRAM(s) Oral daily  remdesivir  IVPB   IV Intermittent   remdesivir  IVPB 100 milliGRAM(s) IV Intermittent every 24 hours  spironolactone 25 milliGRAM(s) Oral daily      LABS:  01-04    138  |  101  |  58[H]  ----------------------------<  157[H]  5.3   |  19[L]  |  1.92[H]    Ca    9.3      04 Jan 2025 07:56  Phos  5.0     01-04  Mg     2.60     01-04      Creatinine Trend: 1.92 <--, 1.90 <--, 2.01 <--, 1.83 <--, 1.52 <--, 2.00 <--    Phosphorus: 5.0 mg/dL (01-04 @ 07:56)                          11.2   10.12 )-----------( 335      ( 04 Jan 2025 07:56 )             35.5     Urine Studies:  Urinalysis - [01-04-25 @ 07:56]      Color  / Appearance  / SG  / pH       Gluc 157 / Ketone   / Bili  / Urobili        Blood  / Protein  / Leuk Est  / Nitrite       RBC  / WBC  / Hyaline  / Gran  / Sq Epi  / Non Sq Epi  / Bacteria       Lipid: chol 130, , HDL 36, LDL --      [01-01-25 @ 05:30]

## 2025-01-04 NOTE — PROGRESS NOTE ADULT - ASSESSMENT
89M with PMH of prostate CA, T2DM, HTN, HLD, CKD, here for hypoglycemia sxs. In the ER, pt found to have cp with EKG w/ NSR and no significant ischemic changes. Pt was recommended to be admitted by Tele Doc Dr. Perea. Cardiology consulted for further evaluation inpt.     1. Cp, +NST and TTE reviewed   2. HTN   3. HLD   4. CKD     -given +NST, initially planned for [facility], deferred for now given COVID+ and rising Cr.   -bp control, cont norvasc, imduri, losartan, and Hctz   -ID eval for covid in elderly     Nitin Lee MD [facility]  Cardiology Attending, Jenn-NS/LOIS  Avaliable on [employer] Team

## 2025-01-04 NOTE — PROGRESS NOTE ADULT - SUBJECTIVE AND OBJECTIVE BOX
Cardiology Attending Follow-up Note     Patient seen and examined at bedside.    Overnight Events:       REVIEW OF SYSTEMS:  Constitutional:     [x ] negative [ ] fevers [ ] chills [ ] weight loss [ ] weight gain  HEENT:                  [x ] negative [ ] dry eyes [ ] eye irritation [ ] postnasal drip [ ] nasal congestion  CV:                         [ x] negative  [ ] chest pain [ ] orthopnea [ ] palpitations [ ] murmur  Resp:                     [ x] negative [ ] cough [ ] shortness of breath [ ] dyspnea [ ] wheezing [ ] sputum [ ]hemoptysis  GI:                          [ x] negative [ ] nausea [ ] vomiting [ ] diarrhea [ ] constipation [ ] abd pain [ ] dysphagia   :                        [ x] negative [ ] dysuria [ ] nocturia [ ] hematuria [ ] increased urinary frequency  Musculoskeletal: [ x] negative [ ] back pain [ ] myalgias [ ] arthralgias [ ] fracture  Skin:                       [ x] negative [ ] rash [ ] itch  Neurological:        [x ] negative [ ] headache [ ] dizziness [ ] syncope [ ] weakness [ ] numbness  Psychiatric:           [ x] negative [ ] anxiety [ ] depression  Endocrine:            [ x] negative [ ] diabetes [ ] thyroid problem  Heme/Lymph:      [ x] negative [ ] anemia [ ] bleeding problem  Allergic/Immune: [ x] negative [ ] itchy eyes [ ] nasal discharge [ ] hives [ ] angioedema    [ x] All other systems negative  [ ] Unable to assess ROS due to    Current Meds:  allopurinol 100 milliGRAM(s) Oral daily  amLODIPine   Tablet 10 milliGRAM(s) Oral daily  aspirin enteric coated 81 milliGRAM(s) Oral daily  atorvastatin 40 milliGRAM(s) Oral at bedtime  heparin   Injectable 5000 Unit(s) SubCutaneous every 8 hours  hydrochlorothiazide 25 milliGRAM(s) Oral daily  insulin glargine Injectable (LANTUS) 17 Unit(s) SubCutaneous at bedtime  insulin lispro (ADMELOG) corrective regimen sliding scale   SubCutaneous at bedtime  insulin lispro (ADMELOG) corrective regimen sliding scale   SubCutaneous three times a day before meals  insulin lispro Injectable (ADMELOG) 6 Unit(s) SubCutaneous three times a day before meals  isosorbide   mononitrate ER Tablet (IMDUR) 60 milliGRAM(s) Oral daily  losartan 100 milliGRAM(s) Oral daily  melatonin 3 milliGRAM(s) Oral at bedtime PRN  nebivolol 5 milliGRAM(s) Oral daily  spironolactone 25 milliGRAM(s) Oral daily      PAST MEDICAL & SURGICAL HISTORY:  HTN (hypertension)      DM2 (diabetes mellitus, type 2)      Prostate cancer      No significant past surgical history          Vitals:  T(F): 98.9 (01-04), Max: 98.9 (01-04)  HR: 68 (01-04) (50 - 68)  BP: 148/77 (01-04) (144/76 - 157/70)  RR: 16 (01-04)  SpO2: 100% (01-04)  I&O's Summary    03 Jan 2025 07:01  -  04 Jan 2025 07:00  --------------------------------------------------------  IN: 760 mL / OUT: 700 mL / NET: 60 mL        Physical Exam:  Appearance: No acute distress  HENT: No JVD   Cardiovascular: RRR, S1/S2, no murmurs  Respiratory: CTABL  Gastrointestinal: soft, NT ND, +BS  Musculoskeletal: No clubbing, no edema   Neurologic: Non-focal  Skin: No rashes, ecchymoses, or cyanosis                          11.2   10.12 )-----------( 335      ( 04 Jan 2025 07:56 )             35.5     01-04    138  |  101  |  58[H]  ----------------------------<  157[H]  5.3   |  19[L]  |  1.92[H]    Ca    9.3      04 Jan 2025 07:56  Phos  5.0     01-04  Mg     2.60     01-04                    Cardiovascular Testings:     CONCLUSIONS:      1. Left ventricular systolic function is normal with an ejection fraction visually estimated at 50 to 55 %.   2. There is mild (grade 1) left ventricular diastolic dysfunction.   3. Left atrium is normalin size.   4. The right atrium is normal in size.   5. Normal right ventricular cavity size, with normal wall thickness, and normal right ventricular systolic function.   6. Trileaflet aortic valve with normal systolic excursion. There is calcification of the aortic valve leaflets.   7. Moderate to severe pulmonic regurgitation.   8. No pericardial effusion seen.    Conclusions:   1. Qualitative Perfusion:      - medium-sized, moderate to severe defect(s) in the inferior and inferolateral wall suggestive of CAD.   2. The left ventricle is mildly decreased in function and normal in size. The time activity curve suggests diastolic dysfunction.. The post stress left ventricular EF is 45 %. The stress end diastolic volume is 71 ml and systolic volume is 39 ml.   3. Hypokinesis of the inferoseptal wall.   4. The RV was not well visualized.

## 2025-01-04 NOTE — PROGRESS NOTE ADULT - SUBJECTIVE AND OBJECTIVE BOX
Chief Complaint: T2DM    Interval Events: Pt seen and examined at bedside. Pt tolerating carb consistent diet with no nausea, no vomiting.       MEDICATIONS  (STANDING):  allopurinol 100 milliGRAM(s) Oral daily  amLODIPine   Tablet 10 milliGRAM(s) Oral daily  aspirin enteric coated 81 milliGRAM(s) Oral daily  atorvastatin 40 milliGRAM(s) Oral at bedtime  heparin   Injectable 5000 Unit(s) SubCutaneous every 8 hours  hydrochlorothiazide 25 milliGRAM(s) Oral daily  insulin glargine Injectable (LANTUS) 17 Unit(s) SubCutaneous at bedtime  insulin lispro (ADMELOG) corrective regimen sliding scale   SubCutaneous at bedtime  insulin lispro (ADMELOG) corrective regimen sliding scale   SubCutaneous three times a day before meals  insulin lispro Injectable (ADMELOG) 6 Unit(s) SubCutaneous three times a day before meals  isosorbide   mononitrate ER Tablet (IMDUR) 60 milliGRAM(s) Oral daily  losartan 100 milliGRAM(s) Oral daily  nebivolol 5 milliGRAM(s) Oral daily  remdesivir  IVPB   IV Intermittent   remdesivir  IVPB 100 milliGRAM(s) IV Intermittent every 24 hours  spironolactone 25 milliGRAM(s) Oral daily    MEDICATIONS  (PRN):  melatonin 3 milliGRAM(s) Oral at bedtime PRN Insomnia      Allergies    No Known Allergies    Intolerances      Review of Systems:  Eyes: No blurry vision  Cardiovascular: No chest pain, palpitations  Respiratory: No SOB, + cough  GI: No nausea, vomiting, abdominal pain      ALL OTHER SYSTEMS REVIEWED AND NEGATIVE      VITALS: T(C): 36.9 (01-04-25 @ 11:28)  T(F): 98.4 (01-04-25 @ 11:28), Max: 98.4 (01-03-25 @ 21:20)  HR: 50 (01-04-25 @ 11:28) (50 - 77)  BP: 154/75 (01-04-25 @ 11:28) (130/70 - 157/70)  RR:  (16 - 18)  SpO2:  (98% - 100%)  Wt(kg): --      Physical Exam:   GENERAL: NAD, well-developed  EYES: No proptosis  HEENT:  Atraumatic, Normocephalic  RESPIRATORY: non labored breathing + cough  GI: Non distended  PSYCH: Alert    CAPILLARY BLOOD GLUCOSE      POCT Blood Glucose.: 263 mg/dL (04 Jan 2025 12:02)  POCT Blood Glucose.: 209 mg/dL (04 Jan 2025 08:22)  POCT Blood Glucose.: 217 mg/dL (03 Jan 2025 23:25)  POCT Blood Glucose.: 199 mg/dL (03 Jan 2025 17:17)      01-04    138  |  101  |  58[H]  ----------------------------<  157[H]  5.3   |  19[L]  |  1.92[H]    eGFR: 33[L]    Ca    9.3      01-04  Mg     2.60     01-04  Phos  5.0     01-04        A1C with Estimated Average Glucose Result: 7.0 % (12-30-24 @ 09:36)      Thyroid Function Tests:

## 2025-01-05 LAB
GLUCOSE BLDC GLUCOMTR-MCNC: 189 MG/DL — HIGH (ref 70–99)
GLUCOSE BLDC GLUCOMTR-MCNC: 205 MG/DL — HIGH (ref 70–99)
GLUCOSE BLDC GLUCOMTR-MCNC: 238 MG/DL — HIGH (ref 70–99)
GLUCOSE BLDC GLUCOMTR-MCNC: 258 MG/DL — HIGH (ref 70–99)

## 2025-01-05 PROCEDURE — 99232 SBSQ HOSP IP/OBS MODERATE 35: CPT

## 2025-01-05 RX ORDER — REMDESIVIR 5 MG/ML
100 INJECTION INTRAVENOUS EVERY 24 HOURS
Refills: 0 | Status: COMPLETED | OUTPATIENT
Start: 2025-01-05 | End: 2025-01-06

## 2025-01-05 RX ADMIN — INSULIN GLARGINE-YFGN 17 UNIT(S): 100 INJECTION, SOLUTION SUBCUTANEOUS at 22:58

## 2025-01-05 RX ADMIN — Medication 60 MILLIGRAM(S): at 11:54

## 2025-01-05 RX ADMIN — Medication 81 MILLIGRAM(S): at 08:51

## 2025-01-05 RX ADMIN — Medication 1: at 18:22

## 2025-01-05 RX ADMIN — Medication 2: at 08:49

## 2025-01-05 RX ADMIN — Medication 6 UNIT(S): at 08:52

## 2025-01-05 RX ADMIN — NEBIVOLOL 5 MILLIGRAM(S): 5 TABLET ORAL at 09:40

## 2025-01-05 RX ADMIN — Medication 1: at 22:58

## 2025-01-05 RX ADMIN — LOSARTAN POTASSIUM 100 MILLIGRAM(S): 100 TABLET, FILM COATED ORAL at 05:32

## 2025-01-05 RX ADMIN — HEPARIN SODIUM 5000 UNIT(S): 1000 INJECTION, SOLUTION INTRAVENOUS; SUBCUTANEOUS at 13:08

## 2025-01-05 RX ADMIN — ATORVASTATIN CALCIUM 40 MILLIGRAM(S): 40 TABLET, FILM COATED ORAL at 21:43

## 2025-01-05 RX ADMIN — ALLOPURINOL 100 MILLIGRAM(S): 100 TABLET ORAL at 08:51

## 2025-01-05 RX ADMIN — Medication 2: at 13:07

## 2025-01-05 RX ADMIN — Medication 6 UNIT(S): at 18:22

## 2025-01-05 RX ADMIN — Medication 6 UNIT(S): at 13:08

## 2025-01-05 RX ADMIN — SPIRONOLACTONE 25 MILLIGRAM(S): 50 TABLET ORAL at 05:32

## 2025-01-05 RX ADMIN — Medication 10 MILLIGRAM(S): at 05:32

## 2025-01-05 RX ADMIN — REMDESIVIR 200 MILLIGRAM(S): 5 INJECTION INTRAVENOUS at 21:43

## 2025-01-05 RX ADMIN — HEPARIN SODIUM 5000 UNIT(S): 1000 INJECTION, SOLUTION INTRAVENOUS; SUBCUTANEOUS at 21:43

## 2025-01-05 RX ADMIN — HEPARIN SODIUM 5000 UNIT(S): 1000 INJECTION, SOLUTION INTRAVENOUS; SUBCUTANEOUS at 05:32

## 2025-01-05 NOTE — PROGRESS NOTE ADULT - SUBJECTIVE AND OBJECTIVE BOX
Date of Service  : 01-05-25     INTERVAL HPI/OVERNIGHT EVENTS: I feel fine.   Vital Signs Last 24 Hrs  T(C): 36.9 (05 Jan 2025 15:30), Max: 36.9 (05 Jan 2025 15:30)  T(F): 98.4 (05 Jan 2025 15:30), Max: 98.4 (05 Jan 2025 15:30)  HR: 55 (05 Jan 2025 15:30) (55 - 62)  BP: 135/74 (05 Jan 2025 15:30) (133/78 - 161/75)  BP(mean): --  RR: 18 (05 Jan 2025 15:30) (16 - 18)  SpO2: 97% (05 Jan 2025 15:30) (94% - 100%)    Parameters below as of 05 Jan 2025 15:30  Patient On (Oxygen Delivery Method): room air      I&O's Summary    04 Jan 2025 07:01  -  05 Jan 2025 07:00  --------------------------------------------------------  IN: 700 mL / OUT: 1050 mL / NET: -350 mL    05 Jan 2025 07:01  -  05 Jan 2025 17:54  --------------------------------------------------------  IN: 0 mL / OUT: 800 mL / NET: -800 mL      MEDICATIONS  (STANDING):  allopurinol 100 milliGRAM(s) Oral daily  amLODIPine   Tablet 10 milliGRAM(s) Oral daily  aspirin enteric coated 81 milliGRAM(s) Oral daily  atorvastatin 40 milliGRAM(s) Oral at bedtime  heparin   Injectable 5000 Unit(s) SubCutaneous every 8 hours  hydrochlorothiazide 25 milliGRAM(s) Oral daily  insulin glargine Injectable (LANTUS) 17 Unit(s) SubCutaneous at bedtime  insulin lispro (ADMELOG) corrective regimen sliding scale   SubCutaneous at bedtime  insulin lispro (ADMELOG) corrective regimen sliding scale   SubCutaneous three times a day before meals  insulin lispro Injectable (ADMELOG) 6 Unit(s) SubCutaneous three times a day before meals  isosorbide   mononitrate ER Tablet (IMDUR) 60 milliGRAM(s) Oral daily  losartan 100 milliGRAM(s) Oral daily  nebivolol 5 milliGRAM(s) Oral daily  remdesivir  IVPB 100 milliGRAM(s) IV Intermittent every 24 hours  spironolactone 25 milliGRAM(s) Oral daily    MEDICATIONS  (PRN):  melatonin 3 milliGRAM(s) Oral at bedtime PRN Insomnia    LABS:                        11.2   10.12 )-----------( 335      ( 04 Jan 2025 07:56 )             35.5     01-04    138  |  101  |  58[H]  ----------------------------<  157[H]  5.3   |  19[L]  |  1.92[H]    Ca    9.3      04 Jan 2025 07:56  Phos  5.0     01-04  Mg     2.60     01-04        Urinalysis Basic - ( 04 Jan 2025 07:56 )    Color: x / Appearance: x / SG: x / pH: x  Gluc: 157 mg/dL / Ketone: x  / Bili: x / Urobili: x   Blood: x / Protein: x / Nitrite: x   Leuk Esterase: x / RBC: x / WBC x   Sq Epi: x / Non Sq Epi: x / Bacteria: x      CAPILLARY BLOOD GLUCOSE      POCT Blood Glucose.: 238 mg/dL (05 Jan 2025 12:18)  POCT Blood Glucose.: 205 mg/dL (05 Jan 2025 08:20)  POCT Blood Glucose.: 247 mg/dL (04 Jan 2025 21:29)        Urinalysis Basic - ( 04 Jan 2025 07:56 )    Color: x / Appearance: x / SG: x / pH: x  Gluc: 157 mg/dL / Ketone: x  / Bili: x / Urobili: x   Blood: x / Protein: x / Nitrite: x   Leuk Esterase: x / RBC: x / WBC x   Sq Epi: x / Non Sq Epi: x / Bacteria: x      REVIEW OF SYSTEMS:  CONSTITUTIONAL: No fever, weight loss, or fatigue  EYES: No eye pain, visual disturbances, or discharge  ENMT:  No difficulty hearing, tinnitus, vertigo; No sinus or throat pain  NECK: No pain or stiffness  RESPIRATORY: No cough, wheezing, chills or hemoptysis; No shortness of breath  CARDIOVASCULAR: No chest pain, palpitations, dizziness, or leg swelling  GASTROINTESTINAL: No abdominal or epigastric pain. No nausea, vomiting, or hematemesis; No diarrhea or constipation. No melena or hematochezia.  GENITOURINARY: No dysuria, frequency, hematuria, or incontinence  NEUROLOGICAL: No headaches, memory loss, loss of strength, numbness, or tremors    Consultant(s) Notes Reviewed:  [x ] YES  [ ] NO    PHYSICAL EXAM:  GENERAL: NAD, well-groomed, well-developed,not in any distress ,  HEAD:  Atraumatic, Normocephalic  EYES: EOMI, PERRLA, conjunctiva and sclera clear  ENMT: No tonsillar erythema, exudates, or enlargement; Moist mucous membranes, Good dentition, No lesions  NECK: Supple, No JVD, Normal thyroid  NERVOUS SYSTEM:  Alert & Oriented X3, No focal deficit   CHEST/LUNG: Good air entry bilateral with no  rales, rhonchi, wheezing, or rubs  HEART: Regular rate and rhythm; No murmurs, rubs, or gallops  ABDOMEN: Soft, Nontender, Nondistended; Bowel sounds present  EXTREMITIES:  2+ Peripheral Pulses, No clubbing, cyanosis, or edema    Care Discussed with Consultants/Other Providers [ x] YES  [ ] NO

## 2025-01-05 NOTE — PROGRESS NOTE ADULT - ASSESSMENT
88 y/o M with pmh of prostate cancer, DM type 2, HTN, p/w hypoglycemia.  Pt reports his insulin glargine  dose was increased from 12 to 15 units recently.  Over the past few days, he reports getting low reading in the morning, but is able to alert his wife who gives him food to bring it  up.  Yesterday AM, pt's wife noticed he was confused and making gurgling sounds while in bed.  She checked FS which was 41.  EMS was called, and pt give D50 en route to ED.  Pt also reports intermittent L sided chest pain for the past several days.  Pain is positional, and improves when pt wears a support belt.  Pain is difficult for patient to describe quality.  No associated dyspnea, dizziness nausea or diaphoresis.  No recent fever or cough.    Pt was evaluated in the ED, and placed in the CDU for monitoring.  He is admitted for further w/u of chest pain and monitoring of glucose.    Planned for Cardiac cath om Thursday.      Problem/Recommendation - 1:  ·  Problem: Chest pain with abnormal stress test .   ·  Recommendation: Awaiting cardiac cath .   Will start on Aspirin and statin.  Cardiology help appreciated.   < from: TTE W or WO Ultrasound Enhancing Agent (12.31.24 @ 07:33) >    CONCLUSIONS:      1. Left ventricular systolic function is normal with an ejection fraction visually estimated at 50 to 55 %.      < from: Nuclear Stress Test-Exercise.. (12.31.24 @ 08:30) >     1. Qualitative Perfusion:      - medium-sized, moderate to severe defect(s) in the inferior and inferolateral wall suggestive of CAD.   2. The left ventricle is mildly decreased in function and normal in size. The time activity curve suggests diastolic dysfunction.. The post stress left ventricular EF is 45 %. The stress end diastolic volume is 71 ml and systolic volume is 39 ml.   3. Hypokinesis of the inferoseptal wall.     Problem/Recommendation - 2:  ·  Problem: Type 2 diabetes mellitus with hypoglycemia.   ·  Recommendation: Endo help appreciated.     Following  their recommendation.     Problem/Recommendation - 3:  ·  Problem: Essential hypertension.   ·  Recommendation: BP medications with hold parameters.     Problem/Recommendation - 4:  ·  Problem: CKD with KIRBY .   ·  Recommendation: Trending Creatinine .  Renal help appreciated.       Problem/Recommendation - 5:  ·  Problem: Prostate cancer.   ·  Recommendation: Out patient Follow Up.       Problem/Recommendation - 6:  ·  Problem: COVID 19 infection with Hypoxia .   ·  Recommendation: ID help appreciated.   ON Remdisvir .    D/W  his wife in detail yesterday.

## 2025-01-05 NOTE — PROGRESS NOTE ADULT - SUBJECTIVE AND OBJECTIVE BOX
[facility]   MD CRISTI SESAY MD ANGELA WONG, PA QIAN CHEN, NP    TEL:  OFFICE: [phone]  From 5pm-7am Answering Service [phone]    -- RENAL FOLLOW UP NOTE ---Date of Service 01-05-25 @ 17:17    Patient is a 89y old  Male who presents with a chief complaint of hypoglycemia.    Patient seen and examined at bedside. No chest pain/SOB.    VITALS:  T(F): 98.4 (01-05-25 @ 15:30), Max: 98.4 (01-05-25 @ 15:30)  HR: 55 (01-05-25 @ 15:30)  BP: 135/74 (01-05-25 @ 15:30)  RR: 18 (01-05-25 @ 15:30)  SpO2: 97% (01-05-25 @ 15:30)  Wt(kg): --    01-04 @ 07:01  -  01-05 @ 07:00  --------------------------------------------------------  IN: 700 mL / OUT: 1050 mL / NET: -350 mL    01-05 @ 07:01  -  01-05 @ 17:17  --------------------------------------------------------  IN: 0 mL / OUT: 800 mL / NET: -800 mL      PHYSICAL EXAM:  General: NAD  Neck: No JVD  Respiratory: CTAB, no wheezes, rales or rhonchi  Cardiovascular: S1, S2, RRR  Gastrointestinal: BS+, soft, NT/ND  Extremities: No peripheral edema    Hospital Medications:   MEDICATIONS  (STANDING):  allopurinol 100 milliGRAM(s) Oral daily  amLODIPine   Tablet 10 milliGRAM(s) Oral daily  aspirin enteric coated 81 milliGRAM(s) Oral daily  atorvastatin 40 milliGRAM(s) Oral at bedtime  heparin   Injectable 5000 Unit(s) SubCutaneous every 8 hours  hydrochlorothiazide 25 milliGRAM(s) Oral daily  insulin glargine Injectable (LANTUS) 17 Unit(s) SubCutaneous at bedtime  insulin lispro (ADMELOG) corrective regimen sliding scale   SubCutaneous at bedtime  insulin lispro (ADMELOG) corrective regimen sliding scale   SubCutaneous three times a day before meals  insulin lispro Injectable (ADMELOG) 6 Unit(s) SubCutaneous three times a day before meals  isosorbide   mononitrate ER Tablet (IMDUR) 60 milliGRAM(s) Oral daily  losartan 100 milliGRAM(s) Oral daily  nebivolol 5 milliGRAM(s) Oral daily  remdesivir  IVPB 100 milliGRAM(s) IV Intermittent every 24 hours  spironolactone 25 milliGRAM(s) Oral daily      LABS:  01-04    138  |  101  |  58[H]  ----------------------------<  157[H]  5.3   |  19[L]  |  1.92[H]    Ca    9.3      04 Jan 2025 07:56  Phos  5.0     01-04  Mg     2.60     01-04      Creatinine Trend: 1.92 <--, 1.90 <--, 2.01 <--, 1.83 <--, 1.52 <--, 2.00 <--                        11.2   10.12 )-----------( 335      ( 04 Jan 2025 07:56 )             35.5     Urine Studies:  Urinalysis - [01-04-25 @ 07:56]      Color  / Appearance  / SG  / pH       Gluc 157 / Ketone   / Bili  / Urobili        Blood  / Protein  / Leuk Est  / Nitrite       RBC  / WBC  / Hyaline  / Gran  / Sq Epi  / Non Sq Epi  / Bacteria     Urine Creatinine 128      [01-04-25 @ 17:00]  Urine Protein 34      [01-04-25 @ 17:00]    Lipid: chol 130, , HDL 36, LDL --      [01-01-25 @ 05:30]

## 2025-01-05 NOTE — PROGRESS NOTE ADULT - SUBJECTIVE AND OBJECTIVE BOX
Cardiology Attending Follow-up Note     Patient seen and examined at bedside.    Overnight Events:     no events    REVIEW OF SYSTEMS:  Constitutional:     [x ] negative [ ] fevers [ ] chills [ ] weight loss [ ] weight gain  HEENT:                  [x ] negative [ ] dry eyes [ ] eye irritation [ ] postnasal drip [ ] nasal congestion  CV:                         [ x] negative  [ ] chest pain [ ] orthopnea [ ] palpitations [ ] murmur  Resp:                     [ x] negative [ ] cough [ ] shortness of breath [ ] dyspnea [ ] wheezing [ ] sputum [ ]hemoptysis  GI:                          [ x] negative [ ] nausea [ ] vomiting [ ] diarrhea [ ] constipation [ ] abd pain [ ] dysphagia   :                        [ x] negative [ ] dysuria [ ] nocturia [ ] hematuria [ ] increased urinary frequency  Musculoskeletal: [ x] negative [ ] back pain [ ] myalgias [ ] arthralgias [ ] fracture  Skin:                       [ x] negative [ ] rash [ ] itch  Neurological:        [x ] negative [ ] headache [ ] dizziness [ ] syncope [ ] weakness [ ] numbness  Psychiatric:           [ x] negative [ ] anxiety [ ] depression  Endocrine:            [ x] negative [ ] diabetes [ ] thyroid problem  Heme/Lymph:      [ x] negative [ ] anemia [ ] bleeding problem  Allergic/Immune: [ x] negative [ ] itchy eyes [ ] nasal discharge [ ] hives [ ] angioedema    [ x] All other systems negative  [ ] Unable to assess ROS due to    Current Meds:  allopurinol 100 milliGRAM(s) Oral daily  amLODIPine   Tablet 10 milliGRAM(s) Oral daily  aspirin enteric coated 81 milliGRAM(s) Oral daily  atorvastatin 40 milliGRAM(s) Oral at bedtime  heparin   Injectable 5000 Unit(s) SubCutaneous every 8 hours  hydrochlorothiazide 25 milliGRAM(s) Oral daily  insulin glargine Injectable (LANTUS) 17 Unit(s) SubCutaneous at bedtime  insulin lispro (ADMELOG) corrective regimen sliding scale   SubCutaneous at bedtime  insulin lispro (ADMELOG) corrective regimen sliding scale   SubCutaneous three times a day before meals  insulin lispro Injectable (ADMELOG) 6 Unit(s) SubCutaneous three times a day before meals  isosorbide   mononitrate ER Tablet (IMDUR) 60 milliGRAM(s) Oral daily  losartan 100 milliGRAM(s) Oral daily  melatonin 3 milliGRAM(s) Oral at bedtime PRN  nebivolol 5 milliGRAM(s) Oral daily  remdesivir  IVPB 100 milliGRAM(s) IV Intermittent every 24 hours  spironolactone 25 milliGRAM(s) Oral daily      PAST MEDICAL & SURGICAL HISTORY:  HTN (hypertension)      DM2 (diabetes mellitus, type 2)      Prostate cancer      No significant past surgical history          Vitals:  T(F): 98.4 (01-05), Max: 98.4 (01-05)  HR: 55 (01-05) (55 - 62)  BP: 135/74 (01-05) (133/78 - 161/75)  RR: 18 (01-05)  SpO2: 97% (01-05)  I&O's Summary    04 Jan 2025 07:01  -  05 Jan 2025 07:00  --------------------------------------------------------  IN: 700 mL / OUT: 1050 mL / NET: -350 mL    05 Jan 2025 07:01  -  05 Jan 2025 17:41  --------------------------------------------------------  IN: 0 mL / OUT: 800 mL / NET: -800 mL        Physical Exam:  Appearance: No acute distress  HENT: No JVD   Cardiovascular: RRR, S1/S2, no murmurs  Respiratory: CTABL  Gastrointestinal: soft, NT ND, +BS  Musculoskeletal: No clubbing, no edema   Neurologic: Non-focal  Skin: No rashes, ecchymoses, or cyanosis                          11.2   10.12 )-----------( 335      ( 04 Jan 2025 07:56 )             35.5     01-04    138  |  101  |  58[H]  ----------------------------<  157[H]  5.3   |  19[L]  |  1.92[H]    Ca    9.3      04 Jan 2025 07:56  Phos  5.0     01-04  Mg     2.60     01-04                    Cardiovascular Testings:   CONCLUSIONS:      1. Left ventricular systolic function is normal with an ejection fraction visually estimated at 50 to 55 %.   2. There is mild (grade 1) left ventricular diastolic dysfunction.   3. Left atrium is normalin size.   4. The right atrium is normal in size.   5. Normal right ventricular cavity size, with normal wall thickness, and normal right ventricular systolic function.   6. Trileaflet aortic valve with normal systolic excursion. There is calcification of the aortic valve leaflets.   7. Moderate to severe pulmonic regurgitation.   8. No pericardial effusion seen.    Conclusions:   1. Qualitative Perfusion:      - medium-sized, moderate to severe defect(s) in the inferior and inferolateral wall suggestive of CAD.   2. The left ventricle is mildly decreased in function and normal in size. The time activity curve suggests diastolic dysfunction.. The post stress left ventricular EF is 45 %. The stress end diastolic volume is 71 ml and systolic volume is 39 ml.   3. Hypokinesis of the inferoseptal wall.   4. The RV was not well visualized.

## 2025-01-05 NOTE — PROGRESS NOTE ADULT - ASSESSMENT
89M with PMH of prostate CA, T2DM, HTN, HLD, CKD, here for hypoglycemia sxs. In the ER, pt found to have cp with EKG w/ NSR and no significant ischemic changes. Pt was recommended to be admitted by Tele Doc Dr. Perea. Cardiology consulted for further evaluation inpt.     1. Cp, +NST and TTE reviewed   2. HTN   3. HLD   4. CKD     -given +NST, initially planned for [facility], deferred for now given COVID+ and rising Cr. Can be done as outpt post discharge given pt has no cardiac sxs at this point.   -bp control, cont norvasc, imduri, losartan, and Hctz   -ID eval for covid in elderly     Nitin Lee MD [facility]  Cardiology Attending, Jenn-NS/LOIS  Avaliable on [employer] Team

## 2025-01-05 NOTE — PROGRESS NOTE ADULT - ASSESSMENT
88 y/o M with pmh of prostate cancer, DM type 2, HTN, p/w hypoglycemia. +NST planning for angio. nephrology consulted for elevated scr    CKD stage 3B  scr seems to be fluctuating ~ 1.5-1.8  S.Cr. relatively stable.  +covid   Continue on hctz, losartan, spironolactone.  LHC deferred in view of Covid+ and elevated S.Cr.  Recommend ns @ 75cc/hr x6 hrs before and 6 hrs after angio  monitor BMP and UO -- no labs today.    htn  Fluctuating; acceptable.  C/W current meds.  Low salt diet.  monitor    proteinuria  mild  UPr/Cr 0.26gm.  Monitor.    covid  care per team    cp  +NST  LHC deferred for now.  f/u cardio 88 y/o M with pmh of prostate cancer, DM type 2, HTN, p/w hypoglycemia. +NST planning for angio. nephrology consulted for elevated scr    CKD stage 3B  scr seems to be fluctuating ~ 1.5-1.8  S.Cr. relatively stable.  +covid   Continue on hctz, losartan, spironolactone.  LHC deferred in view of Covid+ and elevated S.Cr.  Recommend ns @ 75cc/hr x6 hrs before and 6 hrs after-if planned for angio  monitor BMP and UO -- no labs today.    htn  Fluctuating; acceptable.  C/W current meds.  Low salt diet.  monitor    proteinuria  mild  UPr/Cr 0.26gm.  Monitor.    covid  care per team    cp  +NST  LHC deferred for now.  f/u cardio

## 2025-01-06 ENCOUNTER — TRANSCRIPTION ENCOUNTER (OUTPATIENT)
Age: 89
End: 2025-01-06

## 2025-01-06 LAB
ANION GAP SERPL CALC-SCNC: 17 MMOL/L — HIGH (ref 7–14)
BUN SERPL-MCNC: 60 MG/DL — HIGH (ref 7–23)
CALCIUM SERPL-MCNC: 9.2 MG/DL — SIGNIFICANT CHANGE UP (ref 8.4–10.5)
CHLORIDE SERPL-SCNC: 100 MMOL/L — SIGNIFICANT CHANGE UP (ref 98–107)
CO2 SERPL-SCNC: 17 MMOL/L — LOW (ref 22–31)
CREAT SERPL-MCNC: 2.02 MG/DL — HIGH (ref 0.5–1.3)
EGFR: 31 ML/MIN/1.73M2 — LOW
GLUCOSE BLDC GLUCOMTR-MCNC: 187 MG/DL — HIGH (ref 70–99)
GLUCOSE BLDC GLUCOMTR-MCNC: 220 MG/DL — HIGH (ref 70–99)
GLUCOSE BLDC GLUCOMTR-MCNC: 249 MG/DL — HIGH (ref 70–99)
GLUCOSE BLDC GLUCOMTR-MCNC: 305 MG/DL — HIGH (ref 70–99)
GLUCOSE SERPL-MCNC: 163 MG/DL — HIGH (ref 70–99)
HCT VFR BLD CALC: 30.9 % — LOW (ref 39–50)
HGB BLD-MCNC: 10 G/DL — LOW (ref 13–17)
MAGNESIUM SERPL-MCNC: 2.4 MG/DL — SIGNIFICANT CHANGE UP (ref 1.6–2.6)
MCHC RBC-ENTMCNC: 22.6 PG — LOW (ref 27–34)
MCHC RBC-ENTMCNC: 32.4 G/DL — SIGNIFICANT CHANGE UP (ref 32–36)
MCV RBC AUTO: 69.9 FL — LOW (ref 80–100)
NRBC # BLD: 0 /100 WBCS — SIGNIFICANT CHANGE UP (ref 0–0)
NRBC # FLD: 0 K/UL — SIGNIFICANT CHANGE UP (ref 0–0)
PHOSPHATE SERPL-MCNC: 4 MG/DL — SIGNIFICANT CHANGE UP (ref 2.5–4.5)
PLATELET # BLD AUTO: 309 K/UL — SIGNIFICANT CHANGE UP (ref 150–400)
POTASSIUM SERPL-MCNC: 4.9 MMOL/L — SIGNIFICANT CHANGE UP (ref 3.5–5.3)
POTASSIUM SERPL-SCNC: 4.9 MMOL/L — SIGNIFICANT CHANGE UP (ref 3.5–5.3)
RBC # BLD: 4.42 M/UL — SIGNIFICANT CHANGE UP (ref 4.2–5.8)
RBC # FLD: 15.6 % — HIGH (ref 10.3–14.5)
SODIUM SERPL-SCNC: 134 MMOL/L — LOW (ref 135–145)
WBC # BLD: 8.85 K/UL — SIGNIFICANT CHANGE UP (ref 3.8–10.5)
WBC # FLD AUTO: 8.85 K/UL — SIGNIFICANT CHANGE UP (ref 3.8–10.5)

## 2025-01-06 PROCEDURE — G0545: CPT

## 2025-01-06 PROCEDURE — 99232 SBSQ HOSP IP/OBS MODERATE 35: CPT

## 2025-01-06 PROCEDURE — 99231 SBSQ HOSP IP/OBS SF/LOW 25: CPT

## 2025-01-06 RX ORDER — GLIPIZIDE AND METFORMIN HYDROCHLORIDE 5; 500 MG/1; MG/1
1 TABLET, FILM COATED ORAL
Refills: 0 | DISCHARGE

## 2025-01-06 RX ORDER — INSULIN GLARGINE-YFGN 100 [IU]/ML
20 INJECTION, SOLUTION SUBCUTANEOUS AT BEDTIME
Refills: 0 | Status: DISCONTINUED | OUTPATIENT
Start: 2025-01-06 | End: 2025-01-07

## 2025-01-06 RX ORDER — METFORMIN 850 MG/1
1 TABLET ORAL
Qty: 30 | Refills: 0
Start: 2025-01-06 | End: 2025-02-04

## 2025-01-06 RX ORDER — INSULIN LISPRO 100/ML
7 VIAL (ML) SUBCUTANEOUS
Refills: 0 | Status: DISCONTINUED | OUTPATIENT
Start: 2025-01-06 | End: 2025-01-07

## 2025-01-06 RX ADMIN — Medication 81 MILLIGRAM(S): at 08:59

## 2025-01-06 RX ADMIN — HEPARIN SODIUM 5000 UNIT(S): 1000 INJECTION, SOLUTION INTRAVENOUS; SUBCUTANEOUS at 21:46

## 2025-01-06 RX ADMIN — HEPARIN SODIUM 5000 UNIT(S): 1000 INJECTION, SOLUTION INTRAVENOUS; SUBCUTANEOUS at 05:27

## 2025-01-06 RX ADMIN — ATORVASTATIN CALCIUM 40 MILLIGRAM(S): 40 TABLET, FILM COATED ORAL at 21:47

## 2025-01-06 RX ADMIN — INSULIN GLARGINE-YFGN 20 UNIT(S): 100 INJECTION, SOLUTION SUBCUTANEOUS at 22:43

## 2025-01-06 RX ADMIN — ALLOPURINOL 100 MILLIGRAM(S): 100 TABLET ORAL at 08:59

## 2025-01-06 RX ADMIN — Medication 7 UNIT(S): at 18:10

## 2025-01-06 RX ADMIN — Medication 10 MILLIGRAM(S): at 05:26

## 2025-01-06 RX ADMIN — Medication 1: at 08:57

## 2025-01-06 RX ADMIN — HEPARIN SODIUM 5000 UNIT(S): 1000 INJECTION, SOLUTION INTRAVENOUS; SUBCUTANEOUS at 13:05

## 2025-01-06 RX ADMIN — SPIRONOLACTONE 25 MILLIGRAM(S): 50 TABLET ORAL at 05:26

## 2025-01-06 RX ADMIN — Medication 6 UNIT(S): at 12:59

## 2025-01-06 RX ADMIN — Medication 2: at 18:09

## 2025-01-06 RX ADMIN — Medication 60 MILLIGRAM(S): at 12:58

## 2025-01-06 RX ADMIN — REMDESIVIR 200 MILLIGRAM(S): 5 INJECTION INTRAVENOUS at 22:43

## 2025-01-06 RX ADMIN — LOSARTAN POTASSIUM 100 MILLIGRAM(S): 100 TABLET, FILM COATED ORAL at 05:26

## 2025-01-06 RX ADMIN — Medication 4: at 12:58

## 2025-01-06 RX ADMIN — Medication 6 UNIT(S): at 08:58

## 2025-01-06 NOTE — DISCHARGE NOTE PROVIDER - NSDCCPCAREPLAN_GEN_ALL_CORE_FT
PRINCIPAL DISCHARGE DIAGNOSIS  Diagnosis: Hypoglycemia  Assessment and Plan of Treatment: The endocrinologist is adjusting your medications. Please follow your regiment as recommended. Follow up with your endocrinologist in 1-2 weeks.   Your Hemoglobin A1C is  7.0. Target goal for hemoglobin A1C is <7. Monitor blood glucose levels throughout the day before meals and at bedtime. Record blood sugars and bring to outpatient providers appointment in order to be reviewed by your doctor for management modifications. If your sugars are more than 400 or less than 70 you should contact your PCP immediately. Monitor for signs/symptoms of low blood glucose, such as, dizziness, altered mental status, or cool/clammy skin. In addition, monitor for signs/symptoms of high blood glucose, such as, feeling hot, dry, fatigued, or with increased thirst/urination. Make regular podiatry appointments in order to have feet checked for wounds and uncontrolled toe nail growth to prevent infections, as well as, appointments with an ophthalmologist to monitor your vision.      SECONDARY DISCHARGE DIAGNOSES  Diagnosis: Chest pain  Assessment and Plan of Treatment: Due to COVID and the resolution of your symptoms, the cardiologist determined that you can see him in office to have your cardiac cath scheduled. Follow up with Dr. Lee in 1-2 weeks.    Diagnosis: Prostate cancer  Assessment and Plan of Treatment: Follow up with your urologist.    Diagnosis: Essential hypertension  Assessment and Plan of Treatment: Continue blood pressure medication regimen as directed. Monitor for any visual changes, headaches or dizziness.  Monitor blood pressure regularly.  Follow up with your primary care provider for further management for high blood pressure.       PRINCIPAL DISCHARGE DIAGNOSIS  Diagnosis: Chest pain  Assessment and Plan of Treatment: You presented with chest pain.  Your echocardiogram showed good heart pumping function, modearate to severe pulmonic regurgitation. Your stress test was abnormal. You were found to have COVID. Because of your COVID and acute kidney injury, the decision was made to pursue outpatient coronary anigogram. Continue medical management and follow up with cardiology.      SECONDARY DISCHARGE DIAGNOSES  Diagnosis: Hypoglycemia  Assessment and Plan of Treatment: Monitor finger sticks pre-meal and bedtime, low salt, fat and carbohydrate diet, minimize glucose intake.  Exercise daily for at least 30 minutes and weight loss.  Follow up with primary care physician and endocrinologist for routine Hemoglobin A1C checks and management.  Follow up with your ophthalmologist for routine yearly vision exams.    Diagnosis: Pneumonia due to COVID-19 virus  Assessment and Plan of Treatment: You were treated with Remdesivir for COVID.    Diagnosis: Prostate cancer  Assessment and Plan of Treatment: Your MRI of the spine (1/16) confirmed metastatic prostate cancer & spinostenosis, no cord compression. Plan for outpt follow up with Dr. Lucila Contreras/urology/oncology.    Diagnosis: Essential hypertension  Assessment and Plan of Treatment: Continue blood pressure medication regimen as directed. Monitor for any visual changes, headaches or dizziness.  Monitor blood pressure regularly.  Follow up with your primary care provider for further management for high blood pressure.

## 2025-01-06 NOTE — PROGRESS NOTE ADULT - SUBJECTIVE AND OBJECTIVE BOX
Cardiology Attending Follow-up Note     Patient seen and examined at bedside.    Overnight Events:     no sig events  HR 50-60s SR    REVIEW OF SYSTEMS:  Constitutional:     [x ] negative [ ] fevers [ ] chills [ ] weight loss [ ] weight gain  HEENT:                  [x ] negative [ ] dry eyes [ ] eye irritation [ ] postnasal drip [ ] nasal congestion  CV:                         [ x] negative  [ ] chest pain [ ] orthopnea [ ] palpitations [ ] murmur  Resp:                     [ x] negative [ ] cough [ ] shortness of breath [ ] dyspnea [ ] wheezing [ ] sputum [ ]hemoptysis  GI:                          [ x] negative [ ] nausea [ ] vomiting [ ] diarrhea [ ] constipation [ ] abd pain [ ] dysphagia   :                        [ x] negative [ ] dysuria [ ] nocturia [ ] hematuria [ ] increased urinary frequency  Musculoskeletal: [ x] negative [ ] back pain [ ] myalgias [ ] arthralgias [ ] fracture  Skin:                       [ x] negative [ ] rash [ ] itch  Neurological:        [x ] negative [ ] headache [ ] dizziness [ ] syncope [ ] weakness [ ] numbness  Psychiatric:           [ x] negative [ ] anxiety [ ] depression  Endocrine:            [ x] negative [ ] diabetes [ ] thyroid problem  Heme/Lymph:      [ x] negative [ ] anemia [ ] bleeding problem  Allergic/Immune: [ x] negative [ ] itchy eyes [ ] nasal discharge [ ] hives [ ] angioedema    [ x] All other systems negative  [ ] Unable to assess ROS due to    Current Meds:  allopurinol 100 milliGRAM(s) Oral daily  amLODIPine   Tablet 10 milliGRAM(s) Oral daily  aspirin enteric coated 81 milliGRAM(s) Oral daily  atorvastatin 40 milliGRAM(s) Oral at bedtime  heparin   Injectable 5000 Unit(s) SubCutaneous every 8 hours  hydrochlorothiazide 25 milliGRAM(s) Oral daily  insulin glargine Injectable (LANTUS) 20 Unit(s) SubCutaneous at bedtime  insulin lispro (ADMELOG) corrective regimen sliding scale   SubCutaneous at bedtime  insulin lispro (ADMELOG) corrective regimen sliding scale   SubCutaneous three times a day before meals  insulin lispro Injectable (ADMELOG) 7 Unit(s) SubCutaneous three times a day before meals  isosorbide   mononitrate ER Tablet (IMDUR) 60 milliGRAM(s) Oral daily  losartan 100 milliGRAM(s) Oral daily  melatonin 3 milliGRAM(s) Oral at bedtime PRN  spironolactone 25 milliGRAM(s) Oral daily      PAST MEDICAL & SURGICAL HISTORY:  HTN (hypertension)      DM2 (diabetes mellitus, type 2)      Prostate cancer      No significant past surgical history          Vitals:  T(F): 98.6 (01-06), Max: 98.6 (01-06)  HR: 61 (01-06) (54 - 61)  BP: 137/76 (01-06) (117/69 - 158/82)  RR: 18 (01-06)  SpO2: 97% (01-06)  I&O's Summary    05 Jan 2025 07:01  -  06 Jan 2025 07:00  --------------------------------------------------------  IN: 0 mL / OUT: 800 mL / NET: -800 mL        Physical Exam:  Appearance: No acute distress  HENT: No JVD   Cardiovascular: RRR, S1/S2, no murmurs  Respiratory: CTABL  Gastrointestinal: soft, NT ND, +BS  Musculoskeletal: No clubbing, no edema   Neurologic: Non-focal  Skin: No rashes, ecchymoses, or cyanosis                          10.0   8.85  )-----------( 309      ( 06 Jan 2025 06:45 )             30.9     01-06    134[L]  |  100  |  60[H]  ----------------------------<  163[H]  4.9   |  17[L]  |  2.02[H]    Ca    9.2      06 Jan 2025 06:45  Phos  4.0     01-06  Mg     2.40     01-06                    Cardiovascular Testings:     CONCLUSIONS:      1. Left ventricular systolic function is normal with an ejection fraction visually estimated at 50 to 55 %.   2. There is mild (grade 1) left ventricular diastolic dysfunction.   3. Left atrium is normalin size.   4. The right atrium is normal in size.   5. Normal right ventricular cavity size, with normal wall thickness, and normal right ventricular systolic function.   6. Trileaflet aortic valve with normal systolic excursion. There is calcification of the aortic valve leaflets.   7. Moderate to severe pulmonic regurgitation.   8. No pericardial effusion seen.    Conclusions:   1. Qualitative Perfusion:      - medium-sized, moderate to severe defect(s) in the inferior and inferolateral wall suggestive of CAD.   2. The left ventricle is mildly decreased in function and normal in size. The time activity curve suggests diastolic dysfunction.. The post stress left ventricular EF is 45 %. The stress end diastolic volume is 71 ml and systolic volume is 39 ml.   3. Hypokinesis of the inferoseptal wall.   4. The RV was not well visualized.

## 2025-01-06 NOTE — DIETITIAN INITIAL EVALUATION ADULT - OTHER INFO
Patient seen in his room for length of stay. Noted diet order: CSTCSHOSN. No reported difficulty swallowing on current diet consistency, noted to be some difficulty of chewing, pointing to upper dentition. Reports a fair to good PO intake with fair appetite in house per collateral with PCA. No specific food and fluid preferences discussed. No reported nausea, vomit, diarrhea, constipation. Noted last BM reported 1/6 per PCA. Labs notable for Hgb A1c @ 7% (12/30), on lantus 17 units HS, and admelog 6 units AC TID. Dosing weight is 92.1kg (12/30), BMI calculated at 30.9 via height 172.7cm. Bed scale weight @ 88.1kg (1/6), possibly fluid shift on diuretics and decreased oral intake in setting of COVID+. Otherwise, weight trend is relatively stable ranging 93-96kg ~ 1 year per [facility] record. Please obtain a weekly weight to accurately assess weight change. Patient is not amenable for ONS or snacks when RD offered at visit. Nutrition education deferred as patient not appropriate at this time.

## 2025-01-06 NOTE — DIETITIAN INITIAL EVALUATION ADULT - REASON FOR ADMISSION
Hypoglycemia  Per chart review, patient is 88 y/o M with pmh of prostate cancer, DM type 2, HTN, p/w hypoglycemia.  Pt reports his insulin glargine  dose was increased from 12 to 15 units recently.  Over the past few days, he reports getting low reading in the morning, but is able to alert his wife who gives him food to bring it  up.  Yesterday AM, pt's wife noticed he was confused and making gurgling sounds while in bed.  She checked FS which was 41.  EMS was called, and pt give D50 en route to ED.  Pt also reports intermittent L sided chest pain for the past several days.  Pain is positional, and improves when pt wears a support belt.  Pain is difficult for patient to describe quality.  No associated dyspnea, dizziness nausea or diaphoresis.  No recent fever or cough.

## 2025-01-06 NOTE — PROGRESS NOTE ADULT - SUBJECTIVE AND OBJECTIVE BOX
Date of Service  : 01-06-25     INTERVAL HPI/OVERNIGHT EVENTS: I feel fine.   Vital Signs Last 24 Hrs  T(C): 36.8 (06 Jan 2025 05:00), Max: 36.9 (05 Jan 2025 15:30)  T(F): 98.3 (06 Jan 2025 05:00), Max: 98.4 (05 Jan 2025 15:30)  HR: 54 (06 Jan 2025 05:00) (54 - 58)  BP: 158/82 (06 Jan 2025 05:00) (133/74 - 158/82)  BP(mean): --  RR: 18 (06 Jan 2025 05:00) (18 - 19)  SpO2: 100% (06 Jan 2025 05:00) (94% - 100%)    Parameters below as of 06 Jan 2025 05:00  Patient On (Oxygen Delivery Method): room air      I&O's Summary    05 Jan 2025 07:01  -  06 Jan 2025 07:00  --------------------------------------------------------  IN: 0 mL / OUT: 800 mL / NET: -800 mL      MEDICATIONS  (STANDING):  allopurinol 100 milliGRAM(s) Oral daily  amLODIPine   Tablet 10 milliGRAM(s) Oral daily  aspirin enteric coated 81 milliGRAM(s) Oral daily  atorvastatin 40 milliGRAM(s) Oral at bedtime  heparin   Injectable 5000 Unit(s) SubCutaneous every 8 hours  hydrochlorothiazide 25 milliGRAM(s) Oral daily  insulin glargine Injectable (LANTUS) 17 Unit(s) SubCutaneous at bedtime  insulin lispro (ADMELOG) corrective regimen sliding scale   SubCutaneous at bedtime  insulin lispro (ADMELOG) corrective regimen sliding scale   SubCutaneous three times a day before meals  insulin lispro Injectable (ADMELOG) 6 Unit(s) SubCutaneous three times a day before meals  isosorbide   mononitrate ER Tablet (IMDUR) 60 milliGRAM(s) Oral daily  losartan 100 milliGRAM(s) Oral daily  nebivolol 5 milliGRAM(s) Oral daily  remdesivir  IVPB 100 milliGRAM(s) IV Intermittent every 24 hours  spironolactone 25 milliGRAM(s) Oral daily    MEDICATIONS  (PRN):  melatonin 3 milliGRAM(s) Oral at bedtime PRN Insomnia    LABS:                        10.0   8.85  )-----------( 309      ( 06 Jan 2025 06:45 )             30.9     01-06    134[L]  |  100  |  60[H]  ----------------------------<  163[H]  4.9   |  17[L]  |  2.02[H]    Ca    9.2      06 Jan 2025 06:45  Phos  4.0     01-06  Mg     2.40     01-06        Urinalysis Basic - ( 06 Jan 2025 06:45 )    Color: x / Appearance: x / SG: x / pH: x  Gluc: 163 mg/dL / Ketone: x  / Bili: x / Urobili: x   Blood: x / Protein: x / Nitrite: x   Leuk Esterase: x / RBC: x / WBC x   Sq Epi: x / Non Sq Epi: x / Bacteria: x      CAPILLARY BLOOD GLUCOSE      POCT Blood Glucose.: 187 mg/dL (06 Jan 2025 08:25)  POCT Blood Glucose.: 258 mg/dL (05 Jan 2025 22:18)  POCT Blood Glucose.: 189 mg/dL (05 Jan 2025 17:57)  POCT Blood Glucose.: 238 mg/dL (05 Jan 2025 12:18)        Urinalysis Basic - ( 06 Jan 2025 06:45 )    Color: x / Appearance: x / SG: x / pH: x  Gluc: 163 mg/dL / Ketone: x  / Bili: x / Urobili: x   Blood: x / Protein: x / Nitrite: x   Leuk Esterase: x / RBC: x / WBC x   Sq Epi: x / Non Sq Epi: x / Bacteria: x      REVIEW OF SYSTEMS:  CONSTITUTIONAL: No fever, weight loss, or fatigue  EYES: No eye pain, visual disturbances, or discharge  ENMT:  No difficulty hearing, tinnitus, vertigo; No sinus or throat pain  NECK: No pain or stiffness  RESPIRATORY: No cough, wheezing, chills or hemoptysis; No shortness of breath  CARDIOVASCULAR: No chest pain, palpitations, dizziness, or leg swelling  GASTROINTESTINAL: No abdominal or epigastric pain. No nausea, vomiting, or hematemesis; No diarrhea or constipation. No melena or hematochezia.  GENITOURINARY: No dysuria, frequency, hematuria, or incontinence  NEUROLOGICAL: No headaches, memory loss, loss of strength, numbness, or tremors  SKIN: No itching, burning, rashes, or lesions   ENDOCRINE: No heat or cold intolerance; No hair loss      Consultant(s) Notes Reviewed:  [x ] YES  [ ] NO    PHYSICAL EXAM:  GENERAL: NAD, well-groomed, well-developed,not in any distress ,  HEAD:  Atraumatic, Normocephalic  EYES: EOMI, PERRLA, conjunctiva and sclera clear  ENMT: No tonsillar erythema, exudates, or enlargement; Moist mucous membranes, Good dentition, No lesions  NECK: Supple, No JVD, Normal thyroid  NERVOUS SYSTEM:  Alert & Oriented X3, No focal deficit   CHEST/LUNG: Good air entry bilateral with no  rales, rhonchi, wheezing, or rubs  HEART: Regular rate and rhythm; No murmurs, rubs, or gallops  ABDOMEN: Soft, Nontender, Nondistended; Bowel sounds present  EXTREMITIES:  2+ Peripheral Pulses, No clubbing, cyanosis, or edema      Care Discussed with Consultants/Other Providers [ x] YES  [ ] NO

## 2025-01-06 NOTE — PHYSICAL THERAPY INITIAL EVALUATION ADULT - MANUAL MUSCLE TESTING RESULTS, REHAB EVAL
cardiac precautions/cognition impairment; bilateral upper extremities at least 3+/5, right lower extremity at least 3/5, left lower extremity 3-/5/grossly assessed due to

## 2025-01-06 NOTE — DIETITIAN INITIAL EVALUATION ADULT - PERTINENT LABORATORY DATA
01-06    134[L]  |  100  |  60[H]  ----------------------------<  163[H]  4.9   |  17[L]  |  2.02[H]    Ca    9.2      06 Jan 2025 06:45  Phos  4.0     01-06  Mg     2.40     01-06    POCT Blood Glucose.: 305 mg/dL (01-06-25 @ 12:38)  A1C with Estimated Average Glucose Result: 7.0 % (12-30-24 @ 09:36)

## 2025-01-06 NOTE — PROGRESS NOTE ADULT - ASSESSMENT
89M with PMH of prostate CA, T2DM, HTN, HLD, CKD, here for hypoglycemia sxs. In the ER, pt found to have cp with EKG w/ NSR and no significant ischemic changes. Pt was recommended to be admitted by Tele Doc Dr. Perea. Cardiology consulted for further evaluation inpt.     1. Cp, +NST and TTE reviewed   2. HTN   3. HLD   4. CKD     -given +NST, initially planned for [facility], deferred for now given COVID+ and rising Cr. Can be done as outpt post discharge given pt has no cardiac sxs at this point.   -bp control, cont norvasc, imduri, losartan, and Hctz   -HR 50-60s, would rec hold bb   -ID eval for covid in elderly     Nitin Lee MD [facility]  Cardiology Attending, Jenn-NS/LOIS  Avaliable on [employer] Team

## 2025-01-06 NOTE — DIETITIAN INITIAL EVALUATION ADULT - SIGNS/SYMPTOMS
as evidenced by PO intake ~51-75% per RN flow sheet. as evidenced by elevated POCT > 180-250mg x 24 hours, Hgb A1c@ 7%.

## 2025-01-06 NOTE — DISCHARGE NOTE PROVIDER - NSDCFUADDAPPT_GEN_ALL_CORE_FT
Follow up with PCP within 1-2 weeks of discharge. If you are in need of a general medicine physician and post-discharge medical follow-up for further care/recommendations you may contact the [facility] for an appointment at [phone].     Follow up with cardiology within 1-2 weeks of discharge. If you are in need of a general cardiologist after discharge, you may contact the [facility] for an appointment at [phone].     Follow up with endocrinology. you have an appt at [phone], [place] [address] with Dr. Kern on 3/20 @ 2:20pm.     Follow up with the nephrologist within 1-2 weeks of discharge. If you do not have a kidney doctor, you may follow up at [facility] at [address] [place] [address], 2nd floor, [place] [address]. Call [phone] for an appointment.     Follow up with oncology within 1-2 weeks of discharge.

## 2025-01-06 NOTE — DISCHARGE NOTE PROVIDER - CARE PROVIDER_API CALL
Nitin Lee  Cardiovascular Disease  [address] [address] [place] [address], 4th [place] [address]  [place] [address]  Phone: [phone]  Fax: [phone]  Follow Up Time: 2 weeks   Nitin Lee  Cardiovascular Disease  [address] [address] [place] [address], 4th [place] [address]  [place] [address]  Phone: [phone]  Fax: [phone]  Follow Up Time: 2 weeks    Your, PCP  Phone: (   )    -  Fax: (   )    -  Follow Up Time: 2 weeks   Nitin Lee  Cardiovascular Disease  [address] [place] [address], 4th [place] [address]  [place] [address]  Phone: [phone]  Fax: [phone]  Follow Up Time: 2 weeks    Aaron Montenegro  Nephrology  [address] [place] [address]  Phone: [phone]  Fax: [phone]  Follow Up Time:     Your, PCP  Phone: (   )    -  Fax: (   )    -  Follow Up Time: 2 weeks    Michael Gaitan  Medical Oncology  [address] [place] [address]  Phone: [phone]  Fax: [phone]  Follow Up Time:

## 2025-01-06 NOTE — PROGRESS NOTE ADULT - SUBJECTIVE AND OBJECTIVE BOX
[facility]   MD CRISTI SESAY MD MARIA SANTIAGO, NP    TEL:  OFFICE: [phone]  From 5pm-7am Answering Service [phone]    -- RENAL FOLLOW UP NOTE ---Date of Service 01-06-25 @ 16:14    Patient is a 89y old  Male who presents with a chief complaint of Hypoglycemia  Per chart review, patient is 88 y/o M with pmh of prostate cancer, DM type 2, HTN, p/w hypoglycemia.  Pt reports his insulin glargine  dose was increased from 12 to 15 units recently.  Over the past few days, he reports getting low reading in the morning, but is able to alert his wife who gives him food to bring it  up.  Yesterday AM, pt's wife noticed he was confused and making gurgling sounds while in bed.  She checked FS which was 41.  EMS was called, and pt give D50 en route to ED.  Pt also reports intermittent L sided chest pain for the past several days.  Pain is positional, and improves when pt wears a support belt.  Pain is difficult for patient to describe quality.  No associated dyspnea, dizziness nausea or diaphoresis.  No recent fever or cough.          Patient seen and examined at bedside. No chest pain/sob    VITALS:  T(F): 97.5 (01-06-25 @ 12:12), Max: 98.4 (01-06-25 @ 01:00)  HR: 60 (01-06-25 @ 12:12)  BP: 143/69 (01-06-25 @ 12:12)  RR: 17 (01-06-25 @ 12:12)  SpO2: 98% (01-06-25 @ 12:12)  Wt(kg): --    01-05 @ 07:01  -  01-06 @ 07:00  --------------------------------------------------------  IN: 0 mL / OUT: 800 mL / NET: -800 mL          PHYSICAL EXAM:  General: NAD  Neck: No JVD  Respiratory: CTAB, no wheezes, rales or rhonchi  Cardiovascular: S1, S2, RRR  Gastrointestinal: BS+, soft, NT/ND  Extremities: No peripheral edema    Hospital Medications:   MEDICATIONS  (STANDING):  allopurinol 100 milliGRAM(s) Oral daily  amLODIPine   Tablet 10 milliGRAM(s) Oral daily  aspirin enteric coated 81 milliGRAM(s) Oral daily  atorvastatin 40 milliGRAM(s) Oral at bedtime  heparin   Injectable 5000 Unit(s) SubCutaneous every 8 hours  hydrochlorothiazide 25 milliGRAM(s) Oral daily  insulin glargine Injectable (LANTUS) 20 Unit(s) SubCutaneous at bedtime  insulin lispro (ADMELOG) corrective regimen sliding scale   SubCutaneous at bedtime  insulin lispro (ADMELOG) corrective regimen sliding scale   SubCutaneous three times a day before meals  insulin lispro Injectable (ADMELOG) 7 Unit(s) SubCutaneous three times a day before meals  isosorbide   mononitrate ER Tablet (IMDUR) 60 milliGRAM(s) Oral daily  losartan 100 milliGRAM(s) Oral daily  remdesivir  IVPB 100 milliGRAM(s) IV Intermittent every 24 hours  spironolactone 25 milliGRAM(s) Oral daily      LABS:  01-06    134[L]  |  100  |  60[H]  ----------------------------<  163[H]  4.9   |  17[L]  |  2.02[H]    Ca    9.2      06 Jan 2025 06:45  Phos  4.0     01-06  Mg     2.40     01-06      Creatinine Trend: 2.02 <--, 1.92 <--, 1.90 <--, 2.01 <--, 1.83 <--, 1.52 <--    Phosphorus: 4.0 mg/dL (01-06 @ 06:45)                              10.0   8.85  )-----------( 309      ( 06 Jan 2025 06:45 )             30.9     Urine Studies:  Urinalysis - [01-06-25 @ 06:45]      Color  / Appearance  / SG  / pH       Gluc 163 / Ketone   / Bili  / Urobili        Blood  / Protein  / Leuk Est  / Nitrite       RBC  / WBC  / Hyaline  / Gran  / Sq Epi  / Non Sq Epi  / Bacteria     Urine Creatinine 128      [01-04-25 @ 17:00]  Urine Protein 34      [01-04-25 @ 17:00]    Lipid: chol 130, , HDL 36, LDL --      [01-01-25 @ 05:30]        RADIOLOGY & ADDITIONAL STUDIES:

## 2025-01-06 NOTE — PROGRESS NOTE ADULT - ASSESSMENT
89 yo man with diabetes admitted 1/2 with hypoglycemia   noted to have expiratory wheeze  - covid+    afebrile   CXR clear     Covid+ mild- moderate    Initially required supplemental oxygen now saturating well on RA   DM  Renal insufficiency     today day #5/5 remdesivir      VICTOR HUGO

## 2025-01-06 NOTE — PROGRESS NOTE ADULT - ASSESSMENT
88 y/o M with pmh of prostate cancer, DM type 2, HTN, p/w hypoglycemia. +NST planning for angio. nephrology consulted for elevated scr    CKD stage 3B  scr seems to be fluctuating ~ 1.5-1.8  S.Cr. relatively stable.  +covid   Continue on hctz, losartan, spironolactone.  LHC deferred in view of Covid+ and elevated S.Cr.  Recommend ns @ 75cc/hr x6 hrs before and 6 hrs after-if planned for angio  monitor BMP and UO     HTN  Fluctuating; acceptable.  C/W current meds.  Low salt diet.  monitor    proteinuria  mild  UPr/Cr 0.26gm.  Monitor.    covid  care per team    cp  +NST  LHC deferred for now.  f/u cardio

## 2025-01-06 NOTE — DIETITIAN INITIAL EVALUATION ADULT - NSICDXPASTSURGICALHX_GEN_ALL_CORE_FT
[facility]). She should call him to schedule a follow up given how many times he's had asthma exacerbations in the past 4 months.  
PAST SURGICAL HISTORY:  No significant past surgical history

## 2025-01-06 NOTE — PHYSICAL THERAPY INITIAL EVALUATION ADULT - PHYSICAL ASSIST/NONPHYSICAL ASSIST: STAND/SIT, REHAB EVAL
verbal cues/2 person assist
[FreeTextEntry1] : I had the pleasure of seeing Zeb Rivera in the pediatric cardiology clinic at [facility] on 2019; he was last seen three years ago on 2016. \par Zeb is a 19 year old young man with hypertension and morbid obesity; he has been lost to follow-up with both cardiology and nephrology.  He had previously been on 4 anti-hypertensive medications, with his last follow-up with nephrology in 2016; he reports not taking his medications for the past ~ 3 years.  Zeb was first evaluated by pediatric cardiology when he was hospitalized for a hypertensive crisis in 2015.  His previous echocardiograms have demonstrated mild-moderate concentric hypertrophy and diastolic dysfunction secondary to the hypertrophy.\par On evaluation today Zeb reports no cardiovascular symptoms.  He reports no headaches, vision changes, chest pain, shortness of breath, palpitations, dizziness, syncope, edema or cyanosis.  Since his last visit 3 years ago, he has lost 17 kg. He had been seeing nutrition in the past In 2016 Zeb's maternal uncle  suddenly at the age of 36; his mother states that he had high blood pressure along with other medical issues.  Mom reports that multiple family members have significant HTN; she herself has suffered from 2 strokes, luckily with little residual defects.  \par In clinic today, Zeb's blood pressure is 220/120.

## 2025-01-06 NOTE — PHYSICAL THERAPY INITIAL EVALUATION ADULT - ADDITIONAL COMMENTS
Pt is a poor historian; as per care coordinator note, Pt lives in a private house with his wife with 3 steps to enter. Prior to hospital admission, pt's wife assisted him with ADls, self care, ambulation (uses a single axis cane), and stair negotiation.     Pt left comfortable in bed, NAD, all lines intact, all precautions maintained, with call bell in reach, bed alarm on, and RN aware of PT evaluation.

## 2025-01-06 NOTE — DISCHARGE NOTE PROVIDER - NSDCFUSCHEDAPPT_GEN_ALL_CORE_FT
Kamron Lantigua Physician [facility]  NEPHRO [address]  Scheduled Appointment: 02/14/2025     Kamron Lantigua  [facility]  NEPHRO [address]  Scheduled Appointment: 02/14/2025    Yovani Kern  [facility]  ENDOCRIN [address]  Scheduled Appointment: 03/20/2025    [facility]  UROLOGY [address] [place] [address]  Scheduled Appointment: 04/15/2025    Duarte Daniels  [facility]  UROLOGY [address] [place] [address]  Scheduled Appointment: 04/15/2025

## 2025-01-06 NOTE — DISCHARGE NOTE PROVIDER - NSDCMRMEDTOKEN_GEN_ALL_CORE_FT
allopurinol 100 mg oral tablet: 1 tab(s) orally once a day  glipizide-metformin 5 mg-500 mg oral tablet: 1 tab(s) orally once a day  Lantus Solostar Pen 100 units/mL subcutaneous solution: 15 unit(s) subcutaneous once a day (at bedtime)  nebivolol 5 mg oral tablet: 1 tab(s) orally once a day  spironolactone 25 mg oral tablet: 1 tab(s) orally once a day  Tribenzor 40 mg-10 mg-25 mg oral tablet: 1 tab(s) orally once a day   allopurinol 100 mg oral tablet: 1 tab(s) orally once a day  Lantus Solostar Pen 100 units/mL subcutaneous solution: 15 unit(s) subcutaneous once a day (at bedtime)  nebivolol 5 mg oral tablet: 1 tab(s) orally once a day  spironolactone 25 mg oral tablet: 1 tab(s) orally once a day  Tribenzor 40 mg-10 mg-25 mg oral tablet: 1 tab(s) orally once a day   allopurinol 100 mg oral tablet: 1 tab(s) orally once a day  amLODIPine 10 mg oral tablet: 1 tab(s) orally once a day  aspirin 81 mg oral delayed release tablet: 1 tab(s) orally once a day  atorvastatin 40 mg oral tablet: 1 tab(s) orally once a day (at bedtime)  insulin glargine 100 units/mL subcutaneous solution: 27 unit(s) subcutaneous once a day (at bedtime)  insulin lispro 100 units/mL injectable solution: 10 unit(s) injectable 3 times a day (before meals)  isosorbide mononitrate 60 mg oral tablet, extended release: 1 tab(s) orally once a day  losartan 50 mg oral tablet: 1 tab(s) orally once a day  nebivolol 5 mg oral tablet: 1 tab(s) orally once a day  spironolactone 25 mg oral tablet: 1 tab(s) orally once a day   allopurinol 100 mg oral tablet: 1 tab(s) orally once a day  amLODIPine 10 mg oral tablet: 1 tab(s) orally once a day  aspirin 81 mg oral delayed release tablet: 1 tab(s) orally once a day  atorvastatin 40 mg oral tablet: 1 tab(s) orally once a day (at bedtime)  insulin glargine 100 units/mL subcutaneous solution: 27 unit(s) subcutaneous once a day (at bedtime)  insulin lispro 100 units/mL injectable solution: 10 unit(s) injectable 3 times a day (before meals)  isosorbide mononitrate 60 mg oral tablet, extended release: 1 tab(s) orally once a day  losartan 50 mg oral tablet: 1 tab(s) orally once a day  spironolactone 25 mg oral tablet: 1 tab(s) orally once a day   allopurinol 100 mg oral tablet: 1 tab(s) orally once a day  amLODIPine 10 mg oral tablet: 1 tab(s) orally once a day  aspirin 81 mg oral delayed release tablet: 1 tab(s) orally once a day  atorvastatin 40 mg oral tablet: 1 tab(s) orally once a day (at bedtime)  insulin glargine 100 units/mL subcutaneous solution: 27 unit(s) subcutaneous once a day (at bedtime)  insulin lispro 100 units/mL injectable solution: 8 unit(s) injectable 3 times a day (before meals)  isosorbide mononitrate 60 mg oral tablet, extended release: 1 tab(s) orally once a day  losartan 50 mg oral tablet: 1 tab(s) orally once a day  spironolactone 25 mg oral tablet: 1 tab(s) orally once a day

## 2025-01-06 NOTE — DIETITIAN INITIAL EVALUATION ADULT - ETIOLOGY
related to endocrine dysfunction related to physiological cause in setting of current medical condition

## 2025-01-06 NOTE — PROGRESS NOTE ADULT - ASSESSMENT
89 y/o M with pmh of prostate cancer, DM type 2, HTN, p/w hypoglycemia.  Pt reports his insulin glargine  dose was increased from 12 to 15 units recently.  Over the past few days, he reports getting low reading in the morning, but is able to alert his wife who gives him food to bring it  up.  Yesterday AM, pt's wife noticed he was confused and making gurgling sounds while in bed.  She checked FS which was 41.  EMS was called, and pt give D50 en route to ED.  Pt also reports intermittent L sided chest pain for the past several days.  Pain is positional, and improves when pt wears a support belt.  Pain is difficult for patient to describe quality.  No associated dyspnea, dizziness nausea or diaphoresis.  No recent fever or cough.    Pt was evaluated in the ED, and placed in the CDU for monitoring.  He is admitted for further w/u of chest pain and monitoring of glucose.    Planned for Cardiac cath om Thursday.      Problem/Recommendation - 1:  ·  Problem: Chest pain with abnormal stress test .   ·  Recommendation: Awaiting cardiac cath .   Will start on Aspirin and statin.  Cardiology help appreciated.   < from: TTE W or WO Ultrasound Enhancing Agent (12.31.24 @ 07:33) >    CONCLUSIONS:      1. Left ventricular systolic function is normal with an ejection fraction visually estimated at 50 to 55 %.      < from: Nuclear Stress Test-Exercise.. (12.31.24 @ 08:30) >     1. Qualitative Perfusion:      - medium-sized, moderate to severe defect(s) in the inferior and inferolateral wall suggestive of CAD.   2. The left ventricle is mildly decreased in function and normal in size. The time activity curve suggests diastolic dysfunction.. The post stress left ventricular EF is 45 %. The stress end diastolic volume is 71 ml and systolic volume is 39 ml.   3. Hypokinesis of the inferoseptal wall.     Problem/Recommendation - 2:  ·  Problem: Type 2 diabetes mellitus with hypoglycemia.   ·  Recommendation: Endo help appreciated.     Following  their recommendation.     Problem/Recommendation - 3:  ·  Problem: Essential hypertension.   ·  Recommendation: BP medications with hold parameters.     Problem/Recommendation - 4:  ·  Problem: CKD with KIRBY .   ·  Recommendation: Trending Creatinine .  Renal help appreciated.       Problem/Recommendation - 5:  ·  Problem: Prostate cancer.   ·  Recommendation: Out patient Follow Up.       Problem/Recommendation - 6:  ·  Problem: COVID 19 infection with Hypoxia .   ·  Recommendation: ID help appreciated.       ON Remdisvir .

## 2025-01-06 NOTE — PHYSICAL THERAPY INITIAL EVALUATION ADULT - GENERAL OBSERVATIONS, REHAB EVAL
Pt encountered in semisupine position, no distress, AxOx2, with +IV, +cadiac monitor, +pulse oximeter, and +condom catheter. Pt agreeable to participate in PT evaluation. Heart rate 62bpm.

## 2025-01-06 NOTE — PHYSICAL THERAPY INITIAL EVALUATION ADULT - PATIENT PROFILE REVIEW, REHAB EVAL
ACTIVITY ORDER: Ambulate as Tolerated; Spoke with provider Denise prior->Pt OK for PT consult/OOB activity./yes

## 2025-01-06 NOTE — DIETITIAN INITIAL EVALUATION ADULT - PERTINENT MEDS FT
MEDICATIONS  (STANDING):  allopurinol 100 milliGRAM(s) Oral daily  amLODIPine   Tablet 10 milliGRAM(s) Oral daily  aspirin enteric coated 81 milliGRAM(s) Oral daily  atorvastatin 40 milliGRAM(s) Oral at bedtime  heparin   Injectable 5000 Unit(s) SubCutaneous every 8 hours  hydrochlorothiazide 25 milliGRAM(s) Oral daily  insulin glargine Injectable (LANTUS) 20 Unit(s) SubCutaneous at bedtime  insulin lispro (ADMELOG) corrective regimen sliding scale   SubCutaneous at bedtime  insulin lispro (ADMELOG) corrective regimen sliding scale   SubCutaneous three times a day before meals  insulin lispro Injectable (ADMELOG) 7 Unit(s) SubCutaneous three times a day before meals  isosorbide   mononitrate ER Tablet (IMDUR) 60 milliGRAM(s) Oral daily  losartan 100 milliGRAM(s) Oral daily  remdesivir  IVPB 100 milliGRAM(s) IV Intermittent every 24 hours  spironolactone 25 milliGRAM(s) Oral daily    MEDICATIONS  (PRN):  melatonin 3 milliGRAM(s) Oral at bedtime PRN Insomnia

## 2025-01-06 NOTE — DIETITIAN INITIAL EVALUATION ADULT - PROBLEM SELECTOR PLAN 3
- continue olmesartan/amlodipine/hct  - continue aldactone  - continue nebivolol  - reviewed outpatient nephrology notes

## 2025-01-06 NOTE — DISCHARGE NOTE PROVIDER - NSDCACTIVITY_GEN_ALL_CORE
Activity as tolerated Melolabial Interpolation Flap Text: A decision was made to reconstruct the defect utilizing an interpolation axial flap and a staged reconstruction.  A telfa template was made of the defect.  This telfa template was then used to outline the melolabial interpolation flap.  The donor area for the pedicle flap was then injected with anesthesia.  The flap was excised through the skin and subcutaneous tissue down to the layer of the underlying musculature.  The pedicle flap was carefully excised within this deep plane to maintain its blood supply.  The edges of the donor site were undermined.   The donor site was closed in a primary fashion.  The pedicle was then rotated into position and sutured.  Once the tube was sutured into place, adequate blood supply was confirmed with blanching and refill.  The pedicle was then wrapped with xeroform gauze and dressed appropriately with a telfa and gauze bandage to ensure continued blood supply and protect the attached pedicle.

## 2025-01-06 NOTE — DISCHARGE NOTE PROVIDER - HOSPITAL COURSE
89 M with PMHx HTN, DM, and prostate cancer presenting with hypoglycemia at home and intermittent L sided chest pain x several days    COVID+ 1/2, on remdes - increased to 5 days (last dose 1/6 PM)    Chest pain  - Cards (Dr. Nitin Lee) following  - TTE (12/31): EF 50-55%, mild LV diastolic dysfunction, mod-severe pulmonic regurg  - NST (12/31): abnormal w/ medium-sized, moderate to severe defects  - Monitor BP, Imdur added (12/31), can add Hydralazine per cards  - initially planned for [facility], deferred for now given COVID+ and rising Cr - Can be done as outpt post discharge given pt has no cardiac sxs at this point.     KIRBY, monitor Cr     T2DM with hypoglycemia   - Endo following, f/u regarding dc recs (lantus vs oral agent)    On ___ this case was reviewed with  ____, the patient is medically stable and optimized for discharge. All medications were reviewed and prescriptions were sent to mutually agreed upon pharmacy. The patient agrees to follow up with providers as recommended. 89 M with PMHx HTN, DM, and prostate cancer presenting with hypoglycemia at home and intermittent L sided chest pain x several days    COVID+ 1/2, on remdes - increased to 5 days (last dose 1/6 PM)    Chest pain  - Cards (Dr. Nitin Lee) following  - TTE (12/31): EF 50-55%, mild LV diastolic dysfunction, mod-severe pulmonic regurg  - NST (12/31): abnormal w/ medium-sized, moderate to severe defects  - Monitor BP, Imdur added (12/31), can add Hydralazine per cards  - initially planned for [facility], deferred for now given COVID+ and rising Cr - Can be done as outpt post discharge given pt has no cardiac sxs at this point.   -holding bystolic due to heart rate    KIRBY, monitor Cr     T2DM with hypoglycemia   - Endo following, f/u regarding dc recs (lantus vs oral agent)    On ___ this case was reviewed with  ____, the patient is medically stable and optimized for discharge. All medications were reviewed and prescriptions were sent to mutually agreed upon pharmacy. The patient agrees to follow up with providers as recommended. 89 M with PMHx HTN, DM, and prostate cancer presenting with hypoglycemia at home and intermittent L sided chest pain x several days     COVID+ s/p Remdesivir (1/2-1/6)    Back pain   - MRI Spine (1/16) confirms metastatic prostate ca & spinostenosis, no cord compression  - Oncology (Gaitan) following, plan for outpt follow up with Dr. Lucila Contreras   - Neurosurgery: no intervention at this time    Chest pain  - TTE (12/31): EF 50-55%, mild LV diastolic dysfunction, mod-severe pulmonic regurg  - NST (12/31): abnormal w/ medium-sized, moderate to severe defects  - Initially planned for [facility], deferred given COVID and KIRBY - as per cards can be done as outpatient post discharge (1/15)  - Monitor BP, Imdur added (12/31)    KIRBY  - Nephro following, home meds held, resumed Losartan (1/14) and Aldactone (1/15)    PT: MARGARETH   Patient seen and evaluated, Medically cleared/stable for discharge as per   with close outpatient follow up within 1-2 weeks of discharge. Patient understands and agrees with plan of care.

## 2025-01-06 NOTE — DIETITIAN INITIAL EVALUATION ADULT - ADD RECOMMEND
1. Continue current PO diet order as prescribed. Defer diet/texture modification to medical team/SLP as indicated.   2. Nursing to consistently document % PO intake in RN flow sheets; monitor weekly weights, skin integrity, bowel regimen, and nutrition pertinent labs.

## 2025-01-06 NOTE — DISCHARGE NOTE PROVIDER - PROVIDER TOKENS
PROVIDER:[TOKEN:[[id number]],FOLLOWUP:[2 weeks]] PROVIDER:[TOKEN:[[id number]],FOLLOWUP:[2 weeks]],FREE:[LAST:[Your],FIRST:[PCP],PHONE:[(   )    -],FAX:[(   )    -],FOLLOWUP:[2 weeks]] PROVIDER:[TOKEN:[[id number]],FOLLOWUP:[2 weeks]],PROVIDER:[TOKEN:[[id number]]],FREE:[LAST:[Your],FIRST:[PCP],PHONE:[(   )    -],FAX:[(   )    -],FOLLOWUP:[2 weeks]],PROVIDER:[TOKEN:[[id number]]]

## 2025-01-06 NOTE — DISCHARGE NOTE PROVIDER - CARE PROVIDERS DIRECT ADDRESSES
,DirectAddress_Unknown ,DirectAddress_Unknown,DirectAddress_Unknown ,DirectAddress_Unknown,[email or web address],DirectAddress_Unknown,[email or web address]

## 2025-01-06 NOTE — PROGRESS NOTE ADULT - ASSESSMENT
89-year-old male with a past medical history of prostate cancer, diabetes, hypertension, presenting due to concern of hypoglycemia at home.  The patient states he is tired and was not sure what happened.  According to his wife who was in bed with him she started hearing him gurgling at home.  She turned on the lights and tried to wake him up, the patient was unresponsive.  They called EMS who came and checked a fingerstick which was 41.  He was given 1 amp of D50 with a repeat being 140.  Patient also tolerated p.o.  According to wife he had a similar occurrence yesterday where his fingerstick was in the 40s.  Patient takes combined metformin and glipizide as well as lantus  Denies fever, chills, chest pain, trouble breathing, dysuria.  According to patient he has decreased appetite recently compared to the past.  endocrine called for further assistance  pt reports long hx of DM   he states lantus was increased from 12--> 15 units  checks FSBG in the am and afternoon  reports multiple lows in 30-40s   eGFR: 44    #Poorly controlled T2DM with hyperglycemia  - HbA1c: 7.0  - Home Regimen: combined metformin and glipizide as well as lantus 12-15 units  - Endocrinologist: unknown    INPATIENT PLAN:   - Goal -180 mg/dl: Fasting and prandial FSs above goal. Pt with URI + covid.   - Increase Lantus to 20 units SQ at bedtime in the setting of infectious process.   - Increase Admelog to 7 units SQ TID AC, hold if NPO or not eating a meal.   - continue low dose Admelog correction scale at mealtimes  - continue low dose Admelog correction scale at bedtime  - Fingerstick BG before meals, bedtime  - Carbohydrate consistent diet  - RD consult  - Hold oral DM agents while inpatient  - C-Peptide, Serum: 3.0 ng/mL (12-31-24 @ 05:30)---glucose 170- pt is making adequate endogenous insulin.  - eGFR: 31 mL/min/1.73m2 (01-06-25 @ 06:45)      DISCHARGE PLAN: Patient going to rehab  - Lantus 20 units SQ at bedtime plus metformin 500 mg PO QD (egfr 31-was previously on metformin once daily with glipizide combo pill)   - stop glipizide given profound hypoglycemia   - pt reporting decreased appetite - avoid glp-1 for now  - Please send Rx for Glucose tabs, GVOKE hypopen, Baqsimi nasal spray or glucagon emergency kit for hypoglycemia risk.   - Patient to call doctor with persistent high or low BG at home.   - Ensure patient has glucometer, test strips and lancets on discharge.  - Recommend routine outpatient ophthalmology and podiatry follow up.       #HTN  - Outpatient goal BP <130//80 for age  - Can check urine albumin/creatinine outpatient  - Management per primary team.    #HLD  - LDL goal < 70  - On atorvastatin 40mg at bedtime.       SIRENA Garcia-BC  Nurse Practitioner  Division of Endocrinology  Contact on TEAMS    If out of hospital/unavailable when paged, please note: patient will be cared for by another provider on the endocrine service.  For urgent concerns: call the endocrine answering service for assistance to reach covering provider ([phone]). For non-urgent matters: please email [email or web address] for assistance.

## 2025-01-06 NOTE — PROGRESS NOTE ADULT - SUBJECTIVE AND OBJECTIVE BOX
Follow Up:      Interval History/ROS:  afebrile  weak  on RA now     Allergies  No Known Allergies        ANTIMICROBIALS:  remdesivir  IVPB 100 every 24 hours    MEDICATIONS  (STANDING):  allopurinol 100 daily  amLODIPine   Tablet 10 daily  aspirin enteric coated 81 daily  atorvastatin 40 at bedtime  heparin   Injectable 5000 every 8 hours  hydrochlorothiazide 25 daily  insulin glargine Injectable (LANTUS) 20 at bedtime  insulin lispro (ADMELOG) corrective regimen sliding scale  at bedtime  insulin lispro (ADMELOG) corrective regimen sliding scale  three times a day before meals  insulin lispro Injectable (ADMELOG) 7 three times a day before meals  isosorbide   mononitrate ER Tablet (IMDUR) 60 daily  losartan 100 daily  melatonin 3 at bedtime PRN  spironolactone 25 daily    Vital Signs Last 24 Hrs  T(F): 98.5 (01-06-25 @ 16:00), Max: 98.5 (01-06-25 @ 16:00)  HR: 59 (01-06-25 @ 16:00)  BP: 117/69 (01-06-25 @ 16:00)  RR: 18 (01-06-25 @ 16:00)  SpO2: 97% (01-06-25 @ 16:00) (97% - 100%)        PHYSICAL EXAM:  Constitutional: Non toxic   Eyes: No icterus.   Oral cavity: Clear, no lesions  Neck: Supple  RS: no respiratory distress RA  CVS: S1, S2   Abdomen: Soft.   Neuro: Alert, oriented to time/place/person  Cranial nerves 2-12 grossly normal. No focal abnormalities                          10.0   8.85  )-----------( 309      ( 06 Jan 2025 06:45 )             30.9 01-06    134  |  100  |  60  ----------------------------<  163  4.9   |  17  |  2.02  Ca    9.2      06 Jan 2025 06:45Phos  4.0     01-06Mg     2.40     01-06          Urinalysis Basic - ( 03 Jan 2025 06:24 )    Color: x / Appearance: x / SG: x / pH: x  Gluc: 256 mg/dL / Ketone: x  / Bili: x / Urobili: x   Blood: x / Protein: x / Nitrite: x   Leuk Esterase: x / RBC: x / WBC x   Sq Epi: x / Non Sq Epi: x / Bacteria: x        MICROBIOLOGY:    FluA/FluB/RSV/COVID PCR (01.02.25 @ 04:56)   SARS-CoV-2 Result: Detected: This Respiratory Panel uses polymerase chain reaction (PCR) to detect for   influenza A; influenza B; respiratory syncytial virus; and SARS-CoV-2.   Test results should be correlated with clinical presentation, patient   history, and epidemiology.  Influenza A Result: NotDetec  Influenza B Result: NotDetec  Resp Syn Virus Result: NotDetec        RADIOLOGY:    rad< from: Xray Chest 1 View- PORTABLE-Urgent (Xray Chest 1 View- PORTABLE-Urgent .) (01.02.25 @ 05:08) >    ACC: [id number] EXAM:  XR CHEST PORTABLE URGENT 1V   ORDERED BY: RENATE DON     PROCEDURE DATE:  01/02/2025          INTERPRETATION:  EXAMINATION: XR CHEST URGENT    CLINICAL INDICATION: wheezing    TECHNIQUE: Single frontal, portable view ofthe chest was obtained.    COMPARISON: Chest x-ray 12/30/2024.    FINDINGS:  Right hemidiaphragm elevation.  The heart is normal in size.  No focal consolidation.  There is no pneumothorax or pleural effusion.  No acute abnormalities in the visualized osseous structures.    IMPRESSION: Clear lungs.      --- End of Report ---    < end of copied text >

## 2025-01-06 NOTE — DIETITIAN INITIAL EVALUATION ADULT - ORAL INTAKE PTA/DIET HISTORY
Patient is A&Ox 2-3 at baseline. Confirmed no food allergy or intolerance. Patient is alert, and forgetful. Limited historian due to patient unable to complete nutrition interview with periodically off interview topics for poor concentration; likely related to current medical condition. Patient is aware of his decreased weight trend, reports it is unintentional.    [facility] record   93.4kg (12/7/24)  93.6kg (11/11/24)  95.3kg (7/26/24)  96kg (2/12/24)

## 2025-01-06 NOTE — PROGRESS NOTE ADULT - SUBJECTIVE AND OBJECTIVE BOX
Chief Complaint: T2DM with admission for hypoglycemia    Interval Events: Pt seen and examined at bedside. Pt tolerating carb consistent diet with no nausea, no vomiting. Spoke to patients wife and she is in the process of picking a rehab facility for pateint's discharge.     MEDICATIONS  (STANDING):  allopurinol 100 milliGRAM(s) Oral daily  amLODIPine   Tablet 10 milliGRAM(s) Oral daily  aspirin enteric coated 81 milliGRAM(s) Oral daily  atorvastatin 40 milliGRAM(s) Oral at bedtime  heparin   Injectable 5000 Unit(s) SubCutaneous every 8 hours  hydrochlorothiazide 25 milliGRAM(s) Oral daily  insulin glargine Injectable (LANTUS) 20 Unit(s) SubCutaneous at bedtime  insulin lispro (ADMELOG) corrective regimen sliding scale   SubCutaneous at bedtime  insulin lispro (ADMELOG) corrective regimen sliding scale   SubCutaneous three times a day before meals  insulin lispro Injectable (ADMELOG) 7 Unit(s) SubCutaneous three times a day before meals  isosorbide   mononitrate ER Tablet (IMDUR) 60 milliGRAM(s) Oral daily  losartan 100 milliGRAM(s) Oral daily  remdesivir  IVPB 100 milliGRAM(s) IV Intermittent every 24 hours  spironolactone 25 milliGRAM(s) Oral daily    MEDICATIONS  (PRN):  melatonin 3 milliGRAM(s) Oral at bedtime PRN Insomnia      Allergies    No Known Allergies    Intolerances      Review of Systems:  Eyes: No blurry vision  Cardiovascular: No chest pain, palpitations  Respiratory: No SOB, no cough  GI: No nausea, vomiting, abdominal pain  : No dysuria    ALL OTHER SYSTEMS REVIEWED AND NEGATIVE        VITALS: T(C): 36.4 (01-06-25 @ 12:12)  T(F): 97.5 (01-06-25 @ 12:12), Max: 98.4 (01-05-25 @ 15:30)  HR: 60 (01-06-25 @ 12:12) (54 - 60)  BP: 143/69 (01-06-25 @ 12:12) (133/74 - 158/82)  RR:  (17 - 19)  SpO2:  (97% - 100%)  Wt(kg): --      Physical Exam:   GENERAL: NAD, well-developed  EYES: No proptosis  HEENT:  Atraumatic, Normocephalic  RESPIRATORY: non labored breathing   GI: Non distended  PSYCH: Alert, normal affect, normal mood    CAPILLARY BLOOD GLUCOSE    POCT Blood Glucose.: 305 mg/dL (06 Jan 2025 12:38)  POCT Blood Glucose.: 187 mg/dL (06 Jan 2025 08:25)  POCT Blood Glucose.: 258 mg/dL (05 Jan 2025 22:18)  POCT Blood Glucose.: 189 mg/dL (05 Jan 2025 17:57)      01-06    134[L]  |  100  |  60[H]  ----------------------------<  163[H]  4.9   |  17[L]  |  2.02[H]    eGFR: 31[L]    Ca    9.2      01-06  Mg     2.40     01-06  Phos  4.0     01-06        A1C with Estimated Average Glucose Result: 7.0 % (12-30-24 @ 09:36)      Thyroid Function Tests:

## 2025-01-07 PROBLEM — E11.9 TYPE 2 DIABETES MELLITUS WITHOUT COMPLICATIONS: Chronic | Status: ACTIVE | Noted: 2024-12-30

## 2025-01-07 PROBLEM — C61 MALIGNANT NEOPLASM OF PROSTATE: Chronic | Status: ACTIVE | Noted: 2024-12-30

## 2025-01-07 PROBLEM — I10 ESSENTIAL (PRIMARY) HYPERTENSION: Chronic | Status: ACTIVE | Noted: 2024-12-30

## 2025-01-07 LAB
GLUCOSE BLDC GLUCOMTR-MCNC: 151 MG/DL — HIGH (ref 70–99)
GLUCOSE BLDC GLUCOMTR-MCNC: 187 MG/DL — HIGH (ref 70–99)
GLUCOSE BLDC GLUCOMTR-MCNC: 203 MG/DL — HIGH (ref 70–99)
GLUCOSE BLDC GLUCOMTR-MCNC: 204 MG/DL — HIGH (ref 70–99)

## 2025-01-07 PROCEDURE — 99232 SBSQ HOSP IP/OBS MODERATE 35: CPT

## 2025-01-07 RX ORDER — INSULIN GLARGINE-YFGN 100 [IU]/ML
24 INJECTION, SOLUTION SUBCUTANEOUS AT BEDTIME
Refills: 0 | Status: DISCONTINUED | OUTPATIENT
Start: 2025-01-07 | End: 2025-01-08

## 2025-01-07 RX ORDER — INSULIN LISPRO 100/ML
8 VIAL (ML) SUBCUTANEOUS
Refills: 0 | Status: DISCONTINUED | OUTPATIENT
Start: 2025-01-07 | End: 2025-01-08

## 2025-01-07 RX ORDER — INSULIN LISPRO 100/ML
8 VIAL (ML) SUBCUTANEOUS
Refills: 0 | Status: COMPLETED | OUTPATIENT
Start: 2025-01-07 | End: 2025-01-07

## 2025-01-07 RX ADMIN — ATORVASTATIN CALCIUM 40 MILLIGRAM(S): 40 TABLET, FILM COATED ORAL at 21:51

## 2025-01-07 RX ADMIN — Medication 10 MILLIGRAM(S): at 06:39

## 2025-01-07 RX ADMIN — Medication 7 UNIT(S): at 08:59

## 2025-01-07 RX ADMIN — Medication 60 MILLIGRAM(S): at 11:56

## 2025-01-07 RX ADMIN — SPIRONOLACTONE 25 MILLIGRAM(S): 50 TABLET ORAL at 06:40

## 2025-01-07 RX ADMIN — Medication 8 UNIT(S): at 18:13

## 2025-01-07 RX ADMIN — Medication 2: at 08:58

## 2025-01-07 RX ADMIN — Medication 81 MILLIGRAM(S): at 11:56

## 2025-01-07 RX ADMIN — Medication 1: at 18:13

## 2025-01-07 RX ADMIN — Medication 8 UNIT(S): at 13:13

## 2025-01-07 RX ADMIN — LOSARTAN POTASSIUM 100 MILLIGRAM(S): 100 TABLET, FILM COATED ORAL at 06:41

## 2025-01-07 RX ADMIN — HEPARIN SODIUM 5000 UNIT(S): 1000 INJECTION, SOLUTION INTRAVENOUS; SUBCUTANEOUS at 21:51

## 2025-01-07 RX ADMIN — HEPARIN SODIUM 5000 UNIT(S): 1000 INJECTION, SOLUTION INTRAVENOUS; SUBCUTANEOUS at 11:56

## 2025-01-07 RX ADMIN — HEPARIN SODIUM 5000 UNIT(S): 1000 INJECTION, SOLUTION INTRAVENOUS; SUBCUTANEOUS at 06:40

## 2025-01-07 RX ADMIN — INSULIN GLARGINE-YFGN 24 UNIT(S): 100 INJECTION, SOLUTION SUBCUTANEOUS at 23:02

## 2025-01-07 RX ADMIN — ALLOPURINOL 100 MILLIGRAM(S): 100 TABLET ORAL at 11:56

## 2025-01-07 RX ADMIN — Medication 1: at 13:12

## 2025-01-07 NOTE — PROGRESS NOTE ADULT - ASSESSMENT
89-year-old male with a past medical history of prostate cancer, diabetes, hypertension, presenting due to concern of hypoglycemia at home.  The patient states he is tired and was not sure what happened.  According to his wife who was in bed with him she started hearing him gurgling at home.  She turned on the lights and tried to wake him up, the patient was unresponsive.  They called EMS who came and checked a fingerstick which was 41.  He was given 1 amp of D50 with a repeat being 140.  Patient also tolerated p.o.  According to wife he had a similar occurrence yesterday where his fingerstick was in the 40s.  Patient takes combined metformin and glipizide as well as lantus  Denies fever, chills, chest pain, trouble breathing, dysuria.  According to patient he has decreased appetite recently compared to the past.  endocrine called for further assistance  pt reports long hx of DM   he states lantus was increased from 12--> 15 units  checks FSBG in the am and afternoon  reports multiple lows in 30-40s   eGFR: 44    #Poorly controlled T2DM with hyperglycemia  - HbA1c: 7.0  - Home Regimen: combined metformin and glipizide as well as lantus 12-15 units  - Endocrinologist: unknown    INPATIENT PLAN:   - Goal -180 mg/dl: Fasting and prandial FSs above goal. Pt with URI + covid.   - Increase Lantus to 24 units SQ at bedtime in the setting of infectious process.   - Increase Admelog to 8 units SQ TID AC, hold if NPO or not eating a meal.   - continue low dose Admelog correction scale at mealtimes  - continue low dose Admelog correction scale at bedtime  - Fingerstick BG before meals, bedtime  - Carbohydrate consistent diet  - RD consult  - Hold oral DM agents while inpatient  - C-Peptide, Serum: 3.0 ng/mL (12-31-24 @ 05:30)---glucose 170- pt is making adequate endogenous insulin.      DISCHARGE PLAN: Patient going to rehab  - Lantus 24units SQ at bedtime plus metformin 500 mg PO QD (egfr 31-was previously on metformin once daily with glipizide combo pill)   - stop glipizide given profound hypoglycemia   - pt reporting decreased appetite - avoid glp-1 for now  - Please send Rx for Glucose tabs, GVOKE hypopen, Baqsimi nasal spray or glucagon emergency kit for hypoglycemia risk.   - Patient to call doctor with persistent high or low BG at home.   - Ensure patient has glucometer, test strips and lancets on discharge.  - Recommend routine outpatient ophthalmology and podiatry follow up.       #HTN  - Outpatient goal BP <130//80 for age  - Can check urine albumin/creatinine outpatient  - Management per primary team.    #HLD  - LDL goal < 70  - On atorvastatin 40mg at bedtime.       SIRENA Garcia-BC  Nurse Practitioner  Division of Endocrinology  Contact on TEAMS    If out of hospital/unavailable when paged, please note: patient will be cared for by another provider on the endocrine service.  For urgent concerns: call the endocrine answering service for assistance to reach covering provider ([phone]). For non-urgent matters: please email [email or web address] for assistance.   89-year-old male with a past medical history of prostate cancer, diabetes, hypertension, presenting due to concern of hypoglycemia at home.  The patient states he is tired and was not sure what happened.  According to his wife who was in bed with him she started hearing him gurgling at home.  She turned on the lights and tried to wake him up, the patient was unresponsive.  They called EMS who came and checked a fingerstick which was 41.  He was given 1 amp of D50 with a repeat being 140.  Patient also tolerated p.o.  According to wife he had a similar occurrence yesterday where his fingerstick was in the 40s.  Patient takes combined metformin and glipizide as well as lantus  Denies fever, chills, chest pain, trouble breathing, dysuria.  According to patient he has decreased appetite recently compared to the past.  endocrine called for further assistance  pt reports long hx of DM   he states lantus was increased from 12--> 15 units  checks FSBG in the am and afternoon  reports multiple lows in 30-40s   eGFR: 44    #Poorly controlled T2DM with hyperglycemia  - HbA1c: 7.0  - Home Regimen: combined metformin and glipizide as well as lantus 12-15 units  - Endocrinologist: unknown    INPATIENT PLAN:   - Goal -180 mg/dl: Fasting and prandial FSs above goal. Pt with URI + covid.   - Increase Lantus to 24 units SQ at bedtime in the setting of infectious process.   - Increase Admelog to 8 units SQ TID AC, hold if NPO or not eating a meal.   - continue low dose Admelog correction scale at mealtimes  - continue low dose Admelog correction scale at bedtime  - Fingerstick BG before meals, bedtime  - Carbohydrate consistent diet  - RD consult  - Hold oral DM agents while inpatient  - C-Peptide, Serum: 3.0 ng/mL (12-31-24 @ 05:30)---glucose 170- pt is making adequate endogenous insulin.      DISCHARGE PLAN: Patient going to rehab  - Lantus 24units SQ at bedtime plus metformin 500 mg PO QD (egfr 31-was previously on metformin once daily with glipizide combo pill)   - stop glipizide given profound hypoglycemia   - pt reporting decreased appetite - avoid glp-1 for now  - Please send Rx for Glucose tabs, GVOKE hypopen, Baqsimi nasal spray or glucagon emergency kit for hypoglycemia risk.   - Patient to call doctor with persistent high or low BG at home.   - Ensure patient has glucometer, test strips and lancets on discharge.  - Recommend routine outpatient ophthalmology and podiatry follow up.   - [id number] [facility] [address] [place] [address]- phone # [phone]. Dr. Kern 3/20 @ 2:20pm.       #HTN  - Outpatient goal BP <130//80 for age  - Can check urine albumin/creatinine outpatient  - Management per primary team.    #HLD  - LDL goal < 70  - On atorvastatin 40mg at bedtime.       SIRENA Garcia-BC  Nurse Practitioner  Division of Endocrinology  Contact on TEAMS    If out of hospital/unavailable when paged, please note: patient will be cared for by another provider on the endocrine service.  For urgent concerns: call the endocrine answering service for assistance to reach covering provider ([phone]). For non-urgent matters: please email [email or web address] for assistance.

## 2025-01-07 NOTE — PROGRESS NOTE ADULT - ASSESSMENT
88 y/o M with pmh of prostate cancer, DM type 2, HTN, p/w hypoglycemia.  Pt reports his insulin glargine  dose was increased from 12 to 15 units recently.  Over the past few days, he reports getting low reading in the morning, but is able to alert his wife who gives him food to bring it  up.  Yesterday AM, pt's wife noticed he was confused and making gurgling sounds while in bed.  She checked FS which was 41.  EMS was called, and pt give D50 en route to ED.  Pt also reports intermittent L sided chest pain for the past several days.  Pain is positional, and improves when pt wears a support belt.  Pain is difficult for patient to describe quality.  No associated dyspnea, dizziness nausea or diaphoresis.  No recent fever or cough.    Pt was evaluated in the ED, and placed in the CDU for monitoring.  He is admitted for further w/u of chest pain and monitoring of glucose.      #Chest pain with abnormal stress test .   - on Aspirin and statin.  -seen by cardio   initially scheduled for cardiac cath ,m but now deferred and will do as out pt , 2/2 covid pos and rising crt     # Type 2 diabetes mellitus with hypoglycemia.   c/w insulin / FSBS  endo following    #Essential hypertension.   c/w BP medications with hold parameters.    # CKD with KIRBY .   renal following   avoid nephrotoxic meds / contrast  trend renal fx      # Prostate cancer.   -stable ,  Out patient Follow Up.      # COVID 19 infection with Hypoxia .   - ID help appreciated.    -completed RDV    dispo: cardiac cath as out pt, plan for d/c to rehab when bed available    christiano beck MD  covering Dr. Frederick

## 2025-01-07 NOTE — PROGRESS NOTE ADULT - SUBJECTIVE AND OBJECTIVE BOX
Cardiology Attending Follow-up Note     Patient seen and examined at bedside.    Overnight Events:     no cardiac sxs     REVIEW OF SYSTEMS:  Constitutional:     [x ] negative [ ] fevers [ ] chills [ ] weight loss [ ] weight gain  HEENT:                  [x ] negative [ ] dry eyes [ ] eye irritation [ ] postnasal drip [ ] nasal congestion  CV:                         [ x] negative  [ ] chest pain [ ] orthopnea [ ] palpitations [ ] murmur  Resp:                     [ x] negative [ ] cough [ ] shortness of breath [ ] dyspnea [ ] wheezing [ ] sputum [ ]hemoptysis  GI:                          [ x] negative [ ] nausea [ ] vomiting [ ] diarrhea [ ] constipation [ ] abd pain [ ] dysphagia   :                        [ x] negative [ ] dysuria [ ] nocturia [ ] hematuria [ ] increased urinary frequency  Musculoskeletal: [ x] negative [ ] back pain [ ] myalgias [ ] arthralgias [ ] fracture  Skin:                       [ x] negative [ ] rash [ ] itch  Neurological:        [x ] negative [ ] headache [ ] dizziness [ ] syncope [ ] weakness [ ] numbness  Psychiatric:           [ x] negative [ ] anxiety [ ] depression  Endocrine:            [ x] negative [ ] diabetes [ ] thyroid problem  Heme/Lymph:      [ x] negative [ ] anemia [ ] bleeding problem  Allergic/Immune: [ x] negative [ ] itchy eyes [ ] nasal discharge [ ] hives [ ] angioedema    [ x] All other systems negative  [ ] Unable to assess ROS due to    Current Meds:  allopurinol 100 milliGRAM(s) Oral daily  amLODIPine   Tablet 10 milliGRAM(s) Oral daily  aspirin enteric coated 81 milliGRAM(s) Oral daily  atorvastatin 40 milliGRAM(s) Oral at bedtime  heparin   Injectable 5000 Unit(s) SubCutaneous every 8 hours  hydrochlorothiazide 25 milliGRAM(s) Oral daily  insulin glargine Injectable (LANTUS) 24 Unit(s) SubCutaneous at bedtime  insulin lispro (ADMELOG) corrective regimen sliding scale   SubCutaneous at bedtime  insulin lispro (ADMELOG) corrective regimen sliding scale   SubCutaneous three times a day before meals  insulin lispro Injectable (ADMELOG) 8 Unit(s) SubCutaneous three times a day before meals  isosorbide   mononitrate ER Tablet (IMDUR) 60 milliGRAM(s) Oral daily  losartan 100 milliGRAM(s) Oral daily  melatonin 3 milliGRAM(s) Oral at bedtime PRN  spironolactone 25 milliGRAM(s) Oral daily      PAST MEDICAL & SURGICAL HISTORY:  HTN (hypertension)      DM2 (diabetes mellitus, type 2)      Prostate cancer      No significant past surgical history          Vitals:  T(F): 98.2 (01-07), Max: 98.3 (01-07)  HR: 59 (01-07) (57 - 59)  BP: 137/70 (01-07) (137/70 - 148/82)  RR: 16 (01-07)  SpO2: 100% (01-07)  I&O's Summary    07 Jan 2025 07:01  -  08 Jan 2025 00:11  --------------------------------------------------------  IN: 800 mL / OUT: 850 mL / NET: -50 mL        Physical Exam:  Appearance: No acute distress  HENT: No JVD   Cardiovascular: RRR, S1/S2, no murmurs  Respiratory: CTABL  Gastrointestinal: soft, NT ND, +BS  Musculoskeletal: No clubbing, no edema   Neurologic: Non-focal  Skin: No rashes, ecchymoses, or cyanosis                          10.0   8.85  )-----------( 309      ( 06 Jan 2025 06:45 )             30.9     01-06    134[L]  |  100  |  60[H]  ----------------------------<  163[H]  4.9   |  17[L]  |  2.02[H]    Ca    9.2      06 Jan 2025 06:45  Phos  4.0     01-06  Mg     2.40     01-06                    Cardiovascular Testings:   CONCLUSIONS:      1. Left ventricular systolic function is normal with an ejection fraction visually estimated at 50 to 55 %.   2. There is mild (grade 1) left ventricular diastolic dysfunction.   3. Left atrium is normalin size.   4. The right atrium is normal in size.   5. Normal right ventricular cavity size, with normal wall thickness, and normal right ventricular systolic function.   6. Trileaflet aortic valve with normal systolic excursion. There is calcification of the aortic valve leaflets.   7. Moderate to severe pulmonic regurgitation.   8. No pericardial effusion seen.    Conclusions:   1. Qualitative Perfusion:      - medium-sized, moderate to severe defect(s) in the inferior and inferolateral wall suggestive of CAD.   2. The left ventricle is mildly decreased in function and normal in size. The time activity curve suggests diastolic dysfunction.. The post stress left ventricular EF is 45 %. The stress end diastolic volume is 71 ml and systolic volume is 39 ml.   3. Hypokinesis of the inferoseptal wall.   4. The RV was not well visualized.

## 2025-01-07 NOTE — PROGRESS NOTE ADULT - ASSESSMENT
89 y/o M with pmh of prostate cancer, DM type 2, HTN, p/w hypoglycemia. +NST planning for angio. nephrology consulted for elevated scr    CKD stage 3B  scr seems to be fluctuating ~ 1.5-1.8  S.Cr. relatively stable. likely at baseline  +covid   Continue on hctz, losartan, spironolactone.  LHC deferred in view of Covid+ and elevated S.Cr.  Recommend ns @ 75cc/hr x6 hrs before and 6 hrs after-if planned for angio  monitor BMP and UO     HTN  Fluctuating; acceptable.  C/W current meds.  Low salt diet.  monitor    proteinuria  mild  UPr/Cr 0.26gm.  Monitor.    covid  care per team    cp  +NST  LHC deferred for now.  f/u cardio

## 2025-01-07 NOTE — PROGRESS NOTE ADULT - SUBJECTIVE AND OBJECTIVE BOX
[facility] [place] NEPHROLOGY PRACTICE   MD CRISTI SESAY MD ANGELA WONG, PA    TEL:  OFFICE: [phone]  From 5pm-7am Answering Service [phone]    -- RENAL FOLLOW UP NOTE ---Date of Service 01-07-25 @ 13:52    Patient is a 89y old  Male who presents with a chief complaint of hypoglycemia (07 Jan 2025 12:13)      Patient seen and examined at bedside. No chest pain/sob. c/o body aches    VITALS:  T(F): 98.2 (01-07-25 @ 11:45), Max: 98.6 (01-06-25 @ 20:04)  HR: 59 (01-07-25 @ 11:45)  BP: 137/70 (01-07-25 @ 11:45)  RR: 16 (01-07-25 @ 11:45)  SpO2: 100% (01-07-25 @ 11:45)  Wt(kg): --        PHYSICAL EXAM:  General: NAD  Neck: No JVD  Respiratory: CTAB, no wheezes, rales or rhonchi  Cardiovascular: S1, S2, RRR  Gastrointestinal: BS+, soft, NT/ND  Extremities: No peripheral edema    Hospital Medications:   MEDICATIONS  (STANDING):  allopurinol 100 milliGRAM(s) Oral daily  amLODIPine   Tablet 10 milliGRAM(s) Oral daily  aspirin enteric coated 81 milliGRAM(s) Oral daily  atorvastatin 40 milliGRAM(s) Oral at bedtime  heparin   Injectable 5000 Unit(s) SubCutaneous every 8 hours  hydrochlorothiazide 25 milliGRAM(s) Oral daily  insulin glargine Injectable (LANTUS) 24 Unit(s) SubCutaneous at bedtime  insulin lispro (ADMELOG) corrective regimen sliding scale   SubCutaneous at bedtime  insulin lispro (ADMELOG) corrective regimen sliding scale   SubCutaneous three times a day before meals  insulin lispro Injectable (ADMELOG) 8 Unit(s) SubCutaneous three times a day before meals  isosorbide   mononitrate ER Tablet (IMDUR) 60 milliGRAM(s) Oral daily  losartan 100 milliGRAM(s) Oral daily  spironolactone 25 milliGRAM(s) Oral daily      LABS:  01-06    134[L]  |  100  |  60[H]  ----------------------------<  163[H]  4.9   |  17[L]  |  2.02[H]    Ca    9.2      06 Jan 2025 06:45  Phos  4.0     01-06  Mg     2.40     01-06      Creatinine Trend: 2.02 <--, 1.92 <--, 1.90 <--, 2.01 <--, 1.83 <--                                10.0   8.85  )-----------( 309      ( 06 Jan 2025 06:45 )             30.9     Urine Studies:  Urinalysis - [01-06-25 @ 06:45]      Color  / Appearance  / SG  / pH       Gluc 163 / Ketone   / Bili  / Urobili        Blood  / Protein  / Leuk Est  / Nitrite       RBC  / WBC  / Hyaline  / Gran  / Sq Epi  / Non Sq Epi  / Bacteria     Urine Creatinine 128      [01-04-25 @ 17:00]  Urine Protein 34      [01-04-25 @ 17:00]    Lipid: chol 130, , HDL 36, LDL --      [01-01-25 @ 05:30]        RADIOLOGY & ADDITIONAL STUDIES:

## 2025-01-07 NOTE — PROGRESS NOTE ADULT - SUBJECTIVE AND OBJECTIVE BOX
Chief Complaint: T2DM    Interval Events: Pt seen and examined at bedside. Pt confused to place and time this am. Reorientation provided.     MEDICATIONS  (STANDING):  allopurinol 100 milliGRAM(s) Oral daily  amLODIPine   Tablet 10 milliGRAM(s) Oral daily  aspirin enteric coated 81 milliGRAM(s) Oral daily  atorvastatin 40 milliGRAM(s) Oral at bedtime  heparin   Injectable 5000 Unit(s) SubCutaneous every 8 hours  hydrochlorothiazide 25 milliGRAM(s) Oral daily  insulin glargine Injectable (LANTUS) 24 Unit(s) SubCutaneous at bedtime  insulin lispro (ADMELOG) corrective regimen sliding scale   SubCutaneous three times a day before meals  insulin lispro (ADMELOG) corrective regimen sliding scale   SubCutaneous at bedtime  insulin lispro Injectable (ADMELOG) 8 Unit(s) SubCutaneous three times a day before meals  isosorbide   mononitrate ER Tablet (IMDUR) 60 milliGRAM(s) Oral daily  losartan 100 milliGRAM(s) Oral daily  spironolactone 25 milliGRAM(s) Oral daily    MEDICATIONS  (PRN):  melatonin 3 milliGRAM(s) Oral at bedtime PRN Insomnia      Allergies    No Known Allergies    Intolerances      Review of Systems:  Eyes: No blurry vision  Cardiovascular: No chest pain, palpitations  Respiratory: No SOB, no cough  GI: No nausea, vomiting, abdominal pain  : No dysuria    ALL OTHER SYSTEMS REVIEWED AND NEGATIVE    VITALS: T(C): 36.8 (01-07-25 @ 06:30)  T(F): 98.2 (01-07-25 @ 06:30), Max: 98.6 (01-06-25 @ 20:04)  HR: 58 (01-07-25 @ 06:30) (57 - 61)  BP: 148/82 (01-07-25 @ 06:30) (117/69 - 148/82)  RR:  (16 - 18)  SpO2:  (95% - 97%)  Wt(kg): --      Physical Exam:   GENERAL: NAD, well-developed  EYES: No proptosis  HEENT:  Atraumatic, Normocephalic  RESPIRATORY: non labored breathing   GI: Non distended  PSYCH: Alert, confused easily reoriented.     CAPILLARY BLOOD GLUCOSE      POCT Blood Glucose.: 203 mg/dL (07 Jan 2025 08:37)  POCT Blood Glucose.: 249 mg/dL (06 Jan 2025 22:36)  POCT Blood Glucose.: 220 mg/dL (06 Jan 2025 18:00)  POCT Blood Glucose.: 305 mg/dL (06 Jan 2025 12:38)      01-06    134[L]  |  100  |  60[H]  ----------------------------<  163[H]  4.9   |  17[L]  |  2.02[H]    eGFR: 31[L]    Ca    9.2      01-06  Mg     2.40     01-06  Phos  4.0     01-06        A1C with Estimated Average Glucose Result: 7.0 % (12-30-24 @ 09:36)      Thyroid Function Tests:

## 2025-01-07 NOTE — PROGRESS NOTE ADULT - SUBJECTIVE AND OBJECTIVE BOX
Patient is a 89y old  Male who presents with a chief complaint of hypoglycemia (07 Jan 2025 13:52)      INTERVAL HPI/OVERNIGHT EVENTS:  T(C): 36.8 (01-07-25 @ 11:45), Max: 37 (01-06-25 @ 20:04)  HR: 59 (01-07-25 @ 11:45) (57 - 61)  BP: 137/70 (01-07-25 @ 11:45) (137/70 - 148/82)  RR: 16 (01-07-25 @ 11:45) (16 - 18)  SpO2: 100% (01-07-25 @ 11:45) (95% - 100%)  Wt(kg): --  I&O's Summary    07 Jan 2025 07:01  -  07 Jan 2025 18:05  --------------------------------------------------------  IN: 800 mL / OUT: 850 mL / NET: -50 mL        PAST MEDICAL & SURGICAL HISTORY:  HTN (hypertension)      DM2 (diabetes mellitus, type 2)      Prostate cancer      No significant past surgical history          SOCIAL HISTORY  Alcohol:  Tobacco:  Illicit substance use:    FAMILY HISTORY:    REVIEW OF SYSTEMS:  CONSTITUTIONAL: No fever, weight loss, or fatigue  EYES: No eye pain, visual disturbances, or discharge  ENMT:  No difficulty hearing, tinnitus, vertigo; No sinus or throat pain  NECK: No pain or stiffness  RESPIRATORY: No cough, wheezing, chills or hemoptysis; No shortness of breath  CARDIOVASCULAR: No chest pain, palpitations, dizziness, or leg swelling  GASTROINTESTINAL: No abdominal or epigastric pain. No nausea, vomiting, or hematemesis; No diarrhea or constipation. No melena or hematochezia.  GENITOURINARY: No dysuria, frequency, hematuria, or incontinence  NEUROLOGICAL: No headaches, memory loss, loss of strength, numbness, or tremors  SKIN: No itching, burning, rashes, or lesions   LYMPH NODES: No enlarged glands  ENDOCRINE: No heat or cold intolerance; No hair loss  MUSCULOSKELETAL: No joint pain or swelling; No muscle, back, or extremity pain  PSYCHIATRIC: No depression, anxiety, mood swings, or difficulty sleeping  HEME/LYMPH: No easy bruising, or bleeding gums  ALLERY AND IMMUNOLOGIC: No hives or eczema    RADIOLOGY & ADDITIONAL TESTS:    Imaging Personally Reviewed:  [ ] YES  [ ] NO    Consultant(s) Notes Reviewed:  [ ] YES  [ ] NO    PHYSICAL EXAM:  GENERAL: NAD, well-groomed, well-developed  HEAD:  Atraumatic, Normocephalic  EYES: EOMI, PERRLA, conjunctiva and sclera clear  ENMT: No tonsillar erythema, exudates, or enlargement; Moist mucous membranes, Good dentition, No lesions  NECK: Supple, No JVD, Normal thyroid  NERVOUS SYSTEM:  Alert & Oriented X3, Good concentration; Motor Strength 5/5 B/L upper and lower extremities; DTRs 2+ intact and symmetric  CHEST/LUNG: Clear to percussion bilaterally; No rales, rhonchi, wheezing, or rubs  HEART: Regular rate and rhythm; No murmurs, rubs, or gallops  ABDOMEN: Soft, Nontender, Nondistended; Bowel sounds present  EXTREMITIES:  2+ Peripheral Pulses, No clubbing, cyanosis, or edema  LYMPH: No lymphadenopathy noted  SKIN: No rashes or lesions    LABS:                        10.0   8.85  )-----------( 309      ( 06 Jan 2025 06:45 )             30.9     01-06    134[L]  |  100  |  60[H]  ----------------------------<  163[H]  4.9   |  17[L]  |  2.02[H]    Ca    9.2      06 Jan 2025 06:45  Phos  4.0     01-06  Mg     2.40     01-06        Urinalysis Basic - ( 06 Jan 2025 06:45 )    Color: x / Appearance: x / SG: x / pH: x  Gluc: 163 mg/dL / Ketone: x  / Bili: x / Urobili: x   Blood: x / Protein: x / Nitrite: x   Leuk Esterase: x / RBC: x / WBC x   Sq Epi: x / Non Sq Epi: x / Bacteria: x      CAPILLARY BLOOD GLUCOSE      POCT Blood Glucose.: 151 mg/dL (07 Jan 2025 13:08)  POCT Blood Glucose.: 203 mg/dL (07 Jan 2025 08:37)  POCT Blood Glucose.: 249 mg/dL (06 Jan 2025 22:36)        Urinalysis Basic - ( 06 Jan 2025 06:45 )    Color: x / Appearance: x / SG: x / pH: x  Gluc: 163 mg/dL / Ketone: x  / Bili: x / Urobili: x   Blood: x / Protein: x / Nitrite: x   Leuk Esterase: x / RBC: x / WBC x   Sq Epi: x / Non Sq Epi: x / Bacteria: x        MEDICATIONS  (STANDING):  allopurinol 100 milliGRAM(s) Oral daily  amLODIPine   Tablet 10 milliGRAM(s) Oral daily  aspirin enteric coated 81 milliGRAM(s) Oral daily  atorvastatin 40 milliGRAM(s) Oral at bedtime  heparin   Injectable 5000 Unit(s) SubCutaneous every 8 hours  hydrochlorothiazide 25 milliGRAM(s) Oral daily  insulin glargine Injectable (LANTUS) 24 Unit(s) SubCutaneous at bedtime  insulin lispro (ADMELOG) corrective regimen sliding scale   SubCutaneous at bedtime  insulin lispro (ADMELOG) corrective regimen sliding scale   SubCutaneous three times a day before meals  insulin lispro Injectable (ADMELOG) 8 Unit(s) SubCutaneous three times a day before meals  isosorbide   mononitrate ER Tablet (IMDUR) 60 milliGRAM(s) Oral daily  losartan 100 milliGRAM(s) Oral daily  spironolactone 25 milliGRAM(s) Oral daily    MEDICATIONS  (PRN):  melatonin 3 milliGRAM(s) Oral at bedtime PRN Insomnia      Care Discussed with Consultants/Other Providers [ ] YES  [ ] NO Patient is a 89y old  Male who presents with a chief complaint of hypoglycemia (07 Jan 2025 13:52)      INTERVAL HPI/OVERNIGHT EVENTS: gagan nd examined, NAD  T(C): 36.8 (01-07-25 @ 11:45), Max: 37 (01-06-25 @ 20:04)  HR: 59 (01-07-25 @ 11:45) (57 - 61)  BP: 137/70 (01-07-25 @ 11:45) (137/70 - 148/82)  RR: 16 (01-07-25 @ 11:45) (16 - 18)  SpO2: 100% (01-07-25 @ 11:45) (95% - 100%)  Wt(kg): --  I&O's Summary    07 Jan 2025 07:01  -  07 Jan 2025 18:05  --------------------------------------------------------  IN: 800 mL / OUT: 850 mL / NET: -50 mL        PAST MEDICAL & SURGICAL HISTORY:  HTN (hypertension)      DM2 (diabetes mellitus, type 2)      Prostate cancer      No significant past surgical history          SOCIAL HISTORY  Alcohol:  Tobacco:  Illicit substance use:    FAMILY HISTORY:    REVIEW OF SYSTEMS:  CONSTITUTIONAL: No fever, weight loss, or fatigue  EYES: No eye pain, visual disturbances, or discharge  ENMT:  No difficulty hearing, tinnitus, vertigo; No sinus or throat pain  NECK: No pain or stiffness  RESPIRATORY: No cough, wheezing, chills or hemoptysis; No shortness of breath  CARDIOVASCULAR: No chest pain, palpitations, dizziness, or leg swelling  GASTROINTESTINAL: No abdominal or epigastric pain. No nausea, vomiting, or hematemesis; No diarrhea or constipation. No melena or hematochezia.  GENITOURINARY: No dysuria, frequency, hematuria, or incontinence  NEUROLOGICAL: No headaches, memory loss, loss of strength, numbness, or tremors  SKIN: No itching, burning, rashes, or lesions   LYMPH NODES: No enlarged glands  ENDOCRINE: No heat or cold intolerance; No hair loss  MUSCULOSKELETAL: No joint pain or swelling; No muscle, back, or extremity pain  PSYCHIATRIC: No depression, anxiety, mood swings, or difficulty sleeping  HEME/LYMPH: No easy bruising, or bleeding gums  ALLERY AND IMMUNOLOGIC: No hives or eczema    RADIOLOGY & ADDITIONAL TESTS:    Imaging Personally Reviewed:  [ ] YES  [ ] NO    Consultant(s) Notes Reviewed:  [ ] YES  [ ] NO    PHYSICAL EXAM:  GENERAL: NAD, well-groomed, well-developed  HEAD:  Atraumatic, Normocephalic  EYES: EOMI, PERRLA, conjunctiva and sclera clear  ENMT: No tonsillar erythema, exudates, or enlargement; Moist mucous membranes, Good dentition, No lesions  NECK: Supple, No JVD, Normal thyroid  NERVOUS SYSTEM:  Alert & Oriented X3, Good concentration; Motor Strength 5/5 B/L upper and lower extremities; DTRs 2+ intact and symmetric  CHEST/LUNG: Clear to percussion bilaterally; No rales, rhonchi, wheezing, or rubs  HEART: Regular rate and rhythm; No murmurs, rubs, or gallops  ABDOMEN: Soft, Nontender, Nondistended; Bowel sounds present  EXTREMITIES:  2+ Peripheral Pulses, No clubbing, cyanosis, or edema  LYMPH: No lymphadenopathy noted  SKIN: No rashes or lesions    LABS:                        10.0   8.85  )-----------( 309      ( 06 Jan 2025 06:45 )             30.9     01-06    134[L]  |  100  |  60[H]  ----------------------------<  163[H]  4.9   |  17[L]  |  2.02[H]    Ca    9.2      06 Jan 2025 06:45  Phos  4.0     01-06  Mg     2.40     01-06        Urinalysis Basic - ( 06 Jan 2025 06:45 )    Color: x / Appearance: x / SG: x / pH: x  Gluc: 163 mg/dL / Ketone: x  / Bili: x / Urobili: x   Blood: x / Protein: x / Nitrite: x   Leuk Esterase: x / RBC: x / WBC x   Sq Epi: x / Non Sq Epi: x / Bacteria: x      CAPILLARY BLOOD GLUCOSE      POCT Blood Glucose.: 151 mg/dL (07 Jan 2025 13:08)  POCT Blood Glucose.: 203 mg/dL (07 Jan 2025 08:37)  POCT Blood Glucose.: 249 mg/dL (06 Jan 2025 22:36)        Urinalysis Basic - ( 06 Jan 2025 06:45 )    Color: x / Appearance: x / SG: x / pH: x  Gluc: 163 mg/dL / Ketone: x  / Bili: x / Urobili: x   Blood: x / Protein: x / Nitrite: x   Leuk Esterase: x / RBC: x / WBC x   Sq Epi: x / Non Sq Epi: x / Bacteria: x        MEDICATIONS  (STANDING):  allopurinol 100 milliGRAM(s) Oral daily  amLODIPine   Tablet 10 milliGRAM(s) Oral daily  aspirin enteric coated 81 milliGRAM(s) Oral daily  atorvastatin 40 milliGRAM(s) Oral at bedtime  heparin   Injectable 5000 Unit(s) SubCutaneous every 8 hours  hydrochlorothiazide 25 milliGRAM(s) Oral daily  insulin glargine Injectable (LANTUS) 24 Unit(s) SubCutaneous at bedtime  insulin lispro (ADMELOG) corrective regimen sliding scale   SubCutaneous at bedtime  insulin lispro (ADMELOG) corrective regimen sliding scale   SubCutaneous three times a day before meals  insulin lispro Injectable (ADMELOG) 8 Unit(s) SubCutaneous three times a day before meals  isosorbide   mononitrate ER Tablet (IMDUR) 60 milliGRAM(s) Oral daily  losartan 100 milliGRAM(s) Oral daily  spironolactone 25 milliGRAM(s) Oral daily    MEDICATIONS  (PRN):  melatonin 3 milliGRAM(s) Oral at bedtime PRN Insomnia      Care Discussed with Consultants/Other Providers [ ] YES  [ ] NO

## 2025-01-07 NOTE — PROGRESS NOTE ADULT - ASSESSMENT
89M with PMH of prostate CA, T2DM, HTN, HLD, CKD, here for hypoglycemia sxs. In the ER, pt found to have cp with EKG w/ NSR and no significant ischemic changes. Pt was recommended to be admitted by Tele Doc Dr. Perea. Cardiology consulted for further evaluation inpt.     1. Cp, +NST and TTE reviewed   2. HTN   3. HLD   4. CKD     -given +NST, initially planned for [facility], deferred for now given COVID+ and rising Cr. Can be done as outpt post discharge given pt has no cardiac sxs at this point.   -bp control, cont norvasc, imduri, losartan, and Hctz   -HR 50-60s, would rec hold bb     Nitin Lee MD [facility]  Cardiology Attending, Jenn-NS/LOIS  Avaliable on [employer] Team

## 2025-01-08 LAB
GLUCOSE BLDC GLUCOMTR-MCNC: 200 MG/DL — HIGH (ref 70–99)
GLUCOSE BLDC GLUCOMTR-MCNC: 202 MG/DL — HIGH (ref 70–99)
GLUCOSE BLDC GLUCOMTR-MCNC: 209 MG/DL — HIGH (ref 70–99)
GLUCOSE BLDC GLUCOMTR-MCNC: 225 MG/DL — HIGH (ref 70–99)

## 2025-01-08 PROCEDURE — 99233 SBSQ HOSP IP/OBS HIGH 50: CPT

## 2025-01-08 PROCEDURE — 99232 SBSQ HOSP IP/OBS MODERATE 35: CPT

## 2025-01-08 RX ORDER — INSULIN LISPRO 100/ML
9 VIAL (ML) SUBCUTANEOUS
Refills: 0 | Status: DISCONTINUED | OUTPATIENT
Start: 2025-01-08 | End: 2025-01-09

## 2025-01-08 RX ORDER — ACETAMINOPHEN 80 MG/.8ML
1000 SOLUTION/ DROPS ORAL ONCE
Refills: 0 | Status: COMPLETED | OUTPATIENT
Start: 2025-01-08 | End: 2025-01-08

## 2025-01-08 RX ORDER — INSULIN GLARGINE-YFGN 100 [IU]/ML
26 INJECTION, SOLUTION SUBCUTANEOUS AT BEDTIME
Refills: 0 | Status: DISCONTINUED | OUTPATIENT
Start: 2025-01-08 | End: 2025-01-09

## 2025-01-08 RX ADMIN — Medication 8 UNIT(S): at 08:54

## 2025-01-08 RX ADMIN — SPIRONOLACTONE 25 MILLIGRAM(S): 50 TABLET ORAL at 05:08

## 2025-01-08 RX ADMIN — Medication 81 MILLIGRAM(S): at 12:33

## 2025-01-08 RX ADMIN — ACETAMINOPHEN 400 MILLIGRAM(S): 80 SOLUTION/ DROPS ORAL at 12:45

## 2025-01-08 RX ADMIN — Medication 3 MILLIGRAM(S): at 23:02

## 2025-01-08 RX ADMIN — ATORVASTATIN CALCIUM 40 MILLIGRAM(S): 40 TABLET, FILM COATED ORAL at 23:03

## 2025-01-08 RX ADMIN — ALLOPURINOL 100 MILLIGRAM(S): 100 TABLET ORAL at 12:33

## 2025-01-08 RX ADMIN — Medication 2: at 12:34

## 2025-01-08 RX ADMIN — Medication 10 MILLIGRAM(S): at 05:08

## 2025-01-08 RX ADMIN — HEPARIN SODIUM 5000 UNIT(S): 1000 INJECTION, SOLUTION INTRAVENOUS; SUBCUTANEOUS at 12:34

## 2025-01-08 RX ADMIN — HEPARIN SODIUM 5000 UNIT(S): 1000 INJECTION, SOLUTION INTRAVENOUS; SUBCUTANEOUS at 05:08

## 2025-01-08 RX ADMIN — Medication 8 UNIT(S): at 12:34

## 2025-01-08 RX ADMIN — LOSARTAN POTASSIUM 100 MILLIGRAM(S): 100 TABLET, FILM COATED ORAL at 05:08

## 2025-01-08 RX ADMIN — Medication 9 UNIT(S): at 18:19

## 2025-01-08 RX ADMIN — Medication 2: at 08:53

## 2025-01-08 RX ADMIN — Medication 1: at 18:19

## 2025-01-08 RX ADMIN — Medication 60 MILLIGRAM(S): at 12:33

## 2025-01-08 RX ADMIN — HEPARIN SODIUM 5000 UNIT(S): 1000 INJECTION, SOLUTION INTRAVENOUS; SUBCUTANEOUS at 23:02

## 2025-01-08 RX ADMIN — INSULIN GLARGINE-YFGN 26 UNIT(S): 100 INJECTION, SOLUTION SUBCUTANEOUS at 23:01

## 2025-01-08 NOTE — PROGRESS NOTE ADULT - ASSESSMENT
89 y/o M with pmh of prostate cancer, DM type 2, HTN, p/w hypoglycemia.  Pt reports his insulin glargine  dose was increased from 12 to 15 units recently.  Over the past few days, he reports getting low reading in the morning, but is able to alert his wife who gives him food to bring it  up.  Yesterday AM, pt's wife noticed he was confused and making gurgling sounds while in bed.  She checked FS which was 41.  EMS was called, and pt give D50 en route to ED.  Pt also reports intermittent L sided chest pain for the past several days.  Pain is positional, and improves when pt wears a support belt.  Pain is difficult for patient to describe quality.  No associated dyspnea, dizziness nausea or diaphoresis.  No recent fever or cough.    Pt was evaluated in the ED, and placed in the CDU for monitoring.  He is admitted for further w/u of chest pain and monitoring of glucose.    Planned for Cardiac cath om Thursday.      Problem/Recommendation - 1:  ·  Problem: Chest pain with abnormal stress test .   ·  Recommendation: Cardiology planning cardiac cath out patient  .   Will start on Aspirin and statin.  Cardiology help appreciated.   < from: TTE W or WO Ultrasound Enhancing Agent (12.31.24 @ 07:33) >    CONCLUSIONS:      1. Left ventricular systolic function is normal with an ejection fraction visually estimated at 50 to 55 %.      < from: Nuclear Stress Test-Exercise.. (12.31.24 @ 08:30) >     1. Qualitative Perfusion:      - medium-sized, moderate to severe defect(s) in the inferior and inferolateral wall suggestive of CAD.   2. The left ventricle is mildly decreased in function and normal in size. The time activity curve suggests diastolic dysfunction.. The post stress left ventricular EF is 45 %. The stress end diastolic volume is 71 ml and systolic volume is 39 ml.   3. Hypokinesis of the inferoseptal wall.     Problem/Recommendation - 2:  ·  Problem: Type 2 diabetes mellitus with hypoglycemia.   ·  Recommendation: Endo help appreciated.     Following  their recommendation.     Problem/Recommendation - 3:  ·  Problem: Essential hypertension.   ·  Recommendation: BP medications with hold parameters.     Problem/Recommendation - 4:  ·  Problem: CKD with KIRBY .   ·  Recommendation: Trending Creatinine .  Renal help appreciated.       Problem/Recommendation - 5:  ·  Problem: Prostate cancer.   ·  Recommendation: Out patient Follow Up.       Problem/Recommendation - 6:  ·  Problem: COVID 19 infection with Hypoxia .   ·  Recommendation: ID help appreciated.       ON Remdisvir .      Problem/Recommendation - 7:  ·  Problem: Back Pain New onset.   ·  Recommendation: Pain medication and Imaging.           D/W patient and ACP.

## 2025-01-08 NOTE — PROGRESS NOTE ADULT - ASSESSMENT
88 y/o M with pmh of prostate cancer, DM type 2, HTN, p/w hypoglycemia. +NST planning for angio. nephrology consulted for elevated scr    CKD stage 3B  scr seems to be fluctuating ~ 1.5-1.8  S.Cr. relatively stable. likely at baseline. no new lab  +covid   Continue on hctz, losartan, spironolactone.  LHC deferred in view of Covid+ and elevated S.Cr.  Recommend ns @ 75cc/hr x6 hrs before and 6 hrs after-if planned for angio  monitor BMP and UO     HTN  Fluctuating; acceptable.  C/W current meds.  Low salt diet.  monitor    proteinuria  mild  UPr/Cr 0.26gm.  Monitor.    covid  care per team    cp  +NST  LHC deferred for now.  f/u cardio

## 2025-01-08 NOTE — PROGRESS NOTE ADULT - SUBJECTIVE AND OBJECTIVE BOX
[facility] [place] NEPHROLOGY PRACTICE   MD CRISTI SESAY MD ANGELA WONG, PA    TEL:  OFFICE: [phone]  From 5pm-7am Answering Service [phone]    -- RENAL FOLLOW UP NOTE ---Date of Service 01-08-25 @ 14:31    Patient is a 89y old  Male who presents with a chief complaint of hypoglycemia (08 Jan 2025 08:27)      Patient seen and examined at bedside. No chest pain/sob.     VITALS:  T(F): 98.5 (01-08-25 @ 11:50), Max: 98.5 (01-08-25 @ 11:50)  HR: 57 (01-08-25 @ 11:50)  BP: 139/69 (01-08-25 @ 11:50)  RR: 17 (01-08-25 @ 11:50)  SpO2: 96% (01-08-25 @ 11:50)  Wt(kg): --    01-07 @ 07:01  -  01-08 @ 07:00  --------------------------------------------------------  IN: 1160 mL / OUT: 1300 mL / NET: -140 mL    01-08 @ 07:01  -  01-08 @ 14:31  --------------------------------------------------------  IN: 500 mL / OUT: 500 mL / NET: 0 mL          PHYSICAL EXAM:  General: NAD  Neck: No JVD  Respiratory: CTAB, no wheezes, rales or rhonchi  Cardiovascular: S1, S2, RRR  Gastrointestinal: BS+, soft, NT/ND  Extremities: no peripheral edema    Hospital Medications:   MEDICATIONS  (STANDING):  allopurinol 100 milliGRAM(s) Oral daily  amLODIPine   Tablet 10 milliGRAM(s) Oral daily  aspirin enteric coated 81 milliGRAM(s) Oral daily  atorvastatin 40 milliGRAM(s) Oral at bedtime  heparin   Injectable 5000 Unit(s) SubCutaneous every 8 hours  hydrochlorothiazide 25 milliGRAM(s) Oral daily  insulin glargine Injectable (LANTUS) 26 Unit(s) SubCutaneous at bedtime  insulin lispro (ADMELOG) corrective regimen sliding scale   SubCutaneous at bedtime  insulin lispro (ADMELOG) corrective regimen sliding scale   SubCutaneous three times a day before meals  insulin lispro Injectable (ADMELOG) 9 Unit(s) SubCutaneous three times a day before meals  isosorbide   mononitrate ER Tablet (IMDUR) 60 milliGRAM(s) Oral daily  losartan 100 milliGRAM(s) Oral daily  spironolactone 25 milliGRAM(s) Oral daily      LABS:        Creatinine Trend: 2.02 <--, 1.92 <--, 1.90 <--, 2.01 <--            Urine Studies:  Urinalysis - [01-06-25 @ 06:45]      Color  / Appearance  / SG  / pH       Gluc 163 / Ketone   / Bili  / Urobili        Blood  / Protein  / Leuk Est  / Nitrite       RBC  / WBC  / Hyaline  / Gran  / Sq Epi  / Non Sq Epi  / Bacteria     Urine Creatinine 128      [01-04-25 @ 17:00]  Urine Protein 34      [01-04-25 @ 17:00]    Lipid: chol 130, , HDL 36, LDL --      [01-01-25 @ 05:30]        RADIOLOGY & ADDITIONAL STUDIES:

## 2025-01-08 NOTE — PROGRESS NOTE ADULT - ASSESSMENT
89M with PMH of prostate CA, T2DM, HTN, HLD, CKD, here for hypoglycemia sxs. In the ER, pt found to have cp with EKG w/ NSR and no significant ischemic changes. Pt was recommended to be admitted by Tele Doc Dr. Perea. Cardiology consulted for further evaluation inpt.     1. Cp, +NST and TTE reviewed   2. HTN   3. HLD   4. CKD     -given +NST, initially planned for [facility], deferred for now given COVID+ and rising Cr. Can be done as outpt post discharge given pt has no cardiac sxs at this point.   -bp control, cont norvasc, imduri, losartan, and Hctz   -cont asa and statin   -HR 50-60s, would rec hold bb   -discharge planning     Nitin Lee MD [facility]  Cardiology Attending, Jenn-NS/LOIS  Avaliable on [employer] Team

## 2025-01-08 NOTE — PROGRESS NOTE ADULT - SUBJECTIVE AND OBJECTIVE BOX
Cardiology Attending Follow-up Note     Patient seen and examined at bedside.    Overnight Events:     no events     REVIEW OF SYSTEMS:  Constitutional:     [x ] negative [ ] fevers [ ] chills [ ] weight loss [ ] weight gain  HEENT:                  [x ] negative [ ] dry eyes [ ] eye irritation [ ] postnasal drip [ ] nasal congestion  CV:                         [ x] negative  [ ] chest pain [ ] orthopnea [ ] palpitations [ ] murmur  Resp:                     [ x] negative [ ] cough [ ] shortness of breath [ ] dyspnea [ ] wheezing [ ] sputum [ ]hemoptysis  GI:                          [ x] negative [ ] nausea [ ] vomiting [ ] diarrhea [ ] constipation [ ] abd pain [ ] dysphagia   :                        [ x] negative [ ] dysuria [ ] nocturia [ ] hematuria [ ] increased urinary frequency  Musculoskeletal: [ x] negative [ ] back pain [ ] myalgias [ ] arthralgias [ ] fracture  Skin:                       [ x] negative [ ] rash [ ] itch  Neurological:        [x ] negative [ ] headache [ ] dizziness [ ] syncope [ ] weakness [ ] numbness  Psychiatric:           [ x] negative [ ] anxiety [ ] depression  Endocrine:            [ x] negative [ ] diabetes [ ] thyroid problem  Heme/Lymph:      [ x] negative [ ] anemia [ ] bleeding problem  Allergic/Immune: [ x] negative [ ] itchy eyes [ ] nasal discharge [ ] hives [ ] angioedema    [ x] All other systems negative  [ ] Unable to assess ROS due to    Current Meds:  allopurinol 100 milliGRAM(s) Oral daily  amLODIPine   Tablet 10 milliGRAM(s) Oral daily  aspirin enteric coated 81 milliGRAM(s) Oral daily  atorvastatin 40 milliGRAM(s) Oral at bedtime  heparin   Injectable 5000 Unit(s) SubCutaneous every 8 hours  hydrochlorothiazide 25 milliGRAM(s) Oral daily  insulin glargine Injectable (LANTUS) 26 Unit(s) SubCutaneous at bedtime  insulin lispro (ADMELOG) corrective regimen sliding scale   SubCutaneous at bedtime  insulin lispro (ADMELOG) corrective regimen sliding scale   SubCutaneous three times a day before meals  insulin lispro Injectable (ADMELOG) 9 Unit(s) SubCutaneous three times a day before meals  isosorbide   mononitrate ER Tablet (IMDUR) 60 milliGRAM(s) Oral daily  losartan 100 milliGRAM(s) Oral daily  melatonin 3 milliGRAM(s) Oral at bedtime PRN  spironolactone 25 milliGRAM(s) Oral daily      PAST MEDICAL & SURGICAL HISTORY:  HTN (hypertension)      DM2 (diabetes mellitus, type 2)      Prostate cancer      No significant past surgical history          Vitals:  T(F): 98.1 (01-08), Max: 98.5 (01-08)  HR: 59 (01-08) (57 - 61)  BP: 103/63 (01-08) (103/63 - 152/76)  RR: 18 (01-08)  SpO2: 96% (01-08)  I&O's Summary    07 Jan 2025 07:01  -  08 Jan 2025 07:00  --------------------------------------------------------  IN: 1160 mL / OUT: 1300 mL / NET: -140 mL    08 Jan 2025 07:01  -  08 Jan 2025 22:34  --------------------------------------------------------  IN: 800 mL / OUT: 700 mL / NET: 100 mL        Physical Exam:  Appearance: No acute distress  HENT: No JVD   Cardiovascular: RRR, S1/S2, no murmurs  Respiratory: CTABL  Gastrointestinal: soft, NT ND, +BS  Musculoskeletal: No clubbing, no edema   Neurologic: Non-focal  Skin: No rashes, ecchymoses, or cyanosis                          Cardiovascular Testings:   CONCLUSIONS:      1. Left ventricular systolic function is normal with an ejection fraction visually estimated at 50 to 55 %.   2. There is mild (grade 1) left ventricular diastolic dysfunction.   3. Left atrium is normalin size.   4. The right atrium is normal in size.   5. Normal right ventricular cavity size, with normal wall thickness, and normal right ventricular systolic function.   6. Trileaflet aortic valve with normal systolic excursion. There is calcification of the aortic valve leaflets.   7. Moderate to severe pulmonic regurgitation.   8. No pericardial effusion seen.    Conclusions:   1. Qualitative Perfusion:      - medium-sized, moderate to severe defect(s) in the inferior and inferolateral wall suggestive of CAD.   2. The left ventricle is mildly decreased in function and normal in size. The time activity curve suggests diastolic dysfunction.. The post stress left ventricular EF is 45 %. The stress end diastolic volume is 71 ml and systolic volume is 39 ml.   3. Hypokinesis of the inferoseptal wall.   4. The RV was not well visualized.

## 2025-01-08 NOTE — PROGRESS NOTE ADULT - SUBJECTIVE AND OBJECTIVE BOX
Chief Complaint: DM type 2     Interval Events: FS above goal. Good appetite. No N/V or abdominal pain. Patient reporting back pain.     MEDICATIONS  (STANDING):  allopurinol 100 milliGRAM(s) Oral daily  amLODIPine   Tablet 10 milliGRAM(s) Oral daily  aspirin enteric coated 81 milliGRAM(s) Oral daily  atorvastatin 40 milliGRAM(s) Oral at bedtime  heparin   Injectable 5000 Unit(s) SubCutaneous every 8 hours  hydrochlorothiazide 25 milliGRAM(s) Oral daily  insulin glargine Injectable (LANTUS) 24 Unit(s) SubCutaneous at bedtime  insulin lispro (ADMELOG) corrective regimen sliding scale   SubCutaneous at bedtime  insulin lispro (ADMELOG) corrective regimen sliding scale   SubCutaneous three times a day before meals  insulin lispro Injectable (ADMELOG) 8 Unit(s) SubCutaneous three times a day before meals  isosorbide   mononitrate ER Tablet (IMDUR) 60 milliGRAM(s) Oral daily  losartan 100 milliGRAM(s) Oral daily  spironolactone 25 milliGRAM(s) Oral daily    MEDICATIONS  (PRN):  melatonin 3 milliGRAM(s) Oral at bedtime PRN Insomnia    Allergies  No Known Allergies    Review of Systems:  Constitutional: No fever/chills   Eyes: No blurry vision  Cardiovascular: No chest pain, no palpitations  Respiratory: No SOB, no cough  GI: No nausea, vomiting or abdominal pain  : No dysuria  Endocrine: no polyuria, polydipsia    VITALS: T(C): 36.9 (01-08-25 @ 11:50)  T(F): 98.5 (01-08-25 @ 11:50), Max: 98.5 (01-08-25 @ 11:50)  HR: 57 (01-08-25 @ 11:50) (56 - 61)  BP: 139/69 (01-08-25 @ 11:50) (134/80 - 152/76)  RR:  (17 - 18)  SpO2:  (96% - 100%)    Physical Exam:   GENERAL: not in distress  EYES: No proptosis, no lid lag, anicteric  HEENT:  Atraumatic, Normocephalic, moist mucous membranes  RESPIRATORY: non labored breathing   GI: Soft, nontender, non distended  SKIN: Dry, intact, No rashes or lesions  NEURO: A&Ox3    CAPILLARY BLOOD GLUCOSE  POCT Blood Glucose.: 225 mg/dL (08 Jan 2025 12:31)  POCT Blood Glucose.: 209 mg/dL (08 Jan 2025 08:38)  POCT Blood Glucose.: 204 mg/dL (07 Jan 2025 22:48)  POCT Blood Glucose.: 187 mg/dL (07 Jan 2025 18:08)      01-06    134[L]  |  100  |  60[H]  ----------------------------<  163[H]  4.9   |  17[L]  |  2.02[H]    eGFR: 31[L]    Ca    9.2      01-06  Mg     2.40     01-06  Phos  4.0     01-06    A1C with Estimated Average Glucose Result: 7.0 % (12-30-24 @ 09:36)

## 2025-01-08 NOTE — PROGRESS NOTE ADULT - SUBJECTIVE AND OBJECTIVE BOX
Date of Service  : 01-08-25    INTERVAL HPI/OVERNIGHT EVENTS: My back hurts .   Vital Signs Last 24 Hrs  T(C): 36.8 (08 Jan 2025 04:00), Max: 36.8 (07 Jan 2025 11:45)  T(F): 98.2 (08 Jan 2025 04:00), Max: 98.3 (08 Jan 2025 00:00)  HR: 61 (08 Jan 2025 05:05) (56 - 61)  BP: 152/76 (08 Jan 2025 05:05) (134/80 - 152/76)  BP(mean): --  RR: 18 (08 Jan 2025 04:00) (16 - 18)  SpO2: 97% (08 Jan 2025 04:00) (97% - 100%)    Parameters below as of 08 Jan 2025 04:00  Patient On (Oxygen Delivery Method): room air      I&O's Summary    07 Jan 2025 07:01  -  08 Jan 2025 07:00  --------------------------------------------------------  IN: 1160 mL / OUT: 1300 mL / NET: -140 mL      MEDICATIONS  (STANDING):  allopurinol 100 milliGRAM(s) Oral daily  amLODIPine   Tablet 10 milliGRAM(s) Oral daily  aspirin enteric coated 81 milliGRAM(s) Oral daily  atorvastatin 40 milliGRAM(s) Oral at bedtime  heparin   Injectable 5000 Unit(s) SubCutaneous every 8 hours  hydrochlorothiazide 25 milliGRAM(s) Oral daily  insulin glargine Injectable (LANTUS) 24 Unit(s) SubCutaneous at bedtime  insulin lispro (ADMELOG) corrective regimen sliding scale   SubCutaneous three times a day before meals  insulin lispro (ADMELOG) corrective regimen sliding scale   SubCutaneous at bedtime  insulin lispro Injectable (ADMELOG) 8 Unit(s) SubCutaneous three times a day before meals  isosorbide   mononitrate ER Tablet (IMDUR) 60 milliGRAM(s) Oral daily  losartan 100 milliGRAM(s) Oral daily  spironolactone 25 milliGRAM(s) Oral daily    MEDICATIONS  (PRN):  melatonin 3 milliGRAM(s) Oral at bedtime PRN Insomnia    LABS:              CAPILLARY BLOOD GLUCOSE      POCT Blood Glucose.: 204 mg/dL (07 Jan 2025 22:48)  POCT Blood Glucose.: 187 mg/dL (07 Jan 2025 18:08)  POCT Blood Glucose.: 151 mg/dL (07 Jan 2025 13:08)  POCT Blood Glucose.: 203 mg/dL (07 Jan 2025 08:37)          REVIEW OF SYSTEMS:  CONSTITUTIONAL: No fever, weight loss, or fatigue  EYES: No eye pain, visual disturbances, or discharge  ENMT:  No difficulty hearing, tinnitus, vertigo; No sinus or throat pain  NECK: No pain or stiffness  RESPIRATORY: No cough, wheezing, chills or hemoptysis; No shortness of breath  CARDIOVASCULAR: No chest pain, palpitations, dizziness, or leg swelling  GASTROINTESTINAL: No abdominal or epigastric pain. No nausea, vomiting, or hematemesis; No diarrhea or constipation. No melena or hematochezia.  GENITOURINARY: No dysuria, frequency, hematuria, or incontinence  NEUROLOGICAL: No headaches, memory loss, loss of strength, numbness, or tremors    Consultant(s) Notes Reviewed:  [x ] YES  [ ] NO    PHYSICAL EXAM:  GENERAL: NAD, well-groomed, well-developed,not in any distress ,  HEAD:  Atraumatic, Normocephalic  EYES: EOMI, PERRLA, conjunctiva and sclera clear  ENMT: No tonsillar erythema, exudates, or enlargement; Moist mucous membranes, Good dentition, No lesions  NECK: Supple, No JVD, Normal thyroid  NERVOUS SYSTEM:  Alert & Oriented X3, No focal deficit   CHEST/LUNG: Good air entry bilateral with no  rales, rhonchi, wheezing, or rubs  HEART: Regular rate and rhythm; No murmurs, rubs, or gallops  ABDOMEN: Soft, Nontender, Nondistended; Bowel sounds present  EXTREMITIES:  2+ Peripheral Pulses, No clubbing, cyanosis, or edema      Care Discussed with Consultants/Other Providers [ x] YES  [ ] NO

## 2025-01-08 NOTE — PROGRESS NOTE ADULT - ASSESSMENT
89-year-old male with a past medical history of prostate cancer, diabetes, hypertension, presenting due to concern of hypoglycemia at home.  The patient states he is tired and was not sure what happened.  According to his wife who was in bed with him, she started hearing him gurgling at home.  She turned on the lights and tried to wake him up, the patient was unresponsive.  They called EMS who came and checked a fingerstick which was 41.  He was given 1 amp of D50 with a repeat being 140.  Patient also tolerated p.o.  According to wife he had a similar occurrence yesterday where his fingerstick was in the 40s.  Patient takes combined metformin and glipizide as well as lantus  Denies fever, chills, chest pain, trouble breathing, dysuria.  According to patient he has decreased appetite recently compared to the past.  Endocrine called for further assistance.  Reports long hx of DM.   He states Lantus was increased from 12--> 15 units  checks FSBG in the am and afternoon  reports multiple lows in 30-40s       Poorly controlled T2DM with hyperglycemia  - HbA1c: 7.0  - Home Regimen: combined metformin and glipizide as well as Lantus 12-15 units  - Endocrinologist: unknown    Inpatient plan   - FS goal 100-180 mg/dl   - FS above goal   - Increase Lantus to 26 units at bedtime  - Increase Admelog to 9 units TID AC. Hold if not eating/NPO.   - continue low Admelog correctional scale TID AC  - continue separate low Admelog correctional scale at HS  - FS TID AC & HS ---> q6 if NPO   - hypoglycemia protocol PRN   - consistent carbohydrate diet  - RD consult  - c-Peptide 3 with serum glucose 170 - patient is making adequate endogenous insulin.  - check lactate in AM     Discharge plan   - Patient going to rehab.  - Stop glipizide  - STOP metformin  - discharge to rehab on: basal/bolus insulin  - discharge home on: Lantus + Tradjenta 5 mg daily (please check for insurance coverage)  - patient reporting decreased appetite - avoid GLP11 for now  - Please send prescription for glucose tablets 4G (take 4 tablets) or 15G tablets for blood sugar less than 70 mG/dL, repeat fingerstick in 15 minutes. Call your provider for dose adjustment if needed.   - Patient to call doctor with persistent high or low BG at home.   - Ensure patient has glucometer, test strips and lancets on discharge.  - Ensure patient has insulin pen needles.   - Recommend routine outpatient ophthalmology and podiatry follow up.   - Dr Yovani Kern  [id number] [facility] [address], [place] [address]   Appointment with Dr. Kern on 3/20 @ 2:20pm.         HTN  - Outpatient goal BP <130//80 for age  - continue amlodipine, HCTZ, losartan, spironolactone  - Can check urine albumin/creatinine outpatient  - Management per primary team.        HLD  - LDL goal < 70  - LDL 67  - continue statin   - management per primary team         Adithya Marcos, AGACNP-BC  Nurse Practitioner  Division of Endocrinology & Diabetes  available via [employer] Teams

## 2025-01-09 LAB
ANION GAP SERPL CALC-SCNC: 19 MMOL/L — HIGH (ref 7–14)
BUN SERPL-MCNC: 72 MG/DL — HIGH (ref 7–23)
CALCIUM SERPL-MCNC: 9.6 MG/DL — SIGNIFICANT CHANGE UP (ref 8.4–10.5)
CHLORIDE SERPL-SCNC: 98 MMOL/L — SIGNIFICANT CHANGE UP (ref 98–107)
CO2 SERPL-SCNC: 18 MMOL/L — LOW (ref 22–31)
CREAT SERPL-MCNC: 2.2 MG/DL — HIGH (ref 0.5–1.3)
EGFR: 28 ML/MIN/1.73M2 — LOW
GLUCOSE BLDC GLUCOMTR-MCNC: 143 MG/DL — HIGH (ref 70–99)
GLUCOSE BLDC GLUCOMTR-MCNC: 158 MG/DL — HIGH (ref 70–99)
GLUCOSE BLDC GLUCOMTR-MCNC: 200 MG/DL — HIGH (ref 70–99)
GLUCOSE BLDC GLUCOMTR-MCNC: 335 MG/DL — HIGH (ref 70–99)
GLUCOSE SERPL-MCNC: 187 MG/DL — HIGH (ref 70–99)
HCT VFR BLD CALC: 34 % — LOW (ref 39–50)
HGB BLD-MCNC: 10.8 G/DL — LOW (ref 13–17)
MAGNESIUM SERPL-MCNC: 2.6 MG/DL — SIGNIFICANT CHANGE UP (ref 1.6–2.6)
MCHC RBC-ENTMCNC: 22.5 PG — LOW (ref 27–34)
MCHC RBC-ENTMCNC: 31.8 G/DL — LOW (ref 32–36)
MCV RBC AUTO: 71 FL — LOW (ref 80–100)
NRBC # BLD: 0 /100 WBCS — SIGNIFICANT CHANGE UP (ref 0–0)
NRBC # FLD: 0 K/UL — SIGNIFICANT CHANGE UP (ref 0–0)
PHOSPHATE SERPL-MCNC: 5.2 MG/DL — HIGH (ref 2.5–4.5)
PLATELET # BLD AUTO: 363 K/UL — SIGNIFICANT CHANGE UP (ref 150–400)
POTASSIUM SERPL-MCNC: 5.5 MMOL/L — HIGH (ref 3.5–5.3)
POTASSIUM SERPL-SCNC: 5.5 MMOL/L — HIGH (ref 3.5–5.3)
RBC # BLD: 4.79 M/UL — SIGNIFICANT CHANGE UP (ref 4.2–5.8)
RBC # FLD: 15.8 % — HIGH (ref 10.3–14.5)
SODIUM SERPL-SCNC: 135 MMOL/L — SIGNIFICANT CHANGE UP (ref 135–145)
WBC # BLD: 8.74 K/UL — SIGNIFICANT CHANGE UP (ref 3.8–10.5)
WBC # FLD AUTO: 8.74 K/UL — SIGNIFICANT CHANGE UP (ref 3.8–10.5)

## 2025-01-09 PROCEDURE — 72125 CT NECK SPINE W/O DYE: CPT | Mod: 26

## 2025-01-09 PROCEDURE — 72128 CT CHEST SPINE W/O DYE: CPT | Mod: 26

## 2025-01-09 PROCEDURE — 99232 SBSQ HOSP IP/OBS MODERATE 35: CPT

## 2025-01-09 PROCEDURE — 72131 CT LUMBAR SPINE W/O DYE: CPT | Mod: 26

## 2025-01-09 RX ORDER — SODIUM CHLORIDE 9 MG/ML
1000 INJECTION, SOLUTION INTRAMUSCULAR; INTRAVENOUS; SUBCUTANEOUS
Refills: 0 | Status: DISCONTINUED | OUTPATIENT
Start: 2025-01-09 | End: 2025-01-17

## 2025-01-09 RX ORDER — INSULIN GLARGINE-YFGN 100 [IU]/ML
29 INJECTION, SOLUTION SUBCUTANEOUS AT BEDTIME
Refills: 0 | Status: DISCONTINUED | OUTPATIENT
Start: 2025-01-09 | End: 2025-01-13

## 2025-01-09 RX ORDER — INSULIN LISPRO 100/ML
10 VIAL (ML) SUBCUTANEOUS
Refills: 0 | Status: DISCONTINUED | OUTPATIENT
Start: 2025-01-09 | End: 2025-01-17

## 2025-01-09 RX ADMIN — HEPARIN SODIUM 5000 UNIT(S): 1000 INJECTION, SOLUTION INTRAVENOUS; SUBCUTANEOUS at 22:48

## 2025-01-09 RX ADMIN — Medication 1: at 18:11

## 2025-01-09 RX ADMIN — Medication 60 MILLIGRAM(S): at 12:25

## 2025-01-09 RX ADMIN — Medication 10 UNIT(S): at 18:11

## 2025-01-09 RX ADMIN — Medication 10 MILLIGRAM(S): at 06:32

## 2025-01-09 RX ADMIN — Medication 1: at 09:12

## 2025-01-09 RX ADMIN — ALLOPURINOL 100 MILLIGRAM(S): 100 TABLET ORAL at 17:36

## 2025-01-09 RX ADMIN — SPIRONOLACTONE 25 MILLIGRAM(S): 50 TABLET ORAL at 06:32

## 2025-01-09 RX ADMIN — SODIUM CHLORIDE 50 MILLILITER(S): 9 INJECTION, SOLUTION INTRAMUSCULAR; INTRAVENOUS; SUBCUTANEOUS at 12:24

## 2025-01-09 RX ADMIN — Medication 9 UNIT(S): at 09:12

## 2025-01-09 RX ADMIN — ATORVASTATIN CALCIUM 40 MILLIGRAM(S): 40 TABLET, FILM COATED ORAL at 22:48

## 2025-01-09 RX ADMIN — Medication 9 UNIT(S): at 12:59

## 2025-01-09 RX ADMIN — HEPARIN SODIUM 5000 UNIT(S): 1000 INJECTION, SOLUTION INTRAVENOUS; SUBCUTANEOUS at 12:25

## 2025-01-09 RX ADMIN — HEPARIN SODIUM 5000 UNIT(S): 1000 INJECTION, SOLUTION INTRAVENOUS; SUBCUTANEOUS at 06:32

## 2025-01-09 RX ADMIN — Medication 4: at 12:59

## 2025-01-09 RX ADMIN — INSULIN GLARGINE-YFGN 29 UNIT(S): 100 INJECTION, SOLUTION SUBCUTANEOUS at 22:48

## 2025-01-09 RX ADMIN — Medication 81 MILLIGRAM(S): at 12:25

## 2025-01-09 RX ADMIN — LOSARTAN POTASSIUM 100 MILLIGRAM(S): 100 TABLET, FILM COATED ORAL at 06:32

## 2025-01-09 NOTE — PROGRESS NOTE ADULT - ASSESSMENT
89 y/o M with pmh of prostate cancer, DM type 2, HTN, p/w hypoglycemia. +NST planning for angio. nephrology consulted for elevated scr    CKD stage 3B  scr seems to be fluctuating ~ 1.5-1.8  S.Cr. worsen today  +covid   on hctz, losartan, spironolactone, would hold now, resume if scr better in the am  LHC deferred in view of Covid+ and elevated S.Cr.  Recommend ns @ 75cc/hr x6 hrs before and 6 hrs after-if planned for angio  monitor BMP and UO     HTN  Fluctuating; acceptable.  C/W current meds.  Low salt diet.  monitor    proteinuria  mild  UPr/Cr 0.26gm.  Monitor.    covid  care per team    cp  +NST  LHC deferred for now.  f/u cardio 88 y/o M with pmh of prostate cancer, DM type 2, HTN, p/w hypoglycemia. +NST planning for angio. nephrology consulted for elevated scr    CKD stage 3B  scr seems to be fluctuating ~ 1.5-1.8  S.Cr. worsen today  +covid   on hctz, losartan, spironolactone, would hold now, resume if scr better in the am  KIRBY likely prerenal hyperglycemia, on diuretic. elevated bun  will start gentle hydration with ns @ 50cc/hr x20 hrs  LHC deferred in view of Covid+ and elevated S.Cr.  Recommend ns @ 75cc/hr x6 hrs before and 6 hrs after-if planned for angio  monitor BMP and UO     HTN  Fluctuating; acceptable.  C/W current meds.  Low salt diet.  monitor    proteinuria  mild  UPr/Cr 0.26gm.  Monitor.    covid  care per team    cp  +NST  LHC deferred for now.  f/u cardio

## 2025-01-09 NOTE — PROGRESS NOTE ADULT - SUBJECTIVE AND OBJECTIVE BOX
Date of Service  : 01-09-25    INTERVAL HPI/OVERNIGHT EVENTS: Seen and examined . Unable to move as back hurting.   Vital Signs Last 24 Hrs  T(C): 36.7 (09 Jan 2025 06:30), Max: 36.9 (08 Jan 2025 11:50)  T(F): 98 (09 Jan 2025 06:30), Max: 98.5 (08 Jan 2025 11:50)  HR: 60 (09 Jan 2025 06:30) (55 - 60)  BP: 126/67 (09 Jan 2025 06:30) (103/63 - 139/69)  BP(mean): --  RR: 18 (09 Jan 2025 06:30) (17 - 18)  SpO2: 99% (09 Jan 2025 06:30) (82% - 99%)    Parameters below as of 09 Jan 2025 06:30  Patient On (Oxygen Delivery Method): nasal cannula  O2 Flow (L/min): 2    I&O's Summary    08 Jan 2025 07:01  -  09 Jan 2025 07:00  --------------------------------------------------------  IN: 800 mL / OUT: 1200 mL / NET: -400 mL      MEDICATIONS  (STANDING):  allopurinol 100 milliGRAM(s) Oral daily  amLODIPine   Tablet 10 milliGRAM(s) Oral daily  aspirin enteric coated 81 milliGRAM(s) Oral daily  atorvastatin 40 milliGRAM(s) Oral at bedtime  heparin   Injectable 5000 Unit(s) SubCutaneous every 8 hours  hydrochlorothiazide 25 milliGRAM(s) Oral daily  insulin glargine Injectable (LANTUS) 26 Unit(s) SubCutaneous at bedtime  insulin lispro (ADMELOG) corrective regimen sliding scale   SubCutaneous at bedtime  insulin lispro (ADMELOG) corrective regimen sliding scale   SubCutaneous three times a day before meals  insulin lispro Injectable (ADMELOG) 9 Unit(s) SubCutaneous three times a day before meals  isosorbide   mononitrate ER Tablet (IMDUR) 60 milliGRAM(s) Oral daily  losartan 100 milliGRAM(s) Oral daily  spironolactone 25 milliGRAM(s) Oral daily    MEDICATIONS  (PRN):  melatonin 3 milliGRAM(s) Oral at bedtime PRN Insomnia    LABS:                        10.8   8.74  )-----------( 363      ( 09 Jan 2025 06:48 )             34.0     01-09    135  |  98  |  72[H]  ----------------------------<  187[H]  5.5[H]   |  18[L]  |  2.20[H]    Ca    9.6      09 Jan 2025 06:48  Phos  5.2     01-09  Mg     2.60     01-09        Urinalysis Basic - ( 09 Jan 2025 06:48 )    Color: x / Appearance: x / SG: x / pH: x  Gluc: 187 mg/dL / Ketone: x  / Bili: x / Urobili: x   Blood: x / Protein: x / Nitrite: x   Leuk Esterase: x / RBC: x / WBC x   Sq Epi: x / Non Sq Epi: x / Bacteria: x      CAPILLARY BLOOD GLUCOSE      POCT Blood Glucose.: 200 mg/dL (09 Jan 2025 08:30)  POCT Blood Glucose.: 202 mg/dL (08 Jan 2025 22:56)  POCT Blood Glucose.: 200 mg/dL (08 Jan 2025 18:10)  POCT Blood Glucose.: 225 mg/dL (08 Jan 2025 12:31)        Urinalysis Basic - ( 09 Jan 2025 06:48 )    Color: x / Appearance: x / SG: x / pH: x  Gluc: 187 mg/dL / Ketone: x  / Bili: x / Urobili: x   Blood: x / Protein: x / Nitrite: x   Leuk Esterase: x / RBC: x / WBC x   Sq Epi: x / Non Sq Epi: x / Bacteria: x      REVIEW OF SYSTEMS:  CONSTITUTIONAL: No fever, weight loss, or fatigue  EYES: No eye pain, visual disturbances, or discharge  ENMT:  No difficulty hearing, tinnitus, vertigo; No sinus or throat pain  NECK: No pain or stiffness  RESPIRATORY: No cough, wheezing, chills or hemoptysis; No shortness of breath  CARDIOVASCULAR: No chest pain, palpitations, dizziness, or leg swelling  GASTROINTESTINAL: No abdominal or epigastric pain. No nausea, vomiting, or hematemesis; No diarrhea or constipation. No melena or hematochezia.  GENITOURINARY: No dysuria, frequency, hematuria, or incontinence  NEUROLOGICAL: No headaches, memory loss, loss of strength, numbness, or tremors      Consultant(s) Notes Reviewed:  [x ] YES  [ ] NO    PHYSICAL EXAM:  GENERAL: NAD, well-groomed, well-developed,not in any distress ,  HEAD:  Atraumatic, Normocephalic  NECK: Supple, No JVD, Normal thyroid  NERVOUS SYSTEM:  Alert & Oriented X3, No focal deficit   CHEST/LUNG: Good air entry bilateral with no  rales, rhonchi, wheezing, or rubs  HEART: Regular rate and rhythm; No murmurs, rubs, or gallops  ABDOMEN: Soft, Nontender, Nondistended; Bowel sounds present  EXTREMITIES:  2+ Peripheral Pulses, No clubbing, cyanosis, or edema    Care Discussed with Consultants/Other Providers [ x] YES  [ ] NO

## 2025-01-09 NOTE — PROGRESS NOTE ADULT - SUBJECTIVE AND OBJECTIVE BOX
Cardiology Attending Follow-up Note     Patient seen and examined at bedside.    Overnight Events:     no events cp free   more lucid     REVIEW OF SYSTEMS:  Constitutional:     [x ] negative [ ] fevers [ ] chills [ ] weight loss [ ] weight gain  HEENT:                  [x ] negative [ ] dry eyes [ ] eye irritation [ ] postnasal drip [ ] nasal congestion  CV:                         [ x] negative  [ ] chest pain [ ] orthopnea [ ] palpitations [ ] murmur  Resp:                     [ x] negative [ ] cough [ ] shortness of breath [ ] dyspnea [ ] wheezing [ ] sputum [ ]hemoptysis  GI:                          [ x] negative [ ] nausea [ ] vomiting [ ] diarrhea [ ] constipation [ ] abd pain [ ] dysphagia   :                        [ x] negative [ ] dysuria [ ] nocturia [ ] hematuria [ ] increased urinary frequency  Musculoskeletal: [ x] negative [ ] back pain [ ] myalgias [ ] arthralgias [ ] fracture  Skin:                       [ x] negative [ ] rash [ ] itch  Neurological:        [x ] negative [ ] headache [ ] dizziness [ ] syncope [ ] weakness [ ] numbness  Psychiatric:           [ x] negative [ ] anxiety [ ] depression  Endocrine:            [ x] negative [ ] diabetes [ ] thyroid problem  Heme/Lymph:      [ x] negative [ ] anemia [ ] bleeding problem  Allergic/Immune: [ x] negative [ ] itchy eyes [ ] nasal discharge [ ] hives [ ] angioedema    [ x] All other systems negative  [ ] Unable to assess ROS due to    Current Meds:  allopurinol 100 milliGRAM(s) Oral daily  amLODIPine   Tablet 10 milliGRAM(s) Oral daily  aspirin enteric coated 81 milliGRAM(s) Oral daily  atorvastatin 40 milliGRAM(s) Oral at bedtime  heparin   Injectable 5000 Unit(s) SubCutaneous every 8 hours  insulin glargine Injectable (LANTUS) 29 Unit(s) SubCutaneous at bedtime  insulin lispro (ADMELOG) corrective regimen sliding scale   SubCutaneous at bedtime  insulin lispro (ADMELOG) corrective regimen sliding scale   SubCutaneous three times a day before meals  insulin lispro Injectable (ADMELOG) 10 Unit(s) SubCutaneous three times a day before meals  isosorbide   mononitrate ER Tablet (IMDUR) 60 milliGRAM(s) Oral daily  melatonin 3 milliGRAM(s) Oral at bedtime PRN  sodium chloride 0.9%. 1000 milliLiter(s) IV Continuous <Continuous>      PAST MEDICAL & SURGICAL HISTORY:  HTN (hypertension)      DM2 (diabetes mellitus, type 2)      Prostate cancer      No significant past surgical history          Vitals:  T(F): 98.3 (01-09), Max: 98.3 (01-09)  HR: 53 (01-09) (53 - 60)  BP: 142/74 (01-09) (118/64 - 142/74)  RR: 17 (01-09)  SpO2: 99% (01-09)  I&O's Summary    08 Jan 2025 07:01  -  09 Jan 2025 07:00  --------------------------------------------------------  IN: 800 mL / OUT: 1200 mL / NET: -400 mL    09 Jan 2025 07:01  -  09 Jan 2025 20:35  --------------------------------------------------------  IN: 1300 mL / OUT: 900 mL / NET: 400 mL        Physical Exam:  Appearance: No acute distress  HENT: No JVD   Cardiovascular: RRR, S1/S2, no murmurs  Respiratory: CTABL  Gastrointestinal: soft, NT ND, +BS  Musculoskeletal: No clubbing, no edema   Neurologic: Non-focal  Skin: No rashes, ecchymoses, or cyanosis                          10.8   8.74  )-----------( 363      ( 09 Jan 2025 06:48 )             34.0     01-09    135  |  98  |  72[H]  ----------------------------<  187[H]  5.5[H]   |  18[L]  |  2.20[H]    Ca    9.6      09 Jan 2025 06:48  Phos  5.2     01-09  Mg     2.60     01-09                    Cardiovascular Testings:

## 2025-01-09 NOTE — PROGRESS NOTE ADULT - ASSESSMENT
89 y/o M with pmh of prostate cancer, DM type 2, HTN, p/w hypoglycemia.  Pt reports his insulin glargine  dose was increased from 12 to 15 units recently.  Over the past few days, he reports getting low reading in the morning, but is able to alert his wife who gives him food to bring it  up.  Yesterday AM, pt's wife noticed he was confused and making gurgling sounds while in bed.  She checked FS which was 41.  EMS was called, and pt give D50 en route to ED.  Pt also reports intermittent L sided chest pain for the past several days.  Pain is positional, and improves when pt wears a support belt.  Pain is difficult for patient to describe quality.  No associated dyspnea, dizziness nausea or diaphoresis.  No recent fever or cough.    Pt was evaluated in the ED, and placed in the CDU for monitoring.  He is admitted for further w/u of chest pain and monitoring of glucose.       Problem/Recommendation - 1:  ·  Problem: Chest pain with abnormal stress test .   ·  Recommendation: Cardiology planning cardiac cath out patient  .   On  Aspirin and statin.  Cardiology help appreciated.   < from: TTE W or WO Ultrasound Enhancing Agent (12.31.24 @ 07:33) >    CONCLUSIONS:      1. Left ventricular systolic function is normal with an ejection fraction visually estimated at 50 to 55 %.      < from: Nuclear Stress Test-Exercise.. (12.31.24 @ 08:30) >     1. Qualitative Perfusion:      - medium-sized, moderate to severe defect(s) in the inferior and inferolateral wall suggestive of CAD.   2. The left ventricle is mildly decreased in function and normal in size. The time activity curve suggests diastolic dysfunction.. The post stress left ventricular EF is 45 %. The stress end diastolic volume is 71 ml and systolic volume is 39 ml.   3. Hypokinesis of the inferoseptal wall.     Problem/Recommendation - 2:  ·  Problem: Type 2 diabetes mellitus with hypoglycemia.   ·  Recommendation: Endo help appreciated.     Following  their recommendation.     Problem/Recommendation - 3:  ·  Problem: Essential hypertension.   ·  Recommendation: BP medications with hold parameters.     Problem/Recommendation - 4:  ·  Problem: CKD with KIRBY .   ·  Recommendation: Trending Creatinine & Stable.   Renal help appreciated.       Problem/Recommendation - 5:  ·  Problem: Prostate cancer.   ·  Recommendation: Out patient Follow Up.       Problem/Recommendation - 6:  ·  Problem: COVID 19 infection with Hypoxia .   ·  Recommendation: ID help appreciated.       ON Remdisvir .      Problem/Recommendation - 7:  ·  Problem: Back Pain New onset.   ·  Recommendation: Pain medication and CT scan spine.          D/W patient and ACP. Will get PT evaluation.

## 2025-01-09 NOTE — PROGRESS NOTE ADULT - SUBJECTIVE AND OBJECTIVE BOX
[facility]   MD CRISTI SESAY MD ANGELA WONG, PA    TEL:  OFFICE: [phone]  From 5pm-7am Answering Service [phone]    -- RENAL FOLLOW UP NOTE ---Date of Service 01-09-25 @ 11:25    Patient is a 89y old  Male who presents with a chief complaint of hypoglycemia (09 Jan 2025 10:54)      Patient seen and examined at bedside. No chest pain/sob    VITALS:  T(F): 98 (01-09-25 @ 06:30), Max: 98.5 (01-08-25 @ 11:50)  HR: 60 (01-09-25 @ 06:30)  BP: 126/67 (01-09-25 @ 06:30)  RR: 18 (01-09-25 @ 06:30)  SpO2: 99% (01-09-25 @ 06:30)  Wt(kg): --    01-08 @ 07:01  -  01-09 @ 07:00  --------------------------------------------------------  IN: 800 mL / OUT: 1200 mL / NET: -400 mL          PHYSICAL EXAM:  General: NAD  Neck: No JVD  Respiratory: CTAB, no wheezes, rales or rhonchi  Cardiovascular: S1, S2, RRR  Gastrointestinal: BS+, soft, NT/ND  Extremities: No peripheral edema    Hospital Medications:   MEDICATIONS  (STANDING):  allopurinol 100 milliGRAM(s) Oral daily  amLODIPine   Tablet 10 milliGRAM(s) Oral daily  aspirin enteric coated 81 milliGRAM(s) Oral daily  atorvastatin 40 milliGRAM(s) Oral at bedtime  heparin   Injectable 5000 Unit(s) SubCutaneous every 8 hours  hydrochlorothiazide 25 milliGRAM(s) Oral daily  insulin glargine Injectable (LANTUS) 26 Unit(s) SubCutaneous at bedtime  insulin lispro (ADMELOG) corrective regimen sliding scale   SubCutaneous at bedtime  insulin lispro (ADMELOG) corrective regimen sliding scale   SubCutaneous three times a day before meals  insulin lispro Injectable (ADMELOG) 9 Unit(s) SubCutaneous three times a day before meals  isosorbide   mononitrate ER Tablet (IMDUR) 60 milliGRAM(s) Oral daily  losartan 100 milliGRAM(s) Oral daily  spironolactone 25 milliGRAM(s) Oral daily      LABS:  01-09    135  |  98  |  72[H]  ----------------------------<  187[H]  5.5[H]   |  18[L]  |  2.20[H]    Ca    9.6      09 Jan 2025 06:48  Phos  5.2     01-09  Mg     2.60     01-09      Creatinine Trend: 2.20 <--, 2.02 <--, 1.92 <--, 1.90 <--    Phosphorus: 5.2 mg/dL (01-09 @ 06:48)                              10.8   8.74  )-----------( 363      ( 09 Jan 2025 06:48 )             34.0     Urine Studies:  Urinalysis - [01-09-25 @ 06:48]      Color  / Appearance  / SG  / pH       Gluc 187 / Ketone   / Bili  / Urobili        Blood  / Protein  / Leuk Est  / Nitrite       RBC  / WBC  / Hyaline  / Gran  / Sq Epi  / Non Sq Epi  / Bacteria     Urine Creatinine 128      [01-04-25 @ 17:00]  Urine Protein 34      [01-04-25 @ 17:00]    Lipid: chol 130, , HDL 36, LDL --      [01-01-25 @ 05:30]        RADIOLOGY & ADDITIONAL STUDIES:

## 2025-01-09 NOTE — PROGRESS NOTE ADULT - ASSESSMENT
89M with PMH of prostate CA, T2DM, HTN, HLD, CKD, here for hypoglycemia sxs. In the ER, pt found to have cp with EKG w/ NSR and no significant ischemic changes. Pt was recommended to be admitted by Tele Doc Dr. Perea. Cardiology consulted for further evaluation inpt.     1. Cp, +NST and TTE reviewed   2. HTN   3. HLD   4. CKD     -given +NST, initially planned for [facility], deferred for now given COVID+ and rising Cr. Can be done as outpt post discharge given pt has no cardiac sxs at this point.   -bp control, cont norvasc, imduri, losartan, and Hctz   -cont asa and statin   -HR 50-60s, would rec hold bb   -discharge planning   -f/u OP w/ me in clinic     Nitin Lee MD [facility]  Cardiology Attending, Jenn-NS/LOIS  Avaliable on [employer] Team

## 2025-01-09 NOTE — PROGRESS NOTE ADULT - ASSESSMENT
89-year-old male with a past medical history of prostate cancer, diabetes, hypertension, presenting due to concern of hypoglycemia at home.  The patient states he is tired and was not sure what happened.  According to his wife who was in bed with him, she started hearing him gurgling at home.  She turned on the lights and tried to wake him up, the patient was unresponsive.  They called EMS who came and checked a fingerstick which was 41.  He was given 1 amp of D50 with a repeat being 140.  Patient also tolerated p.o.  According to wife he had a similar occurrence yesterday where his fingerstick was in the 40s.  Patient takes combined metformin and glipizide as well as lantus  Denies fever, chills, chest pain, trouble breathing, dysuria.  According to patient he has decreased appetite recently compared to the past.  Endocrine called for further assistance.  Reports long hx of DM.   He states Lantus was increased from 12--> 15 units  checks FSBG in the am and afternoon  reports multiple lows in 30-40s       Poorly controlled T2DM with hyperglycemia  - HbA1c: 7.0  - Home Regimen: combined metformin and glipizide as well as Lantus 12-15 units  - Endocrinologist: unknown    Inpatient plan   - FS goal 100-180 mg/dl   - FS above goal   - Increase Lantus to 29 units at bedtime  - Increase Admelog to 10 units TID AC. Hold if not eating/NPO.   - continue low Admelog correctional scale TID AC  - continue separate low Admelog correctional scale at HS  - FS TID AC & HS ---> q6 if NPO   - hypoglycemia protocol PRN   - consistent carbohydrate diet  - RD consult  - c-Peptide 3 with serum glucose 170 - patient is making adequate endogenous insulin.  - check lactate in AM     Discharge plan   - Patient going to rehab.  - Stop glipizide  - STOP metformin  - discharge to rehab on: basal/bolus insulin  - discharge home on: Lantus + Tradjenta 5 mg daily (please check for insurance coverage)  - patient reporting decreased appetite - avoid GLP11 for now  - Please send prescription for glucose tablets 4G (take 4 tablets) or 15G tablets for blood sugar less than 70 mG/dL, repeat fingerstick in 15 minutes. Call your provider for dose adjustment if needed.   - Patient to call doctor with persistent high or low BG at home.   - Ensure patient has glucometer, test strips and lancets on discharge.  - Ensure patient has insulin pen needles.   - Recommend routine outpatient ophthalmology and podiatry follow up.   - Dr Yovani Kern  [address], [place] [address]   Appointment with Dr. Kern on 3/20 @ 2:20pm.         HTN  - Outpatient goal BP <130//80 for age  - continue amlodipine, HCTZ, losartan, spironolactone  - Can check urine albumin/creatinine outpatient  - Management per primary team.        HLD  - LDL goal < 70  - LDL 67  - continue statin   - management per primary team     SIRENA Garcia-BC  Nurse Practitioner  Division of Endocrinology  Contact on TEAMS    If out of hospital/unavailable when paged, please note: patient will be cared for by another provider on the endocrine service.  For urgent concerns: call the endocrine answering service for assistance to reach covering provider ([phone]). For non-urgent matters: please email [email or web address] for assistance.   89-year-old male with a past medical history of prostate cancer, diabetes, hypertension, presenting due to concern of hypoglycemia at home.  The patient states he is tired and was not sure what happened.  According to his wife who was in bed with him, she started hearing him gurgling at home.  She turned on the lights and tried to wake him up, the patient was unresponsive.  They called EMS who came and checked a fingerstick which was 41.  He was given 1 amp of D50 with a repeat being 140.  Patient also tolerated p.o.  According to wife he had a similar occurrence yesterday where his fingerstick was in the 40s.  Patient takes combined metformin and glipizide as well as lantus  Denies fever, chills, chest pain, trouble breathing, dysuria.  According to patient he has decreased appetite recently compared to the past.  Endocrine called for further assistance.  Reports long hx of DM.   He states Lantus was increased from 12--> 15 units  checks FSBG in the am and afternoon  reports multiple lows in 30-40s       Poorly controlled T2DM with hyperglycemia  - HbA1c: 7.0  - Home Regimen: combined metformin and glipizide as well as Lantus 12-15 units  - Endocrinologist: unknown    Inpatient plan   - FS goal 100-180 mg/dl   - FS above goal   - Increase Lantus to 29 units at bedtime  - Increase Admelog to 10 units TID AC. Hold if not eating/NPO.   - continue low Admelog correctional scale TID AC  - continue separate low Admelog correctional scale at HS  - FS TID AC & HS ---> q6 if NPO   - hypoglycemia protocol PRN   - consistent carbohydrate diet  - RD consult  - c-Peptide 3 with serum glucose 170 - patient is making adequate endogenous insulin.  - check lactate in AM     Discharge plan   - Patient going to rehab.  - Stop glipizide  - STOP metformin  - discharge to rehab on: basal/bolus insulin  - discharge home on: Lantus + Tradjenta 5 mg daily (please check for insurance coverage)  - patient reporting decreased appetite - avoid GLP11 for now  - Please send prescription for glucose tablets 4G (take 4 tablets) or 15G tablets for blood sugar less than 70 mG/dL, repeat fingerstick in 15 minutes. Call your provider for dose adjustment if needed.   - Patient to call doctor with persistent high or low BG at home.   - Ensure patient has glucometer, test strips and lancets on discharge.  - Ensure patient has insulin pen needles.   - Recommend routine outpatient ophthalmology and podiatry follow up.   - Dr Yovani Kern  [address], [place] [address]   Appointment with Dr. Kern on 3/20 @ 2:20pm.         HTN  - Outpatient goal BP <130//80 for age  - continue amlodipine, HCTZ, losartan, spironolactone  - Can check urine albumin/creatinine outpatient  - Management per primary team.        HLD  - LDL goal < 70  - LDL 67  - continue statin   - management per primary team     d/w Dr. Eli, Nancy Aponte, AISHAJAIME-BC  Nurse Practitioner  Division of Endocrinology  Contact on TEAMS    If out of hospital/unavailable when paged, please note: patient will be cared for by another provider on the endocrine service.  For urgent concerns: call the endocrine answering service for assistance to reach covering provider ([phone]). For non-urgent matters: please email [email or web address] for assistance.   89-year-old male with a past medical history of prostate cancer, diabetes, hypertension, presenting due to concern of hypoglycemia at home.  The patient states he is tired and was not sure what happened.  According to his wife who was in bed with him, she started hearing him gurgling at home.  She turned on the lights and tried to wake him up, the patient was unresponsive.  They called EMS who came and checked a fingerstick which was 41.  He was given 1 amp of D50 with a repeat being 140.  Patient also tolerated p.o.  According to wife he had a similar occurrence yesterday where his fingerstick was in the 40s.  Patient takes combined metformin and glipizide as well as lantus  Denies fever, chills, chest pain, trouble breathing, dysuria.  According to patient he has decreased appetite recently compared to the past.  Endocrine called for further assistance.  Reports long hx of DM.   He states Lantus was increased from 12--> 15 units  checks FSBG in the am and afternoon  reports multiple lows in 30-40s       Poorly controlled T2DM with hyperglycemia  - HbA1c: 7.0  - Home Regimen: combined metformin and glipizide as well as Lantus 12-15 units  - Endocrinologist: unknown    Inpatient plan   - FS goal 100-180 mg/dl   - FS above goal   - Increase Lantus to 29 units at bedtime  - Increase Admelog to 10 units TID AC. Hold if not eating/NPO.   - continue low Admelog correctional scale TID AC  - continue separate low Admelog correctional scale at HS  - FS TID AC & HS ---> q6 if NPO   - hypoglycemia protocol PRN   - consistent carbohydrate diet  - RD consult  - c-Peptide 3 with serum glucose 170 - patient is making adequate endogenous insulin.  - check lactate in AM     Discharge plan   - Patient going to rehab.  - Stop glipizide  - STOP metformin  - discharge to rehab on: basal/bolus insulin  - discharge home on: Lantus + Tradjenta 5 mg daily (please check for insurance coverage)  - patient reporting decreased appetite - avoid GLP11 for now  - Please send prescription for glucose tablets 4G (take 4 tablets) or 15G tablets for blood sugar less than 70 mG/dL, repeat fingerstick in 15 minutes. Call your provider for dose adjustment if needed.   - Patient to call doctor with persistent high or low BG at home.   - Ensure patient has glucometer, test strips and lancets on discharge.  - Ensure patient has insulin pen needles.   - Recommend routine outpatient ophthalmology and podiatry follow up.   - Dr Yovani Kern  [address] [facility] [address] [facility] [address], [place] [address]   Appointment with Dr. Kern on 3/20 @ 2:20pm.         HTN  - Outpatient goal BP <130//80 for age  - continue amlodipine, HCTZ, losartan, spironolactone  - Can check urine albumin/creatinine outpatient  - Management per primary team.        HLD  - LDL goal < 70  - LDL 67  - continue statin   - management per primary team       SIRENA Garcia-BC  Nurse Practitioner  Division of Endocrinology  Contact on TEAMS    If out of hospital/unavailable when paged, please note: patient will be cared for by another provider on the endocrine service.  For urgent concerns: call the endocrine answering service for assistance to reach covering provider ([phone]). For non-urgent matters: please email [email or web address] for assistance.

## 2025-01-09 NOTE — PROGRESS NOTE ADULT - SUBJECTIVE AND OBJECTIVE BOX
Chief Complaint: T2DM    Interval Events: Pt seen and examined at bedside. Pt's spouse is at bedside requesting to speak with case management. LAUREN Palmer informed. Pt without complaints today.     MEDICATIONS  (STANDING):  allopurinol 100 milliGRAM(s) Oral daily  amLODIPine   Tablet 10 milliGRAM(s) Oral daily  aspirin enteric coated 81 milliGRAM(s) Oral daily  atorvastatin 40 milliGRAM(s) Oral at bedtime  heparin   Injectable 5000 Unit(s) SubCutaneous every 8 hours  insulin glargine Injectable (LANTUS) 29 Unit(s) SubCutaneous at bedtime  insulin lispro (ADMELOG) corrective regimen sliding scale   SubCutaneous at bedtime  insulin lispro (ADMELOG) corrective regimen sliding scale   SubCutaneous three times a day before meals  insulin lispro Injectable (ADMELOG) 10 Unit(s) SubCutaneous three times a day before meals  isosorbide   mononitrate ER Tablet (IMDUR) 60 milliGRAM(s) Oral daily  sodium chloride 0.9%. 1000 milliLiter(s) (50 mL/Hr) IV Continuous <Continuous>    MEDICATIONS  (PRN):  melatonin 3 milliGRAM(s) Oral at bedtime PRN Insomnia      Allergies    No Known Allergies    Intolerances      Review of Systems:  Eyes: No blurry vision  Cardiovascular: No chest pain, palpitations  Respiratory: No SOB, no cough  GI: No nausea, vomiting, abdominal pain  : No dysuria  Psych: no depression  Endocrine: no polyuria, polydipsia      VITALS: T(C): 36.7 (01-09-25 @ 06:30)  T(F): 98 (01-09-25 @ 06:30), Max: 98.1 (01-08-25 @ 20:04)  HR: 60 (01-09-25 @ 06:30) (55 - 60)  BP: 126/67 (01-09-25 @ 06:30) (103/63 - 127/70)  RR:  (18 - 18)  SpO2:  (82% - 99%)  Wt(kg): --      Physical Exam:   GENERAL: NAD, well-developed  EYES: No proptosis  HEENT:  Atraumatic, Normocephalic  RESPIRATORY: non labored breathing   GI: Non distended  PSYCH: Alert, normal affect, normal mood    CAPILLARY BLOOD GLUCOSE    POCT Blood Glucose.: 335 mg/dL (09 Jan 2025 12:19)  POCT Blood Glucose.: 200 mg/dL (09 Jan 2025 08:30)  POCT Blood Glucose.: 202 mg/dL (08 Jan 2025 22:56)  POCT Blood Glucose.: 200 mg/dL (08 Jan 2025 18:10)      01-09    135  |  98  |  72[H]  ----------------------------<  187[H]  5.5[H]   |  18[L]  |  2.20[H]    eGFR: 28[L]    Ca    9.6      01-09  Mg     2.60     01-09  Phos  5.2     01-09      A1C with Estimated Average Glucose Result: 7.0 % (12-30-24 @ 09:36)

## 2025-01-10 LAB
ANION GAP SERPL CALC-SCNC: 17 MMOL/L — HIGH (ref 7–14)
BUN SERPL-MCNC: 70 MG/DL — HIGH (ref 7–23)
CALCIUM SERPL-MCNC: 9.5 MG/DL — SIGNIFICANT CHANGE UP (ref 8.4–10.5)
CHLORIDE SERPL-SCNC: 102 MMOL/L — SIGNIFICANT CHANGE UP (ref 98–107)
CO2 SERPL-SCNC: 18 MMOL/L — LOW (ref 22–31)
CREAT SERPL-MCNC: 2.23 MG/DL — HIGH (ref 0.5–1.3)
EGFR: 28 ML/MIN/1.73M2 — LOW
GLUCOSE BLDC GLUCOMTR-MCNC: 133 MG/DL — HIGH (ref 70–99)
GLUCOSE BLDC GLUCOMTR-MCNC: 158 MG/DL — HIGH (ref 70–99)
GLUCOSE BLDC GLUCOMTR-MCNC: 160 MG/DL — HIGH (ref 70–99)
GLUCOSE BLDC GLUCOMTR-MCNC: 251 MG/DL — HIGH (ref 70–99)
GLUCOSE SERPL-MCNC: 121 MG/DL — HIGH (ref 70–99)
HCT VFR BLD CALC: 33.4 % — LOW (ref 39–50)
HGB BLD-MCNC: 10.6 G/DL — LOW (ref 13–17)
MAGNESIUM SERPL-MCNC: 2.5 MG/DL — SIGNIFICANT CHANGE UP (ref 1.6–2.6)
MCHC RBC-ENTMCNC: 22.5 PG — LOW (ref 27–34)
MCHC RBC-ENTMCNC: 31.7 G/DL — LOW (ref 32–36)
MCV RBC AUTO: 70.9 FL — LOW (ref 80–100)
NRBC # BLD: 0 /100 WBCS — SIGNIFICANT CHANGE UP (ref 0–0)
NRBC # FLD: 0 K/UL — SIGNIFICANT CHANGE UP (ref 0–0)
PHOSPHATE SERPL-MCNC: 4 MG/DL — SIGNIFICANT CHANGE UP (ref 2.5–4.5)
PLATELET # BLD AUTO: 390 K/UL — SIGNIFICANT CHANGE UP (ref 150–400)
POTASSIUM SERPL-MCNC: 5 MMOL/L — SIGNIFICANT CHANGE UP (ref 3.5–5.3)
POTASSIUM SERPL-SCNC: 5 MMOL/L — SIGNIFICANT CHANGE UP (ref 3.5–5.3)
RBC # BLD: 4.71 M/UL — SIGNIFICANT CHANGE UP (ref 4.2–5.8)
RBC # FLD: 15.9 % — HIGH (ref 10.3–14.5)
SODIUM SERPL-SCNC: 137 MMOL/L — SIGNIFICANT CHANGE UP (ref 135–145)
WBC # BLD: 8.86 K/UL — SIGNIFICANT CHANGE UP (ref 3.8–10.5)
WBC # FLD AUTO: 8.86 K/UL — SIGNIFICANT CHANGE UP (ref 3.8–10.5)

## 2025-01-10 PROCEDURE — 99232 SBSQ HOSP IP/OBS MODERATE 35: CPT

## 2025-01-10 RX ADMIN — Medication 3: at 13:19

## 2025-01-10 RX ADMIN — ALLOPURINOL 100 MILLIGRAM(S): 100 TABLET ORAL at 11:47

## 2025-01-10 RX ADMIN — Medication 10 UNIT(S): at 13:19

## 2025-01-10 RX ADMIN — Medication 1: at 09:00

## 2025-01-10 RX ADMIN — Medication 10 MILLIGRAM(S): at 05:04

## 2025-01-10 RX ADMIN — HEPARIN SODIUM 5000 UNIT(S): 1000 INJECTION, SOLUTION INTRAVENOUS; SUBCUTANEOUS at 05:04

## 2025-01-10 RX ADMIN — ATORVASTATIN CALCIUM 40 MILLIGRAM(S): 40 TABLET, FILM COATED ORAL at 22:59

## 2025-01-10 RX ADMIN — HEPARIN SODIUM 5000 UNIT(S): 1000 INJECTION, SOLUTION INTRAVENOUS; SUBCUTANEOUS at 14:28

## 2025-01-10 RX ADMIN — Medication 10 UNIT(S): at 18:08

## 2025-01-10 RX ADMIN — Medication 60 MILLIGRAM(S): at 11:47

## 2025-01-10 RX ADMIN — HEPARIN SODIUM 5000 UNIT(S): 1000 INJECTION, SOLUTION INTRAVENOUS; SUBCUTANEOUS at 22:59

## 2025-01-10 RX ADMIN — Medication 1: at 18:08

## 2025-01-10 RX ADMIN — INSULIN GLARGINE-YFGN 29 UNIT(S): 100 INJECTION, SOLUTION SUBCUTANEOUS at 22:58

## 2025-01-10 RX ADMIN — Medication 81 MILLIGRAM(S): at 11:47

## 2025-01-10 RX ADMIN — Medication 10 UNIT(S): at 09:00

## 2025-01-10 NOTE — PROGRESS NOTE ADULT - SUBJECTIVE AND OBJECTIVE BOX
Cardiology Attending Follow-up Note     Patient seen and examined at bedside.    Overnight Events:     no events   pending PT eval for dc     REVIEW OF SYSTEMS:  Constitutional:     [x ] negative [ ] fevers [ ] chills [ ] weight loss [ ] weight gain  HEENT:                  [x ] negative [ ] dry eyes [ ] eye irritation [ ] postnasal drip [ ] nasal congestion  CV:                         [ x] negative  [ ] chest pain [ ] orthopnea [ ] palpitations [ ] murmur  Resp:                     [ x] negative [ ] cough [ ] shortness of breath [ ] dyspnea [ ] wheezing [ ] sputum [ ]hemoptysis  GI:                          [ x] negative [ ] nausea [ ] vomiting [ ] diarrhea [ ] constipation [ ] abd pain [ ] dysphagia   :                        [ x] negative [ ] dysuria [ ] nocturia [ ] hematuria [ ] increased urinary frequency  Musculoskeletal: [ x] negative [ ] back pain [ ] myalgias [ ] arthralgias [ ] fracture  Skin:                       [ x] negative [ ] rash [ ] itch  Neurological:        [x ] negative [ ] headache [ ] dizziness [ ] syncope [ ] weakness [ ] numbness  Psychiatric:           [ x] negative [ ] anxiety [ ] depression  Endocrine:            [ x] negative [ ] diabetes [ ] thyroid problem  Heme/Lymph:      [ x] negative [ ] anemia [ ] bleeding problem  Allergic/Immune: [ x] negative [ ] itchy eyes [ ] nasal discharge [ ] hives [ ] angioedema    [ x] All other systems negative  [ ] Unable to assess ROS due to    Current Meds:  allopurinol 100 milliGRAM(s) Oral daily  amLODIPine   Tablet 10 milliGRAM(s) Oral daily  aspirin enteric coated 81 milliGRAM(s) Oral daily  atorvastatin 40 milliGRAM(s) Oral at bedtime  heparin   Injectable 5000 Unit(s) SubCutaneous every 8 hours  insulin glargine Injectable (LANTUS) 29 Unit(s) SubCutaneous at bedtime  insulin lispro (ADMELOG) corrective regimen sliding scale   SubCutaneous at bedtime  insulin lispro (ADMELOG) corrective regimen sliding scale   SubCutaneous three times a day before meals  insulin lispro Injectable (ADMELOG) 10 Unit(s) SubCutaneous three times a day before meals  isosorbide   mononitrate ER Tablet (IMDUR) 60 milliGRAM(s) Oral daily  melatonin 3 milliGRAM(s) Oral at bedtime PRN  sodium chloride 0.9%. 1000 milliLiter(s) IV Continuous <Continuous>      PAST MEDICAL & SURGICAL HISTORY:  HTN (hypertension)      DM2 (diabetes mellitus, type 2)      Prostate cancer      No significant past surgical history          Vitals:  T(F): 98.3 (01-10), Max: 98.4 (01-10)  HR: 58 (01-10) (55 - 58)  BP: 124/63 (01-10) (124/63 - 146/78)  RR: 18 (01-10)  SpO2: 97% (01-10)  I&O's Summary    09 Jan 2025 07:01  -  10 Luke 2025 07:00  --------------------------------------------------------  IN: 1300 mL / OUT: 1500 mL / NET: -200 mL    10 Luke 2025 07:01  -  10 Luke 2025 21:41  --------------------------------------------------------  IN: 750 mL / OUT: 650 mL / NET: 100 mL        Physical Exam:  Appearance: No acute distress  HENT: No JVD   Cardiovascular: RRR, S1/S2, no murmurs  Respiratory: CTABL  Gastrointestinal: soft, NT ND, +BS  Musculoskeletal: No clubbing, no edema   Neurologic: Non-focal  Skin: No rashes, ecchymoses, or cyanosis                          10.6   8.86  )-----------( 390      ( 10 Luke 2025 06:02 )             33.4     01-10    137  |  102  |  70[H]  ----------------------------<  121[H]  5.0   |  18[L]  |  2.23[H]    Ca    9.5      10 Luke 2025 06:02  Phos  4.0     01-10  Mg     2.50     01-10                    Cardiovascular Testings:

## 2025-01-10 NOTE — PROGRESS NOTE ADULT - ASSESSMENT
89M with PMH of prostate CA, T2DM, HTN, HLD, CKD, here for hypoglycemia sxs. In the ER, pt found to have cp with EKG w/ NSR and no significant ischemic changes. Pt was recommended to be admitted by Tele Doc Dr. Perea. Cardiology consulted for further evaluation inpt.     1. Cp, +NST and TTE reviewed   2. HTN   3. HLD   4. CKD     -given +NST, initially planned for [facility], deferred for now given COVID+ and rising Cr. Can be done as outpt post discharge given pt has no cardiac sxs at this point.   -bp control, cont norvasc, imduri, losartan, and Hctz   -cont asa and statin   -HR 50-60s, would rec hold bb   -discharge planning   -f/u OP w/ me in clinic     Nitin Lee MD [facility]  Cardiology Attending, Jenn-NS/LOIS  Avaliable on [employer] Team

## 2025-01-10 NOTE — PROGRESS NOTE ADULT - ASSESSMENT
88 y/o M with pmh of prostate cancer, DM type 2, HTN, p/w hypoglycemia.  Pt reports his insulin glargine  dose was increased from 12 to 15 units recently.  Over the past few days, he reports getting low reading in the morning, but is able to alert his wife who gives him food to bring it  up.  Yesterday AM, pt's wife noticed he was confused and making gurgling sounds while in bed.  She checked FS which was 41.  EMS was called, and pt give D50 en route to ED.  Pt also reports intermittent L sided chest pain for the past several days.  Pain is positional, and improves when pt wears a support belt.  Pain is difficult for patient to describe quality.  No associated dyspnea, dizziness nausea or diaphoresis.  No recent fever or cough.    Pt was evaluated in the ED, and placed in the CDU for monitoring.  He is admitted for further w/u of chest pain and monitoring of glucose.       Problem/Recommendation - 1:  ·  Problem: Chest pain with abnormal stress test .   ·  Recommendation: Cardiology planning cardiac cath out patient  .   On  Aspirin and statin.  Cardiology help appreciated.   < from: TTE W or WO Ultrasound Enhancing Agent (12.31.24 @ 07:33) >    CONCLUSIONS:      1. Left ventricular systolic function is normal with an ejection fraction visually estimated at 50 to 55 %.      < from: Nuclear Stress Test-Exercise.. (12.31.24 @ 08:30) >     1. Qualitative Perfusion:      - medium-sized, moderate to severe defect(s) in the inferior and inferolateral wall suggestive of CAD.   2. The left ventricle is mildly decreased in function and normal in size. The time activity curve suggests diastolic dysfunction.. The post stress left ventricular EF is 45 %. The stress end diastolic volume is 71 ml and systolic volume is 39 ml.   3. Hypokinesis of the inferoseptal wall.     Problem/Recommendation - 2:  ·  Problem: Type 2 diabetes mellitus with hypoglycemia.   ·  Recommendation: Endo help appreciated.     Following  their recommendation.     Problem/Recommendation - 3:  ·  Problem: Essential hypertension.   ·  Recommendation: BP medications with hold parameters.     Problem/Recommendation - 4:  ·  Problem: CKD with KIRBY .   ·  Recommendation: Trending Creatinine & Stable.   Renal help appreciated.       Problem/Recommendation - 5:  ·  Problem: Prostate cancer.   ·  Recommendation: Out patient Follow Up.       Problem/Recommendation - 6:  ·  Problem: COVID 19 infection with Hypoxia .   ·  Recommendation: ID help appreciated.       ON Remdisvir .      Problem/Recommendation - 7:  ·  Problem: Back Pain New onset.   ·  Recommendation: Pain medication and CT scan spine.     < from: CT Lumbar Spine No Cont (01.09.25 @ 20:52) >  IMPRESSION: Degenerative changes involving the cervical thoracic and   lumbar spine region as well as abnormal lucent lesions as described   above. While these findings could be compatible with metastasis these are   unlikely compatible with prostate metastasis given that they are lytic   lesions at cannot of blastic lesions. Contrast enhanced MRI of the   cervical thoracic and lumbar spine is recommended for further evaluation   if there are no contraindications    Oncology help appreciated.       D/W patient and ACP. Will get PT evaluation.

## 2025-01-10 NOTE — PROGRESS NOTE ADULT - ASSESSMENT
88 y/o M with pmh of prostate cancer, DM type 2, HTN, p/w hypoglycemia. +NST planning for angio. nephrology consulted for elevated scr    CKD stage 3B  scr seems to be fluctuating ~ 1.5-1.8  S.Cr. worsen today  +covid   on hctz, losartan, spironolactone, would hold now,( last dose 1/9)  KIRBY likely prerenal hyperglycemia, on diuretic. elevated bun  s/p gentle hydration with ns @ 50cc/hr x20 hrs 1/9  renal function stable today  LH deferred in view of Covid+ and elevated S.Cr.  Recommend ns @ 75cc/hr x6 hrs before and 6 hrs after-if planned for angio  monitor BMP and UO     HTN  Fluctuating; acceptable.  C/W current meds.  Low salt diet.  monitor    proteinuria  mild  UPr/Cr 0.26gm.  Monitor.    covid  care per team    cp  +NST  LHC deferred for now.  f/u cardio

## 2025-01-10 NOTE — PROGRESS NOTE ADULT - SUBJECTIVE AND OBJECTIVE BOX
[facility]   MD CRISTI SESAY MD ANGELA WONG, PA    TEL:  OFFICE: [phone]  From 5pm-7am Answering Service [phone]    -- RENAL FOLLOW UP NOTE ---Date of Service 01-10-25 @ 13:26    Patient is a 89y old  Male who presents with a chief complaint of hypoglycemia (10 Luke 2025 12:10)      Patient seen and examined at bedside. No chest pain/sob    VITALS:  T(F): 98 (01-10-25 @ 11:45), Max: 98.4 (01-10-25 @ 05:00)  HR: 58 (01-10-25 @ 11:45)  BP: 142/78 (01-10-25 @ 11:45)  RR: 18 (01-10-25 @ 11:45)  SpO2: 96% (01-10-25 @ 11:45)  Wt(kg): --    01-09 @ 07:01  -  01-10 @ 07:00  --------------------------------------------------------  IN: 1300 mL / OUT: 1500 mL / NET: -200 mL    01-10 @ 07:01  -  01-10 @ 13:26  --------------------------------------------------------  IN: 250 mL / OUT: 0 mL / NET: 250 mL          PHYSICAL EXAM:  General: NAD  Neck: No JVD  Respiratory: CTAB, no wheezes, rales or rhonchi  Cardiovascular: S1, S2, RRR  Gastrointestinal: BS+, soft, NT/ND  Extremities: No peripheral edema    Hospital Medications:   MEDICATIONS  (STANDING):  allopurinol 100 milliGRAM(s) Oral daily  amLODIPine   Tablet 10 milliGRAM(s) Oral daily  aspirin enteric coated 81 milliGRAM(s) Oral daily  atorvastatin 40 milliGRAM(s) Oral at bedtime  heparin   Injectable 5000 Unit(s) SubCutaneous every 8 hours  insulin glargine Injectable (LANTUS) 29 Unit(s) SubCutaneous at bedtime  insulin lispro (ADMELOG) corrective regimen sliding scale   SubCutaneous at bedtime  insulin lispro (ADMELOG) corrective regimen sliding scale   SubCutaneous three times a day before meals  insulin lispro Injectable (ADMELOG) 10 Unit(s) SubCutaneous three times a day before meals  isosorbide   mononitrate ER Tablet (IMDUR) 60 milliGRAM(s) Oral daily  sodium chloride 0.9%. 1000 milliLiter(s) (50 mL/Hr) IV Continuous <Continuous>      LABS:  01-10    137  |  102  |  70[H]  ----------------------------<  121[H]  5.0   |  18[L]  |  2.23[H]    Ca    9.5      10 Luke 2025 06:02  Phos  4.0     01-10  Mg     2.50     01-10      Creatinine Trend: 2.23 <--, 2.20 <--, 2.02 <--, 1.92 <--    Phosphorus: 4.0 mg/dL (01-10 @ 06:02)                              10.6   8.86  )-----------( 390      ( 10 Luke 2025 06:02 )             33.4     Urine Studies:  Urinalysis - [01-10-25 @ 06:02]      Color  / Appearance  / SG  / pH       Gluc 121 / Ketone   / Bili  / Urobili        Blood  / Protein  / Leuk Est  / Nitrite       RBC  / WBC  / Hyaline  / Gran  / Sq Epi  / Non Sq Epi  / Bacteria     Urine Creatinine 128      [01-04-25 @ 17:00]  Urine Protein 34      [01-04-25 @ 17:00]    Lipid: chol 130, , HDL 36, LDL --      [01-01-25 @ 05:30]        RADIOLOGY & ADDITIONAL STUDIES:

## 2025-01-10 NOTE — PROGRESS NOTE ADULT - ASSESSMENT
89-year-old male with a past medical history of prostate cancer, diabetes, hypertension, presenting due to concern of hypoglycemia at home.  The patient states he is tired and was not sure what happened.  According to his wife who was in bed with him, she started hearing him gurgling at home.  She turned on the lights and tried to wake him up, the patient was unresponsive.  They called EMS who came and checked a fingerstick which was 41.  He was given 1 amp of D50 with a repeat being 140.  Patient also tolerated p.o.  According to wife he had a similar occurrence yesterday where his fingerstick was in the 40s.  Patient takes combined metformin and glipizide as well as lantus  Denies fever, chills, chest pain, trouble breathing, dysuria.  According to patient he has decreased appetite recently compared to the past.  Endocrine called for further assistance.  Reports long hx of DM.   He states Lantus was increased from 12--> 15 units  checks FSBG in the am and afternoon  reports multiple lows in 30-40s       Poorly controlled T2DM with hyperglycemia  - HbA1c: 7.0  - Home Regimen: combined metformin and glipizide as well as Lantus 12-15 units  - Endocrinologist: unknown    Inpatient plan   - FS goal 100-180 mg/dl   - FS at goal   - Lantus 29 units at bedtime  - Admelog 10 units TID AC. Hold if not eating/NPO.   - continue low Admelog correctional scale TID AC  - continue separate low Admelog correctional scale at HS  - FS TID AC & HS ---> q6 if NPO   - hypoglycemia protocol PRN   - consistent carbohydrate diet  - RD consult  - c-Peptide 3 with serum glucose 170 - patient is making adequate endogenous insulin.  - check lactate in AM     Discharge plan   - Patient going to rehab.  - Stop glipizide  - STOP metformin  - discharge to rehab on: basal/bolus insulin  - discharge home on: Lantus + Tradjenta 5 mg daily (please check for insurance coverage)  - patient reporting decreased appetite - avoid GLP11 for now  - Please send prescription for glucose tablets 4G (take 4 tablets) or 15G tablets for blood sugar less than 70 mG/dL, repeat fingerstick in 15 minutes. Call your provider for dose adjustment if needed.   - Patient to call doctor with persistent high or low BG at home.   - Ensure patient has glucometer, test strips and lancets on discharge.  - Ensure patient has insulin pen needles.   - Recommend routine outpatient ophthalmology and podiatry follow up.   - Dr Yovani Kern  [id number] [facility] [address], [place] [address]   Appointment with Dr. Kern on 3/20 @ 2:20pm.         HTN  - Outpatient goal BP <130//80 for age  - continue amlodipine, HCTZ, losartan, spironolactone  - Can check urine albumin/creatinine outpatient  - Management per primary team.        HLD  - LDL goal < 70  - LDL 67  - continue statin   - management per primary team       AISHA GarciaCNP-BC  Nurse Practitioner  Division of Endocrinology  Contact on TEAMS    If out of hospital/unavailable when paged, please note: patient will be cared for by another provider on the endocrine service.  For urgent concerns: call the endocrine answering service for assistance to reach covering provider ([phone]). For non-urgent matters: please email [email or web address] for assistance.

## 2025-01-10 NOTE — PROGRESS NOTE ADULT - SUBJECTIVE AND OBJECTIVE BOX
Chief Complaint: T2DM    Interval Events: Pt seen and examined at bedside. Awake and alert. Pt tolerating carb consistent diet with no nausea, no vomiting. Cough has resolved.      MEDICATIONS  (STANDING):  allopurinol 100 milliGRAM(s) Oral daily  amLODIPine   Tablet 10 milliGRAM(s) Oral daily  aspirin enteric coated 81 milliGRAM(s) Oral daily  atorvastatin 40 milliGRAM(s) Oral at bedtime  heparin   Injectable 5000 Unit(s) SubCutaneous every 8 hours  insulin glargine Injectable (LANTUS) 29 Unit(s) SubCutaneous at bedtime  insulin lispro (ADMELOG) corrective regimen sliding scale   SubCutaneous at bedtime  insulin lispro (ADMELOG) corrective regimen sliding scale   SubCutaneous three times a day before meals  insulin lispro Injectable (ADMELOG) 10 Unit(s) SubCutaneous three times a day before meals  isosorbide   mononitrate ER Tablet (IMDUR) 60 milliGRAM(s) Oral daily  sodium chloride 0.9%. 1000 milliLiter(s) (50 mL/Hr) IV Continuous <Continuous>    MEDICATIONS  (PRN):  melatonin 3 milliGRAM(s) Oral at bedtime PRN Insomnia      Allergies    No Known Allergies    Intolerances      Review of Systems:  Eyes: No blurry vision  Cardiovascular: No chest pain, palpitations  Respiratory: No SOB, no cough  GI: No nausea, vomiting, abdominal pain  : No dysuria      ALL OTHER SYSTEMS REVIEWED AND NEGATIVE          VITALS: T(C): 36.7 (01-10-25 @ 11:45)  T(F): 98 (01-10-25 @ 11:45), Max: 98.4 (01-10-25 @ 05:00)  HR: 58 (01-10-25 @ 11:45) (53 - 58)  BP: 142/78 (01-10-25 @ 11:45) (121/63 - 146/78)  RR:  (17 - 18)  SpO2:  (96% - 99%)  Wt(kg): --      Physical Exam:   GENERAL: NAD, well-developed  EYES: No proptosis  HEENT:  Atraumatic, Normocephalic  RESPIRATORY: non labored breathing   GI: Non distended  PSYCH: Alert, normal affect, normal mood    CAPILLARY BLOOD GLUCOSE    POCT Blood Glucose.: 160 mg/dL (10 Luke 2025 08:52)  POCT Blood Glucose.: 143 mg/dL (09 Jan 2025 22:10)  POCT Blood Glucose.: 158 mg/dL (09 Jan 2025 17:54)  POCT Blood Glucose.: 335 mg/dL (09 Jan 2025 12:19)      01-10    137  |  102  |  70[H]  ----------------------------<  121[H]  5.0   |  18[L]  |  2.23[H]    eGFR: 28[L]    Ca    9.5      01-10  Mg     2.50     01-10  Phos  4.0     01-10        A1C with Estimated Average Glucose Result: 7.0 % (12-30-24 @ 09:36)      Thyroid Function Tests:

## 2025-01-10 NOTE — PROGRESS NOTE ADULT - SUBJECTIVE AND OBJECTIVE BOX
Date of Service  : 01-10-25     INTERVAL HPI/OVERNIGHT EVENTS: I feel same.   Vital Signs Last 24 Hrs  T(C): 36.7 (10 Luke 2025 11:45), Max: 36.9 (10 Luke 2025 05:00)  T(F): 98 (10 Luke 2025 11:45), Max: 98.4 (10 Luke 2025 05:00)  HR: 58 (10 Luke 2025 11:45) (55 - 58)  BP: 142/78 (10 Luke 2025 11:45) (121/63 - 146/78)  BP(mean): --  RR: 18 (10 Luke 2025 11:45) (18 - 18)  SpO2: 96% (10 Luke 2025 11:45) (96% - 99%)    Parameters below as of 10 Luke 2025 11:45  Patient On (Oxygen Delivery Method): room air      I&O's Summary    09 Jan 2025 07:01  -  10 Luke 2025 07:00  --------------------------------------------------------  IN: 1300 mL / OUT: 1500 mL / NET: -200 mL    10 Luke 2025 07:01  -  10 Luke 2025 18:54  --------------------------------------------------------  IN: 750 mL / OUT: 650 mL / NET: 100 mL      MEDICATIONS  (STANDING):  allopurinol 100 milliGRAM(s) Oral daily  amLODIPine   Tablet 10 milliGRAM(s) Oral daily  aspirin enteric coated 81 milliGRAM(s) Oral daily  atorvastatin 40 milliGRAM(s) Oral at bedtime  heparin   Injectable 5000 Unit(s) SubCutaneous every 8 hours  insulin glargine Injectable (LANTUS) 29 Unit(s) SubCutaneous at bedtime  insulin lispro (ADMELOG) corrective regimen sliding scale   SubCutaneous at bedtime  insulin lispro (ADMELOG) corrective regimen sliding scale   SubCutaneous three times a day before meals  insulin lispro Injectable (ADMELOG) 10 Unit(s) SubCutaneous three times a day before meals  isosorbide   mononitrate ER Tablet (IMDUR) 60 milliGRAM(s) Oral daily  sodium chloride 0.9%. 1000 milliLiter(s) (50 mL/Hr) IV Continuous <Continuous>    MEDICATIONS  (PRN):  melatonin 3 milliGRAM(s) Oral at bedtime PRN Insomnia    LABS:                        10.6   8.86  )-----------( 390      ( 10 Luke 2025 06:02 )             33.4     01-10    137  |  102  |  70[H]  ----------------------------<  121[H]  5.0   |  18[L]  |  2.23[H]    Ca    9.5      10 Luke 2025 06:02  Phos  4.0     01-10  Mg     2.50     01-10        Urinalysis Basic - ( 10 Luke 2025 06:02 )    Color: x / Appearance: x / SG: x / pH: x  Gluc: 121 mg/dL / Ketone: x  / Bili: x / Urobili: x   Blood: x / Protein: x / Nitrite: x   Leuk Esterase: x / RBC: x / WBC x   Sq Epi: x / Non Sq Epi: x / Bacteria: x      CAPILLARY BLOOD GLUCOSE      POCT Blood Glucose.: 158 mg/dL (10 Luke 2025 17:41)  POCT Blood Glucose.: 251 mg/dL (10 Luke 2025 13:06)  POCT Blood Glucose.: 160 mg/dL (10 Luke 2025 08:52)  POCT Blood Glucose.: 143 mg/dL (09 Jan 2025 22:10)        Urinalysis Basic - ( 10 Luke 2025 06:02 )    Color: x / Appearance: x / SG: x / pH: x  Gluc: 121 mg/dL / Ketone: x  / Bili: x / Urobili: x   Blood: x / Protein: x / Nitrite: x   Leuk Esterase: x / RBC: x / WBC x   Sq Epi: x / Non Sq Epi: x / Bacteria: x      REVIEW OF SYSTEMS:  CONSTITUTIONAL: No fever, weight loss, or fatigue  EYES: No eye pain, visual disturbances, or discharge  ENMT:  No difficulty hearing, tinnitus, vertigo; No sinus or throat pain  NECK: No pain or stiffness  RESPIRATORY: No cough, wheezing, chills or hemoptysis; No shortness of breath  CARDIOVASCULAR: No chest pain, palpitations, dizziness, or leg swelling  GASTROINTESTINAL: No abdominal or epigastric pain. No nausea, vomiting, or hematemesis; No diarrhea or constipation. No melena or hematochezia.  GENITOURINARY: No dysuria, frequency, hematuria, or incontinence  NEUROLOGICAL: No headaches, memory loss, loss of strength, numbness, or tremors      RADIOLOGY & ADDITIONAL TESTS:    Consultant(s) Notes Reviewed:  [x ] YES  [ ] NO    PHYSICAL EXAM:  GENERAL: NAD, well-groomed, well-developed,not in any distress ,  HEAD:  Atraumatic, Normocephalic  EYES: EOMI, PERRLA, conjunctiva and sclera clear  ENMT: No tonsillar erythema, exudates, or enlargement; Moist mucous membranes, Good dentition, No lesions  NECK: Supple, No JVD, Normal thyroid  NERVOUS SYSTEM:  Alert & Oriented X3, No focal deficit   CHEST/LUNG: Good air entry bilateral with no  rales, rhonchi, wheezing, or rubs  HEART: Regular rate and rhythm; No murmurs, rubs, or gallops  ABDOMEN: Soft, Nontender, Nondistended; Bowel sounds present  EXTREMITIES:  2+ Peripheral Pulses, No clubbing, cyanosis, or edema    Care Discussed with Consultants/Other Providers [ x] YES  [ ] NO

## 2025-01-11 DIAGNOSIS — U07.1 COVID-19: ICD-10-CM

## 2025-01-11 LAB
ANION GAP SERPL CALC-SCNC: 16 MMOL/L — HIGH (ref 7–14)
BUN SERPL-MCNC: 69 MG/DL — HIGH (ref 7–23)
CALCIUM SERPL-MCNC: 9.2 MG/DL — SIGNIFICANT CHANGE UP (ref 8.4–10.5)
CHLORIDE SERPL-SCNC: 102 MMOL/L — SIGNIFICANT CHANGE UP (ref 98–107)
CO2 SERPL-SCNC: 17 MMOL/L — LOW (ref 22–31)
CREAT SERPL-MCNC: 2.22 MG/DL — HIGH (ref 0.5–1.3)
EGFR: 28 ML/MIN/1.73M2 — LOW
GLUCOSE BLDC GLUCOMTR-MCNC: 112 MG/DL — HIGH (ref 70–99)
GLUCOSE BLDC GLUCOMTR-MCNC: 143 MG/DL — HIGH (ref 70–99)
GLUCOSE BLDC GLUCOMTR-MCNC: 153 MG/DL — HIGH (ref 70–99)
GLUCOSE BLDC GLUCOMTR-MCNC: 242 MG/DL — HIGH (ref 70–99)
GLUCOSE BLDC GLUCOMTR-MCNC: 250 MG/DL — HIGH (ref 70–99)
GLUCOSE SERPL-MCNC: 137 MG/DL — HIGH (ref 70–99)
HCT VFR BLD CALC: 28.7 % — LOW (ref 39–50)
HGB BLD-MCNC: 9.3 G/DL — LOW (ref 13–17)
LACTATE SERPL-SCNC: 0.8 MMOL/L — SIGNIFICANT CHANGE UP (ref 0.5–2)
MAGNESIUM SERPL-MCNC: 2.4 MG/DL — SIGNIFICANT CHANGE UP (ref 1.6–2.6)
MCHC RBC-ENTMCNC: 23 PG — LOW (ref 27–34)
MCHC RBC-ENTMCNC: 32.4 G/DL — SIGNIFICANT CHANGE UP (ref 32–36)
MCV RBC AUTO: 70.9 FL — LOW (ref 80–100)
NRBC # BLD: 0 /100 WBCS — SIGNIFICANT CHANGE UP (ref 0–0)
NRBC # FLD: 0 K/UL — SIGNIFICANT CHANGE UP (ref 0–0)
PHOSPHATE SERPL-MCNC: 3.9 MG/DL — SIGNIFICANT CHANGE UP (ref 2.5–4.5)
PLATELET # BLD AUTO: 352 K/UL — SIGNIFICANT CHANGE UP (ref 150–400)
POTASSIUM SERPL-MCNC: 5 MMOL/L — SIGNIFICANT CHANGE UP (ref 3.5–5.3)
POTASSIUM SERPL-SCNC: 5 MMOL/L — SIGNIFICANT CHANGE UP (ref 3.5–5.3)
PSA FLD-MCNC: 32.3 NG/ML — HIGH (ref 0–4)
PSA FLD-MCNC: 33.8 NG/ML — HIGH (ref 0–4)
RBC # BLD: 4.05 M/UL — LOW (ref 4.2–5.8)
RBC # FLD: 15.6 % — HIGH (ref 10.3–14.5)
SODIUM SERPL-SCNC: 135 MMOL/L — SIGNIFICANT CHANGE UP (ref 135–145)
WBC # BLD: 8.71 K/UL — SIGNIFICANT CHANGE UP (ref 3.8–10.5)
WBC # FLD AUTO: 8.71 K/UL — SIGNIFICANT CHANGE UP (ref 3.8–10.5)

## 2025-01-11 PROCEDURE — 99233 SBSQ HOSP IP/OBS HIGH 50: CPT

## 2025-01-11 RX ADMIN — HEPARIN SODIUM 5000 UNIT(S): 1000 INJECTION, SOLUTION INTRAVENOUS; SUBCUTANEOUS at 21:35

## 2025-01-11 RX ADMIN — HEPARIN SODIUM 5000 UNIT(S): 1000 INJECTION, SOLUTION INTRAVENOUS; SUBCUTANEOUS at 05:20

## 2025-01-11 RX ADMIN — Medication 10 UNIT(S): at 18:04

## 2025-01-11 RX ADMIN — Medication 3 MILLIGRAM(S): at 21:34

## 2025-01-11 RX ADMIN — INSULIN GLARGINE-YFGN 29 UNIT(S): 100 INJECTION, SOLUTION SUBCUTANEOUS at 22:16

## 2025-01-11 RX ADMIN — Medication 10 MILLIGRAM(S): at 05:20

## 2025-01-11 RX ADMIN — Medication 10 UNIT(S): at 12:59

## 2025-01-11 RX ADMIN — Medication 81 MILLIGRAM(S): at 11:33

## 2025-01-11 RX ADMIN — ALLOPURINOL 100 MILLIGRAM(S): 100 TABLET ORAL at 11:33

## 2025-01-11 RX ADMIN — ATORVASTATIN CALCIUM 40 MILLIGRAM(S): 40 TABLET, FILM COATED ORAL at 21:34

## 2025-01-11 RX ADMIN — Medication 1: at 12:58

## 2025-01-11 RX ADMIN — Medication 10 UNIT(S): at 08:48

## 2025-01-11 RX ADMIN — Medication 60 MILLIGRAM(S): at 11:33

## 2025-01-11 NOTE — PROGRESS NOTE ADULT - SUBJECTIVE AND OBJECTIVE BOX
Cardiology Attending Follow-up Note     Patient seen and examined at bedside.    Overnight Events:     no cardiac sxs     REVIEW OF SYSTEMS:  Constitutional:     [x ] negative [ ] fevers [ ] chills [ ] weight loss [ ] weight gain  HEENT:                  [x ] negative [ ] dry eyes [ ] eye irritation [ ] postnasal drip [ ] nasal congestion  CV:                         [ x] negative  [ ] chest pain [ ] orthopnea [ ] palpitations [ ] murmur  Resp:                     [ x] negative [ ] cough [ ] shortness of breath [ ] dyspnea [ ] wheezing [ ] sputum [ ]hemoptysis  GI:                          [ x] negative [ ] nausea [ ] vomiting [ ] diarrhea [ ] constipation [ ] abd pain [ ] dysphagia   :                        [ x] negative [ ] dysuria [ ] nocturia [ ] hematuria [ ] increased urinary frequency  Musculoskeletal: [ x] negative [ ] back pain [ ] myalgias [ ] arthralgias [ ] fracture  Skin:                       [ x] negative [ ] rash [ ] itch  Neurological:        [x ] negative [ ] headache [ ] dizziness [ ] syncope [ ] weakness [ ] numbness  Psychiatric:           [ x] negative [ ] anxiety [ ] depression  Endocrine:            [ x] negative [ ] diabetes [ ] thyroid problem  Heme/Lymph:      [ x] negative [ ] anemia [ ] bleeding problem  Allergic/Immune: [ x] negative [ ] itchy eyes [ ] nasal discharge [ ] hives [ ] angioedema    [ x] All other systems negative  [ ] Unable to assess ROS due to    Current Meds:  allopurinol 100 milliGRAM(s) Oral daily  amLODIPine   Tablet 10 milliGRAM(s) Oral daily  aspirin enteric coated 81 milliGRAM(s) Oral daily  atorvastatin 40 milliGRAM(s) Oral at bedtime  heparin   Injectable 5000 Unit(s) SubCutaneous every 8 hours  insulin glargine Injectable (LANTUS) 29 Unit(s) SubCutaneous at bedtime  insulin lispro (ADMELOG) corrective regimen sliding scale   SubCutaneous at bedtime  insulin lispro (ADMELOG) corrective regimen sliding scale   SubCutaneous three times a day before meals  insulin lispro Injectable (ADMELOG) 10 Unit(s) SubCutaneous three times a day before meals  isosorbide   mononitrate ER Tablet (IMDUR) 60 milliGRAM(s) Oral daily  melatonin 3 milliGRAM(s) Oral at bedtime PRN  sodium chloride 0.9%. 1000 milliLiter(s) IV Continuous <Continuous>      PAST MEDICAL & SURGICAL HISTORY:  HTN (hypertension)      DM2 (diabetes mellitus, type 2)      Prostate cancer      No significant past surgical history          Vitals:  T(F): 99.4 (01-11), Max: 99.4 (01-11)  HR: 65 (01-11) (56 - 65)  BP: 118/62 (01-11) (118/62 - 157/81)  RR: 18 (01-11)  SpO2: 98% (01-11)  I&O's Summary    10 Luke 2025 07:01  -  11 Jan 2025 07:00  --------------------------------------------------------  IN: 750 mL / OUT: 650 mL / NET: 100 mL    11 Jan 2025 07:01  -  11 Jan 2025 22:46  --------------------------------------------------------  IN: 900 mL / OUT: 510 mL / NET: 390 mL        Physical Exam:  Appearance: No acute distress  HENT: No JVD   Cardiovascular: RRR, S1/S2, no murmurs  Respiratory: CTABL  Gastrointestinal: soft, NT ND, +BS  Musculoskeletal: No clubbing, no edema   Neurologic: Non-focal  Skin: No rashes, ecchymoses, or cyanosis                          9.3    8.71  )-----------( 352      ( 11 Jan 2025 06:20 )             28.7     01-11    135  |  102  |  69[H]  ----------------------------<  137[H]  5.0   |  17[L]  |  2.22[H]    Ca    9.2      11 Jan 2025 06:20  Phos  3.9     01-11  Mg     2.40     01-11                    Cardiovascular Testings:

## 2025-01-11 NOTE — PROGRESS NOTE ADULT - SUBJECTIVE AND OBJECTIVE BOX
Date of Service  : 01-11-25     INTERVAL HPI/OVERNIGHT EVENTS: I feel fine.   Vital Signs Last 24 Hrs  T(C): 36.8 (11 Jan 2025 11:30), Max: 36.8 (10 Luke 2025 20:37)  T(F): 98.2 (11 Jan 2025 11:30), Max: 98.3 (10 Luke 2025 20:37)  HR: 56 (11 Jan 2025 11:30) (56 - 59)  BP: 157/81 (11 Jan 2025 11:30) (124/63 - 157/81)  BP(mean): --  RR: 18 (11 Jan 2025 11:30) (18 - 18)  SpO2: 99% (11 Jan 2025 11:30) (97% - 99%)    Parameters below as of 11 Jan 2025 11:30  Patient On (Oxygen Delivery Method): room air      I&O's Summary    10 Luke 2025 07:01  -  11 Jan 2025 07:00  --------------------------------------------------------  IN: 750 mL / OUT: 650 mL / NET: 100 mL    11 Jan 2025 07:01  -  11 Jan 2025 19:41  --------------------------------------------------------  IN: 750 mL / OUT: 280 mL / NET: 470 mL      MEDICATIONS  (STANDING):  allopurinol 100 milliGRAM(s) Oral daily  amLODIPine   Tablet 10 milliGRAM(s) Oral daily  aspirin enteric coated 81 milliGRAM(s) Oral daily  atorvastatin 40 milliGRAM(s) Oral at bedtime  heparin   Injectable 5000 Unit(s) SubCutaneous every 8 hours  insulin glargine Injectable (LANTUS) 29 Unit(s) SubCutaneous at bedtime  insulin lispro (ADMELOG) corrective regimen sliding scale   SubCutaneous three times a day before meals  insulin lispro (ADMELOG) corrective regimen sliding scale   SubCutaneous at bedtime  insulin lispro Injectable (ADMELOG) 10 Unit(s) SubCutaneous three times a day before meals  isosorbide   mononitrate ER Tablet (IMDUR) 60 milliGRAM(s) Oral daily  sodium chloride 0.9%. 1000 milliLiter(s) (50 mL/Hr) IV Continuous <Continuous>    MEDICATIONS  (PRN):  melatonin 3 milliGRAM(s) Oral at bedtime PRN Insomnia    LABS:                        9.3    8.71  )-----------( 352      ( 11 Jan 2025 06:20 )             28.7     01-11    135  |  102  |  69[H]  ----------------------------<  137[H]  5.0   |  17[L]  |  2.22[H]    Ca    9.2      11 Jan 2025 06:20  Phos  3.9     01-11  Mg     2.40     01-11        Urinalysis Basic - ( 11 Jan 2025 06:20 )    Color: x / Appearance: x / SG: x / pH: x  Gluc: 137 mg/dL / Ketone: x  / Bili: x / Urobili: x   Blood: x / Protein: x / Nitrite: x   Leuk Esterase: x / RBC: x / WBC x   Sq Epi: x / Non Sq Epi: x / Bacteria: x      CAPILLARY BLOOD GLUCOSE      POCT Blood Glucose.: 112 mg/dL (11 Jan 2025 18:02)  POCT Blood Glucose.: 153 mg/dL (11 Jan 2025 12:34)  POCT Blood Glucose.: 143 mg/dL (11 Jan 2025 08:34)  POCT Blood Glucose.: 133 mg/dL (10 Luke 2025 22:39)        Urinalysis Basic - ( 11 Jan 2025 06:20 )    Color: x / Appearance: x / SG: x / pH: x  Gluc: 137 mg/dL / Ketone: x  / Bili: x / Urobili: x   Blood: x / Protein: x / Nitrite: x   Leuk Esterase: x / RBC: x / WBC x   Sq Epi: x / Non Sq Epi: x / Bacteria: x      REVIEW OF SYSTEMS:  CONSTITUTIONAL: No fever, weight loss, or fatigue  EYES: No eye pain, visual disturbances, or discharge  ENMT:  No difficulty hearing, tinnitus, vertigo; No sinus or throat pain  NECK: No pain or stiffness  RESPIRATORY: No cough, wheezing, chills or hemoptysis; No shortness of breath  CARDIOVASCULAR: No chest pain, palpitations, dizziness, or leg swelling  GASTROINTESTINAL: No abdominal or epigastric pain. No nausea, vomiting, or hematemesis; No diarrhea or constipation. No melena or hematochezia.  GENITOURINARY: No dysuria, frequency, hematuria, or incontinence  NEUROLOGICAL: No headaches, memory loss, loss of strength, numbness, or tremors      Consultant(s) Notes Reviewed:  [x ] YES  [ ] NO    PHYSICAL EXAM:  GENERAL: NAD, well-groomed, well-developed,not in any distress ,  HEAD:  Atraumatic, Normocephalic  EYES: EOMI, PERRLA, conjunctiva and sclera clear  ENMT: No tonsillar erythema, exudates, or enlargement; Moist mucous membranes, Good dentition, No lesions  NECK: Supple, No JVD, Normal thyroid  NERVOUS SYSTEM:  Alert & Oriented X3, No focal deficit   CHEST/LUNG: Good air entry bilateral with no  rales, rhonchi, wheezing, or rubs  HEART: Regular rate and rhythm; No murmurs, rubs, or gallops  ABDOMEN: Soft, Nontender, Nondistended; Bowel sounds present  EXTREMITIES:  2+ Peripheral Pulses, No clubbing, cyanosis, or edema      Care Discussed with Consultants/Other Providers [ x] YES  [ ] NO

## 2025-01-11 NOTE — PROGRESS NOTE ADULT - ASSESSMENT
88 y/o M with pmh of prostate cancer, DM type 2, HTN, p/w hypoglycemia.  Pt reports his insulin glargine  dose was increased from 12 to 15 units recently.  Over the past few days, he reports getting low reading in the morning, but is able to alert his wife who gives him food to bring it  up.  Yesterday AM, pt's wife noticed he was confused and making gurgling sounds while in bed.  She checked FS which was 41.  EMS was called, and pt give D50 en route to ED.  Pt also reports intermittent L sided chest pain for the past several days.  Pain is positional, and improves when pt wears a support belt.  Pain is difficult for patient to describe quality.  No associated dyspnea, dizziness nausea or diaphoresis.  No recent fever or cough.    Pt was evaluated in the ED, and placed in the CDU for monitoring.  He is admitted for further w/u of chest pain and monitoring of glucose.       Problem/Recommendation - 1:  ·  Problem: Chest pain with abnormal stress test .   ·  Recommendation: Cardiology planning cardiac cath out patient  .   On  Aspirin and statin.  Cardiology help appreciated.   < from: TTE W or WO Ultrasound Enhancing Agent (12.31.24 @ 07:33) >    CONCLUSIONS:      1. Left ventricular systolic function is normal with an ejection fraction visually estimated at 50 to 55 %.      < from: Nuclear Stress Test-Exercise.. (12.31.24 @ 08:30) >     1. Qualitative Perfusion:      - medium-sized, moderate to severe defect(s) in the inferior and inferolateral wall suggestive of CAD.   2. The left ventricle is mildly decreased in function and normal in size. The time activity curve suggests diastolic dysfunction.. The post stress left ventricular EF is 45 %. The stress end diastolic volume is 71 ml and systolic volume is 39 ml.   3. Hypokinesis of the inferoseptal wall.     Problem/Recommendation - 2:  ·  Problem: Type 2 diabetes mellitus with hypoglycemia.   ·  Recommendation: Endo help appreciated.     Following  their recommendation.     Problem/Recommendation - 3:  ·  Problem: Essential hypertension.   ·  Recommendation: BP medications with hold parameters.     Problem/Recommendation - 4:  ·  Problem: CKD with KIRBY .   ·  Recommendation: Trending Creatinine & Stable.   Renal help appreciated.       Problem/Recommendation - 5:  ·  Problem: Prostate cancer.   ·  Recommendation: Out patient Follow Up.       Problem/Recommendation - 6:  ·  Problem: COVID 19 infection with Hypoxia .   ·  Recommendation: ID help appreciated.       ON Remdisvir .      Problem/Recommendation - 7:  ·  Problem: Back Pain New onset.   ·  Recommendation: Pain medication and CT scan spine.     < from: CT Lumbar Spine No Cont (01.09.25 @ 20:52) >  IMPRESSION: Degenerative changes involving the cervical thoracic and   lumbar spine region as well as abnormal lucent lesions as described   above. While these findings could be compatible with metastasis these are   unlikely compatible with prostate metastasis given that they are lytic   lesions at cannot of blastic lesions. Contrast enhanced MRI of the   cervical thoracic and lumbar spine is recommended for further evaluation   if there are no contraindications    Oncology help appreciated.       D/W patient and ACP. DC planning.

## 2025-01-11 NOTE — PROGRESS NOTE ADULT - ASSESSMENT
89 y/o M with pmh of prostate cancer, DM type 2, HTN, p/w hypoglycemia. +NST planning for angio. nephrology consulted for elevated scr    CKD stage 3B  Cr plateauing at 2.1-2.3.  +covid   He was on HCTZ, losartan, spironolactone, since held. ( last dose 1/9)  KIRBY likely hemodynamic.  s/p gentle hydration with ns @ 50cc/hr x20 hrs 1/9.  LHC deferred in view of Covid+ and elevated S.Cr.  Recommend ns @ 75cc/hr x6 hrs before and 6 hrs after-if planned for angio  monitor BMP and UO     HTN  Fluctuating; acceptable.  C/W current meds.  Low salt diet.  monitor    proteinuria  mild  UPr/Cr 0.26gm.  Monitor.    covid  care per team    cp  +NST  LHC deferred for now.  f/u cardio

## 2025-01-11 NOTE — PROGRESS NOTE ADULT - SUBJECTIVE AND OBJECTIVE BOX
FABIO HUSSEIN  89y  Patient is a 89y old  Male who presents with a chief complaint of hypoglycemia (10 Luke 2025 21:41)    HPI:  88 y/o M with pmh of prostate cancer, DM type 2, HTN, p/w hypoglycemia. Followed for CKD.    HEALTH ISSUES - PROBLEM Dx:  Chest pain    Essential hypertension    Prostate cancer    Type 2 diabetes mellitus with hypoglycemia          MEDICATIONS  (STANDING):  allopurinol 100 milliGRAM(s) Oral daily  amLODIPine   Tablet 10 milliGRAM(s) Oral daily  aspirin enteric coated 81 milliGRAM(s) Oral daily  atorvastatin 40 milliGRAM(s) Oral at bedtime  heparin   Injectable 5000 Unit(s) SubCutaneous every 8 hours  insulin glargine Injectable (LANTUS) 29 Unit(s) SubCutaneous at bedtime  insulin lispro (ADMELOG) corrective regimen sliding scale   SubCutaneous three times a day before meals  insulin lispro (ADMELOG) corrective regimen sliding scale   SubCutaneous at bedtime  insulin lispro Injectable (ADMELOG) 10 Unit(s) SubCutaneous three times a day before meals  isosorbide   mononitrate ER Tablet (IMDUR) 60 milliGRAM(s) Oral daily  sodium chloride 0.9%. 1000 milliLiter(s) (50 mL/Hr) IV Continuous <Continuous>    MEDICATIONS  (PRN):  melatonin 3 milliGRAM(s) Oral at bedtime PRN Insomnia    Vital Signs Last 24 Hrs  T(C): 36.8 (11 Jan 2025 11:30), Max: 36.8 (10 Luke 2025 20:37)  T(F): 98.2 (11 Jan 2025 11:30), Max: 98.3 (10 Luke 2025 20:37)  HR: 56 (11 Jan 2025 11:30) (56 - 59)  BP: 157/81 (11 Jan 2025 11:30) (124/63 - 157/81)  BP(mean): --  RR: 18 (11 Jan 2025 11:30) (18 - 18)  SpO2: 99% (11 Jan 2025 11:30) (97% - 99%)    Parameters below as of 11 Jan 2025 11:30  Patient On (Oxygen Delivery Method): room air      Daily     Daily     PHYSICAL EXAM:  Constitutional:  He appears comfortable and not distressed. Not diaphoretic.    Neck:  The thyroid is normal. Trachea is midline.     Breasts: Normal examination.    Respiratory: The lungs are clear to auscultation. No dullness and expansion is normal.    Cardiovascular: S1 and S2 are normal. No mummurs, rubs or gallops are present.    Gastrointestinal: The abdomen is soft. No tenderness is present. No masses are present. Bowel sounds are normal.    Genitourinary: The bladder is not distended. No CVA tenderness is present.    Extremities: No edema is noted. No deformities are present.    Neurological: Tone, power and sensation are normal.    Skin: No leasions are seen  or palpated.    Lymph Nodes: No lymphadenopathy is present.    Psychiatric: Mood is appropriate. No hallucinations or flight of ideas are noted.                              9.3    8.71  )-----------( 352      ( 11 Jan 2025 06:20 )             28.7     01-11    135  |  102  |  69[H]  ----------------------------<  137[H]  5.0   |  17[L]  |  2.22[H]    Ca    9.2      11 Jan 2025 06:20  Phos  3.9     01-11  Mg     2.40     01-11

## 2025-01-11 NOTE — PROGRESS NOTE ADULT - ASSESSMENT
89M with PMH of prostate CA, T2DM, HTN, HLD, CKD, here for hypoglycemia sxs. In the ER, pt found to have cp with EKG w/ NSR and no significant ischemic changes. Pt was recommended to be admitted by Tele Doc Dr. Perea. Cardiology consulted for further evaluation inpt.     1. Cp, +NST and TTE reviewed   2. HTN   3. HLD   4. CKD     -given +NST, initially planned for [facility], deferred for now given COVID+ and rising Cr. Can be done as outpt post discharge given pt has no cardiac sxs at this point.   -bp control, cont norvasc, imduri, losartan, and Hctz   -cont asa and statin   -HR 50-60s, would rec hold bb   -new onset back pain, +prostate ca, pending heme/onc eval and MRI   -discharge planning   -f/u OP w/ me in clinic     Nitin Lee MD [facility]  Cardiology Attending, Jenn-NS/LOIS  Avaliable on [employer] Team

## 2025-01-12 LAB
ANION GAP SERPL CALC-SCNC: 14 MMOL/L — SIGNIFICANT CHANGE UP (ref 7–14)
BUN SERPL-MCNC: 66 MG/DL — HIGH (ref 7–23)
CALCIUM SERPL-MCNC: 9.1 MG/DL — SIGNIFICANT CHANGE UP (ref 8.4–10.5)
CHLORIDE SERPL-SCNC: 103 MMOL/L — SIGNIFICANT CHANGE UP (ref 98–107)
CO2 SERPL-SCNC: 20 MMOL/L — LOW (ref 22–31)
CREAT SERPL-MCNC: 2.32 MG/DL — HIGH (ref 0.5–1.3)
EGFR: 26 ML/MIN/1.73M2 — LOW
GLUCOSE BLDC GLUCOMTR-MCNC: 125 MG/DL — HIGH (ref 70–99)
GLUCOSE BLDC GLUCOMTR-MCNC: 161 MG/DL — HIGH (ref 70–99)
GLUCOSE BLDC GLUCOMTR-MCNC: 185 MG/DL — HIGH (ref 70–99)
GLUCOSE BLDC GLUCOMTR-MCNC: 197 MG/DL — HIGH (ref 70–99)
GLUCOSE SERPL-MCNC: 148 MG/DL — HIGH (ref 70–99)
HCT VFR BLD CALC: 27.8 % — LOW (ref 39–50)
HGB BLD-MCNC: 8.8 G/DL — LOW (ref 13–17)
MAGNESIUM SERPL-MCNC: 2.4 MG/DL — SIGNIFICANT CHANGE UP (ref 1.6–2.6)
MCHC RBC-ENTMCNC: 22.3 PG — LOW (ref 27–34)
MCHC RBC-ENTMCNC: 31.7 G/DL — LOW (ref 32–36)
MCV RBC AUTO: 70.4 FL — LOW (ref 80–100)
NRBC # BLD: 0 /100 WBCS — SIGNIFICANT CHANGE UP (ref 0–0)
NRBC # FLD: 0 K/UL — SIGNIFICANT CHANGE UP (ref 0–0)
PHOSPHATE SERPL-MCNC: 4.2 MG/DL — SIGNIFICANT CHANGE UP (ref 2.5–4.5)
PLATELET # BLD AUTO: 370 K/UL — SIGNIFICANT CHANGE UP (ref 150–400)
POTASSIUM SERPL-MCNC: 5.5 MMOL/L — HIGH (ref 3.5–5.3)
POTASSIUM SERPL-SCNC: 5.5 MMOL/L — HIGH (ref 3.5–5.3)
RBC # BLD: 3.95 M/UL — LOW (ref 4.2–5.8)
RBC # FLD: 15.9 % — HIGH (ref 10.3–14.5)
SODIUM SERPL-SCNC: 137 MMOL/L — SIGNIFICANT CHANGE UP (ref 135–145)
WBC # BLD: 9.72 K/UL — SIGNIFICANT CHANGE UP (ref 3.8–10.5)
WBC # FLD AUTO: 9.72 K/UL — SIGNIFICANT CHANGE UP (ref 3.8–10.5)

## 2025-01-12 PROCEDURE — 99232 SBSQ HOSP IP/OBS MODERATE 35: CPT

## 2025-01-12 RX ORDER — SODIUM ZIRCONIUM CYCLOSILICATE 10 G/10G
10 POWDER, FOR SUSPENSION ORAL ONCE
Refills: 0 | Status: COMPLETED | OUTPATIENT
Start: 2025-01-12 | End: 2025-01-12

## 2025-01-12 RX ADMIN — Medication 10 MILLIGRAM(S): at 08:51

## 2025-01-12 RX ADMIN — HEPARIN SODIUM 5000 UNIT(S): 1000 INJECTION, SOLUTION INTRAVENOUS; SUBCUTANEOUS at 13:06

## 2025-01-12 RX ADMIN — Medication 10 UNIT(S): at 18:34

## 2025-01-12 RX ADMIN — Medication 3 MILLIGRAM(S): at 21:31

## 2025-01-12 RX ADMIN — INSULIN GLARGINE-YFGN 29 UNIT(S): 100 INJECTION, SOLUTION SUBCUTANEOUS at 22:32

## 2025-01-12 RX ADMIN — ALLOPURINOL 100 MILLIGRAM(S): 100 TABLET ORAL at 08:53

## 2025-01-12 RX ADMIN — Medication 1: at 18:34

## 2025-01-12 RX ADMIN — ATORVASTATIN CALCIUM 40 MILLIGRAM(S): 40 TABLET, FILM COATED ORAL at 21:30

## 2025-01-12 RX ADMIN — Medication 1: at 12:32

## 2025-01-12 RX ADMIN — HEPARIN SODIUM 5000 UNIT(S): 1000 INJECTION, SOLUTION INTRAVENOUS; SUBCUTANEOUS at 21:30

## 2025-01-12 RX ADMIN — Medication 10 UNIT(S): at 12:32

## 2025-01-12 RX ADMIN — HEPARIN SODIUM 5000 UNIT(S): 1000 INJECTION, SOLUTION INTRAVENOUS; SUBCUTANEOUS at 05:47

## 2025-01-12 RX ADMIN — Medication 60 MILLIGRAM(S): at 12:33

## 2025-01-12 RX ADMIN — Medication 81 MILLIGRAM(S): at 08:51

## 2025-01-12 RX ADMIN — SODIUM ZIRCONIUM CYCLOSILICATE 10 GRAM(S): 10 POWDER, FOR SUSPENSION ORAL at 05:50

## 2025-01-12 RX ADMIN — Medication 10 UNIT(S): at 08:49

## 2025-01-12 NOTE — PROVIDER CONTACT NOTE (OTHER) - SITUATION
patient refusing meds, patient refusing before bed FS, patient refusing temperature check
STAT LOKELMA due- push back Norvasc due to parameter that reads "give lokelma 2 hours before any other oral medication".
patient blood sugar 354, due for 8 units lantus but no sliding scale amelog ordered. Add amelog?
expiratory wheezing heard upon patient assessment

## 2025-01-12 NOTE — PROGRESS NOTE ADULT - SUBJECTIVE AND OBJECTIVE BOX
no new complaints  no chest pain    REVIEW OF SYSTEMS:    CONSTITUTIONAL: No weakness, fevers or chills, weight loss  RESPIRATORY: No cough, wheezing, hemoptysis; No shortness of breath  CARDIOVASCULAR: No chest pain or palpitations  GASTROINTESTINAL: No abdominal, nausea, vomiting, or hematemesis; No diarrhea or constipation. No melena or hematochezia.  GENITOURINARY: No dysuria, frequency or hematuria  SKIN: No itching, burning, rashes, or lesions   All other review of systems is negative unless indicated above.    VITAL SIGNS:    T98.1    PHYSICAL EXAM:     GENERAL: no acute distress  HEENT: NC/AT, EOMI, neck supple, MMM  RESPIRATORY: LCTAB/L, no rhonchi, rales, or wheezing  CARDIOVASCULAR: RRR, no murmurs, gallops, rubs  ABDOMINAL: soft, non-tender, non-distended, positive bowel sounds   EXTREMITIES: no clubbing, cyanosis, or edema  NEUROLOGICAL: alert and oriented x 3, non-focal  LYMPHATIC: lymphatic: cervical, supraclavicular, axilla, inguinal  SKIN: no rashes or lesions   MUSCULOSKELETAL: no gross joint deformity                          8.8    9.72  )-----------( 370      ( 12 Jan 2025 03:45 )             27.8     01-12    137  |  103  |  66[H]  ----------------------------<  148[H]  5.5[H]   |  20[L]  |  2.32[H]    Ca    9.1      12 Jan 2025 03:45  Phos  4.2     01-12  Mg     2.40     01-12        MEDICATIONS  (STANDING):  allopurinol 100 milliGRAM(s) Oral daily  amLODIPine   Tablet 10 milliGRAM(s) Oral daily  aspirin enteric coated 81 milliGRAM(s) Oral daily  atorvastatin 40 milliGRAM(s) Oral at bedtime  heparin   Injectable 5000 Unit(s) SubCutaneous every 8 hours  insulin glargine Injectable (LANTUS) 29 Unit(s) SubCutaneous at bedtime  insulin lispro (ADMELOG) corrective regimen sliding scale   SubCutaneous three times a day before meals  insulin lispro (ADMELOG) corrective regimen sliding scale   SubCutaneous at bedtime  insulin lispro Injectable (ADMELOG) 10 Unit(s) SubCutaneous three times a day before meals  isosorbide   mononitrate ER Tablet (IMDUR) 60 milliGRAM(s) Oral daily  sodium chloride 0.9%. 1000 milliLiter(s) (50 mL/Hr) IV Continuous <Continuous>

## 2025-01-12 NOTE — PROVIDER CONTACT NOTE (OTHER) - REASON
STAT LOKELMA due- push back Norvasc due to parameter?
expiratory wheezing heard upon assessment
patient refusing meds, patient refusing before bed FS, patient refusing temperature check
patient blood sugar 354

## 2025-01-12 NOTE — PROGRESS NOTE ADULT - ASSESSMENT
89M with PMH of prostate CA, T2DM, HTN, HLD, CKD, here for hypoglycemia sxs. In the ER, pt found to have cp with EKG w/ NSR and no significant ischemic changes. Pt was recommended to be admitted by Tele Doc Dr. Perea. Cardiology consulted for further evaluation inpt.     1. Cp, +NST and TTE reviewed   2. HTN   3. HLD   4. CKD     -given +NST, initially planned for [facility], deferred for now given COVID+ and rising Cr. Can be done as outpt post discharge given pt has no cardiac sxs at this point.   -bp control, cont norvasc, imduri, losartan, and Hctz   -cont asa and statin   -HR 50-60s, would rec hold bb   -new onset back pain, +prostate ca, evaluated by heme onc and pending MRI   -discharge planning   -f/u OP w/ me in clinic     Nitin Lee MD [facility]  Cardiology Attending, Jenn-NS/LOIS  Avaliable on [employer] Team

## 2025-01-12 NOTE — PROVIDER CONTACT NOTE (OTHER) - ASSESSMENT
patient a&Ox4 upon admission, now presenting with acute confusion, anger & paranoia-a&Ox2 & unable to reorient
patient a&Ox3, O2 stable at 95, vs stable as documented. patient denies any SOB or distress
patient a&Ox3, no s/s hyperglycemia, vitals as documented
patient a&Ox4, vs as documented, no s/s distress

## 2025-01-12 NOTE — PROGRESS NOTE ADULT - ASSESSMENT
89 y/o M with pmh of prostate cancer, DM type 2, HTN, p/w hypoglycemia.  Pt reports his insulin glargine  dose was increased from 12 to 15 units recently.  Over the past few days, he reports getting low reading in the morning, but is able to alert his wife who gives him food to bring it  up.  Yesterday AM, pt's wife noticed he was confused and making gurgling sounds while in bed.  She checked FS which was 41.  EMS was called, and pt give D50 en route to ED.  Pt also reports intermittent L sided chest pain for the past several days.  Pain is positional, and improves when pt wears a support belt.  Pain is difficult for patient to describe quality.  No associated dyspnea, dizziness nausea or diaphoresis.  No recent fever or cough.    Pt was evaluated in the ED, and placed in the CDU for monitoring.  He is admitted for further w/u of chest pain and monitoring of glucose.       Problem/Recommendation - 1:  ·  Problem: Chest pain with abnormal stress test .   ·  Recommendation: Cardiology planning cardiac cath out patient  .   On  Aspirin and statin.  Cardiology help appreciated.   < from: TTE W or WO Ultrasound Enhancing Agent (12.31.24 @ 07:33) >    CONCLUSIONS:      1. Left ventricular systolic function is normal with an ejection fraction visually estimated at 50 to 55 %.      < from: Nuclear Stress Test-Exercise.. (12.31.24 @ 08:30) >     1. Qualitative Perfusion:      - medium-sized, moderate to severe defect(s) in the inferior and inferolateral wall suggestive of CAD.   2. The left ventricle is mildly decreased in function and normal in size. The time activity curve suggests diastolic dysfunction.. The post stress left ventricular EF is 45 %. The stress end diastolic volume is 71 ml and systolic volume is 39 ml.   3. Hypokinesis of the inferoseptal wall.     Problem/Recommendation - 2:  ·  Problem: Type 2 diabetes mellitus with hypoglycemia.   ·  Recommendation: Endo help appreciated.     Following  their recommendation.     Problem/Recommendation - 3:  ·  Problem: Essential hypertension.   ·  Recommendation: BP medications with hold parameters.     Problem/Recommendation - 4:  ·  Problem: CKD with KIRBY .   ·  Recommendation: Trending Creatinine & Stable.   Renal help appreciated.       Problem/Recommendation - 5:  ·  Problem: Prostate cancer.   ·  Recommendation: Out patient Follow Up.       Problem/Recommendation - 6:  ·  Problem: COVID 19 infection with Hypoxia .   ·  Recommendation: ID help appreciated.       ON Remdisvir .      Problem/Recommendation - 7:  ·  Problem: Back Pain New onset.   ·  Recommendation: Pain medication and awaiting MR.     < from: CT Lumbar Spine No Cont (01.09.25 @ 20:52) >  IMPRESSION: Degenerative changes involving the cervical thoracic and   lumbar spine region as well as abnormal lucent lesions as described   above. While these findings could be compatible with metastasis these are   unlikely compatible with prostate metastasis given that they are lytic   lesions at cannot of blastic lesions. Contrast enhanced MRI of the   cervical thoracic and lumbar spine is recommended for further evaluation   if there are no contraindications    Oncology help appreciated.       D/W patient and ACP. DC planning pending .

## 2025-01-12 NOTE — PROGRESS NOTE ADULT - SUBJECTIVE AND OBJECTIVE BOX
Date of Service  : 01-12-25     INTERVAL HPI/OVERNIGHT EVENTS: I feel fine.   Vital Signs Last 24 Hrs  T(C): 36.7 (12 Jan 2025 12:39), Max: 37.4 (11 Jan 2025 20:24)  T(F): 98.1 (12 Jan 2025 12:39), Max: 99.4 (11 Jan 2025 20:24)  HR: 62 (12 Jan 2025 12:39) (60 - 65)  BP: 136/79 (12 Jan 2025 12:39) (118/62 - 140/78)  BP(mean): --  RR: 17 (12 Jan 2025 12:39) (17 - 18)  SpO2: 98% (12 Jan 2025 12:39) (98% - 99%)    Parameters below as of 12 Jan 2025 12:39  Patient On (Oxygen Delivery Method): room air      I&O's Summary    11 Jan 2025 07:01  -  12 Jan 2025 07:00  --------------------------------------------------------  IN: 900 mL / OUT: 710 mL / NET: 190 mL      MEDICATIONS  (STANDING):  allopurinol 100 milliGRAM(s) Oral daily  amLODIPine   Tablet 10 milliGRAM(s) Oral daily  aspirin enteric coated 81 milliGRAM(s) Oral daily  atorvastatin 40 milliGRAM(s) Oral at bedtime  heparin   Injectable 5000 Unit(s) SubCutaneous every 8 hours  insulin glargine Injectable (LANTUS) 29 Unit(s) SubCutaneous at bedtime  insulin lispro (ADMELOG) corrective regimen sliding scale   SubCutaneous at bedtime  insulin lispro (ADMELOG) corrective regimen sliding scale   SubCutaneous three times a day before meals  insulin lispro Injectable (ADMELOG) 10 Unit(s) SubCutaneous three times a day before meals  isosorbide   mononitrate ER Tablet (IMDUR) 60 milliGRAM(s) Oral daily  sodium chloride 0.9%. 1000 milliLiter(s) (50 mL/Hr) IV Continuous <Continuous>    MEDICATIONS  (PRN):  melatonin 3 milliGRAM(s) Oral at bedtime PRN Insomnia    LABS:                        8.8    9.72  )-----------( 370      ( 12 Jan 2025 03:45 )             27.8     01-12    137  |  103  |  66[H]  ----------------------------<  148[H]  5.5[H]   |  20[L]  |  2.32[H]    Ca    9.1      12 Jan 2025 03:45  Phos  4.2     01-12  Mg     2.40     01-12        Urinalysis Basic - ( 12 Jan 2025 03:45 )    Color: x / Appearance: x / SG: x / pH: x  Gluc: 148 mg/dL / Ketone: x  / Bili: x / Urobili: x   Blood: x / Protein: x / Nitrite: x   Leuk Esterase: x / RBC: x / WBC x   Sq Epi: x / Non Sq Epi: x / Bacteria: x      CAPILLARY BLOOD GLUCOSE      POCT Blood Glucose.: 185 mg/dL (12 Jan 2025 12:24)  POCT Blood Glucose.: 125 mg/dL (12 Jan 2025 08:41)  POCT Blood Glucose.: 242 mg/dL (11 Jan 2025 22:37)  POCT Blood Glucose.: 250 mg/dL (11 Jan 2025 22:15)  POCT Blood Glucose.: 112 mg/dL (11 Jan 2025 18:02)        Urinalysis Basic - ( 12 Jan 2025 03:45 )    Color: x / Appearance: x / SG: x / pH: x  Gluc: 148 mg/dL / Ketone: x  / Bili: x / Urobili: x   Blood: x / Protein: x / Nitrite: x   Leuk Esterase: x / RBC: x / WBC x   Sq Epi: x / Non Sq Epi: x / Bacteria: x      REVIEW OF SYSTEMS:  CONSTITUTIONAL: No fever, weight loss, or fatigue  EYES: No eye pain, visual disturbances, or discharge  ENMT:  No difficulty hearing, tinnitus, vertigo; No sinus or throat pain  NECK: No pain or stiffness  RESPIRATORY: No cough, wheezing, chills or hemoptysis; No shortness of breath  CARDIOVASCULAR: No chest pain, palpitations, dizziness, or leg swelling  GASTROINTESTINAL: No abdominal or epigastric pain. No nausea, vomiting, or hematemesis; No diarrhea or constipation. No melena or hematochezia.  GENITOURINARY: No dysuria, frequency, hematuria, or incontinence  NEUROLOGICAL: No headaches, memory loss, loss of strength, numbness, or tremors    Consultant(s) Notes Reviewed:  [x ] YES  [ ] NO    PHYSICAL EXAM:  GENERAL: NAD, well-groomed, well-developed,not in any distress ,  HEAD:  Atraumatic, Normocephalic  EYES: EOMI, PERRLA, conjunctiva and sclera clear  ENMT: No tonsillar erythema, exudates, or enlargement; Moist mucous membranes, Good dentition, No lesions  NECK: Supple, No JVD, Normal thyroid  NERVOUS SYSTEM:  Alert & Oriented X3, No focal deficit   CHEST/LUNG: Good air entry bilateral with no  rales, rhonchi, wheezing, or rubs  HEART: Regular rate and rhythm; No murmurs, rubs, or gallops  ABDOMEN: Soft, Nontender, Nondistended; Bowel sounds present  EXTREMITIES:  2+ Peripheral Pulses, No clubbing, cyanosis, or edema    Care Discussed with Consultants/Other Providers [ x] YES  [ ] NO

## 2025-01-12 NOTE — PROGRESS NOTE ADULT - SUBJECTIVE AND OBJECTIVE BOX
Cardiology Attending Follow-up Note     Patient seen and examined at bedside.    Overnight Events:     pending MRI   no cardiac sxs     REVIEW OF SYSTEMS:  Constitutional:     [x ] negative [ ] fevers [ ] chills [ ] weight loss [ ] weight gain  HEENT:                  [x ] negative [ ] dry eyes [ ] eye irritation [ ] postnasal drip [ ] nasal congestion  CV:                         [ x] negative  [ ] chest pain [ ] orthopnea [ ] palpitations [ ] murmur  Resp:                     [ x] negative [ ] cough [ ] shortness of breath [ ] dyspnea [ ] wheezing [ ] sputum [ ]hemoptysis  GI:                          [ x] negative [ ] nausea [ ] vomiting [ ] diarrhea [ ] constipation [ ] abd pain [ ] dysphagia   :                        [ x] negative [ ] dysuria [ ] nocturia [ ] hematuria [ ] increased urinary frequency  Musculoskeletal: [ x] negative [ ] back pain [ ] myalgias [ ] arthralgias [ ] fracture  Skin:                       [ x] negative [ ] rash [ ] itch  Neurological:        [x ] negative [ ] headache [ ] dizziness [ ] syncope [ ] weakness [ ] numbness  Psychiatric:           [ x] negative [ ] anxiety [ ] depression  Endocrine:            [ x] negative [ ] diabetes [ ] thyroid problem  Heme/Lymph:      [ x] negative [ ] anemia [ ] bleeding problem  Allergic/Immune: [ x] negative [ ] itchy eyes [ ] nasal discharge [ ] hives [ ] angioedema    [ x] All other systems negative  [ ] Unable to assess ROS due to    Current Meds:  allopurinol 100 milliGRAM(s) Oral daily  amLODIPine   Tablet 10 milliGRAM(s) Oral daily  aspirin enteric coated 81 milliGRAM(s) Oral daily  atorvastatin 40 milliGRAM(s) Oral at bedtime  heparin   Injectable 5000 Unit(s) SubCutaneous every 8 hours  insulin glargine Injectable (LANTUS) 29 Unit(s) SubCutaneous at bedtime  insulin lispro (ADMELOG) corrective regimen sliding scale   SubCutaneous three times a day before meals  insulin lispro (ADMELOG) corrective regimen sliding scale   SubCutaneous at bedtime  insulin lispro Injectable (ADMELOG) 10 Unit(s) SubCutaneous three times a day before meals  isosorbide   mononitrate ER Tablet (IMDUR) 60 milliGRAM(s) Oral daily  melatonin 3 milliGRAM(s) Oral at bedtime PRN  sodium chloride 0.9%. 1000 milliLiter(s) IV Continuous <Continuous>      PAST MEDICAL & SURGICAL HISTORY:  HTN (hypertension)      DM2 (diabetes mellitus, type 2)      Prostate cancer      No significant past surgical history          Vitals:  T(F): 98.2 (01-12), Max: 98.2 (01-12)  HR: 65 (01-12) (60 - 65)  BP: 120/67 (01-12) (120/67 - 140/78)  RR: 17 (01-12)  SpO2: 97% (01-12)  I&O's Summary    11 Jan 2025 07:01  -  12 Jan 2025 07:00  --------------------------------------------------------  IN: 900 mL / OUT: 710 mL / NET: 190 mL    12 Jan 2025 07:01  -  12 Jan 2025 23:52  --------------------------------------------------------  IN: 0 mL / OUT: 700 mL / NET: -700 mL        Physical Exam:  Appearance: No acute distress  HENT: No JVD   Cardiovascular: RRR, S1/S2, no murmurs  Respiratory: CTABL  Gastrointestinal: soft, NT ND, +BS  Musculoskeletal: No clubbing, no edema   Neurologic: Non-focal  Skin: No rashes, ecchymoses, or cyanosis                          8.8    9.72  )-----------( 370      ( 12 Jan 2025 03:45 )             27.8     01-12    137  |  103  |  66[H]  ----------------------------<  148[H]  5.5[H]   |  20[L]  |  2.32[H]    Ca    9.1      12 Jan 2025 03:45  Phos  4.2     01-12  Mg     2.40     01-12                    Cardiovascular Testings:

## 2025-01-12 NOTE — PROGRESS NOTE ADULT - ASSESSMENT
89 y/o M with pmh of prostate cancer, DM type 2, HTN, p/w hypoglycemia. +NST planning for angio. nephrology consulted for elevated scr    CKD stage 3B  Cr plateauing at 2.1-2.3.  +covid   He was on HCTZ, losartan, spironolactone, since held. ( last dose 1/9)  KIRBY likely hemodynamic.  s/p gentle hydration with ns @ 50cc/hr x20 hrs 1/9.  LHC deferred in view of Covid+ and elevated S.Cr.  - Recommend NS @ 75cc/hr x6 hrs before and 6 hrs after if planned for angiogram.  - monitor BMP and UO  - Group 2 Gadolinium Contrast can be used for MRI.    HTN  Fluctuating; acceptable.  C/W current meds.  Low salt diet.  monitor    Proteinuria  mild  UPr/Cr 0.26gm.  Monitor.    COVID-19:  care as per team    Chest Pains  +NST  LHC deferred for now.  f/u cardio

## 2025-01-12 NOTE — PROGRESS NOTE ADULT - SUBJECTIVE AND OBJECTIVE BOX
FABIO HUSSEIN  89y  Patient is a 89y old  Male who presents with a chief complaint of hypoglycemia (12 Jan 2025 14:29)    HPI:  89 y/o M with pmh of prostate cancer, DM type 2, HTN, p/w hypoglycemia.  Followed for CKD.    HEALTH ISSUES - PROBLEM Dx:  Chest pain    Essential hypertension    Prostate cancer    Type 2 diabetes mellitus with hypoglycemia    Pneumonia due to COVID-19 virus          MEDICATIONS  (STANDING):  allopurinol 100 milliGRAM(s) Oral daily  amLODIPine   Tablet 10 milliGRAM(s) Oral daily  aspirin enteric coated 81 milliGRAM(s) Oral daily  atorvastatin 40 milliGRAM(s) Oral at bedtime  heparin   Injectable 5000 Unit(s) SubCutaneous every 8 hours  insulin glargine Injectable (LANTUS) 29 Unit(s) SubCutaneous at bedtime  insulin lispro (ADMELOG) corrective regimen sliding scale   SubCutaneous at bedtime  insulin lispro (ADMELOG) corrective regimen sliding scale   SubCutaneous three times a day before meals  insulin lispro Injectable (ADMELOG) 10 Unit(s) SubCutaneous three times a day before meals  isosorbide   mononitrate ER Tablet (IMDUR) 60 milliGRAM(s) Oral daily  sodium chloride 0.9%. 1000 milliLiter(s) (50 mL/Hr) IV Continuous <Continuous>    MEDICATIONS  (PRN):  melatonin 3 milliGRAM(s) Oral at bedtime PRN Insomnia    Vital Signs Last 24 Hrs  T(C): 36.7 (12 Jan 2025 12:39), Max: 37.4 (11 Jan 2025 20:24)  T(F): 98.1 (12 Jan 2025 12:39), Max: 99.4 (11 Jan 2025 20:24)  HR: 62 (12 Jan 2025 12:39) (60 - 65)  BP: 136/79 (12 Jan 2025 12:39) (118/62 - 140/78)  BP(mean): --  RR: 17 (12 Jan 2025 12:39) (17 - 18)  SpO2: 98% (12 Jan 2025 12:39) (98% - 99%)    Parameters below as of 12 Jan 2025 12:39  Patient On (Oxygen Delivery Method): room air      Daily     Daily     PHYSICAL EXAM:  Constitutional:  He appears comfortable and not distressed. Not diaphoretic.    Neck:  The thyroid is normal. Trachea is midline.     Breasts: Normal examination.    Respiratory: The lungs are clear to auscultation. No dullness and expansion is normal.    Cardiovascular: S1 and S2 are normal. No mummurs, rubs or gallops are present.    Gastrointestinal: The abdomen is soft. No tenderness is present. No masses are present. Bowel sounds are normal.    Genitourinary: The bladder is not distended. No CVA tenderness is present.    Extremities: No edema is noted. No deformities are present.    Neurological: Cognition is normal. Tone, power and sensation are normal. Gait is steady.    Skin: No leasions are seen  or palpated.    Lymph Nodes: No lymphadenopathy is present.    Psychiatric: Mood is appropriate. No hallucinations or flight of ideas are noted.                              8.8    9.72  )-----------( 370      ( 12 Jan 2025 03:45 )             27.8     01-12    137  |  103  |  66[H]  ----------------------------<  148[H]  5.5[H]   |  20[L]  |  2.32[H]    Ca    9.1      12 Jan 2025 03:45  Phos  4.2     01-12  Mg     2.40     01-12      Urinalysis Basic - ( 12 Jan 2025 03:45 )    Color: x / Appearance: x / SG: x / pH: x  Gluc: 148 mg/dL / Ketone: x  / Bili: x / Urobili: x   Blood: x / Protein: x / Nitrite: x   Leuk Esterase: x / RBC: x / WBC x   Sq Epi: x / Non Sq Epi: x / Bacteria: x

## 2025-01-12 NOTE — PROVIDER CONTACT NOTE (OTHER) - ACTION/TREATMENT ORDERED:
chest xray, albuterol nebulizer trtment, viral screen
push norvasc back to 2 hours after lokelma- admin during breakfast time
ameog
delirium treatment with Zyprexa & retry.   Upon retry-patient calmed down with zyprexa & allowed the nurse to do FS and give insulin but refused oral Lipitor.

## 2025-01-12 NOTE — PROVIDER CONTACT NOTE (OTHER) - BACKGROUND
patient admit dx chest pain, hypoglycemia
patient brayden dx chest pain, hypoglycemia.
Pt admit with chest pain & hypoglycemia
patient covid positive pmhx htn

## 2025-01-13 DIAGNOSIS — M54.50 LOW BACK PAIN, UNSPECIFIED: ICD-10-CM

## 2025-01-13 LAB
ANION GAP SERPL CALC-SCNC: 14 MMOL/L — SIGNIFICANT CHANGE UP (ref 7–14)
BUN SERPL-MCNC: 65 MG/DL — HIGH (ref 7–23)
CALCIUM SERPL-MCNC: 9.1 MG/DL — SIGNIFICANT CHANGE UP (ref 8.4–10.5)
CHLORIDE SERPL-SCNC: 103 MMOL/L — SIGNIFICANT CHANGE UP (ref 98–107)
CO2 SERPL-SCNC: 20 MMOL/L — LOW (ref 22–31)
CREAT SERPL-MCNC: 2.27 MG/DL — HIGH (ref 0.5–1.3)
EGFR: 27 ML/MIN/1.73M2 — LOW
GLUCOSE BLDC GLUCOMTR-MCNC: 123 MG/DL — HIGH (ref 70–99)
GLUCOSE BLDC GLUCOMTR-MCNC: 133 MG/DL — HIGH (ref 70–99)
GLUCOSE BLDC GLUCOMTR-MCNC: 165 MG/DL — HIGH (ref 70–99)
GLUCOSE BLDC GLUCOMTR-MCNC: 209 MG/DL — HIGH (ref 70–99)
GLUCOSE SERPL-MCNC: 133 MG/DL — HIGH (ref 70–99)
HCT VFR BLD CALC: 29 % — LOW (ref 39–50)
HGB BLD-MCNC: 9.3 G/DL — LOW (ref 13–17)
MAGNESIUM SERPL-MCNC: 2.5 MG/DL — SIGNIFICANT CHANGE UP (ref 1.6–2.6)
MCHC RBC-ENTMCNC: 22.6 PG — LOW (ref 27–34)
MCHC RBC-ENTMCNC: 32.1 G/DL — SIGNIFICANT CHANGE UP (ref 32–36)
MCV RBC AUTO: 70.4 FL — LOW (ref 80–100)
NRBC # BLD: 0 /100 WBCS — SIGNIFICANT CHANGE UP (ref 0–0)
NRBC # FLD: 0 K/UL — SIGNIFICANT CHANGE UP (ref 0–0)
PHOSPHATE SERPL-MCNC: 4.1 MG/DL — SIGNIFICANT CHANGE UP (ref 2.5–4.5)
PLATELET # BLD AUTO: 362 K/UL — SIGNIFICANT CHANGE UP (ref 150–400)
POTASSIUM SERPL-MCNC: 4.5 MMOL/L — SIGNIFICANT CHANGE UP (ref 3.5–5.3)
POTASSIUM SERPL-SCNC: 4.5 MMOL/L — SIGNIFICANT CHANGE UP (ref 3.5–5.3)
RBC # BLD: 4.12 M/UL — LOW (ref 4.2–5.8)
RBC # FLD: 15.6 % — HIGH (ref 10.3–14.5)
SODIUM SERPL-SCNC: 137 MMOL/L — SIGNIFICANT CHANGE UP (ref 135–145)
WBC # BLD: 9.45 K/UL — SIGNIFICANT CHANGE UP (ref 3.8–10.5)
WBC # FLD AUTO: 9.45 K/UL — SIGNIFICANT CHANGE UP (ref 3.8–10.5)

## 2025-01-13 PROCEDURE — 99232 SBSQ HOSP IP/OBS MODERATE 35: CPT

## 2025-01-13 RX ORDER — INSULIN GLARGINE-YFGN 100 [IU]/ML
26 INJECTION, SOLUTION SUBCUTANEOUS AT BEDTIME
Refills: 0 | Status: DISCONTINUED | OUTPATIENT
Start: 2025-01-13 | End: 2025-01-13

## 2025-01-13 RX ORDER — ACETAMINOPHEN 80 MG/.8ML
650 SOLUTION/ DROPS ORAL EVERY 6 HOURS
Refills: 0 | Status: DISCONTINUED | OUTPATIENT
Start: 2025-01-13 | End: 2025-01-17

## 2025-01-13 RX ORDER — INSULIN GLARGINE-YFGN 100 [IU]/ML
29 INJECTION, SOLUTION SUBCUTANEOUS AT BEDTIME
Refills: 0 | Status: DISCONTINUED | OUTPATIENT
Start: 2025-01-13 | End: 2025-01-17

## 2025-01-13 RX ORDER — ACETAMINOPHEN 80 MG/.8ML
975 SOLUTION/ DROPS ORAL ONCE
Refills: 0 | Status: COMPLETED | OUTPATIENT
Start: 2025-01-13 | End: 2025-01-13

## 2025-01-13 RX ADMIN — ATORVASTATIN CALCIUM 40 MILLIGRAM(S): 40 TABLET, FILM COATED ORAL at 21:23

## 2025-01-13 RX ADMIN — Medication 60 MILLIGRAM(S): at 12:31

## 2025-01-13 RX ADMIN — HEPARIN SODIUM 5000 UNIT(S): 1000 INJECTION, SOLUTION INTRAVENOUS; SUBCUTANEOUS at 21:23

## 2025-01-13 RX ADMIN — Medication 10 UNIT(S): at 13:04

## 2025-01-13 RX ADMIN — ALLOPURINOL 100 MILLIGRAM(S): 100 TABLET ORAL at 08:25

## 2025-01-13 RX ADMIN — ACETAMINOPHEN 650 MILLIGRAM(S): 80 SOLUTION/ DROPS ORAL at 18:00

## 2025-01-13 RX ADMIN — Medication 10 UNIT(S): at 17:59

## 2025-01-13 RX ADMIN — Medication 81 MILLIGRAM(S): at 08:25

## 2025-01-13 RX ADMIN — HEPARIN SODIUM 5000 UNIT(S): 1000 INJECTION, SOLUTION INTRAVENOUS; SUBCUTANEOUS at 14:36

## 2025-01-13 RX ADMIN — HEPARIN SODIUM 5000 UNIT(S): 1000 INJECTION, SOLUTION INTRAVENOUS; SUBCUTANEOUS at 06:06

## 2025-01-13 RX ADMIN — ACETAMINOPHEN 975 MILLIGRAM(S): 80 SOLUTION/ DROPS ORAL at 03:03

## 2025-01-13 RX ADMIN — Medication 10 UNIT(S): at 08:56

## 2025-01-13 RX ADMIN — Medication 10 MILLIGRAM(S): at 06:06

## 2025-01-13 RX ADMIN — ACETAMINOPHEN 975 MILLIGRAM(S): 80 SOLUTION/ DROPS ORAL at 04:03

## 2025-01-13 RX ADMIN — Medication 1: at 13:04

## 2025-01-13 RX ADMIN — INSULIN GLARGINE-YFGN 29 UNIT(S): 100 INJECTION, SOLUTION SUBCUTANEOUS at 21:23

## 2025-01-13 NOTE — CONSULT NOTE ADULT - CONSULT REQUESTED DATE/TIME
11-Jan-2024 00:00
30-Dec-2024 16:39
13-Jan-2025 13:16
02-Jan-2025 15:23
30-Dec-2024 18:50
02-Jan-2025 17:24
31-Dec-2024 15:05

## 2025-01-13 NOTE — PROGRESS NOTE ADULT - SUBJECTIVE AND OBJECTIVE BOX
Date of Service  : 01-13-25     INTERVAL HPI/OVERNIGHT EVENTS: I feel fine.   Vital Signs Last 24 Hrs  T(C): 36.6 (13 Jan 2025 22:52), Max: 37 (13 Jan 2025 06:00)  T(F): 97.8 (13 Jan 2025 22:52), Max: 98.6 (13 Jan 2025 06:00)  HR: 71 (13 Jan 2025 22:52) (60 - 80)  BP: 141/76 (13 Jan 2025 22:52) (134/70 - 142/76)  BP(mean): --  RR: 16 (13 Jan 2025 22:52) (16 - 18)  SpO2: 96% (13 Jan 2025 22:52) (96% - 99%)    Parameters below as of 13 Jan 2025 22:52  Patient On (Oxygen Delivery Method): room air      I&O's Summary    12 Jan 2025 07:01  -  13 Jan 2025 07:00  --------------------------------------------------------  IN: 0 mL / OUT: 1200 mL / NET: -1200 mL      MEDICATIONS  (STANDING):  allopurinol 100 milliGRAM(s) Oral daily  amLODIPine   Tablet 10 milliGRAM(s) Oral daily  aspirin enteric coated 81 milliGRAM(s) Oral daily  atorvastatin 40 milliGRAM(s) Oral at bedtime  heparin   Injectable 5000 Unit(s) SubCutaneous every 8 hours  insulin glargine Injectable (LANTUS) 29 Unit(s) SubCutaneous at bedtime  insulin lispro (ADMELOG) corrective regimen sliding scale   SubCutaneous three times a day before meals  insulin lispro (ADMELOG) corrective regimen sliding scale   SubCutaneous at bedtime  insulin lispro Injectable (ADMELOG) 10 Unit(s) SubCutaneous three times a day before meals  isosorbide   mononitrate ER Tablet (IMDUR) 60 milliGRAM(s) Oral daily  sodium chloride 0.9%. 1000 milliLiter(s) (50 mL/Hr) IV Continuous <Continuous>    MEDICATIONS  (PRN):  acetaminophen     Tablet .. 650 milliGRAM(s) Oral every 6 hours PRN Temp greater or equal to 38C (100.4F), Mild Pain (1 - 3), Moderate Pain (4 - 6)  melatonin 3 milliGRAM(s) Oral at bedtime PRN Insomnia    LABS:                        9.3    9.45  )-----------( 362      ( 13 Jan 2025 05:45 )             29.0     01-13    137  |  103  |  65[H]  ----------------------------<  133[H]  4.5   |  20[L]  |  2.27[H]    Ca    9.1      13 Jan 2025 05:45  Phos  4.1     01-13  Mg     2.50     01-13        Urinalysis Basic - ( 13 Jan 2025 05:45 )    Color: x / Appearance: x / SG: x / pH: x  Gluc: 133 mg/dL / Ketone: x  / Bili: x / Urobili: x   Blood: x / Protein: x / Nitrite: x   Leuk Esterase: x / RBC: x / WBC x   Sq Epi: x / Non Sq Epi: x / Bacteria: x      CAPILLARY BLOOD GLUCOSE      POCT Blood Glucose.: 209 mg/dL (13 Jan 2025 21:15)  POCT Blood Glucose.: 123 mg/dL (13 Jan 2025 17:46)  POCT Blood Glucose.: 165 mg/dL (13 Jan 2025 12:41)  POCT Blood Glucose.: 133 mg/dL (13 Jan 2025 08:53)        Urinalysis Basic - ( 13 Jan 2025 05:45 )    Color: x / Appearance: x / SG: x / pH: x  Gluc: 133 mg/dL / Ketone: x  / Bili: x / Urobili: x   Blood: x / Protein: x / Nitrite: x   Leuk Esterase: x / RBC: x / WBC x   Sq Epi: x / Non Sq Epi: x / Bacteria: x      REVIEW OF SYSTEMS:  CONSTITUTIONAL: No fever, weight loss, or fatigue  EYES: No eye pain, visual disturbances, or discharge  ENMT:  No difficulty hearing, tinnitus, vertigo; No sinus or throat pain  NECK: No pain or stiffness  RESPIRATORY: No cough, wheezing, chills or hemoptysis; No shortness of breath  CARDIOVASCULAR: No chest pain, palpitations, dizziness, or leg swelling  GASTROINTESTINAL: No abdominal or epigastric pain. No nausea, vomiting, or hematemesis; No diarrhea or constipation. No melena or hematochezia.  GENITOURINARY: No dysuria, frequency, hematuria, or incontinence  NEUROLOGICAL: No headaches, memory loss, loss of strength, numbness, or tremors      RADIOLOGY & ADDITIONAL TESTS:    Consultant(s) Notes Reviewed:  [x ] YES  [ ] NO    PHYSICAL EXAM:  GENERAL: NAD, well-groomed, well-developed,not in any distress ,  HEAD:  Atraumatic, Normocephalic  EYES: EOMI, PERRLA, conjunctiva and sclera clear  ENMT: No tonsillar erythema, exudates, or enlargement; Moist mucous membranes, Good dentition, No lesions  NECK: Supple, No JVD, Normal thyroid  NERVOUS SYSTEM:  Alert & Oriented X3, No focal deficit   CHEST/LUNG: Good air entry bilateral with no  rales, rhonchi, wheezing, or rubs  HEART: Regular rate and rhythm; No murmurs, rubs, or gallops  ABDOMEN: Soft, Nontender, Nondistended; Bowel sounds present  EXTREMITIES:  2+ Peripheral Pulses, No clubbing, cyanosis, or edema  SKIN: No rashes or lesions    Care Discussed with Consultants/Other Providers [ x] YES  [ ] NO

## 2025-01-13 NOTE — CONSULT NOTE ADULT - ASSESSMENT
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

## 2025-01-13 NOTE — PROGRESS NOTE ADULT - ASSESSMENT
89-year-old male with a past medical history of prostate cancer, diabetes, hypertension, presenting due to concern of hypoglycemia at home.  The patient states he is tired and was not sure what happened.  According to his wife who was in bed with him, she started hearing him gurgling at home.  She turned on the lights and tried to wake him up, the patient was unresponsive.  They called EMS who came and checked a fingerstick which was 41.  He was given 1 amp of D50 with a repeat being 140.  Patient also tolerated p.o.  According to wife he had a similar occurrence yesterday where his fingerstick was in the 40s.  Patient takes combined metformin and glipizide as well as lantus  Denies fever, chills, chest pain, trouble breathing, dysuria.  According to patient he has decreased appetite recently compared to the past.  Endocrine called for further assistance.  Reports long hx of DM.   He states Lantus was increased from 12--> 15 units  checks FSBG in the am and afternoon  reports multiple lows in 30-40s       Poorly controlled T2DM with hyperglycemia  - HbA1c: 7.0  - Home Regimen: combined metformin and glipizide as well as Lantus 12-15 units  - Endocrinologist: unknown    Inpatient plan   - FS goal 100-180 mg/dl   - FS at goal   - Lantus 29 units at bedtime  - Admelog 10 units TID AC. Hold if not eating/NPO.   - continue low Admelog correctional scale TID AC  - continue separate low Admelog correctional scale at HS  - FS TID AC & HS ---> q6 if NPO   - hypoglycemia protocol PRN   - consistent carbohydrate diet  - RD consult  - c-Peptide 3 with serum glucose 170 - patient is making adequate endogenous insulin.    Discharge plan   - Patient going to rehab.  - Stop glipizide  - STOP metformin  - discharge to rehab on: basal/bolus insulin: Lantus 29 units at bedtime, Admelog 10 units TID AC. Hold if not eating/NPO.   - discharge home on: Lantus + Tradjenta 5 mg daily (please check for insurance coverage)  - patient reporting decreased appetite - avoid GLP11 for now  - Please send prescription for glucose tablets 4G (take 4 tablets) or 15G tablets for blood sugar less than 70 mG/dL, repeat fingerstick in 15 minutes. Call your provider for dose adjustment if needed.   - Patient to call doctor with persistent high or low BG at home.   - Ensure patient has glucometer, test strips and lancets on discharge.  - Ensure patient has insulin pen needles.   - Recommend routine outpatient ophthalmology and podiatry follow up.   - Dr Yovani Kern  [address] [facility] [address], [place] [address]   Appointment with Dr. Kern on 3/20 @ 2:20pm.         HTN  - Outpatient goal BP <130//80 for age  - continue amlodipine, HCTZ, losartan, spironolactone  - Can check urine albumin/creatinine outpatient  - Management per primary team.        HLD  - LDL goal < 70  - LDL 67  - continue statin   - management per primary team       SIRENA Garcia-BC  Nurse Practitioner  Division of Endocrinology  Contact on TEAMS    If out of hospital/unavailable when paged, please note: patient will be cared for by another provider on the endocrine service.  For urgent concerns: call the endocrine answering service for assistance to reach covering provider ([phone]). For non-urgent matters: please email [email or web address] for assistance.

## 2025-01-13 NOTE — PROGRESS NOTE ADULT - ASSESSMENT
89M with PMH of prostate CA, T2DM, HTN, HLD, CKD, here for hypoglycemia sxs. In the ER, pt found to have cp with EKG w/ NSR and no significant ischemic changes. Pt was recommended to be admitted by Tele Doc Dr. Perea. Cardiology consulted for further evaluation inpt.     1. Cp, +NST and TTE reviewed   2. HTN   3. HLD   4. CKD     -given +NST, initially planned for [facility], deferred for now given COVID+ and rising Cr. Can be done as outpt post discharge given pt has no cardiac sxs at this point.   -bp control, cont norvasc, imduri, losartan, and Hctz   -cont asa and statin   -HR 50-60s, would rec hold bb   -new onset back pain, +prostate ca, evaluated by heme onc and pending MRI   -discharge planning   -f/u OP w/ me in clinic     Nitin Lee MD [facility]  Cardiology Attending, Jenn-NS/LOIS  Avaliable on [employer] Team

## 2025-01-13 NOTE — PROGRESS NOTE ADULT - SUBJECTIVE AND OBJECTIVE BOX
[facility]   MD CRISTI SESAY MD MARIA SANTIAGO, NP    TEL:  OFFICE: [phone]  From 5pm-7am Answering Service [phone]    -- RENAL FOLLOW UP NOTE ---Date of Service 01-13-25 @ 16:28    Patient is a 89y old  Male who presents with a chief complaint of hypoglycemia       Patient seen and examined at bedside. No chest pain/sob    VITALS:  T(F): 97.4 (01-13-25 @ 12:20), Max: 98.6 (01-13-25 @ 06:00)  HR: 60 (01-13-25 @ 12:20)  BP: 134/70 (01-13-25 @ 12:20)  RR: 18 (01-13-25 @ 12:20)  SpO2: 96% (01-13-25 @ 12:20)  Wt(kg): --    01-12 @ 07:01  -  01-13 @ 07:00  --------------------------------------------------------  IN: 0 mL / OUT: 1200 mL / NET: -1200 mL          PHYSICAL EXAM:  General: NAD  Neck: No JVD  Respiratory: CTAB, no wheezes, rales or rhonchi  Cardiovascular: S1, S2, RRR  Gastrointestinal: BS+, soft, NT/ND  Extremities: No peripheral edema    Hospital Medications:   MEDICATIONS  (STANDING):  allopurinol 100 milliGRAM(s) Oral daily  amLODIPine   Tablet 10 milliGRAM(s) Oral daily  aspirin enteric coated 81 milliGRAM(s) Oral daily  atorvastatin 40 milliGRAM(s) Oral at bedtime  heparin   Injectable 5000 Unit(s) SubCutaneous every 8 hours  insulin glargine Injectable (LANTUS) 29 Unit(s) SubCutaneous at bedtime  insulin lispro (ADMELOG) corrective regimen sliding scale   SubCutaneous three times a day before meals  insulin lispro (ADMELOG) corrective regimen sliding scale   SubCutaneous at bedtime  insulin lispro Injectable (ADMELOG) 10 Unit(s) SubCutaneous three times a day before meals  isosorbide   mononitrate ER Tablet (IMDUR) 60 milliGRAM(s) Oral daily  sodium chloride 0.9%. 1000 milliLiter(s) (50 mL/Hr) IV Continuous <Continuous>      LABS:  01-13    137  |  103  |  65[H]  ----------------------------<  133[H]  4.5   |  20[L]  |  2.27[H]    Ca    9.1      13 Jan 2025 05:45  Phos  4.1     01-13  Mg     2.50     01-13      Creatinine Trend: 2.27 <--, 2.32 <--, 2.22 <--, 2.23 <--, 2.20 <--    Phosphorus: 4.1 mg/dL (01-13 @ 05:45)                              9.3    9.45  )-----------( 362      ( 13 Jan 2025 05:45 )             29.0     Urine Studies:  Urinalysis - [01-13-25 @ 05:45]      Color  / Appearance  / SG  / pH       Gluc 133 / Ketone   / Bili  / Urobili        Blood  / Protein  / Leuk Est  / Nitrite       RBC  / WBC  / Hyaline  / Gran  / Sq Epi  / Non Sq Epi  / Bacteria       Lipid: chol 130, , HDL 36, LDL --      [01-01-25 @ 05:30]        RADIOLOGY & ADDITIONAL STUDIES:

## 2025-01-13 NOTE — PROGRESS NOTE ADULT - SUBJECTIVE AND OBJECTIVE BOX
Chief Complaint: T2DM    Interval Events: Pt seen and examined at bedside. Pt tolerating carb consistent diet with no nausea, no vomiting.    MEDICATIONS  (STANDING):  allopurinol 100 milliGRAM(s) Oral daily  amLODIPine   Tablet 10 milliGRAM(s) Oral daily  aspirin enteric coated 81 milliGRAM(s) Oral daily  atorvastatin 40 milliGRAM(s) Oral at bedtime  heparin   Injectable 5000 Unit(s) SubCutaneous every 8 hours  insulin glargine Injectable (LANTUS) 29 Unit(s) SubCutaneous at bedtime  insulin lispro (ADMELOG) corrective regimen sliding scale   SubCutaneous three times a day before meals  insulin lispro (ADMELOG) corrective regimen sliding scale   SubCutaneous at bedtime  insulin lispro Injectable (ADMELOG) 10 Unit(s) SubCutaneous three times a day before meals  isosorbide   mononitrate ER Tablet (IMDUR) 60 milliGRAM(s) Oral daily  sodium chloride 0.9%. 1000 milliLiter(s) (50 mL/Hr) IV Continuous <Continuous>    MEDICATIONS  (PRN):  melatonin 3 milliGRAM(s) Oral at bedtime PRN Insomnia      Allergies    No Known Allergies    Intolerances      Review of Systems:  Eyes: No blurry vision  Cardiovascular: No chest pain, palpitations  Respiratory: No SOB, no cough  GI: No nausea, vomiting, abdominal pain  : No dysuria    ALL OTHER SYSTEMS REVIEWED AND NEGATIVE      VITALS: T(C): 36.3 (01-13-25 @ 12:20)  T(F): 97.4 (01-13-25 @ 12:20), Max: 98.6 (01-13-25 @ 06:00)  HR: 60 (01-13-25 @ 12:20) (60 - 65)  BP: 134/70 (01-13-25 @ 12:20) (120/67 - 142/76)  RR:  (17 - 18)  SpO2:  (96% - 99%)  Wt(kg): --      Physical Exam:   GENERAL: NAD, well-developed  EYES: No proptosis  HEENT:  Atraumatic, Normocephalic  RESPIRATORY: non labored breathing   GI: Non distended  PSYCH: Alert, normal affect, normal mood    CAPILLARY BLOOD GLUCOSE    POCT Blood Glucose.: 165 mg/dL (13 Jan 2025 12:41)  POCT Blood Glucose.: 133 mg/dL (13 Jan 2025 08:53)  POCT Blood Glucose.: 161 mg/dL (12 Jan 2025 22:29)  POCT Blood Glucose.: 197 mg/dL (12 Jan 2025 17:58)      01-13    137  |  103  |  65[H]  ----------------------------<  133[H]  4.5   |  20[L]  |  2.27[H]    eGFR: 27[L]    Ca    9.1      01-13  Mg     2.50     01-13  Phos  4.1     01-13        A1C with Estimated Average Glucose Result: 7.0 % (12-30-24 @ 09:36)      Thyroid Function Tests:

## 2025-01-13 NOTE — CONSULT NOTE ADULT - SUBJECTIVE AND OBJECTIVE BOX
Reason For Consult: DM    HPI:  89-year-old male with a past medical history of prostate cancer, diabetes, hypertension, presenting due to concern of hypoglycemia at home.  The patient states he is tired and was not sure what happened.  According to his wife who was in bed with him she started hearing him gurgling at home.  She turned on the lights and tried to wake him up, the patient was unresponsive.  They called EMS who came and checked a fingerstick which was 41.  He was given 1 amp of D50 with a repeat being 140.  Patient also tolerated p.o.  According to wife he had a similar occurrence yesterday where his fingerstick was in the 40s.  Patient takes combined metformin and glipizide as well as lantus  Denies fever, chills, chest pain, trouble breathing, dysuria.  According to patient he has decreased appetite recently compared to the past.  endocrine called for further assistance  pt reports long hx of DM   he states lantus was increased from 12--> 15 units  checks FSBG in the am and afternoon  reports multiple lows in 30-40s     a1c 7.0      PAST MEDICAL & SURGICAL HISTORY:  HTN (hypertension)      DM2 (diabetes mellitus, type 2)      Prostate cancer          FAMILY HISTORY:  does not report DM in family         Social History:  no illcitis    Outpatient Medications:  lantus   metformin   glipzide    MEDICATIONS  (STANDING):    MEDICATIONS  (PRN):      Allergies    No Known Allergies    Intolerances      Review of Systems:  Constitutional: No fever  Eyes: No blurry vision  Neuro: No tremors  HEENT: No pain  Cardiovascular: +chest pain, palpitations  Respiratory: No SOB, no cough  GI: No nausea, vomiting, abdominal pain  : No dysuria  Skin: no rash  Psych: no depression  Endocrine: no polyuria, polydipsia  Hem/lymph: no swelling  Osteoporosis: no fractures    ALL OTHER SYSTEMS REVIEWED AND NEGATIVE        PHYSICAL EXAM:  VITALS: T(C): 36.9 (12-30-24 @ 14:25)  T(F): 98.4 (12-30-24 @ 14:25), Max: 99.3 (12-30-24 @ 06:10)  HR: 60 (12-30-24 @ 14:25) (59 - 65)  BP: 196/88 (12-30-24 @ 14:25) (171/79 - 196/88)  RR:  (15 - 16)  SpO2:  (96% - 100%)  Wt(kg): --  GENERAL: NAD, well-groomed, well-developed  EYES: No proptosis, no lid lag, anicteric  HEENT:  Atraumatic, Normocephalic, moist mucous membranes  RESPIRATORY: Clear to auscultation bilaterally; No rales, rhonchi, wheezing, or rubs  CARDIOVASCULAR: Regular rate and rhythm; No murmurs; no peripheral edema  GI: Soft, nontender, non distended, normal bowel sounds  SKIN: Dry, intact, No rashes or lesions  MUSCULOSKELETAL: Full range of motion, normal strength  NEURO: sensation intact, extraocular movements intact, no tremor, normal reflexes  PSYCH: Alert and oriented x 3, normal affect, normal mood      POCT Blood Glucose.: 155 mg/dL (12-30-24 @ 16:12)  POCT Blood Glucose.: 141 mg/dL (12-30-24 @ 13:58)  POCT Blood Glucose.: 97 mg/dL (12-30-24 @ 13:13)  POCT Blood Glucose.: 132 mg/dL (12-30-24 @ 12:07)  POCT Blood Glucose.: 150 mg/dL (12-30-24 @ 11:18)  POCT Blood Glucose.: 77 mg/dL (12-30-24 @ 10:06)  POCT Blood Glucose.: 104 mg/dL (12-30-24 @ 08:45)  POCT Blood Glucose.: 126 mg/dL (12-30-24 @ 06:18)                            9.7    10.77 )-----------( 316      ( 30 Dec 2024 09:36 )             30.9       12-30    142  |  106  |  44[H]  ----------------------------<  82  4.8   |  22  |  2.00[H]    eGFR: 32[L]    Ca    9.8      12-30    TPro  7.7  /  Alb  4.2  /  TBili  <0.2  /  DBili  x   /  AST  25  /  ALT  9   /  AlkPhos  170[H]  12-30      Thyroid Function Tests:              Radiology:             
Cardiology Attending Consultation Note     Patient seen and evaluated at bedside    Chief Complaint: hypoglycemia sxs     HPI:  89M with PMH of prostate CA, T2DM, HTn, HLD here for hypoglycemia sxs. In the ER, pt found to have cp with EKG w/ NSR and no significant ischemic changes. Pt was recommended to be admitted by Tele Doc Dr. Perea. Cardiology consulted for further evaluation inpt.     PMHx:   HTN (hypertension)    DM2 (diabetes mellitus, type 2)    Prostate cancer        PSHx:       Allergies:  No Known Allergies      Home Meds:    Current Medications:       FAMILY HISTORY:  No pertinent family history in first degree relatives        Social History: Personally reviewed   No tobacco, EtOH or IVDU     REVIEW OF SYSTEMS:  Constitutional:     [x ] negative [ ] fevers [ ] chills [ ] weight loss [ ] weight gain  HEENT:                  [x ] negative [ ] dry eyes [ ] eye irritation [ ] postnasal drip [ ] nasal congestion  CV:                         [ x] negative  [ ] chest pain [ ] orthopnea [ ] palpitations [ ] murmur  Resp:                     [x ] negative [ ] cough [ ] shortness of breath [ ] dyspnea [ ] wheezing [ ] sputum [ ]hemoptysis  GI:                          [ x] negative [ ] nausea [ ] vomiting [ ] diarrhea [ ] constipation [ ] abd pain [ ] dysphagia   :                        [ x] negative [ ] dysuria [ ] nocturia [ ] hematuria [ ] increased urinary frequency  Musculoskeletal: [x ] negative [ ] back pain [ ] myalgias [ ] arthralgias [ ] fracture  Skin:                       [ x] negative [ ] rash [ ] itch  Neurological:        [ x] negative [ ] headache [ ] dizziness [ ] syncope [ ] weakness [ ] numbness  Psychiatric:           [ x] negative [ ] anxiety [ ] depression  Endocrine:            [ x] negative [ ] diabetes [ ] thyroid problem  Heme/Lymph:      [ x] negative [ ] anemia [ ] bleeding problem  Allergic/Immune: [ x] negative [ ] itchy eyes [ ] nasal discharge [ ] hives [ ] angioedema    [ x] All other systems negative  [ ] Unable to assess ROS due to      Physical Exam:  T(F): 98.5 (12-30), Max: 99.3 (12-30)  HR: 62 (12-30) (59 - 65)  BP: 184/78 (12-30) (171/79 - 196/88)  RR: 18 (12-30)  SpO2: 97% (12-30)    Gen: Well appearing   HENNT: No JVP   CV: regular rate, regular rhtyhm, no murmur   Pulm: clear to auscultation bilaterally   Abdomen: Non-tender, non-distended   Ext: no edema b/l   Neuro: grossly non-focal     Cardiovascular Diagnostic Testing:      Labs: Personally reviewed                        9.7    10.77 )-----------( 316      ( 30 Dec 2024 09:36 )             30.9     12-30    142  |  106  |  44[H]  ----------------------------<  82  4.8   |  22  |  2.00[H]    Ca    9.8      30 Dec 2024 09:36    TPro  7.7  /  Alb  4.2  /  TBili  <0.2  /  DBili  x   /  AST  25  /  ALT  9   /  AlkPhos  170[H]  12-30            
HPI:  88 y/o M with pmh of prostate cancer, DM type 2, p/w hypoglycemia.  Per admission note  pt's wife noticed he was confused and making gurgling sounds while in bed.  Also intermittent L sided chest pain   Noted expiratory wheeze today  covid PCR+      PAST MEDICAL & SURGICAL HISTORY:  HTN (hypertension)      DM2 (diabetes mellitus, type 2)      Prostate cancer      No significant past surgical history          Allergies    No Known Allergies    Intolerances        ANTIMICROBIALS:      OTHER MEDS:  allopurinol 100 milliGRAM(s) Oral daily  amLODIPine   Tablet 10 milliGRAM(s) Oral daily  aspirin enteric coated 81 milliGRAM(s) Oral daily  atorvastatin 40 milliGRAM(s) Oral at bedtime  heparin   Injectable 5000 Unit(s) SubCutaneous every 8 hours  hydrochlorothiazide 25 milliGRAM(s) Oral daily  insulin glargine Injectable (LANTUS) 10 Unit(s) SubCutaneous at bedtime  insulin lispro (ADMELOG) corrective regimen sliding scale   SubCutaneous at bedtime  insulin lispro (ADMELOG) corrective regimen sliding scale   SubCutaneous three times a day before meals  insulin lispro Injectable (ADMELOG) 4 Unit(s) SubCutaneous three times a day before meals  isosorbide   mononitrate ER Tablet (IMDUR) 60 milliGRAM(s) Oral daily  losartan 100 milliGRAM(s) Oral daily  melatonin 3 milliGRAM(s) Oral at bedtime PRN  nebivolol 5 milliGRAM(s) Oral daily  spironolactone 25 milliGRAM(s) Oral daily      SOCIAL HISTORY:    FAMILY HISTORY:  No pertinent family history in first degree relatives        REVIEW OF SYSTEMS  [  ] ROS unobtainable because:    [x  ] All other systems negative except as noted below:	    Constitutional:  [ ] fever [ ] chills  [ ] weight loss  [ ] weakness  Skin:  [ ] rash [ ] phlebitis	  Eyes: [ ] icterus [ ] pain  [ ] discharge	  ENMT: [ ] sore throat  [ ] thrush [ ] ulcers [ ] exudates  Respiratory: [ ] dyspnea [ ] hemoptysis [ ] cough [ ] sputum	  Cardiovascular:  [ ] chest pain [ ] palpitations [ ] edema	  Gastrointestinal:  [ ] nausea [ ] vomiting [ ] diarrhea [ ] constipation [ ] pain	  Genitourinary:  [ ] dysuria [ ] frequency [ ] hematuria [ ] discharge [ ] flank pain  [ ] incontinence  Musculoskeletal:  [ ] myalgias [ ] arthralgias [ ] arthritis  [ ] back pain  Neurological:  [ ] headache [ ] seizures  [ ] confusion/altered mental status  Psychiatric:  [ ] anxiety [ ] depression	  Hematology/Lymphatics:  [ ] lymphadenopathy  Endocrine:  [ ] adrenal [ ] thyroid  Allergic/Immunologic:	 [ ] transplant [ ] seasonal    PHYSICAL EXAM:  Constitutional: Not in acute distress  Eyes: No icterus.  minimal nasal congestion   Neck: Supple  RS: Chest clear   CVS: S1, S2   Abdomen: Soft. No guarding/rigidity/tenderness.  Neuro: Alert, appropriate affect   Cranial nerves 2-12 grossly normal. No focal abnormalities    Drug Dosing Weight  Height (cm): 172.7 (31 Dec 2024 18:00)  Weight (kg): 92.1 (30 Dec 2024 06:10)  BMI (kg/m2): 30.9 (31 Dec 2024 18:00)  BSA (m2): 2.06 (31 Dec 2024 18:00)    Vital Signs Last 24 Hrs  T(F): 98.4 (25 @ 11:51), Max: 99.3 (24 @ 06:10)    Vital Signs Last 24 Hrs  HR: 71 (25 @ 11:51) (67 - 75)  BP: 148/81 (25 @ 11:51) (138/72 - 156/77)  RR: 18 (25 @ 11:51)  SpO2: 96% (25 @ 11:51) (95% - 97%)  Wt(kg): --                          10.0   10.14 )-----------( 297      ( 2025 04:25 )             32.0       02    135  |  99  |  47[H]  ----------------------------<  239[H]  4.1   |  21[L]  |  2.01[H]    Ca    9.6      2025 04:25  Phos  3.9     02  Mg     2.20     0102        Urinalysis Basic - ( 2025 04:27 )    Color: Yellow / Appearance: Clear / S.019 / pH: x  Gluc: x / Ketone: Negative mg/dL  / Bili: Negative / Urobili: 0.2 mg/dL   Blood: x / Protein: 30 mg/dL / Nitrite: Negative   Leuk Esterase: Negative / RBC: 0 /HPF / WBC 0 /HPF   Sq Epi: x / Non Sq Epi: 0 /HPF / Bacteria: Negative /HPF        MICROBIOLOGY:    SARS-CoV-2 Result: Detected:        RADIOLOGY:    rad< from: Xray Chest 1 View- PORTABLE-Urgent (Xray Chest 1 View- PORTABLE-Urgent .) (25 @ 05:08) >    ACC: [id number] EXAM:  XR CHEST PORTABLE URGENT 1V   ORDERED BY: RENTAE DON     PROCEDURE DATE:  2025          INTERPRETATION:  EXAMINATION: XR CHEST URGENT    CLINICAL INDICATION: wheezing    TECHNIQUE: Single frontal, portable view ofthe chest was obtained.    COMPARISON: Chest x-ray 2024.    FINDINGS:  Right hemidiaphragm elevation.  The heart is normal in size.  No focal consolidation.  There is no pneumothorax or pleural effusion.  No acute abnormalities in the visualized osseous structures.    IMPRESSION: Clear lungs.      --- End of Report ---      < end of copied text >  
[facility]   MD CRISTI SESAY MD ANGELA WONG, PA        TEL:  OFFICE: [phone]  From 5pm-7am answering service [phone]    --- INITIAL RENAL CONSULT NOTE ---date of service 25 @ 15:23    HPI:  89 y/o M with pmh of prostate cancer, DM type 2, HTN, p/w hypoglycemia.  Pt reports his insulin glargine  dose was increased from 12 to 15 units recently.  Over the past few days, he reports getting low reading in the morning, but is able to alert his wife who gives him food to bring it  up.  Yesterday AM, pt's wife noticed he was confused and making gurgling sounds while in bed.  She checked FS which was 41.  EMS was called, and pt give D50 en route to ED.  Pt also reports intermittent L sided chest pain for the past several days.  Pain is positional, and improves when pt wears a support belt.  Pain is difficult for patient to describe quality.  No associated dyspnea, dizziness nausea or diaphoresis.  No recent fever or cough.    pt seen and examined at bedside. seems uninterested in participate in hpi only answers "im hungry"      Allergies:  No Known Allergies      PAST MEDICAL & SURGICAL HISTORY:  HTN (hypertension)      DM2 (diabetes mellitus, type 2)      Prostate cancer      No significant past surgical history          Home Medications Reviewed    Hospital Medications:   MEDICATIONS  (STANDING):  allopurinol 100 milliGRAM(s) Oral daily  amLODIPine   Tablet 10 milliGRAM(s) Oral daily  aspirin enteric coated 81 milliGRAM(s) Oral daily  atorvastatin 40 milliGRAM(s) Oral at bedtime  heparin   Injectable 5000 Unit(s) SubCutaneous every 8 hours  hydrochlorothiazide 25 milliGRAM(s) Oral daily  insulin glargine Injectable (LANTUS) 10 Unit(s) SubCutaneous at bedtime  insulin lispro (ADMELOG) corrective regimen sliding scale   SubCutaneous at bedtime  insulin lispro (ADMELOG) corrective regimen sliding scale   SubCutaneous three times a day before meals  insulin lispro Injectable (ADMELOG) 4 Unit(s) SubCutaneous three times a day before meals  isosorbide   mononitrate ER Tablet (IMDUR) 60 milliGRAM(s) Oral daily  losartan 100 milliGRAM(s) Oral daily  nebivolol 5 milliGRAM(s) Oral daily  spironolactone 25 milliGRAM(s) Oral daily      SOCIAL HISTORY:  Denies ETOh, Smoking,     FAMILY HISTORY:  No pertinent family history in first degree relatives        REVIEW OF SYSTEMS:  CONSTITUTIONAL: No weakness, fevers or chills  EYES/ENT: No visual changes;  No vertigo or throat pain   NECK: No pain or stiffness  RESPIRATORY: No cough, wheezing, hemoptysis; No shortness of breath  CARDIOVASCULAR: No chest pain or palpitations.  GASTROINTESTINAL: No abdominal or epigastric pain. No nausea, vomiting, or hematemesis; No diarrhea or constipation. No melena or hematochezia.  GENITOURINARY: No dysuria, frequency, foamy urine, urinary urgency, incontinence or hematuria  NEUROLOGICAL: No numbness or weakness  SKIN: No itching, burning, rashes, or lesions   VASCULAR: No bilateral lower extremity edema.   All other review of systems is negative unless indicated above.    VITALS:  T(F): 98.4 (25 @ 11:51), Max: 99.1 (25 @ 20:33)  HR: 71 (25 @ 11:51)  BP: 148/81 (25 @ 11:51)  RR: 18 (25 @ 11:51)  SpO2: 96% (25 @ 11:51)  Wt(kg): --     @ 07:01  -   @ 07:00  --------------------------------------------------------  IN: 0 mL / OUT: 370 mL / NET: -370 mL          PHYSICAL EXAM:  General: NAD  HEENT: anicteric sclera, oropharynx clear, MMM  Neck: No JVD  Respiratory: CTAB, no wheezes, rales or rhonchi  Cardiovascular: S1, S2, RRR  Gastrointestinal: BS+, soft, NT/ND  Extremities: No cyanosis or clubbing. No peripheral edema  Neurological: difficult to assess  Psychiatric: unable to assess  : No CVA tenderness. No jacques.   Skin: No rashes  Vascular Access: none    LABS:      135  |  99  |  47[H]  ----------------------------<  239[H]  4.1   |  21[L]  |  2.01[H]    Ca    9.6      2025 04:25  Phos  3.9     01-02  Mg     2.20     01-02      Creatinine Trend: 2.01 <--, 1.83 <--, 1.52 <--, 2.00 <--                        10.0   10.14 )-----------( 297      ( 2025 04:25 )             32.0     Urine Studies:  Urinalysis Basic - ( 2025 04:27 )    Color: Yellow / Appearance: Clear / S.019 / pH:   Gluc:  / Ketone: Negative mg/dL  / Bili: Negative / Urobili: 0.2 mg/dL   Blood:  / Protein: 30 mg/dL / Nitrite: Negative   Leuk Esterase: Negative / RBC: 0 /HPF / WBC 0 /HPF   Sq Epi:  / Non Sq Epi: 0 /HPF / Bacteria: Negative /HPF          RADIOLOGY & ADDITIONAL STUDIES:                
NEUROSURGERY CONSULT    HPI: 88 y/o M with pmh of prostate cancer, DM type 2, HTN, p/w hypoglycemia.  Pt reports his insulin glargine  dose was increased from 12 to 15 units recently.  Over the past few days, he reports getting low reading in the morning, but is able to alert his wife who gives him food to bring it  up.  Yesterday AM, pt's wife noticed he was confused and making gurgling sounds while in bed.  She checked FS which was 41.  EMS was called, and pt give D50 en route to ED.  Pt also reports intermittent L sided chest pain for the past several days.  Pain is positional, and improves when pt wears a support belt.  Pain is difficult for patient to describe quality.  No associated dyspnea, dizziness nausea or diaphoresis.  No recent fever or cough.      Pt was evaluated in the ED, and placed in the CDU for monitoring.  He is admitted for further w/u of chest pain and monitoring of glucose.    RADIOLOGY:   ACC: [id number] EXAM:  CT THORACIC SPINE   ORDERED BY: GINETTE AMBROSE     ACC: [id number] EXAM:  CT LUMBAR SPINE   ORDERED BY: GINETTE AMBROSE     ACC: [id number] EXAM:  CT CERVICAL SPINE   ORDERED BY: GINETTE AMBROSE     PROCEDURE DATE:  01/09/2025          INTERPRETATION:  Clinical indication: Back pain.    Axial sections were performed through the cervical thoracic and lumbar   spine region. Coronal and and sagittal reconstructions were performed.    Cervical spine:    Reversal of the normal cervical lordosis is seen    Disc space narrowing endplate changes mass effect seen involving the C5-6   C6-C7 C7-T1 levels secondary to chronic degenerative    C2-3: Bilateral hypertrophic facet joint change. Mild narrowing of the   left neural foramen    C3-4: Disc bulge is seen. Bilateral hypertrophic facet change. Moderate   narrowing of the left neural foramen. Severe narrowing of the right   neural foramen    C4-5: Disc bulge and bilateral hypertrophic facet change. Mild narrowing   of the right neural foramen and severe narrowing of the left neural   foramen    C5-6: Disc bulge is identified. Hypertrophic change involving both facet   and uncal vertebral joints with mild to moderate narrowing of the left   neural foramen. Moderate narrowing of the right neural foramen    C6-7: Hypertrophic change is seen both facet and uncovertebral joint.   Moderate narrowing of both neural foramen    C7-T1: Hypertrophic change both facet and uncal regions. Moderate   narrowing of both neural foramen. Mild narrowing of the spinal canal.    T1-2: Bilateral hypertrophic facet joint changes. Moderate narrowing of   spinal canal.    C2-3: Normal    Well-defined expansile lytic lesion is seen involving the left T1   lamina/proximal transverse process. This lesion measures approximately   1.1 x 2.7 cm and could be compatible with a hemangioma though the   possibility of underlying lesion such as metastasis multiple myeloma or   other similar etiology cannot be entirely excluded. There are more vague   lucencyseen involving the C4 and C5 and C6 vertebral bodies which could   be compatible with hemangiomas though underlying lytic lesion such as   metastasis multiple myeloma cannot be excluded as regions well. Contrast   enhanced MRI is recommended to characterize this finding if there are no   complications.    No acute fractures or dislocations seen.    Elevation paraspinal soft tissues is limited by lack of IV contrast   though grossly unremarkable    Enlargement of the left lobe of thyroid is identified. Dedicated imaging   of the thyroid can be done if not ready to better evaluate for underlying   lesions.    The visualized portions of both lung apices appear clear.    Thoracic/lumbar spine:    Scoliosis is seen    Loss of the normal lumbar lordosis is seen    Disc space narrowing endplate sclerotic changes and osteophytes are seen   throughout the thoracic and lumbar spine region as well as scattered   areas of vacuum disc changes. These findings are secondary to chronic   degenerative changes.    Compression deformity with abnormal lucency involving the L4 vertebral   body. There are also scattered areas of lucencies seen involving the T8,   T11 and L2 vertebral bodies. These findings could be compatible with an   atypical hemangiomas, the the possibility of underlying lesion such as   metastasis multiple myeloma, lymphoma or similar etiologies cannot be   entirely excluded. Contrast enhanced MRI can be done to better evaluate   these findings do no complications. Bilateral hypertrophic facet joint   changes are seen involving multiple levels secondary chronic degenerative   changes    L1-2: Disc bulge and bilateral hypertrophic facet joint changes. Mild   narrowing of the spinal canal.    L2-3: Bilateral hypertrophic facet change. Mild to moderate narrowing of   the right neural foramen    L3-4: Disc bulge and bilateral hypertrophic facet joint change. Mild to   moderate narrowing of the spinal canal.    L4-5: Disc bulge and bilateral hypertrophic facet joint change. Moderate   to severe narrowing of the spinal canal.    L5-S1: Disc bulge and bilateral hypertrophic facet joint change. Mild   narrowing of the spinal canal.    Elevation of the paraspinal soft tissues is limited likely contrast   though grossly normal    The visualized lungs appear clear.    IMPRESSION: Degenerative changes involving the cervical thoracic and   lumbar spine region as well as abnormal lucent lesions as described   above. While these findings could be compatible with metastasis these are   unlikely compatible with prostate metastasis given that they are lytic   lesions at cannot of blastic lesions. Contrast enhanced MRI of the   cervical thoracic and lumbar spine is recommended for further evaluation   if there are no contraindications    --- End of Report ---            JOSE MCKNIGHT MD; Attending Radiologist  This document has been electronically signed. Luke 10 2025  8:46AM      MEDS:  allopurinol 100 milliGRAM(s) Oral daily  amLODIPine   Tablet 10 milliGRAM(s) Oral daily  aspirin enteric coated 81 milliGRAM(s) Oral daily  atorvastatin 40 milliGRAM(s) Oral at bedtime  heparin   Injectable 5000 Unit(s) SubCutaneous every 8 hours  insulin glargine Injectable (LANTUS) 29 Unit(s) SubCutaneous at bedtime  insulin lispro (ADMELOG) corrective regimen sliding scale   SubCutaneous three times a day before meals  insulin lispro (ADMELOG) corrective regimen sliding scale   SubCutaneous at bedtime  insulin lispro Injectable (ADMELOG) 10 Unit(s) SubCutaneous three times a day before meals  isosorbide   mononitrate ER Tablet (IMDUR) 60 milliGRAM(s) Oral daily  melatonin 3 milliGRAM(s) Oral at bedtime PRN  sodium chloride 0.9%. 1000 milliLiter(s) IV Continuous <Continuous>      Vital Signs Last 24 Hrs  T(C): 36.3 (13 Jan 2025 12:20), Max: 37 (13 Jan 2025 06:00)  T(F): 97.4 (13 Jan 2025 12:20), Max: 98.6 (13 Jan 2025 06:00)  HR: 60 (13 Jan 2025 12:20) (60 - 65)  BP: 134/70 (13 Jan 2025 12:20) (120/67 - 142/76)  BP(mean): --  RR: 18 (13 Jan 2025 12:20) (17 - 18)  SpO2: 96% (13 Jan 2025 12:20) (96% - 99%)    Parameters below as of 13 Jan 2025 12:20  Patient On (Oxygen Delivery Method): room air        LABS:                        9.3    9.45  )-----------( 362      ( 13 Jan 2025 05:45 )             29.0     01-13    137  |  103  |  65[H]  ----------------------------<  133[H]  4.5   |  20[L]  |  2.27[H]    Ca    9.1      13 Jan 2025 05:45  Phos  4.1     01-13  Mg     2.50     01-13            PHYSICAL EXAM:  AOx3, Following Commands, Face symmetrical  EOMI, PERRL    RUE MOTOR:   Delt 4+/5  Bicep 4+/5  Tricep 4+/5      HG 4+/5      LUE MOTOR:   Delt 4+/5  Bicep 4+/5   Tricep 4+/5     HG 4+/5     RLE MOTOR:   HF 5/5            KF 5/5          KE 5/5         DF 5/5         PF 5/5    EHL 5/5    LLE MOTOR:   HF 5/5        KF 5/5          KE 5/5            DF 5/5            PF 5/5       EHL 5/5      SENSATION: intact to light touch     REFLEXES:  No clonus  No Hoffmans    No TTP on exam       
    Patient is a 89y old  Male who presents with a chief complaint of hypoglycemia (31 Dec 2024 14:22)      HPI:  89 y/o M with pmh of prostate cancer, DM type 2, HTN, p/w hypoglycemia.  Pt reports his insulin glargine  dose was increased from 12 to 15 units recently.  Over the past few days, he reports getting low reading in the morning, but is able to alert his wife who gives him food to bring it  up.  Yesterday AM, pt's wife noticed he was confused and making gurgling sounds while in bed.  She checked FS which was 41.  EMS was called, and pt give D50 en route to ED.  Pt also reports intermittent L sided chest pain for the past several days.  Pain is positional, and improves when pt wears a support belt.  Pain is difficult for patient to describe quality.  No associated dyspnea, dizziness nausea or diaphoresis.  No recent fever or cough.    Pt was evaluated in the ED, and placed in the CDU for monitoring.  He is admitted for further w/u of chest pain and monitoring of glucose.    Planned for Cardiac cath om Thursday.       PAST MEDICAL & SURGICAL HISTORY:  HTN (hypertension)      DM2 (diabetes mellitus, type 2)      Prostate cancer      No significant past surgical history          Social History: No smoking.     FAMILY HISTORY:  No pertinent family history in first degree relatives        Allergies    No Known Allergies    Intolerances        REVIEW OF SYSTEMS:    CONSTITUTIONAL: No fever, weight loss, or fatigue  EYES: No eye pain, visual disturbances, or discharge  RESPIRATORY: No cough, wheezing, chills or hemoptysis; No shortness of breath  CARDIOVASCULAR: No chest pain, palpitations, dizziness, or leg swelling  GASTROINTESTINAL: No abdominal or epigastric pain. No nausea, vomiting, or hematemesis; No diarrhea or constipation. No melena or hematochezia.  GENITOURINARY: No dysuria, frequency, hematuria, or incontinence  NEUROLOGICAL: No headaches, memory loss, loss of strength, numbness, or tremors  SKIN: No itching, burning, rashes, or lesions   ENDOCRINE: No heat or cold intolerance; No hair loss  MUSCULOSKELETAL: No joint pain or swelling; No muscle, back, or extremity pain  PSYCHIATRIC: No depression, anxiety, mood swings, or difficulty sleeping      MEDICATIONS  (STANDING):  allopurinol 100 milliGRAM(s) Oral daily  amLODIPine   Tablet 10 milliGRAM(s) Oral daily  heparin   Injectable 5000 Unit(s) SubCutaneous every 8 hours  hydrochlorothiazide 25 milliGRAM(s) Oral daily  insulin glargine Injectable (LANTUS) 8 Unit(s) SubCutaneous at bedtime  insulin lispro (ADMELOG) corrective regimen sliding scale   SubCutaneous three times a day before meals  insulin lispro Injectable (ADMELOG) 2 Unit(s) SubCutaneous three times a day before meals  losartan 100 milliGRAM(s) Oral daily  nebivolol 5 milliGRAM(s) Oral daily  spironolactone 25 milliGRAM(s) Oral daily    MEDICATIONS  (PRN):      Vital Signs Last 24 Hrs  T(C): 36.9 (31 Dec 2024 14:27), Max: 36.9 (30 Dec 2024 18:20)  T(F): 98.5 (31 Dec 2024 14:27), Max: 98.5 (30 Dec 2024 18:20)  HR: 57 (31 Dec 2024 14:27) (57 - 75)  BP: 162/83 (31 Dec 2024 14:27) (145/72 - 184/78)  BP(mean): --  RR: 16 (31 Dec 2024 14:27) (16 - 18)  SpO2: 95% (31 Dec 2024 14:27) (95% - 100%)    Parameters below as of 31 Dec 2024 14:27  Patient On (Oxygen Delivery Method): room air        PHYSICAL EXAM:    GENERAL: NAD, well-groomed, well-developed  HEAD:  Atraumatic, Normocephalic  EYES: EOMI, PERRLA, conjunctiva and sclera clear  ENMT: No tonsillar erythema, exudates, or enlargement; Moist mucous membranes, Good dentition, No lesions  NECK: Supple, No JVD, Normal thyroid  NERVOUS SYSTEM:  Alert & Oriented X3, No new  focal sign .  CHEST/LUNG: Air entry good bilaterally; No rales, rhonchi, wheezing, or rubs  HEART: Regular rate and rhythm; No murmurs, rubs, or gallops  ABDOMEN: Soft, Nontender, Nondistended; Bowel sounds present  EXTREMITIES:  2+ Peripheral Pulses, No clubbing, cyanosis, or edema      LABS:                        10.3   9.87  )-----------( 271      ( 31 Dec 2024 05:30 )             32.3     12-31    137  |  101  |  30[H]  ----------------------------<  170[H]  4.7   |  20[L]  |  1.52[H]    Ca    9.7      31 Dec 2024 05:30  Phos  2.9     12-31  Mg     1.90     12-31    TPro  7.7  /  Alb  4.2  /  TBili  <0.2  /  DBili  x   /  AST  25  /  ALT  9   /  AlkPhos  170[H]  12-30      Urinalysis Basic - ( 31 Dec 2024 05:30 )    Color: x / Appearance: x / SG: x / pH: x  Gluc: 170 mg/dL / Ketone: x  / Bili: x / Urobili: x   Blood: x / Protein: x / Nitrite: x   Leuk Esterase: x / RBC: x / WBC x   Sq Epi: x / Non Sq Epi: x / Bacteria: x          RADIOLOGY & ADDITIONAL STUDIES:    
HPI:  88 y/o M with pmh of prostate cancer, DM type 2, HTN, p/w hypoglycemia.  Pt reports his insulin glargine  dose was increased from 12 to 15 units recently.  Over the past few days, he reports getting low reading in the morning, but is able to alert his wife who gives him food to bring it  up.  Yesterday AM, pt's wife noticed he was confused and making gurgling sounds while in bed.  She checked FS which was 41.  EMS was called, and pt give D50 en route to ED.  Pt also reports intermittent L sided chest pain for the past several days.  Pain is positional, and improves when pt wears a support belt.  Pain is difficult for patient to describe quality.  No associated dyspnea, dizziness nausea or diaphoresis.  No recent fever or cough.      Pt was evaluated in the ED, and placed in the CDU for monitoring.  He is admitted for further w/u of chest pain and monitoring of glucose.   (30 Dec 2024 21:59)  He is being treated with Remdeivir for Covid infection.  Poor historian:  History obtained from wife. Dx to have prostate cancer in 4/13/2011. 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 treated c prostatectomy & RT by Dr. Duarte Daniels. Disease recurrence around 2020,. Started with complete Jass.  progressive disease in 4/2/24, started with Lupron -arbi- prednisone; co-managed with Sr Contreras at [facility]. In November PSA rises again. JASS was stopped. On his day of present admission, he is scheduled to see Dr. Contreras for another treatment of  ,     REVIEW OF SYSTEMS:    CONSTITUTIONAL: No weakness, fevers or chills, weight loss  EYES/ENT: No visual changes, no throat pain   RESPIRATORY: No cough, wheezing, hemoptysis; No shortness of breath  CARDIOVASCULAR: No chest pain or palpitations  GASTROINTESTINAL: No abdominal, nausea, vomiting, or hematemesis; No diarrhea or constipation. No melena or hematochezia.  GENITOURINARY: No dysuria, frequency or hematuria  NEUROLOGICAL: No dizziness, numbness, or weakness  SKIN: No itching, burning, rashes, or lesions   All other review of systems is negative unless indicated above.         Allergies:  	No Known Allergies:     Home Medications:   * Patient Currently Takes Medications as of 31-Dec-2024 00:49 documented in Structured Notes  · 	allopurinol 100 mg oral tablet: Last Dose Taken:  , 1 tab(s) orally once a day  · 	spironolactone 25 mg oral tablet: Last Dose Taken:  , 1 tab(s) orally once a day  · 	Lantus Solostar Pen 100 units/mL subcutaneous solution: Last Dose Taken:  , 15 unit(s) subcutaneous once a day (at bedtime)  · 	glipizide-metformin 5 mg-500 mg oral tablet: Last Dose Taken:  , 1 tab(s) orally once a day  · 	nebivolol 5 mg oral tablet: Last Dose Taken:  , 1 tab(s) orally once a day  · 	Tribenzor 40 mg-10 mg-25 mg oral tablet: Last Dose Taken:  , 1 tab(s) orally once a day    Patient History:    Past Medical, Past Surgical, and Family History:  PAST MEDICAL HISTORY:  DM2 (diabetes mellitus, type 2)     HTN (hypertension)     Prostate cancer.     PAST SURGICAL HISTORY:  No significant past surgical history.     FAMILY HISTORY:  No pertinent family history in first degree relatives. No pertinent family history of: no family history pertinent to current presentation.     Social History:  · Substance use	Yes  · Tobacco use	remote smoking history     Tobacco Screening:  · Core Measure Site	No    VITAL SIGNS:    T98.2 56 157/81 18    PHYSICAL EXAM:     GENERAL: no acute distress  HEENT: NC/AT, EOMI, neck supple, MMM  RESPIRATORY: LCTAB/L, no rhonchi, rales, or wheezing  CARDIOVASCULAR: RRR, no murmurs, gallops, rubs  ABDOMINAL: soft, non-tender, non-distended, positive bowel sounds   EXTREMITIES: no clubbing, cyanosis, or edema  NEUROLOGICAL: alert and confused, non-focal  LYMPHATIC: lymphatic: cervical, supraclavicular, axilla, inguinal  SKIN: no rashes or lesions   MUSCULOSKELETAL: no gross joint deformity                          9.3    8.71  )-----------( 352      ( 11 Jan 2025 06:20 )             28.7     01-11    135  |  102  |  69[H]  ----------------------------<  137[H]  5.0   |  17[L]  |  2.22[H]    Ca    9.2      11 Jan 2025 06:20  Phos  3.9     01-11  Mg     2.40     01-11        MEDICATIONS  (STANDING):  allopurinol 100 milliGRAM(s) Oral daily  amLODIPine   Tablet 10 milliGRAM(s) Oral daily  aspirin enteric coated 81 milliGRAM(s) Oral daily  atorvastatin 40 milliGRAM(s) Oral at bedtime  heparin   Injectable 5000 Unit(s) SubCutaneous every 8 hours  insulin glargine Injectable (LANTUS) 29 Unit(s) SubCutaneous at bedtime  insulin lispro (ADMELOG) corrective regimen sliding scale   SubCutaneous three times a day before meals  insulin lispro (ADMELOG) corrective regimen sliding scale   SubCutaneous at bedtime  insulin lispro Injectable (ADMELOG) 10 Unit(s) SubCutaneous three times a day before meals  isosorbide   mononitrate ER Tablet (IMDUR) 60 milliGRAM(s) Oral daily  sodium chloride 0.9%. 1000 milliLiter(s) (50 mL/Hr) IV Continuous <Continuous>    PROCEDURE DATE:  01/09/2025    CT scan of spine      INTERPRETATION:  Clinical indication: Back pain.    Axial sections were performed through the cervical thoracic and lumbar   spine region. Coronal and and sagittal reconstructions were performed.    Cervical spine:    Reversal of the normal cervical lordosis is seen    Disc space narrowing endplate changes mass effect seen involving the C5-6   C6-C7 C7-T1 levels secondary to chronic degenerative    C2-3: Bilateral hypertrophic facet joint change. Mild narrowing of the   left neural foramen    C3-4: Disc bulge is seen. Bilateral hypertrophic facet change. Moderate   narrowing of the left neural foramen. Severe narrowing of the right   neural foramen    C4-5: Disc bulge and bilateral hypertrophic facet change. Mild narrowing   of the right neural foramen and severe narrowing of the left neural   foramen    C5-6: Disc bulge is identified. Hypertrophic change involving both facet   and uncal vertebral joints with mild to moderate narrowing of the left   neural foramen. Moderate narrowing of the right neural foramen    C6-7: Hypertrophic change is seen both facet and uncovertebral joint.   Moderate narrowing of both neural foramen    C7-T1: Hypertrophic change both facet and uncal regions. Moderate   narrowing of both neural foramen. Mild narrowing of the spinal canal.    T1-2: Bilateral hypertrophic facet joint changes. Moderate narrowing of   spinal canal.    C2-3: Normal    Well-defined expansile lytic lesion is seen involving the left T1   lamina/proximal transverse process. This lesion measures approximately   1.1 x 2.7 cm and could be compatible with a hemangioma though the   possibility of underlying lesion such as metastasis multiple myeloma or   other similar etiology cannot be entirely excluded. There are more vague   lucency seen involving the C4 and C5 and C6 vertebral bodies which could   be compatible with hemangiomas though underlying lytic lesion such as   metastasis multiple myeloma cannot be excluded as regions well. Contrast   enhanced MRI is recommended to characterize this finding if there are no   complications.    No acute fractures or dislocations seen.    Elevation paraspinal soft tissues is limited by lack of IV contrast   though grossly unremarkable    Enlargement of the left lobe of thyroid is identified. Dedicated imaging   of the thyroid can be done if not ready to better evaluate for underlying   lesions.    The visualized portions of both lung apices appear clear.    Thoracic/lumbar spine:    Scoliosis is seen    Loss of the normal lumbar lordosis is seen    Disc space narrowing endplate sclerotic changes and osteophytes are seen   throughout the thoracic and lumbar spine region as well as scattered   areas of vacuum disc changes. These findings are secondary to chronic   degenerative changes.    Compression deformity with abnormal lucency involving the L4 vertebral   body. There are also scattered areas of lucencies seen involving the T8,   T11 and L2 vertebral bodies. These findings could be compatible with an   atypical hemangiomas, the the possibility of underlying lesion such as   metastasis multiple myeloma, lymphoma or similar etiologies cannot be   entirely excluded. Contrast enhanced MRI can be done to better evaluate   these findings do no complications. Bilateral hypertrophic facet joint   changes are seen involving multiple levels secondary chronic degenerative   changes    L1-2: Disc bulge and bilateral hypertrophic facet joint changes. Mild   narrowing of the spinal canal.    L2-3: Bilateral hypertrophic facet change. Mild to moderate narrowing of   the right neural foramen    L3-4: Disc bulge and bilateral hypertrophic facet joint change. Mild to   moderate narrowing of the spinal canal.    L4-5: Disc bulge and bilateral hypertrophic facet joint change. Moderate   to severe narrowing of the spinal canal.    L5-S1: Disc bulge and bilateral hypertrophic facet joint change. Mild   narrowing of the spinal canal.    Elevation of the paraspinal soft tissues is limited likely contrast   though grossly normal    The visualized lungs appear clear.    IMPRESSION: Degenerative changes involving the cervical thoracic and   lumbar spine region as well as abnormal lucent lesions as described   above. While these findings could be compatible with metastasis these are   unlikely compatible with prostate metastasis given that they are lytic   lesions at cannot of blastic lesions. Contrast enhanced MRI of the   cervical thoracic and lumbar spine is recommended for further evaluation   if there are no contraindications

## 2025-01-13 NOTE — PROGRESS NOTE ADULT - SUBJECTIVE AND OBJECTIVE BOX
Cardiology Attending Follow-up Note     Patient seen and examined at bedside.    Overnight Events:     no cardiac sxs     REVIEW OF SYSTEMS:  Constitutional:     [x ] negative [ ] fevers [ ] chills [ ] weight loss [ ] weight gain  HEENT:                  [x ] negative [ ] dry eyes [ ] eye irritation [ ] postnasal drip [ ] nasal congestion  CV:                         [ x] negative  [ ] chest pain [ ] orthopnea [ ] palpitations [ ] murmur  Resp:                     [ x] negative [ ] cough [ ] shortness of breath [ ] dyspnea [ ] wheezing [ ] sputum [ ]hemoptysis  GI:                          [ x] negative [ ] nausea [ ] vomiting [ ] diarrhea [ ] constipation [ ] abd pain [ ] dysphagia   :                        [ x] negative [ ] dysuria [ ] nocturia [ ] hematuria [ ] increased urinary frequency  Musculoskeletal: [ x] negative [ ] back pain [ ] myalgias [ ] arthralgias [ ] fracture  Skin:                       [ x] negative [ ] rash [ ] itch  Neurological:        [x ] negative [ ] headache [ ] dizziness [ ] syncope [ ] weakness [ ] numbness  Psychiatric:           [ x] negative [ ] anxiety [ ] depression  Endocrine:            [ x] negative [ ] diabetes [ ] thyroid problem  Heme/Lymph:      [ x] negative [ ] anemia [ ] bleeding problem  Allergic/Immune: [ x] negative [ ] itchy eyes [ ] nasal discharge [ ] hives [ ] angioedema    [ x] All other systems negative  [ ] Unable to assess ROS due to    Current Meds:  acetaminophen     Tablet .. 650 milliGRAM(s) Oral every 6 hours PRN  allopurinol 100 milliGRAM(s) Oral daily  amLODIPine   Tablet 10 milliGRAM(s) Oral daily  aspirin enteric coated 81 milliGRAM(s) Oral daily  atorvastatin 40 milliGRAM(s) Oral at bedtime  heparin   Injectable 5000 Unit(s) SubCutaneous every 8 hours  insulin glargine Injectable (LANTUS) 29 Unit(s) SubCutaneous at bedtime  insulin lispro (ADMELOG) corrective regimen sliding scale   SubCutaneous at bedtime  insulin lispro (ADMELOG) corrective regimen sliding scale   SubCutaneous three times a day before meals  insulin lispro Injectable (ADMELOG) 10 Unit(s) SubCutaneous three times a day before meals  isosorbide   mononitrate ER Tablet (IMDUR) 60 milliGRAM(s) Oral daily  melatonin 3 milliGRAM(s) Oral at bedtime PRN  sodium chloride 0.9%. 1000 milliLiter(s) IV Continuous <Continuous>      PAST MEDICAL & SURGICAL HISTORY:  HTN (hypertension)      DM2 (diabetes mellitus, type 2)      Prostate cancer      No significant past surgical history          Vitals:  T(F): 98.6 (01-13), Max: 98.6 (01-13)  HR: 80 (01-13) (60 - 80)  BP: 135/71 (01-13) (134/70 - 142/76)  RR: 18 (01-13)  SpO2: 97% (01-13)  I&O's Summary    12 Jan 2025 07:01  -  13 Jan 2025 07:00  --------------------------------------------------------  IN: 0 mL / OUT: 1200 mL / NET: -1200 mL        Physical Exam:  Appearance: No acute distress  HENT: No JVD   Cardiovascular: RRR, S1/S2, no murmurs  Respiratory: CTABL  Gastrointestinal: soft, NT ND, +BS  Musculoskeletal: No clubbing, no edema   Neurologic: Non-focal  Skin: No rashes, ecchymoses, or cyanosis                          9.3    9.45  )-----------( 362      ( 13 Jan 2025 05:45 )             29.0     01-13    137  |  103  |  65[H]  ----------------------------<  133[H]  4.5   |  20[L]  |  2.27[H]    Ca    9.1      13 Jan 2025 05:45  Phos  4.1     01-13  Mg     2.50     01-13                    Cardiovascular Testings:

## 2025-01-13 NOTE — PROGRESS NOTE ADULT - ASSESSMENT
88 y/o M with pmh of prostate cancer, DM type 2, HTN, p/w hypoglycemia. +NST planning for angio. nephrology consulted for elevated scr    CKD stage 3B  Cr plateauing at 2.1-2.3.  +covid; s/p remdesivir  He was on HCTZ, losartan, spironolactone, since held. ( last dose 1/9)  KIRBY likely hemodynamic.  s/p gentle hydration with ns @ 50cc/hr x20 hrs 1/9.  LHC deferred in view of Covid+ and elevated S.Cr.  Recommend NS @ 75cc/hr x6 hrs before and 6 hrs after if planned for angiogram.  monitor BMP and UO  Group 2 Gadolinium Contrast can be used for MRI.    HTN  Fluctuating; acceptable.  C/W current meds.  Low salt diet.  monitor    Proteinuria  mild  UPr/Cr 0.26gm.  Monitor.    COVID-19:  care as per team  s/p remdesivir    Chest Pains  +NST  LHC deferred for now.  f/u cardio

## 2025-01-13 NOTE — CONSULT NOTE ADULT - TIME BILLING
Patient seen and examined.  CT spine reviewed.  Patient with hx of Prostate CA admitted for hypoglycemia.  Pt c/o back pain and CT spine revealed lytic lesions T8, T11, L2 and L4 vertebrae, with L4 compression fx.  Awaiting MRI spine.

## 2025-01-13 NOTE — CONSULT NOTE ADULT - PROBLEM SELECTOR RECOMMENDATION 9
- MRI spine w/wo     [id number]    Case to be discussed with attending neurosurgeon Dr. Aguilera
Planned for cardiac cath.   Will start on Aspirin and statin.  Cardiology help appreciated.   < from: TTE W or WO Ultrasound Enhancing Agent (12.31.24 @ 07:33) >    CONCLUSIONS:      1. Left ventricular systolic function is normal with an ejection fraction visually estimated at 50 to 55 %.      < from: Nuclear Stress Test-Exercise.. (12.31.24 @ 08:30) >     1. Qualitative Perfusion:      - medium-sized, moderate to severe defect(s) in the inferior and inferolateral wall suggestive of CAD.   2. The left ventricle is mildly decreased in function and normal in size. The time activity curve suggests diastolic dysfunction.. The post stress left ventricular EF is 45 %. The stress end diastolic volume is 71 ml and systolic volume is 39 ml.   3. Hypokinesis of the inferoseptal wall.
continue to monitor

## 2025-01-13 NOTE — PROGRESS NOTE ADULT - SUBJECTIVE AND OBJECTIVE BOX
neurosurgery note appreciated  back pain is positional      REVIEW OF SYSTEMS:    CONSTITUTIONAL: No weakness, fevers or chills, weight loss  EYES/ENT: No visual changes, no throat pain   RESPIRATORY: No cough, wheezing, hemoptysis; No shortness of breath  CARDIOVASCULAR: No chest pain or palpitations  GENITOURINARY: No dysuria, frequency or hematuria    All other review of systems is negative unless indicated above.    VITAL SIGNS:    T97.9     PHYSICAL EXAM:     GENERAL: no acute distress  HEENT: NC/AT, EOMI, neck supple, MMM  RESPIRATORY: LCTAB/L, no rhonchi, rales, or wheezing  CARDIOVASCULAR: RRR, no murmurs, gallops, rubs  ABDOMINAL: soft, non-tender, non-distended, positive bowel sounds   EXTREMITIES: no clubbing, cyanosis, or edema  NEUROLOGICAL: alert and oriented x 3, non-focal  LYMPHATIC: lymphatic: cervical, supraclavicular, axilla, inguinal  SKIN: no rashes or lesions   MUSCULOSKELETAL: no gross joint deformity                          9.3    9.45  )-----------( 362      ( 13 Jan 2025 05:45 )             29.0     01-13    137  |  103  |  65[H]  ----------------------------<  133[H]  4.5   |  20[L]  |  2.27[H]    Ca    9.1      13 Jan 2025 05:45  Phos  4.1     01-13  Mg     2.50     01-13        MEDICATIONS  (STANDING):  allopurinol 100 milliGRAM(s) Oral daily  amLODIPine   Tablet 10 milliGRAM(s) Oral daily  aspirin enteric coated 81 milliGRAM(s) Oral daily  atorvastatin 40 milliGRAM(s) Oral at bedtime  heparin   Injectable 5000 Unit(s) SubCutaneous every 8 hours  insulin glargine Injectable (LANTUS) 29 Unit(s) SubCutaneous at bedtime  insulin lispro (ADMELOG) corrective regimen sliding scale   SubCutaneous three times a day before meals  insulin lispro (ADMELOG) corrective regimen sliding scale   SubCutaneous at bedtime  insulin lispro Injectable (ADMELOG) 10 Unit(s) SubCutaneous three times a day before meals  isosorbide   mononitrate ER Tablet (IMDUR) 60 milliGRAM(s) Oral daily  sodium chloride 0.9%. 1000 milliLiter(s) (50 mL/Hr) IV Continuous <Continuous>

## 2025-01-14 LAB
ANION GAP SERPL CALC-SCNC: 14 MMOL/L — SIGNIFICANT CHANGE UP (ref 7–14)
BUN SERPL-MCNC: 64 MG/DL — HIGH (ref 7–23)
CALCIUM SERPL-MCNC: 9.3 MG/DL — SIGNIFICANT CHANGE UP (ref 8.4–10.5)
CHLORIDE SERPL-SCNC: 105 MMOL/L — SIGNIFICANT CHANGE UP (ref 98–107)
CO2 SERPL-SCNC: 21 MMOL/L — LOW (ref 22–31)
CREAT SERPL-MCNC: 2.23 MG/DL — HIGH (ref 0.5–1.3)
EGFR: 28 ML/MIN/1.73M2 — LOW
GLUCOSE BLDC GLUCOMTR-MCNC: 115 MG/DL — HIGH (ref 70–99)
GLUCOSE BLDC GLUCOMTR-MCNC: 193 MG/DL — HIGH (ref 70–99)
GLUCOSE BLDC GLUCOMTR-MCNC: 239 MG/DL — HIGH (ref 70–99)
GLUCOSE BLDC GLUCOMTR-MCNC: 99 MG/DL — SIGNIFICANT CHANGE UP (ref 70–99)
GLUCOSE SERPL-MCNC: 110 MG/DL — HIGH (ref 70–99)
MAGNESIUM SERPL-MCNC: 2.4 MG/DL — SIGNIFICANT CHANGE UP (ref 1.6–2.6)
PHOSPHATE SERPL-MCNC: 3.9 MG/DL — SIGNIFICANT CHANGE UP (ref 2.5–4.5)
POTASSIUM SERPL-MCNC: 4.5 MMOL/L — SIGNIFICANT CHANGE UP (ref 3.5–5.3)
POTASSIUM SERPL-SCNC: 4.5 MMOL/L — SIGNIFICANT CHANGE UP (ref 3.5–5.3)
SODIUM SERPL-SCNC: 140 MMOL/L — SIGNIFICANT CHANGE UP (ref 135–145)

## 2025-01-14 PROCEDURE — 99232 SBSQ HOSP IP/OBS MODERATE 35: CPT

## 2025-01-14 RX ORDER — INSULIN LISPRO 100/ML
7 VIAL (ML) SUBCUTANEOUS ONCE
Refills: 0 | Status: COMPLETED | OUTPATIENT
Start: 2025-01-14 | End: 2025-01-14

## 2025-01-14 RX ORDER — LOSARTAN POTASSIUM 100 MG/1
50 TABLET, FILM COATED ORAL DAILY
Refills: 0 | Status: DISCONTINUED | OUTPATIENT
Start: 2025-01-14 | End: 2025-01-17

## 2025-01-14 RX ADMIN — Medication 7 UNIT(S): at 09:08

## 2025-01-14 RX ADMIN — Medication 1: at 11:37

## 2025-01-14 RX ADMIN — Medication 10 UNIT(S): at 17:07

## 2025-01-14 RX ADMIN — ACETAMINOPHEN 650 MILLIGRAM(S): 80 SOLUTION/ DROPS ORAL at 17:54

## 2025-01-14 RX ADMIN — HEPARIN SODIUM 5000 UNIT(S): 1000 INJECTION, SOLUTION INTRAVENOUS; SUBCUTANEOUS at 14:55

## 2025-01-14 RX ADMIN — ALLOPURINOL 100 MILLIGRAM(S): 100 TABLET ORAL at 11:38

## 2025-01-14 RX ADMIN — INSULIN GLARGINE-YFGN 29 UNIT(S): 100 INJECTION, SOLUTION SUBCUTANEOUS at 21:58

## 2025-01-14 RX ADMIN — Medication 10 MILLIGRAM(S): at 05:19

## 2025-01-14 RX ADMIN — Medication 60 MILLIGRAM(S): at 11:39

## 2025-01-14 RX ADMIN — HEPARIN SODIUM 5000 UNIT(S): 1000 INJECTION, SOLUTION INTRAVENOUS; SUBCUTANEOUS at 21:06

## 2025-01-14 RX ADMIN — ACETAMINOPHEN 650 MILLIGRAM(S): 80 SOLUTION/ DROPS ORAL at 08:18

## 2025-01-14 RX ADMIN — HEPARIN SODIUM 5000 UNIT(S): 1000 INJECTION, SOLUTION INTRAVENOUS; SUBCUTANEOUS at 05:19

## 2025-01-14 RX ADMIN — ACETAMINOPHEN 650 MILLIGRAM(S): 80 SOLUTION/ DROPS ORAL at 09:18

## 2025-01-14 RX ADMIN — ACETAMINOPHEN 650 MILLIGRAM(S): 80 SOLUTION/ DROPS ORAL at 16:54

## 2025-01-14 RX ADMIN — ATORVASTATIN CALCIUM 40 MILLIGRAM(S): 40 TABLET, FILM COATED ORAL at 21:06

## 2025-01-14 RX ADMIN — Medication 81 MILLIGRAM(S): at 11:38

## 2025-01-14 RX ADMIN — Medication 10 UNIT(S): at 11:38

## 2025-01-14 NOTE — PROGRESS NOTE ADULT - SUBJECTIVE AND OBJECTIVE BOX
Date of Service  : 01-14-25     INTERVAL HPI/OVERNIGHT EVENTS: I feel fine.   Vital Signs Last 24 Hrs  T(C): 36.3 (14 Jan 2025 17:08), Max: 36.7 (14 Jan 2025 05:00)  T(F): 97.3 (14 Jan 2025 17:08), Max: 98.1 (14 Jan 2025 05:00)  HR: 65 (14 Jan 2025 17:08) (62 - 71)  BP: 131/60 (14 Jan 2025 17:08) (131/60 - 154/79)  BP(mean): --  RR: 18 (14 Jan 2025 17:08) (16 - 18)  SpO2: 97% (14 Jan 2025 17:08) (96% - 100%)    Parameters below as of 14 Jan 2025 17:08  Patient On (Oxygen Delivery Method): room air      I&O's Summary    14 Jan 2025 07:01  -  14 Jan 2025 21:18  --------------------------------------------------------  IN: 820 mL / OUT: 1000 mL / NET: -180 mL      MEDICATIONS  (STANDING):  allopurinol 100 milliGRAM(s) Oral daily  amLODIPine   Tablet 10 milliGRAM(s) Oral daily  aspirin enteric coated 81 milliGRAM(s) Oral daily  atorvastatin 40 milliGRAM(s) Oral at bedtime  heparin   Injectable 5000 Unit(s) SubCutaneous every 8 hours  insulin glargine Injectable (LANTUS) 29 Unit(s) SubCutaneous at bedtime  insulin lispro (ADMELOG) corrective regimen sliding scale   SubCutaneous three times a day before meals  insulin lispro (ADMELOG) corrective regimen sliding scale   SubCutaneous at bedtime  insulin lispro Injectable (ADMELOG) 10 Unit(s) SubCutaneous three times a day before meals  isosorbide   mononitrate ER Tablet (IMDUR) 60 milliGRAM(s) Oral daily  losartan 50 milliGRAM(s) Oral daily  sodium chloride 0.9%. 1000 milliLiter(s) (50 mL/Hr) IV Continuous <Continuous>    MEDICATIONS  (PRN):  acetaminophen     Tablet .. 650 milliGRAM(s) Oral every 6 hours PRN Temp greater or equal to 38C (100.4F), Mild Pain (1 - 3), Moderate Pain (4 - 6)  melatonin 3 milliGRAM(s) Oral at bedtime PRN Insomnia    LABS:                        9.3    9.45  )-----------( 362      ( 13 Jan 2025 05:45 )             29.0     01-14    140  |  105  |  64[H]  ----------------------------<  110[H]  4.5   |  21[L]  |  2.23[H]    Ca    9.3      14 Jan 2025 08:02  Phos  3.9     01-14  Mg     2.40     01-14        Urinalysis Basic - ( 14 Jan 2025 08:02 )    Color: x / Appearance: x / SG: x / pH: x  Gluc: 110 mg/dL / Ketone: x  / Bili: x / Urobili: x   Blood: x / Protein: x / Nitrite: x   Leuk Esterase: x / RBC: x / WBC x   Sq Epi: x / Non Sq Epi: x / Bacteria: x      CAPILLARY BLOOD GLUCOSE      POCT Blood Glucose.: 115 mg/dL (14 Jan 2025 17:04)  POCT Blood Glucose.: 193 mg/dL (14 Jan 2025 11:33)  POCT Blood Glucose.: 99 mg/dL (14 Jan 2025 08:13)        Urinalysis Basic - ( 14 Jan 2025 08:02 )    Color: x / Appearance: x / SG: x / pH: x  Gluc: 110 mg/dL / Ketone: x  / Bili: x / Urobili: x   Blood: x / Protein: x / Nitrite: x   Leuk Esterase: x / RBC: x / WBC x   Sq Epi: x / Non Sq Epi: x / Bacteria: x      REVIEW OF SYSTEMS:  CONSTITUTIONAL: No fever, weight loss, or fatigue  EYES: No eye pain, visual disturbances, or discharge  ENMT:  No difficulty hearing, tinnitus, vertigo; No sinus or throat pain  NECK: No pain or stiffness  RESPIRATORY: No cough, wheezing, chills or hemoptysis; No shortness of breath  CARDIOVASCULAR: No chest pain, palpitations, dizziness, or leg swelling  GASTROINTESTINAL: No abdominal or epigastric pain. No nausea, vomiting, or hematemesis; No diarrhea or constipation. No melena or hematochezia.  GENITOURINARY: No dysuria, frequency, hematuria, or incontinence  NEUROLOGICAL: No headaches, memory loss, loss of strength, numbness, or tremors      Consultant(s) Notes Reviewed:  [x ] YES  [ ] NO    PHYSICAL EXAM:  GENERAL: NAD, well-groomed, well-developed,not in any distress ,  HEAD:  Atraumatic, Normocephalic  NECK: Supple, No JVD, Normal thyroid  NERVOUS SYSTEM:  Alert & Oriented X3, No focal deficit   CHEST/LUNG: Good air entry bilateral with no  rales, rhonchi, wheezing, or rubs  HEART: Regular rate and rhythm; No murmurs, rubs, or gallops  ABDOMEN: Soft, Nontender, Nondistended; Bowel sounds present  EXTREMITIES:  2+ Peripheral Pulses, No clubbing, cyanosis, or edema    Care Discussed with Consultants/Other Providers [ x] YES  [ ] NO

## 2025-01-14 NOTE — PROGRESS NOTE ADULT - SUBJECTIVE AND OBJECTIVE BOX
Cardiology Attending Follow-up Note     Patient seen and examined at bedside.    Overnight Events:       REVIEW OF SYSTEMS:  Constitutional:     [x ] negative [ ] fevers [ ] chills [ ] weight loss [ ] weight gain  HEENT:                  [x ] negative [ ] dry eyes [ ] eye irritation [ ] postnasal drip [ ] nasal congestion  CV:                         [ x] negative  [ ] chest pain [ ] orthopnea [ ] palpitations [ ] murmur  Resp:                     [ x] negative [ ] cough [ ] shortness of breath [ ] dyspnea [ ] wheezing [ ] sputum [ ]hemoptysis  GI:                          [ x] negative [ ] nausea [ ] vomiting [ ] diarrhea [ ] constipation [ ] abd pain [ ] dysphagia   :                        [ x] negative [ ] dysuria [ ] nocturia [ ] hematuria [ ] increased urinary frequency  Musculoskeletal: [ x] negative [ ] back pain [ ] myalgias [ ] arthralgias [ ] fracture  Skin:                       [ x] negative [ ] rash [ ] itch  Neurological:        [x ] negative [ ] headache [ ] dizziness [ ] syncope [ ] weakness [ ] numbness  Psychiatric:           [ x] negative [ ] anxiety [ ] depression  Endocrine:            [ x] negative [ ] diabetes [ ] thyroid problem  Heme/Lymph:      [ x] negative [ ] anemia [ ] bleeding problem  Allergic/Immune: [ x] negative [ ] itchy eyes [ ] nasal discharge [ ] hives [ ] angioedema    [ x] All other systems negative  [ ] Unable to assess ROS due to    Current Meds:  acetaminophen     Tablet .. 650 milliGRAM(s) Oral every 6 hours PRN  allopurinol 100 milliGRAM(s) Oral daily  amLODIPine   Tablet 10 milliGRAM(s) Oral daily  aspirin enteric coated 81 milliGRAM(s) Oral daily  atorvastatin 40 milliGRAM(s) Oral at bedtime  heparin   Injectable 5000 Unit(s) SubCutaneous every 8 hours  insulin glargine Injectable (LANTUS) 29 Unit(s) SubCutaneous at bedtime  insulin lispro (ADMELOG) corrective regimen sliding scale   SubCutaneous three times a day before meals  insulin lispro (ADMELOG) corrective regimen sliding scale   SubCutaneous at bedtime  insulin lispro Injectable (ADMELOG) 10 Unit(s) SubCutaneous three times a day before meals  isosorbide   mononitrate ER Tablet (IMDUR) 60 milliGRAM(s) Oral daily  losartan 50 milliGRAM(s) Oral daily  melatonin 3 milliGRAM(s) Oral at bedtime PRN  sodium chloride 0.9%. 1000 milliLiter(s) IV Continuous <Continuous>      PAST MEDICAL & SURGICAL HISTORY:  HTN (hypertension)      DM2 (diabetes mellitus, type 2)      Prostate cancer      No significant past surgical history          Vitals:  T(F): 97.3 (01-14), Max: 98.1 (01-14)  HR: 65 (01-14) (62 - 65)  BP: 131/60 (01-14) (131/60 - 154/79)  RR: 18 (01-14)  SpO2: 97% (01-14)  I&O's Summary    14 Jan 2025 07:01  -  14 Jan 2025 23:04  --------------------------------------------------------  IN: 820 mL / OUT: 1000 mL / NET: -180 mL        Physical Exam:  Appearance: No acute distress  HENT: No JVD   Cardiovascular: RRR, S1/S2, no murmurs  Respiratory: CTABL  Gastrointestinal: soft, NT ND, +BS  Musculoskeletal: No clubbing, no edema   Neurologic: Non-focal  Skin: No rashes, ecchymoses, or cyanosis                          9.3    9.45  )-----------( 362      ( 13 Jan 2025 05:45 )             29.0     01-14    140  |  105  |  64[H]  ----------------------------<  110[H]  4.5   |  21[L]  |  2.23[H]    Ca    9.3      14 Jan 2025 08:02  Phos  3.9     01-14  Mg     2.40     01-14                    Cardiovascular Testings:      Cardiology Attending Follow-up Note     Patient seen and examined at bedside.    Overnight Events:     no cardiac sxs     REVIEW OF SYSTEMS:  Constitutional:     [x ] negative [ ] fevers [ ] chills [ ] weight loss [ ] weight gain  HEENT:                  [x ] negative [ ] dry eyes [ ] eye irritation [ ] postnasal drip [ ] nasal congestion  CV:                         [ x] negative  [ ] chest pain [ ] orthopnea [ ] palpitations [ ] murmur  Resp:                     [ x] negative [ ] cough [ ] shortness of breath [ ] dyspnea [ ] wheezing [ ] sputum [ ]hemoptysis  GI:                          [ x] negative [ ] nausea [ ] vomiting [ ] diarrhea [ ] constipation [ ] abd pain [ ] dysphagia   :                        [ x] negative [ ] dysuria [ ] nocturia [ ] hematuria [ ] increased urinary frequency  Musculoskeletal: [ x] negative [ ] back pain [ ] myalgias [ ] arthralgias [ ] fracture  Skin:                       [ x] negative [ ] rash [ ] itch  Neurological:        [x ] negative [ ] headache [ ] dizziness [ ] syncope [ ] weakness [ ] numbness  Psychiatric:           [ x] negative [ ] anxiety [ ] depression  Endocrine:            [ x] negative [ ] diabetes [ ] thyroid problem  Heme/Lymph:      [ x] negative [ ] anemia [ ] bleeding problem  Allergic/Immune: [ x] negative [ ] itchy eyes [ ] nasal discharge [ ] hives [ ] angioedema    [ x] All other systems negative  [ ] Unable to assess ROS due to    Current Meds:  acetaminophen     Tablet .. 650 milliGRAM(s) Oral every 6 hours PRN  allopurinol 100 milliGRAM(s) Oral daily  amLODIPine   Tablet 10 milliGRAM(s) Oral daily  aspirin enteric coated 81 milliGRAM(s) Oral daily  atorvastatin 40 milliGRAM(s) Oral at bedtime  heparin   Injectable 5000 Unit(s) SubCutaneous every 8 hours  insulin glargine Injectable (LANTUS) 29 Unit(s) SubCutaneous at bedtime  insulin lispro (ADMELOG) corrective regimen sliding scale   SubCutaneous three times a day before meals  insulin lispro (ADMELOG) corrective regimen sliding scale   SubCutaneous at bedtime  insulin lispro Injectable (ADMELOG) 10 Unit(s) SubCutaneous three times a day before meals  isosorbide   mononitrate ER Tablet (IMDUR) 60 milliGRAM(s) Oral daily  losartan 50 milliGRAM(s) Oral daily  melatonin 3 milliGRAM(s) Oral at bedtime PRN  sodium chloride 0.9%. 1000 milliLiter(s) IV Continuous <Continuous>      PAST MEDICAL & SURGICAL HISTORY:  HTN (hypertension)      DM2 (diabetes mellitus, type 2)      Prostate cancer      No significant past surgical history          Vitals:  T(F): 97.3 (01-14), Max: 98.1 (01-14)  HR: 65 (01-14) (62 - 65)  BP: 131/60 (01-14) (131/60 - 154/79)  RR: 18 (01-14)  SpO2: 97% (01-14)  I&O's Summary    14 Jan 2025 07:01  -  14 Jan 2025 23:04  --------------------------------------------------------  IN: 820 mL / OUT: 1000 mL / NET: -180 mL        Physical Exam:  Appearance: No acute distress  HENT: No JVD   Cardiovascular: RRR, S1/S2, no murmurs  Respiratory: CTABL  Gastrointestinal: soft, NT ND, +BS  Musculoskeletal: No clubbing, no edema   Neurologic: Non-focal  Skin: No rashes, ecchymoses, or cyanosis                          9.3    9.45  )-----------( 362      ( 13 Jan 2025 05:45 )             29.0     01-14    140  |  105  |  64[H]  ----------------------------<  110[H]  4.5   |  21[L]  |  2.23[H]    Ca    9.3      14 Jan 2025 08:02  Phos  3.9     01-14  Mg     2.40     01-14                    Cardiovascular Testings:

## 2025-01-14 NOTE — PROGRESS NOTE ADULT - SUBJECTIVE AND OBJECTIVE BOX
[facility]   MD CRISTI SESAY MD ANGELA WONG, PA    TEL:  OFFICE: [phone]  From 5pm-7am Answering Service [phone]    -- RENAL FOLLOW UP NOTE ---Date of Service 01-14-25 @ 10:50    Patient is a 89y old  Male who presents with a chief complaint of hypoglycemia (13 Jan 2025 21:42)      Patient seen and examined at bedside. No chest pain/sob    VITALS:  T(F): 98.1 (01-14-25 @ 05:00), Max: 98.6 (01-13-25 @ 20:06)  HR: 64 (01-14-25 @ 05:00)  BP: 154/79 (01-14-25 @ 05:00)  RR: 18 (01-14-25 @ 05:00)  SpO2: 97% (01-14-25 @ 05:00)  Wt(kg): --    01-14 @ 07:01  -  01-14 @ 10:50  --------------------------------------------------------  IN: 240 mL / OUT: 0 mL / NET: 240 mL          PHYSICAL EXAM:  General: NAD  Neck: No JVD  Respiratory: CTAB, no wheezes, rales or rhonchi  Cardiovascular: S1, S2, RRR  Gastrointestinal: BS+, soft, NT/ND  Extremities: No peripheral edema    Hospital Medications:   MEDICATIONS  (STANDING):  allopurinol 100 milliGRAM(s) Oral daily  amLODIPine   Tablet 10 milliGRAM(s) Oral daily  aspirin enteric coated 81 milliGRAM(s) Oral daily  atorvastatin 40 milliGRAM(s) Oral at bedtime  heparin   Injectable 5000 Unit(s) SubCutaneous every 8 hours  insulin glargine Injectable (LANTUS) 29 Unit(s) SubCutaneous at bedtime  insulin lispro (ADMELOG) corrective regimen sliding scale   SubCutaneous three times a day before meals  insulin lispro (ADMELOG) corrective regimen sliding scale   SubCutaneous at bedtime  insulin lispro Injectable (ADMELOG) 10 Unit(s) SubCutaneous three times a day before meals  isosorbide   mononitrate ER Tablet (IMDUR) 60 milliGRAM(s) Oral daily  sodium chloride 0.9%. 1000 milliLiter(s) (50 mL/Hr) IV Continuous <Continuous>      LABS:  01-14    140  |  105  |  64[H]  ----------------------------<  110[H]  4.5   |  21[L]  |  2.23[H]    Ca    9.3      14 Jan 2025 08:02  Phos  3.9     01-14  Mg     2.40     01-14      Creatinine Trend: 2.23 <--, 2.27 <--, 2.32 <--, 2.22 <--, 2.23 <--, 2.20 <--    Phosphorus: 3.9 mg/dL (01-14 @ 08:02)                              9.3    9.45  )-----------( 362      ( 13 Jan 2025 05:45 )             29.0     Urine Studies:  Urinalysis - [01-14-25 @ 08:02]      Color  / Appearance  / SG  / pH       Gluc 110 / Ketone   / Bili  / Urobili        Blood  / Protein  / Leuk Est  / Nitrite       RBC  / WBC  / Hyaline  / Gran  / Sq Epi  / Non Sq Epi  / Bacteria       Lipid: chol 130, , HDL 36, LDL --      [01-01-25 @ 05:30]        RADIOLOGY & ADDITIONAL STUDIES:

## 2025-01-14 NOTE — PROGRESS NOTE ADULT - ASSESSMENT
89M with PMH of prostate CA, T2DM, HTN, HLD, CKD, here for hypoglycemia sxs. In the ER, pt found to have cp with EKG w/ NSR and no significant ischemic changes. Pt was recommended to be admitted by Tele Doc Dr. Perea. Cardiology consulted for further evaluation inpt.     1. Cp, +NST and TTE reviewed   2. HTN   3. HLD   4. CKD     -given +NST, initially planned for [facility], deferred for now given COVID+ and rising Cr. Can be done as outpt post discharge given pt has no cardiac sxs at this point.   -bp control, cont norvasc, imduri, losartan, and Hctz   -cont asa and statin   -HR 50-60s, would rec hold bb   -new onset back pain, +prostate ca, evaluated by heme onc and pending MRI   -discharge planning   -f/u OP w/ me in clinic     Nitin Lee MD [facility]  Cardiology Attending, Jenn-NS/LOIS  Avaliable on [employer] Team

## 2025-01-14 NOTE — PROGRESS NOTE ADULT - ASSESSMENT
89-year-old male with a past medical history of prostate cancer, diabetes, hypertension, presenting due to concern of hypoglycemia at home.  The patient states he is tired and was not sure what happened.  According to his wife who was in bed with him, she started hearing him gurgling at home.  She turned on the lights and tried to wake him up, the patient was unresponsive.  They called EMS who came and checked a fingerstick which was 41.  He was given 1 amp of D50 with a repeat being 140.  Patient also tolerated p.o.  According to wife he had a similar occurrence yesterday where his fingerstick was in the 40s.  Patient takes combined metformin and glipizide as well as lantus  Denies fever, chills, chest pain, trouble breathing, dysuria.  According to patient he has decreased appetite recently compared to the past.  Endocrine called for further assistance.  Reports long hx of DM.   He states Lantus was increased from 12--> 15 units  checks FSBG in the am and afternoon  reports multiple lows in 30-40s       Poorly controlled T2DM with hyperglycemia  - HbA1c: 7.0  - Home Regimen: combined metformin and glipizide as well as Lantus 12-15 units  - Endocrinologist: unknown    Inpatient plan   - FS goal 100-180 mg/dl   - AM FS slightly below goal but serum glucose in range.   - Continue Lantus 29 units at bedtime  - Continue Admelog 10 units TID AC. Hold if not eating/NPO.   - continue low Admelog correctional scale TID AC  - continue separate low Admelog correctional scale at HS  - FS TID AC & HS ---> q6 if NPO   - hypoglycemia protocol PRN   - consistent carbohydrate diet  - RD consult  - c-Peptide 3 with serum glucose 170 - patient is making adequate endogenous insulin.    Discharge plan   - Patient going to rehab.  - Stop glipizide  - STOP metformin  - discharge to rehab on: basal/bolus insulin: Lantus 29 units at bedtime, Admelog 10 units TID AC. Hold if not eating/NPO.   - discharge home on: Lantus + Tradjenta 5 mg daily (please check for insurance coverage)  - patient reporting decreased appetite - avoid GLP11 for now  - Please send prescription for glucose tablets 4G (take 4 tablets) or 15G tablets for blood sugar less than 70 mG/dL, repeat fingerstick in 15 minutes. Call your provider for dose adjustment if needed.   - Patient to call doctor with persistent high or low BG at home.   - Ensure patient has glucometer, test strips and lancets on discharge.  - Ensure patient has insulin pen needles.   - Recommend routine outpatient ophthalmology and podiatry follow up.   - Dr Yovani Kern  [id number], [place] [address]   Appointment with Dr. Kern on 3/20 @ 2:20pm.         HTN  - Outpatient goal BP <130//80 for age  - continue amlodipine, HCTZ, losartan, spironolactone  - Can check urine albumin/creatinine outpatient  - Management per primary team.        HLD  - LDL goal < 70  - LDL 67  - continue statin   - management per primary team       SIRENA Garcia-BC  Nurse Practitioner  Division of Endocrinology  Contact on TEAMS    If out of hospital/unavailable when paged, please note: patient will be cared for by another provider on the endocrine service.  For urgent concerns: call the endocrine answering service for assistance to reach covering provider ([phone]). For non-urgent matters: please email [email or web address] for assistance.

## 2025-01-14 NOTE — PROGRESS NOTE ADULT - ASSESSMENT
88 y/o M with pmh of prostate cancer, DM type 2, HTN, p/w hypoglycemia.  Pt reports his insulin glargine  dose was increased from 12 to 15 units recently.  Over the past few days, he reports getting low reading in the morning, but is able to alert his wife who gives him food to bring it  up.  Yesterday AM, pt's wife noticed he was confused and making gurgling sounds while in bed.  She checked FS which was 41.  EMS was called, and pt give D50 en route to ED.  Pt also reports intermittent L sided chest pain for the past several days.  Pain is positional, and improves when pt wears a support belt.  Pain is difficult for patient to describe quality.  No associated dyspnea, dizziness nausea or diaphoresis.  No recent fever or cough.    Pt was evaluated in the ED, and placed in the CDU for monitoring.  He is admitted for further w/u of chest pain and monitoring of glucose.       Problem/Recommendation - 1:  ·  Problem: Chest pain with abnormal stress test .   ·  Recommendation: Cardiology planning cardiac cath out patient  .   On  Aspirin and statin.  Cardiology help appreciated.   < from: TTE W or WO Ultrasound Enhancing Agent (12.31.24 @ 07:33) >    CONCLUSIONS:      1. Left ventricular systolic function is normal with an ejection fraction visually estimated at 50 to 55 %.      < from: Nuclear Stress Test-Exercise.. (12.31.24 @ 08:30) >     1. Qualitative Perfusion:      - medium-sized, moderate to severe defect(s) in the inferior and inferolateral wall suggestive of CAD.   2. The left ventricle is mildly decreased in function and normal in size. The time activity curve suggests diastolic dysfunction.. The post stress left ventricular EF is 45 %. The stress end diastolic volume is 71 ml and systolic volume is 39 ml.   3. Hypokinesis of the inferoseptal wall.     Problem/Recommendation - 2:  ·  Problem: Type 2 diabetes mellitus with hypoglycemia.   ·  Recommendation: Endo help appreciated.     Following  their recommendation.     Problem/Recommendation - 3:  ·  Problem: Essential hypertension.   ·  Recommendation: BP medications with hold parameters.     Problem/Recommendation - 4:  ·  Problem: CKD with KIRBY .   ·  Recommendation: Trending Creatinine & Stable.   Renal help appreciated.       Problem/Recommendation - 5:  ·  Problem: Prostate cancer.   ·  Recommendation: Out patient Follow Up.       Problem/Recommendation - 6:  ·  Problem: COVID 19 infection with Hypoxia .   ·  Recommendation: ID help appreciated.       ON Remdisvir .      Problem/Recommendation - 7:  ·  Problem: Back Pain New onset.   ·  Recommendation: Pain medication and awaiting MR.     < from: CT Lumbar Spine No Cont (01.09.25 @ 20:52) >  IMPRESSION: Degenerative changes involving the cervical thoracic and   lumbar spine region as well as abnormal lucent lesions as described   above. While these findings could be compatible with metastasis these are   unlikely compatible with prostate metastasis given that they are lytic   lesions at cannot of blastic lesions. Contrast enhanced MRI of the   cervical thoracic and lumbar spine is recommended for further evaluation   if there are no contraindications    Oncology help appreciated.       D/W patient and ACP. DC planning pending .

## 2025-01-14 NOTE — PROGRESS NOTE ADULT - SUBJECTIVE AND OBJECTIVE BOX
back pain improves  Pt was evaluated in the ED, and placed in the CDU for monitoring.  He is admitted for further w/u of chest pain and monitoring of glucose.   (30 Dec 2024 21:59)    REVIEW OF SYSTEMS:    CONSTITUTIONAL: No weakness, fevers or chills, weight loss  EYES/ENT: No visual changes, no throat pain   RESPIRATORY: No cough, wheezing, hemoptysis; No shortness of breath  CARDIOVASCULAR: No chest pain or palpitations  GENITOURINARY: No dysuria, frequency or hematuria  NEUROLOGICAL: No dizziness, numbness, or weakness  SKIN: No itching, burning, rashes, or lesions   All other review of systems is negative unless indicated above.    VITAL SIGNS:    T97.7    PHYSICAL EXAM:     GENERAL: no acute distress  HEENT: NC/AT, EOMI, neck supple, MMM  RESPIRATORY: LCTAB/L, no rhonchi, rales, or wheezing  CARDIOVASCULAR: RRR, no murmurs, gallops, rubs  ABDOMINAL: soft, non-tender, non-distended, positive bowel sounds   EXTREMITIES: no clubbing, cyanosis, or edema  NEUROLOGICAL: alert and oriented x 3, non-focal  LYMPHATIC: lymphatic: cervical, supraclavicular, axilla, inguinal  SKIN: no rashes or lesions   MUSCULOSKELETAL: no gross joint deformity                          9.3    9.45  )-----------( 362      ( 13 Jan 2025 05:45 )             29.0     01-14    140  |  105  |  64[H]  ----------------------------<  110[H]  4.5   |  21[L]  |  2.23[H]    Ca    9.3      14 Jan 2025 08:02  Phos  3.9     01-14  Mg     2.40     01-14        MEDICATIONS  (STANDING):  allopurinol 100 milliGRAM(s) Oral daily  amLODIPine   Tablet 10 milliGRAM(s) Oral daily  aspirin enteric coated 81 milliGRAM(s) Oral daily  atorvastatin 40 milliGRAM(s) Oral at bedtime  heparin   Injectable 5000 Unit(s) SubCutaneous every 8 hours  insulin glargine Injectable (LANTUS) 29 Unit(s) SubCutaneous at bedtime  insulin lispro (ADMELOG) corrective regimen sliding scale   SubCutaneous three times a day before meals  insulin lispro (ADMELOG) corrective regimen sliding scale   SubCutaneous at bedtime  insulin lispro Injectable (ADMELOG) 10 Unit(s) SubCutaneous three times a day before meals  isosorbide   mononitrate ER Tablet (IMDUR) 60 milliGRAM(s) Oral daily  losartan 50 milliGRAM(s) Oral daily  sodium chloride 0.9%. 1000 milliLiter(s) (50 mL/Hr) IV Continuous <Continuous>

## 2025-01-14 NOTE — PROGRESS NOTE ADULT - SUBJECTIVE AND OBJECTIVE BOX
Chief Complaint: T2DM    Interval Events: Pt seen and examined at bedside. Pt reports a good appetite. No complaints today.      MEDICATIONS  (STANDING):  allopurinol 100 milliGRAM(s) Oral daily  amLODIPine   Tablet 10 milliGRAM(s) Oral daily  aspirin enteric coated 81 milliGRAM(s) Oral daily  atorvastatin 40 milliGRAM(s) Oral at bedtime  heparin   Injectable 5000 Unit(s) SubCutaneous every 8 hours  insulin glargine Injectable (LANTUS) 29 Unit(s) SubCutaneous at bedtime  insulin lispro (ADMELOG) corrective regimen sliding scale   SubCutaneous three times a day before meals  insulin lispro (ADMELOG) corrective regimen sliding scale   SubCutaneous at bedtime  insulin lispro Injectable (ADMELOG) 10 Unit(s) SubCutaneous three times a day before meals  isosorbide   mononitrate ER Tablet (IMDUR) 60 milliGRAM(s) Oral daily  losartan 50 milliGRAM(s) Oral daily  sodium chloride 0.9%. 1000 milliLiter(s) (50 mL/Hr) IV Continuous <Continuous>    MEDICATIONS  (PRN):  acetaminophen     Tablet .. 650 milliGRAM(s) Oral every 6 hours PRN Temp greater or equal to 38C (100.4F), Mild Pain (1 - 3), Moderate Pain (4 - 6)  melatonin 3 milliGRAM(s) Oral at bedtime PRN Insomnia      Allergies    No Known Allergies    Intolerances      Review of Systems:  Eyes: No blurry vision  Cardiovascular: No chest pain, palpitations  Respiratory: No SOB, no cough  GI: No nausea, vomiting, abdominal pain  : No dysuria    ALL OTHER SYSTEMS REVIEWED AND NEGATIVE      VITALS: T(C): 36.5 (01-14-25 @ 11:35)  T(F): 97.7 (01-14-25 @ 11:35), Max: 98.6 (01-13-25 @ 20:06)  HR: 62 (01-14-25 @ 11:35) (62 - 80)  BP: 151/74 (01-14-25 @ 11:35) (135/71 - 154/79)  RR:  (16 - 18)  SpO2:  (96% - 100%)  Wt(kg): --      Physical Exam:   GENERAL: NAD, well-developed  EYES: No proptosis  HEENT:  Atraumatic, Normocephalic  RESPIRATORY: non labored breathing   GI: Non distended    CAPILLARY BLOOD GLUCOSE      POCT Blood Glucose.: 193 mg/dL (14 Jan 2025 11:33)  POCT Blood Glucose.: 99 mg/dL (14 Jan 2025 08:13)  POCT Blood Glucose.: 209 mg/dL (13 Jan 2025 21:15)  POCT Blood Glucose.: 123 mg/dL (13 Jan 2025 17:46)      01-14    140  |  105  |  64[H]  ----------------------------<  110[H]  4.5   |  21[L]  |  2.23[H]    eGFR: 28[L]    Ca    9.3      01-14  Mg     2.40     01-14  Phos  3.9     01-14        A1C with Estimated Average Glucose Result: 7.0 % (12-30-24 @ 09:36)      Thyroid Function Tests:

## 2025-01-14 NOTE — PROGRESS NOTE ADULT - ASSESSMENT
89 y/o M with pmh of prostate cancer, DM type 2, HTN, p/w hypoglycemia. +NST planning for angio. nephrology consulted for elevated scr    CKD stage 3B  Cr plateauing at 2.1-2.3.  +covid; s/p remdesivir  He was on HCTZ, losartan, spironolactone, since held. ( last dose 1/9)  KIRBY likely hemodynamic.  s/p gentle hydration with ns @ 50cc/hr x20 hrs 1/9.  LHC deferred in view of Covid+ and elevated S.Cr.  Recommend NS @ 75cc/hr x6 hrs before and 6 hrs after if planned for angiogram.  monitor BMP and UO  Group 2 Gadolinium Contrast can be used for MRI.    HTN  suboptimal  He was on HCTZ, losartan, spironolactone, since held. ( last dose 1/9)  will restart losartan today. renal function stable  C/W current meds.  Low salt diet.  monitor    Proteinuria  mild  UPr/Cr 0.26gm.  Monitor.    COVID-19:  care as per team  s/p remdesivir    Chest Pains  +NST  LHC deferred for now.  f/u cardio

## 2025-01-15 LAB
ANION GAP SERPL CALC-SCNC: 15 MMOL/L — HIGH (ref 7–14)
BASOPHILS # BLD AUTO: 0.05 K/UL — SIGNIFICANT CHANGE UP (ref 0–0.2)
BASOPHILS NFR BLD AUTO: 0.5 % — SIGNIFICANT CHANGE UP (ref 0–2)
BUN SERPL-MCNC: 53 MG/DL — HIGH (ref 7–23)
CALCIUM SERPL-MCNC: 9.4 MG/DL — SIGNIFICANT CHANGE UP (ref 8.4–10.5)
CHLORIDE SERPL-SCNC: 102 MMOL/L — SIGNIFICANT CHANGE UP (ref 98–107)
CO2 SERPL-SCNC: 20 MMOL/L — LOW (ref 22–31)
CREAT SERPL-MCNC: 1.8 MG/DL — HIGH (ref 0.5–1.3)
EGFR: 36 ML/MIN/1.73M2 — LOW
EOSINOPHIL # BLD AUTO: 0.84 K/UL — HIGH (ref 0–0.5)
EOSINOPHIL NFR BLD AUTO: 7.6 % — HIGH (ref 0–6)
GLUCOSE BLDC GLUCOMTR-MCNC: 134 MG/DL — HIGH (ref 70–99)
GLUCOSE BLDC GLUCOMTR-MCNC: 147 MG/DL — HIGH (ref 70–99)
GLUCOSE BLDC GLUCOMTR-MCNC: 180 MG/DL — HIGH (ref 70–99)
GLUCOSE BLDC GLUCOMTR-MCNC: 241 MG/DL — HIGH (ref 70–99)
GLUCOSE SERPL-MCNC: 142 MG/DL — HIGH (ref 70–99)
HCT VFR BLD CALC: 31.2 % — LOW (ref 39–50)
HGB BLD-MCNC: 9.9 G/DL — LOW (ref 13–17)
IANC: 7.11 K/UL — SIGNIFICANT CHANGE UP (ref 1.8–7.4)
IMM GRANULOCYTES NFR BLD AUTO: 2.4 % — HIGH (ref 0–0.9)
LYMPHOCYTES # BLD AUTO: 1.59 K/UL — SIGNIFICANT CHANGE UP (ref 1–3.3)
LYMPHOCYTES # BLD AUTO: 14.4 % — SIGNIFICANT CHANGE UP (ref 13–44)
MAGNESIUM SERPL-MCNC: 2.3 MG/DL — SIGNIFICANT CHANGE UP (ref 1.6–2.6)
MCHC RBC-ENTMCNC: 22.6 PG — LOW (ref 27–34)
MCHC RBC-ENTMCNC: 31.7 G/DL — LOW (ref 32–36)
MCV RBC AUTO: 71.1 FL — LOW (ref 80–100)
MONOCYTES # BLD AUTO: 1.22 K/UL — HIGH (ref 0–0.9)
MONOCYTES NFR BLD AUTO: 11 % — SIGNIFICANT CHANGE UP (ref 2–14)
NEUTROPHILS # BLD AUTO: 7.11 K/UL — SIGNIFICANT CHANGE UP (ref 1.8–7.4)
NEUTROPHILS NFR BLD AUTO: 64.1 % — SIGNIFICANT CHANGE UP (ref 43–77)
NRBC # BLD: 0 /100 WBCS — SIGNIFICANT CHANGE UP (ref 0–0)
NRBC # FLD: 0 K/UL — SIGNIFICANT CHANGE UP (ref 0–0)
PHOSPHATE SERPL-MCNC: 3.6 MG/DL — SIGNIFICANT CHANGE UP (ref 2.5–4.5)
PLATELET # BLD AUTO: 361 K/UL — SIGNIFICANT CHANGE UP (ref 150–400)
POTASSIUM SERPL-MCNC: 4.4 MMOL/L — SIGNIFICANT CHANGE UP (ref 3.5–5.3)
POTASSIUM SERPL-SCNC: 4.4 MMOL/L — SIGNIFICANT CHANGE UP (ref 3.5–5.3)
RBC # BLD: 4.39 M/UL — SIGNIFICANT CHANGE UP (ref 4.2–5.8)
RBC # FLD: 15.9 % — HIGH (ref 10.3–14.5)
SODIUM SERPL-SCNC: 137 MMOL/L — SIGNIFICANT CHANGE UP (ref 135–145)
WBC # BLD: 11.08 K/UL — HIGH (ref 3.8–10.5)
WBC # FLD AUTO: 11.08 K/UL — HIGH (ref 3.8–10.5)

## 2025-01-15 PROCEDURE — 99232 SBSQ HOSP IP/OBS MODERATE 35: CPT

## 2025-01-15 RX ORDER — SPIRONOLACTONE 50 MG/1
25 TABLET ORAL DAILY
Refills: 0 | Status: DISCONTINUED | OUTPATIENT
Start: 2025-01-15 | End: 2025-01-17

## 2025-01-15 RX ADMIN — Medication 10 UNIT(S): at 08:15

## 2025-01-15 RX ADMIN — Medication 1: at 16:50

## 2025-01-15 RX ADMIN — HEPARIN SODIUM 5000 UNIT(S): 1000 INJECTION, SOLUTION INTRAVENOUS; SUBCUTANEOUS at 05:26

## 2025-01-15 RX ADMIN — Medication 10 UNIT(S): at 16:51

## 2025-01-15 RX ADMIN — ATORVASTATIN CALCIUM 40 MILLIGRAM(S): 40 TABLET, FILM COATED ORAL at 21:30

## 2025-01-15 RX ADMIN — HEPARIN SODIUM 5000 UNIT(S): 1000 INJECTION, SOLUTION INTRAVENOUS; SUBCUTANEOUS at 14:30

## 2025-01-15 RX ADMIN — ACETAMINOPHEN 650 MILLIGRAM(S): 80 SOLUTION/ DROPS ORAL at 05:26

## 2025-01-15 RX ADMIN — HEPARIN SODIUM 5000 UNIT(S): 1000 INJECTION, SOLUTION INTRAVENOUS; SUBCUTANEOUS at 21:30

## 2025-01-15 RX ADMIN — Medication 2: at 11:17

## 2025-01-15 RX ADMIN — Medication 10 MILLIGRAM(S): at 05:26

## 2025-01-15 RX ADMIN — ACETAMINOPHEN 650 MILLIGRAM(S): 80 SOLUTION/ DROPS ORAL at 06:25

## 2025-01-15 RX ADMIN — ACETAMINOPHEN 650 MILLIGRAM(S): 80 SOLUTION/ DROPS ORAL at 21:30

## 2025-01-15 RX ADMIN — ACETAMINOPHEN 650 MILLIGRAM(S): 80 SOLUTION/ DROPS ORAL at 22:00

## 2025-01-15 RX ADMIN — Medication 60 MILLIGRAM(S): at 11:19

## 2025-01-15 RX ADMIN — INSULIN GLARGINE-YFGN 29 UNIT(S): 100 INJECTION, SOLUTION SUBCUTANEOUS at 22:40

## 2025-01-15 RX ADMIN — LOSARTAN POTASSIUM 50 MILLIGRAM(S): 100 TABLET, FILM COATED ORAL at 05:26

## 2025-01-15 RX ADMIN — Medication 10 UNIT(S): at 11:18

## 2025-01-15 RX ADMIN — ACETAMINOPHEN 650 MILLIGRAM(S): 80 SOLUTION/ DROPS ORAL at 11:54

## 2025-01-15 RX ADMIN — Medication 81 MILLIGRAM(S): at 11:18

## 2025-01-15 RX ADMIN — ALLOPURINOL 100 MILLIGRAM(S): 100 TABLET ORAL at 11:18

## 2025-01-15 NOTE — PROGRESS NOTE ADULT - SUBJECTIVE AND OBJECTIVE BOX
back pain under control    REVIEW OF SYSTEMS:    CONSTITUTIONAL: No weakness, fevers or chills, weight loss   RESPIRATORY: No cough, wheezing, hemoptysis; No shortness of breath  CARDIOVASCULAR: No chest pain or palpitations  GASTROINTESTINAL: No abdominal, nausea, vomiting, or hematemesis; No diarrhea or constipation. No melena or hematochezia.  GENITOURINARY: No dysuria, frequency or hematuria  NEUROLOGICAL: No dizziness, numbness, or weakness  All other review of systems is negative unless indicated above.    VITAL SIGNS:    T98.1    PHYSICAL EXAM:     GENERAL: no acute distress  HEENT: NC/AT, EOMI, neck supple, MMM  RESPIRATORY: LCTAB/L, no rhonchi, rales, or wheezing  CARDIOVASCULAR: RRR, no murmurs, gallops, rubs  ABDOMINAL: soft, non-tender, non-distended, positive bowel sounds   EXTREMITIES: no clubbing, cyanosis, or edema  NEUROLOGICAL: alert and oriented x 3, non-focal  LYMPHATIC: lymphatic: cervical, supraclavicular, axilla, inguinal  SKIN: no rashes or lesions   MUSCULOSKELETAL: no gross joint deformity                          9.9    11.08 )-----------( 361      ( 15 Luke 2025 07:40 )             31.2     01-15    137  |  102  |  53[H]  ----------------------------<  142[H]  4.4   |  20[L]  |  1.80[H]    Ca    9.4      15 Luke 2025 07:40  Phos  3.6     01-15  Mg     2.30     01-15        MEDICATIONS  (STANDING):  allopurinol 100 milliGRAM(s) Oral daily  amLODIPine   Tablet 10 milliGRAM(s) Oral daily  aspirin enteric coated 81 milliGRAM(s) Oral daily  atorvastatin 40 milliGRAM(s) Oral at bedtime  heparin   Injectable 5000 Unit(s) SubCutaneous every 8 hours  insulin glargine Injectable (LANTUS) 29 Unit(s) SubCutaneous at bedtime  insulin lispro (ADMELOG) corrective regimen sliding scale   SubCutaneous three times a day before meals  insulin lispro (ADMELOG) corrective regimen sliding scale   SubCutaneous at bedtime  insulin lispro Injectable (ADMELOG) 10 Unit(s) SubCutaneous three times a day before meals  isosorbide   mononitrate ER Tablet (IMDUR) 60 milliGRAM(s) Oral daily  losartan 50 milliGRAM(s) Oral daily  sodium chloride 0.9%. 1000 milliLiter(s) (50 mL/Hr) IV Continuous <Continuous>  spironolactone 25 milliGRAM(s) Oral daily

## 2025-01-15 NOTE — PROGRESS NOTE ADULT - ASSESSMENT
89 y/o M with pmh of prostate cancer, DM type 2, HTN, p/w hypoglycemia. +NST planning for angio. nephrology consulted for elevated scr    CKD stage 3B  possible baseline ~1.5-1.8  Cr plateauing at 2.1-2.3.  +covid; s/p remdesivir  He was on HCTZ, losartan, spironolactone, since held. ( last dose 1/9)  KIRBY likely ATN, renal function improving now  restarted on losartan 50 1/14, scr better will restart spironolactone 1/15  monitor BMP and UO  Group 2 Gadolinium Contrast can be used for MRI.    HTN  better  He was on HCTZ, losartan, spironolactone, since held. ( last dose 1/9)  will restart losartan 1/14   spironolactone restarted 1/15  C/W current meds.  Low salt diet.  monitor    Proteinuria  mild  UPr/Cr 0.26gm.  Monitor.    COVID-19:  care as per team  s/p remdesivir    Chest Pains  +NST  LHC deferred for now.  f/u cardio

## 2025-01-15 NOTE — PROGRESS NOTE ADULT - SUBJECTIVE AND OBJECTIVE BOX
Cardiology Attending Follow-up Note     Patient seen and examined at bedside.    Overnight Events:     still pending MRI of the spine   no cardiac sxs     REVIEW OF SYSTEMS:  Constitutional:     [x ] negative [ ] fevers [ ] chills [ ] weight loss [ ] weight gain  HEENT:                  [x ] negative [ ] dry eyes [ ] eye irritation [ ] postnasal drip [ ] nasal congestion  CV:                         [ x] negative  [ ] chest pain [ ] orthopnea [ ] palpitations [ ] murmur  Resp:                     [ x] negative [ ] cough [ ] shortness of breath [ ] dyspnea [ ] wheezing [ ] sputum [ ]hemoptysis  GI:                          [ x] negative [ ] nausea [ ] vomiting [ ] diarrhea [ ] constipation [ ] abd pain [ ] dysphagia   :                        [ x] negative [ ] dysuria [ ] nocturia [ ] hematuria [ ] increased urinary frequency  Musculoskeletal: [ x] negative [ ] back pain [ ] myalgias [ ] arthralgias [ ] fracture  Skin:                       [ x] negative [ ] rash [ ] itch  Neurological:        [x ] negative [ ] headache [ ] dizziness [ ] syncope [ ] weakness [ ] numbness  Psychiatric:           [ x] negative [ ] anxiety [ ] depression  Endocrine:            [ x] negative [ ] diabetes [ ] thyroid problem  Heme/Lymph:      [ x] negative [ ] anemia [ ] bleeding problem  Allergic/Immune: [ x] negative [ ] itchy eyes [ ] nasal discharge [ ] hives [ ] angioedema    [ x] All other systems negative  [ ] Unable to assess ROS due to    Current Meds:  acetaminophen     Tablet .. 650 milliGRAM(s) Oral every 6 hours PRN  allopurinol 100 milliGRAM(s) Oral daily  amLODIPine   Tablet 10 milliGRAM(s) Oral daily  aspirin enteric coated 81 milliGRAM(s) Oral daily  atorvastatin 40 milliGRAM(s) Oral at bedtime  heparin   Injectable 5000 Unit(s) SubCutaneous every 8 hours  insulin glargine Injectable (LANTUS) 29 Unit(s) SubCutaneous at bedtime  insulin lispro (ADMELOG) corrective regimen sliding scale   SubCutaneous three times a day before meals  insulin lispro (ADMELOG) corrective regimen sliding scale   SubCutaneous at bedtime  insulin lispro Injectable (ADMELOG) 10 Unit(s) SubCutaneous three times a day before meals  isosorbide   mononitrate ER Tablet (IMDUR) 60 milliGRAM(s) Oral daily  losartan 50 milliGRAM(s) Oral daily  melatonin 3 milliGRAM(s) Oral at bedtime PRN  sodium chloride 0.9%. 1000 milliLiter(s) IV Continuous <Continuous>  spironolactone 25 milliGRAM(s) Oral daily      PAST MEDICAL & SURGICAL HISTORY:  HTN (hypertension)      DM2 (diabetes mellitus, type 2)      Prostate cancer      No significant past surgical history          Vitals:  T(F): 98.4 (01-15), Max: 98.4 (01-15)  HR: 81 (01-15) (58 - 81)  BP: 101/64 (01-15) (101/64 - 160/64)  RR: 18 (01-15)  SpO2: 100% (01-15)  I&O's Summary    14 Jan 2025 07:01  -  15 Luke 2025 07:00  --------------------------------------------------------  IN: 820 mL / OUT: 1000 mL / NET: -180 mL    15 Luke 2025 07:01  -  15 Jan 2025 21:55  --------------------------------------------------------  IN: 720 mL / OUT: 400 mL / NET: 320 mL        Physical Exam:  Appearance: No acute distress  HENT: No JVD   Cardiovascular: RRR, S1/S2, no murmurs  Respiratory: CTABL  Gastrointestinal: soft, NT ND, +BS  Musculoskeletal: No clubbing, no edema   Neurologic: Non-focal  Skin: No rashes, ecchymoses, or cyanosis                          9.9    11.08 )-----------( 361      ( 15 Luke 2025 07:40 )             31.2     01-15    137  |  102  |  53[H]  ----------------------------<  142[H]  4.4   |  20[L]  |  1.80[H]    Ca    9.4      15 Luke 2025 07:40  Phos  3.6     01-15  Mg     2.30     01-15                    Cardiovascular Testings:

## 2025-01-15 NOTE — PROGRESS NOTE ADULT - ASSESSMENT
89-year-old male with a past medical history of prostate cancer, diabetes, hypertension, presenting due to concern of hypoglycemia at home.  The patient states he is tired and was not sure what happened.  According to his wife who was in bed with him, she started hearing him gurgling at home.  She turned on the lights and tried to wake him up, the patient was unresponsive.  They called EMS who came and checked a fingerstick which was 41.  He was given 1 amp of D50 with a repeat being 140.  Patient also tolerated p.o.  According to wife he had a similar occurrence yesterday where his fingerstick was in the 40s.  Patient takes combined metformin and glipizide as well as lantus  Denies fever, chills, chest pain, trouble breathing, dysuria.  According to patient he has decreased appetite recently compared to the past.  Endocrine called for further assistance.  Reports long hx of DM.   He states Lantus was increased from 12--> 15 units  checks FSBG in the am and afternoon  reports multiple lows in 30-40s       Poorly controlled T2DM with hyperglycemia  - HbA1c: 7.0  - Home Regimen: combined metformin and glipizide as well as Lantus 12-15 units  - Endocrinologist: unknown    Inpatient plan   - FS goal 100-200 mg/dl: would like ot avoid hypoglycemia in elderly pt   - Variable glucose   - Continue Lantus 29 units at bedtime  - Continue Admelog 10 units TID AC. Hold if not eating/NPO.   - continue low Admelog correctional scale TID AC  - continue separate low Admelog correctional scale at HS  - FS TID AC & HS ---> q6 if NPO   - hypoglycemia protocol PRN   - consistent carbohydrate diet  - RD consult  - c-Peptide 3.0 with serum glucose 170 - patient is making adequate endogenous insulin.    Discharge plan   - Patient going to rehab.  - Stop glipizide  - STOP metformin  - discharge to rehab on: basal/bolus insulin (doses TBD)   - discharge home on: Lantus + Tradjenta 5 mg daily (please check for insurance coverage) vs Lantus and Trulicity 0.75mg sq weekly if no contraindications (appetite is adequate today please assess appetite daily); endocrines notes previously states pt reports poor appetite; Final plan tbd based on glucose trend, insulin requirements, and appetite   - Please send prescription for glucose tablets 4G (take 4 tablets) or 15G tablets for blood sugar less than 70 mG/dL, repeat fingerstick in 15 minutes. Call your provider for dose adjustment if needed.   - Patient to call doctor with persistent high or low BG at home.   - Ensure patient has glucometer, test strips and lancets on discharge.  - Ensure patient has insulin pen needles.   - Recommend routine outpatient ophthalmology and podiatry follow up.   - Dr Yovani Kern  [id number] [facility] [address], [place] [address]   Appointment with Dr. Kern on 3/20 @ 2:20pm.     HTN  - Outpatient goal BP <130//80 for age  - continue amlodipine, HCTZ, losartan, spironolactone  - Can check urine albumin/creatinine outpatient  - Management per primary team.    HLD  - LDL goal < 70  - LDL 67  - continue statin if no contraindications   - management per primary team     d/w Estevan Horn  Nurse Practitioner  Division of Endocrinology & Diabetes  In house pager #[phone]    If before 9AM or after 6PM, or on weekends/holidays, please call endocrine answering service for assistance ([phone]).For nonurgent matters email [email or web address] for assistance.

## 2025-01-15 NOTE — PROGRESS NOTE ADULT - ASSESSMENT
89M with PMH of prostate CA, T2DM, HTN, HLD, CKD, here for hypoglycemia sxs. In the ER, pt found to have cp with EKG w/ NSR and no significant ischemic changes. Pt was recommended to be admitted by Tele Doc Dr. Perea. Cardiology consulted for further evaluation inpt.     1. Cp, +NST and TTE reviewed   2. HTN   3. HLD   4. CKD     -given +NST, initially planned for [facility], deferred for now given COVID+ and rising Cr. Can be done as outpt post discharge given pt has no cardiac sxs at this point.   -bp control, cont norvasc, imduri, losartan, and Hctz   -cont asa and statin   -HR 50-60s, would rec hold bb   -new onset back pain, +prostate ca, evaluated by heme onc and pending MRI   -discharge planning   -f/u OP w/ me in clinic     Nitin Lee MD [facility]  Cardiology Attending, [email or web address]-NS/LOIS  [employer] Team

## 2025-01-15 NOTE — PROGRESS NOTE ADULT - SUBJECTIVE AND OBJECTIVE BOX
Date of Service  : 01-15-25     INTERVAL HPI/OVERNIGHT EVENTS: I feel fine.   Vital Signs Last 24 Hrs  T(C): 36.7 (15 Luke 2025 17:05), Max: 36.8 (15 Luke 2025 11:03)  T(F): 98.1 (15 Luke 2025 17:05), Max: 98.2 (15 Luke 2025 11:03)  HR: 61 (15 Luke 2025 17:05) (58 - 67)  BP: 153/76 (15 Luke 2025 17:05) (144/71 - 160/64)  BP(mean): --  RR: 17 (15 Luke 2025 17:05) (17 - 18)  SpO2: 100% (15 Luke 2025 17:05) (96% - 100%)    Parameters below as of 15 Luke 2025 17:05  Patient On (Oxygen Delivery Method): room air      I&O's Summary    14 Jan 2025 07:01  -  15 Luke 2025 07:00  --------------------------------------------------------  IN: 820 mL / OUT: 1000 mL / NET: -180 mL    15 Luke 2025 07:01  -  15 Luke 2025 20:53  --------------------------------------------------------  IN: 720 mL / OUT: 400 mL / NET: 320 mL      MEDICATIONS  (STANDING):  allopurinol 100 milliGRAM(s) Oral daily  amLODIPine   Tablet 10 milliGRAM(s) Oral daily  aspirin enteric coated 81 milliGRAM(s) Oral daily  atorvastatin 40 milliGRAM(s) Oral at bedtime  heparin   Injectable 5000 Unit(s) SubCutaneous every 8 hours  insulin glargine Injectable (LANTUS) 29 Unit(s) SubCutaneous at bedtime  insulin lispro (ADMELOG) corrective regimen sliding scale   SubCutaneous three times a day before meals  insulin lispro (ADMELOG) corrective regimen sliding scale   SubCutaneous at bedtime  insulin lispro Injectable (ADMELOG) 10 Unit(s) SubCutaneous three times a day before meals  isosorbide   mononitrate ER Tablet (IMDUR) 60 milliGRAM(s) Oral daily  losartan 50 milliGRAM(s) Oral daily  sodium chloride 0.9%. 1000 milliLiter(s) (50 mL/Hr) IV Continuous <Continuous>  spironolactone 25 milliGRAM(s) Oral daily    MEDICATIONS  (PRN):  acetaminophen     Tablet .. 650 milliGRAM(s) Oral every 6 hours PRN Temp greater or equal to 38C (100.4F), Mild Pain (1 - 3), Moderate Pain (4 - 6)  melatonin 3 milliGRAM(s) Oral at bedtime PRN Insomnia    LABS:                        9.9    11.08 )-----------( 361      ( 15 Luke 2025 07:40 )             31.2     01-15    137  |  102  |  53[H]  ----------------------------<  142[H]  4.4   |  20[L]  |  1.80[H]    Ca    9.4      15 Luke 2025 07:40  Phos  3.6     01-15  Mg     2.30     01-15        Urinalysis Basic - ( 15 Luke 2025 07:40 )    Color: x / Appearance: x / SG: x / pH: x  Gluc: 142 mg/dL / Ketone: x  / Bili: x / Urobili: x   Blood: x / Protein: x / Nitrite: x   Leuk Esterase: x / RBC: x / WBC x   Sq Epi: x / Non Sq Epi: x / Bacteria: x      CAPILLARY BLOOD GLUCOSE      POCT Blood Glucose.: 180 mg/dL (15 Luke 2025 16:48)  POCT Blood Glucose.: 241 mg/dL (15 Luke 2025 11:15)  POCT Blood Glucose.: 147 mg/dL (15 Luke 2025 08:12)  POCT Blood Glucose.: 239 mg/dL (14 Jan 2025 21:32)        Urinalysis Basic - ( 15 Luke 2025 07:40 )    Color: x / Appearance: x / SG: x / pH: x  Gluc: 142 mg/dL / Ketone: x  / Bili: x / Urobili: x   Blood: x / Protein: x / Nitrite: x   Leuk Esterase: x / RBC: x / WBC x   Sq Epi: x / Non Sq Epi: x / Bacteria: x      REVIEW OF SYSTEMS:  CONSTITUTIONAL: No fever, weight loss, or fatigue  EYES: No eye pain, visual disturbances, or discharge  ENMT:  No difficulty hearing, tinnitus, vertigo; No sinus or throat pain  NECK: No pain or stiffness  RESPIRATORY: No cough, wheezing, chills or hemoptysis; No shortness of breath  CARDIOVASCULAR: No chest pain, palpitations, dizziness, or leg swelling  GASTROINTESTINAL: No abdominal or epigastric pain. No nausea, vomiting, or hematemesis; No diarrhea or constipation. No melena or hematochezia.  GENITOURINARY: No dysuria, frequency, hematuria, or incontinence  NEUROLOGICAL: No headaches, memory loss, loss of strength, numbness, or tremors      Consultant(s) Notes Reviewed:  [x ] YES  [ ] NO    PHYSICAL EXAM:  GENERAL: NAD, well-groomed, well-developed,not in any distress ,  HEAD:  Atraumatic, Normocephalic  NECK: Supple, No JVD, Normal thyroid  NERVOUS SYSTEM:  Alert & Oriented X3, No focal deficit   CHEST/LUNG: Good air entry bilateral with no  rales, rhonchi, wheezing, or rubs  HEART: Regular rate and rhythm; No murmurs, rubs, or gallops  ABDOMEN: Soft, Nontender, Nondistended; Bowel sounds present  EXTREMITIES:  2+ Peripheral Pulses, No clubbing, cyanosis, or edema    Care Discussed with Consultants/Other Providers [ x] YES  [ ] NO

## 2025-01-15 NOTE — PROGRESS NOTE ADULT - SUBJECTIVE AND OBJECTIVE BOX
Chief Complaint:     History:    MEDICATIONS  (STANDING):  allopurinol 100 milliGRAM(s) Oral daily  amLODIPine   Tablet 10 milliGRAM(s) Oral daily  aspirin enteric coated 81 milliGRAM(s) Oral daily  atorvastatin 40 milliGRAM(s) Oral at bedtime  heparin   Injectable 5000 Unit(s) SubCutaneous every 8 hours  insulin glargine Injectable (LANTUS) 29 Unit(s) SubCutaneous at bedtime  insulin lispro (ADMELOG) corrective regimen sliding scale   SubCutaneous three times a day before meals  insulin lispro (ADMELOG) corrective regimen sliding scale   SubCutaneous at bedtime  insulin lispro Injectable (ADMELOG) 10 Unit(s) SubCutaneous three times a day before meals  isosorbide   mononitrate ER Tablet (IMDUR) 60 milliGRAM(s) Oral daily  losartan 50 milliGRAM(s) Oral daily  sodium chloride 0.9%. 1000 milliLiter(s) (50 mL/Hr) IV Continuous <Continuous>    MEDICATIONS  (PRN):  acetaminophen     Tablet .. 650 milliGRAM(s) Oral every 6 hours PRN Temp greater or equal to 38C (100.4F), Mild Pain (1 - 3), Moderate Pain (4 - 6)  melatonin 3 milliGRAM(s) Oral at bedtime PRN Insomnia      Allergies    No Known Allergies    Intolerances      Review of Systems:  Constitutional: No fever  Eyes: No blurry vision  Neuro: No tremors  HEENT: No pain  Cardiovascular: No chest pain, palpitations  Respiratory: No SOB, no cough  GI: No nausea, vomiting, abdominal pain  : No dysuria  Skin: no rash  Psych: no depression  Endocrine: no polyuria, polydipsia  Hem/lymph: no swelling  Osteoporosis: no fractures    ALL OTHER SYSTEMS REVIEWED AND NEGATIVE    UNABLE TO OBTAIN    PHYSICAL EXAM:  VITALS: T(C): 36.8 (01-15-25 @ 11:03)  T(F): 98.2 (01-15-25 @ 11:03), Max: 98.2 (01-15-25 @ 11:03)  HR: 64 (01-15-25 @ 11:03) (58 - 67)  BP: 144/71 (01-15-25 @ 11:03) (131/60 - 160/64)  RR:  (17 - 18)  SpO2:  (96% - 99%)  Wt(kg): --  GENERAL: NAD, well-groomed, well-developed  RESPIRATORY: No labored breathing   GI: Soft, nontender, non distended  PSYCH: Alert and oriented x 3, normal affect, normal mood      CAPILLARY BLOOD GLUCOSE  POCT Blood Glucose.: 241 mg/dL (15 Luke 2025 11:15)  POCT Blood Glucose.: 147 mg/dL (15 Luke 2025 08:12)  POCT Blood Glucose.: 239 mg/dL (14 Jan 2025 21:32)  POCT Blood Glucose.: 115 mg/dL (14 Jan 2025 17:04)    A1C with Estimated Average Glucose (12.30.24 @ 09:36)    A1C with Estimated Average Glucose Result: 7.0   Estimated Average Glucose: 154      01-15    137  |  102  |  53[H]  ----------------------------<  142[H]  4.4   |  20[L]  |  1.80[H]    eGFR: 36[L]    Ca    9.4      01-15  Mg     2.30     01-15  Phos  3.6     01-15    Diet, Consistent Carbohydrate w/Evening Snack (12-30-24 @ 14:06) [Active]                         Chief Complaint: Type 2  DM     History: Pt seen at bedside. Pt tolerating oral diet. Pt denies nausea and vomiting/any signs of hypoglycemia. Pt reports an adequate appetite.     MEDICATIONS  (STANDING):  allopurinol 100 milliGRAM(s) Oral daily  amLODIPine   Tablet 10 milliGRAM(s) Oral daily  aspirin enteric coated 81 milliGRAM(s) Oral daily  atorvastatin 40 milliGRAM(s) Oral at bedtime  heparin   Injectable 5000 Unit(s) SubCutaneous every 8 hours  insulin glargine Injectable (LANTUS) 29 Unit(s) SubCutaneous at bedtime  insulin lispro (ADMELOG) corrective regimen sliding scale   SubCutaneous three times a day before meals  insulin lispro (ADMELOG) corrective regimen sliding scale   SubCutaneous at bedtime  insulin lispro Injectable (ADMELOG) 10 Unit(s) SubCutaneous three times a day before meals  isosorbide   mononitrate ER Tablet (IMDUR) 60 milliGRAM(s) Oral daily  losartan 50 milliGRAM(s) Oral daily  sodium chloride 0.9%. 1000 milliLiter(s) (50 mL/Hr) IV Continuous <Continuous>    MEDICATIONS  (PRN):  acetaminophen     Tablet .. 650 milliGRAM(s) Oral every 6 hours PRN Temp greater or equal to 38C (100.4F), Mild Pain (1 - 3), Moderate Pain (4 - 6)  melatonin 3 milliGRAM(s) Oral at bedtime PRN Insomnia      Allergies: No Known Allergies    Review of Systems:  Respiratory: No SOB, no cough  GI: No nausea, vomiting, abdominal pain  Endocrine: no polyuria, polydipsia    PHYSICAL EXAM:  VITALS: T(C): 36.8 (01-15-25 @ 11:03)  T(F): 98.2 (01-15-25 @ 11:03), Max: 98.2 (01-15-25 @ 11:03)  HR: 64 (01-15-25 @ 11:03) (58 - 67)  BP: 144/71 (01-15-25 @ 11:03) (131/60 - 160/64)  RR:  (17 - 18)  SpO2:  (96% - 99%)  Wt(kg): --  GENERAL: NAD, well-groomed, well-developed  RESPIRATORY: No labored breathing   GI: Soft, nontender, non distended  PSYCH: Alert and oriented x 3, normal affect, normal mood      CAPILLARY BLOOD GLUCOSE  POCT Blood Glucose.: 241 mg/dL (15 Luke 2025 11:15)  POCT Blood Glucose.: 147 mg/dL (15 Luke 2025 08:12)  POCT Blood Glucose.: 239 mg/dL (14 Jan 2025 21:32)  POCT Blood Glucose.: 115 mg/dL (14 Jan 2025 17:04)    A1C with Estimated Average Glucose (12.30.24 @ 09:36)    A1C with Estimated Average Glucose Result: 7.0   Estimated Average Glucose: 154      01-15    137  |  102  |  53[H]  ----------------------------<  142[H]  4.4   |  20[L]  |  1.80[H]    eGFR: 36[L]    Ca    9.4      01-15  Mg     2.30     01-15  Phos  3.6     01-15    Diet, Consistent Carbohydrate w/Evening Snack (12-30-24 @ 14:06) [Active]

## 2025-01-15 NOTE — PROGRESS NOTE ADULT - SUBJECTIVE AND OBJECTIVE BOX
[facility] [place] [facility]   MD CRISTI SESAY MD ANGELA WONG, PA    TEL:  OFFICE: [phone]  From 5pm-7am Answering Service [phone]    -- RENAL FOLLOW UP NOTE ---Date of Service 01-15-25 @ 16:12    Patient is a 89y old  Male who presents with a chief complaint of hypoglycemia (15 Luke 2025 14:23)      Patient seen and examined at bedside. No chest pain/sob    VITALS:  T(F): 98.2 (01-15-25 @ 11:03), Max: 98.2 (01-15-25 @ 11:03)  HR: 64 (01-15-25 @ 11:03)  BP: 144/71 (01-15-25 @ 11:03)  RR: 17 (01-15-25 @ 11:03)  SpO2: 99% (01-15-25 @ 11:03)  Wt(kg): --    01-14 @ 07:01  -  01-15 @ 07:00  --------------------------------------------------------  IN: 820 mL / OUT: 1000 mL / NET: -180 mL    01-15 @ 07:01  -  01-15 @ 16:12  --------------------------------------------------------  IN: 480 mL / OUT: 0 mL / NET: 480 mL          PHYSICAL EXAM:  General: NAD  Neck: No JVD  Respiratory: CTAB, no wheezes, rales or rhonchi  Cardiovascular: S1, S2, RRR  Gastrointestinal: BS+, soft, NT/ND  Extremities: No peripheral edema    Hospital Medications:   MEDICATIONS  (STANDING):  allopurinol 100 milliGRAM(s) Oral daily  amLODIPine   Tablet 10 milliGRAM(s) Oral daily  aspirin enteric coated 81 milliGRAM(s) Oral daily  atorvastatin 40 milliGRAM(s) Oral at bedtime  heparin   Injectable 5000 Unit(s) SubCutaneous every 8 hours  insulin glargine Injectable (LANTUS) 29 Unit(s) SubCutaneous at bedtime  insulin lispro (ADMELOG) corrective regimen sliding scale   SubCutaneous three times a day before meals  insulin lispro (ADMELOG) corrective regimen sliding scale   SubCutaneous at bedtime  insulin lispro Injectable (ADMELOG) 10 Unit(s) SubCutaneous three times a day before meals  isosorbide   mononitrate ER Tablet (IMDUR) 60 milliGRAM(s) Oral daily  losartan 50 milliGRAM(s) Oral daily  sodium chloride 0.9%. 1000 milliLiter(s) (50 mL/Hr) IV Continuous <Continuous>  spironolactone 25 milliGRAM(s) Oral daily      LABS:  01-15    137  |  102  |  53[H]  ----------------------------<  142[H]  4.4   |  20[L]  |  1.80[H]    Ca    9.4      15 Luke 2025 07:40  Phos  3.6     01-15  Mg     2.30     01-15      Creatinine Trend: 1.80 <--, 2.23 <--, 2.27 <--, 2.32 <--, 2.22 <--, 2.23 <--, 2.20 <--    Phosphorus: 3.6 mg/dL (01-15 @ 07:40)                              9.9    11.08 )-----------( 361      ( 15 Luke 2025 07:40 )             31.2     Urine Studies:  Urinalysis - [01-15-25 @ 07:40]      Color  / Appearance  / SG  / pH       Gluc 142 / Ketone   / Bili  / Urobili        Blood  / Protein  / Leuk Est  / Nitrite       RBC  / WBC  / Hyaline  / Gran  / Sq Epi  / Non Sq Epi  / Bacteria       Lipid: chol 130, , HDL 36, LDL --      [01-01-25 @ 05:30]        RADIOLOGY & ADDITIONAL STUDIES:

## 2025-01-16 LAB
ANION GAP SERPL CALC-SCNC: 15 MMOL/L — HIGH (ref 7–14)
BASOPHILS # BLD AUTO: 0.04 K/UL — SIGNIFICANT CHANGE UP (ref 0–0.2)
BASOPHILS NFR BLD AUTO: 0.4 % — SIGNIFICANT CHANGE UP (ref 0–2)
BUN SERPL-MCNC: 43 MG/DL — HIGH (ref 7–23)
CALCIUM SERPL-MCNC: 9.3 MG/DL — SIGNIFICANT CHANGE UP (ref 8.4–10.5)
CHLORIDE SERPL-SCNC: 103 MMOL/L — SIGNIFICANT CHANGE UP (ref 98–107)
CO2 SERPL-SCNC: 20 MMOL/L — LOW (ref 22–31)
CREAT SERPL-MCNC: 1.66 MG/DL — HIGH (ref 0.5–1.3)
EGFR: 39 ML/MIN/1.73M2 — LOW
EOSINOPHIL # BLD AUTO: 0.83 K/UL — HIGH (ref 0–0.5)
EOSINOPHIL NFR BLD AUTO: 8.7 % — HIGH (ref 0–6)
GLUCOSE BLDC GLUCOMTR-MCNC: 125 MG/DL — HIGH (ref 70–99)
GLUCOSE BLDC GLUCOMTR-MCNC: 132 MG/DL — HIGH (ref 70–99)
GLUCOSE BLDC GLUCOMTR-MCNC: 179 MG/DL — HIGH (ref 70–99)
GLUCOSE BLDC GLUCOMTR-MCNC: 194 MG/DL — HIGH (ref 70–99)
GLUCOSE BLDC GLUCOMTR-MCNC: 231 MG/DL — HIGH (ref 70–99)
GLUCOSE SERPL-MCNC: 125 MG/DL — HIGH (ref 70–99)
HCT VFR BLD CALC: 30.5 % — LOW (ref 39–50)
HGB BLD-MCNC: 9.7 G/DL — LOW (ref 13–17)
IANC: 6.23 K/UL — SIGNIFICANT CHANGE UP (ref 1.8–7.4)
IMM GRANULOCYTES NFR BLD AUTO: 2.2 % — HIGH (ref 0–0.9)
LYMPHOCYTES # BLD AUTO: 1.27 K/UL — SIGNIFICANT CHANGE UP (ref 1–3.3)
LYMPHOCYTES # BLD AUTO: 13.3 % — SIGNIFICANT CHANGE UP (ref 13–44)
MAGNESIUM SERPL-MCNC: 2.3 MG/DL — SIGNIFICANT CHANGE UP (ref 1.6–2.6)
MCHC RBC-ENTMCNC: 22.6 PG — LOW (ref 27–34)
MCHC RBC-ENTMCNC: 31.8 G/DL — LOW (ref 32–36)
MCV RBC AUTO: 70.9 FL — LOW (ref 80–100)
MONOCYTES # BLD AUTO: 0.94 K/UL — HIGH (ref 0–0.9)
MONOCYTES NFR BLD AUTO: 9.9 % — SIGNIFICANT CHANGE UP (ref 2–14)
NEUTROPHILS # BLD AUTO: 6.23 K/UL — SIGNIFICANT CHANGE UP (ref 1.8–7.4)
NEUTROPHILS NFR BLD AUTO: 65.5 % — SIGNIFICANT CHANGE UP (ref 43–77)
NRBC # BLD: 0 /100 WBCS — SIGNIFICANT CHANGE UP (ref 0–0)
NRBC # FLD: 0 K/UL — SIGNIFICANT CHANGE UP (ref 0–0)
PHOSPHATE SERPL-MCNC: 3.3 MG/DL — SIGNIFICANT CHANGE UP (ref 2.5–4.5)
PLATELET # BLD AUTO: 383 K/UL — SIGNIFICANT CHANGE UP (ref 150–400)
POTASSIUM SERPL-MCNC: 4.8 MMOL/L — SIGNIFICANT CHANGE UP (ref 3.5–5.3)
POTASSIUM SERPL-SCNC: 4.8 MMOL/L — SIGNIFICANT CHANGE UP (ref 3.5–5.3)
RBC # BLD: 4.3 M/UL — SIGNIFICANT CHANGE UP (ref 4.2–5.8)
RBC # FLD: 16.1 % — HIGH (ref 10.3–14.5)
SODIUM SERPL-SCNC: 138 MMOL/L — SIGNIFICANT CHANGE UP (ref 135–145)
WBC # BLD: 9.52 K/UL — SIGNIFICANT CHANGE UP (ref 3.8–10.5)
WBC # FLD AUTO: 9.52 K/UL — SIGNIFICANT CHANGE UP (ref 3.8–10.5)

## 2025-01-16 PROCEDURE — 99232 SBSQ HOSP IP/OBS MODERATE 35: CPT

## 2025-01-16 PROCEDURE — 72157 MRI CHEST SPINE W/O & W/DYE: CPT | Mod: 26

## 2025-01-16 PROCEDURE — 72156 MRI NECK SPINE W/O & W/DYE: CPT | Mod: 26

## 2025-01-16 PROCEDURE — 72158 MRI LUMBAR SPINE W/O & W/DYE: CPT | Mod: 26

## 2025-01-16 RX ADMIN — Medication 10 MILLIGRAM(S): at 05:22

## 2025-01-16 RX ADMIN — Medication 60 MILLIGRAM(S): at 12:08

## 2025-01-16 RX ADMIN — ACETAMINOPHEN 650 MILLIGRAM(S): 80 SOLUTION/ DROPS ORAL at 10:30

## 2025-01-16 RX ADMIN — HEPARIN SODIUM 5000 UNIT(S): 1000 INJECTION, SOLUTION INTRAVENOUS; SUBCUTANEOUS at 05:22

## 2025-01-16 RX ADMIN — Medication 1: at 17:27

## 2025-01-16 RX ADMIN — HEPARIN SODIUM 5000 UNIT(S): 1000 INJECTION, SOLUTION INTRAVENOUS; SUBCUTANEOUS at 22:05

## 2025-01-16 RX ADMIN — LOSARTAN POTASSIUM 50 MILLIGRAM(S): 100 TABLET, FILM COATED ORAL at 05:22

## 2025-01-16 RX ADMIN — ATORVASTATIN CALCIUM 40 MILLIGRAM(S): 40 TABLET, FILM COATED ORAL at 22:05

## 2025-01-16 RX ADMIN — ACETAMINOPHEN 650 MILLIGRAM(S): 80 SOLUTION/ DROPS ORAL at 04:05

## 2025-01-16 RX ADMIN — Medication 10 UNIT(S): at 17:28

## 2025-01-16 RX ADMIN — ALLOPURINOL 100 MILLIGRAM(S): 100 TABLET ORAL at 12:09

## 2025-01-16 RX ADMIN — HEPARIN SODIUM 5000 UNIT(S): 1000 INJECTION, SOLUTION INTRAVENOUS; SUBCUTANEOUS at 14:07

## 2025-01-16 RX ADMIN — SPIRONOLACTONE 25 MILLIGRAM(S): 50 TABLET ORAL at 05:22

## 2025-01-16 RX ADMIN — ACETAMINOPHEN 650 MILLIGRAM(S): 80 SOLUTION/ DROPS ORAL at 03:35

## 2025-01-16 RX ADMIN — INSULIN GLARGINE-YFGN 29 UNIT(S): 100 INJECTION, SOLUTION SUBCUTANEOUS at 23:29

## 2025-01-16 RX ADMIN — Medication 81 MILLIGRAM(S): at 12:08

## 2025-01-16 NOTE — PROGRESS NOTE ADULT - ASSESSMENT
88 y/o M with pmh of prostate cancer, DM type 2, HTN, p/w hypoglycemia. +NST planning for angio. nephrology consulted for elevated scr    CKD stage 3B  possible baseline ~1.5-1.8  Cr plateauing at 2.1-2.3.  +covid; s/p remdesivir  He was on HCTZ, losartan, spironolactone, since held. ( last dose 1/9)  KIRBY likely ATN, renal function improving now  restarted on losartan 50 1/14, scr better restart spironolactone 1/15. pending lab  monitor BMP and UO  Group 2 Gadolinium Contrast can be used for MRI.    HTN  better  He was on HCTZ, losartan, spironolactone, since held. ( last dose 1/9)  will restart losartan 1/14   spironolactone restarted 1/15  C/W current meds.  Low salt diet.  monitor    Proteinuria  mild  UPr/Cr 0.26gm.  Monitor.    COVID-19:  care as per team  s/p remdesivir    Chest Pains  +NST  EF 45%  C deferred for now.  f/u cardio

## 2025-01-16 NOTE — PROGRESS NOTE ADULT - SUBJECTIVE AND OBJECTIVE BOX
Cardiology Attending Follow-up Note     Patient seen and examined at bedside.    Overnight Events:     no cardiac sxs   mri noted for metastatic prostate ca     REVIEW OF SYSTEMS:  Constitutional:     [x ] negative [ ] fevers [ ] chills [ ] weight loss [ ] weight gain  HEENT:                  [x ] negative [ ] dry eyes [ ] eye irritation [ ] postnasal drip [ ] nasal congestion  CV:                         [ x] negative  [ ] chest pain [ ] orthopnea [ ] palpitations [ ] murmur  Resp:                     [ x] negative [ ] cough [ ] shortness of breath [ ] dyspnea [ ] wheezing [ ] sputum [ ]hemoptysis  GI:                          [ x] negative [ ] nausea [ ] vomiting [ ] diarrhea [ ] constipation [ ] abd pain [ ] dysphagia   :                        [ x] negative [ ] dysuria [ ] nocturia [ ] hematuria [ ] increased urinary frequency  Musculoskeletal: [ x] negative [ ] back pain [ ] myalgias [ ] arthralgias [ ] fracture  Skin:                       [ x] negative [ ] rash [ ] itch  Neurological:        [x ] negative [ ] headache [ ] dizziness [ ] syncope [ ] weakness [ ] numbness  Psychiatric:           [ x] negative [ ] anxiety [ ] depression  Endocrine:            [ x] negative [ ] diabetes [ ] thyroid problem  Heme/Lymph:      [ x] negative [ ] anemia [ ] bleeding problem  Allergic/Immune: [ x] negative [ ] itchy eyes [ ] nasal discharge [ ] hives [ ] angioedema    [ x] All other systems negative  [ ] Unable to assess ROS due to    Current Meds:  acetaminophen     Tablet .. 650 milliGRAM(s) Oral every 6 hours PRN  allopurinol 100 milliGRAM(s) Oral daily  amLODIPine   Tablet 10 milliGRAM(s) Oral daily  aspirin enteric coated 81 milliGRAM(s) Oral daily  atorvastatin 40 milliGRAM(s) Oral at bedtime  heparin   Injectable 5000 Unit(s) SubCutaneous every 8 hours  insulin glargine Injectable (LANTUS) 29 Unit(s) SubCutaneous at bedtime  insulin lispro (ADMELOG) corrective regimen sliding scale   SubCutaneous three times a day before meals  insulin lispro (ADMELOG) corrective regimen sliding scale   SubCutaneous at bedtime  insulin lispro Injectable (ADMELOG) 10 Unit(s) SubCutaneous three times a day before meals  isosorbide   mononitrate ER Tablet (IMDUR) 60 milliGRAM(s) Oral daily  losartan 50 milliGRAM(s) Oral daily  melatonin 3 milliGRAM(s) Oral at bedtime PRN  sodium chloride 0.9%. 1000 milliLiter(s) IV Continuous <Continuous>  spironolactone 25 milliGRAM(s) Oral daily      PAST MEDICAL & SURGICAL HISTORY:  HTN (hypertension)      DM2 (diabetes mellitus, type 2)      Prostate cancer      No significant past surgical history          Vitals:  T(F): 98.3 (01-16), Max: 98.3 (01-16)  HR: 58 (01-16) (58 - 63)  BP: 144/67 (01-16) (139/61 - 144/67)  RR: 17 (01-16)  SpO2: 100% (01-16)  I&O's Summary    15 Luke 2025 07:01  -  16 Jan 2025 07:00  --------------------------------------------------------  IN: 870 mL / OUT: 1450 mL / NET: -580 mL    16 Jan 2025 07:01  -  16 Jan 2025 22:58  --------------------------------------------------------  IN: 720 mL / OUT: 400 mL / NET: 320 mL        Physical Exam:  Appearance: No acute distress  HENT: No JVD   Cardiovascular: RRR, S1/S2, no murmurs  Respiratory: CTABL  Gastrointestinal: soft, NT ND, +BS  Musculoskeletal: No clubbing, no edema   Neurologic: Non-focal  Skin: No rashes, ecchymoses, or cyanosis                          9.7    9.52  )-----------( 383      ( 16 Jan 2025 11:43 )             30.5     01-16    138  |  103  |  43[H]  ----------------------------<  125[H]  4.8   |  20[L]  |  1.66[H]    Ca    9.3      16 Jan 2025 11:43  Phos  3.3     01-16  Mg     2.30     01-16                    Cardiovascular Testings:

## 2025-01-16 NOTE — CHART NOTE - NSCHARTNOTEFT_GEN_A_CORE
Case and imaging reviewed. No neurosurgical intervention at this time. Recommend follow up with onc and rad/onc for treatment options.  Neurosurgery signing off. Reconsult PRN.    < from: MR Thoracic Spine w/wo IV Cont (01.16.25 @ 11:00) >    IMPRESSION:  1. 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 No additional extraosseous-epidural soft   tissue components or leptomeningeal enhancement identified.  2. Additional findings, including those degenerative, described in detail   above.    < end of copied text >      [id number]  Case discussed with attending neurosurgeon Dr. Aguilera Case and imaging reviewed. No neurosurgical intervention at this time. Recommend follow up with onc and rad/onc for treatment options.  Neurosurgery signing off. Reconsult PRN.    < from: MR Thoracic Spine w/wo IV Cont (01.16.25 @ 11:00) >    IMPRESSION:  1. 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 No additional extraosseous-epidural soft   tissue components or leptomeningeal enhancement identified.  2. Additional findings, including those degenerative, described in detail   above.    < end of copied text >      [id number]  Case discussed with attending neurosurgeon Dr. Aguilera.

## 2025-01-16 NOTE — PROGRESS NOTE ADULT - ASSESSMENT
spine MRI confirms metastatic prostate cancer  & spinostenosis- no cord compression and paraspinal mass

## 2025-01-16 NOTE — PROGRESS NOTE ADULT - SUBJECTIVE AND OBJECTIVE BOX
Back pain improves  MRI report noted    REVIEW OF SYSTEMS:    CONSTITUTIONAL: No weakness, fevers or chills, weight loss  RESPIRATORY: No cough, wheezing, hemoptysis; No shortness of breath  CARDIOVASCULAR: No chest pain or palpitations  GASTROINTESTINAL: No abdominal, nausea, vomiting, or hematemesis; No diarrhea or constipation. No melena or hematochezia.  GENITOURINARY: No dysuria, frequency or hematuria  NEUROLOGICAL: No dizziness, numbness, or weakness  SKIN: No itching, burning, rashes, or lesions   All other review of systems is negative unless indicated above.    VITAL SIGNS:    T98.1    PHYSICAL EXAM:     GENERAL: no acute distress  HEENT: NC/AT, EOMI, neck supple, MMM  RESPIRATORY: LCTAB/L, no rhonchi, rales, or wheezing  CARDIOVASCULAR: RRR, no murmurs, gallops, rubs  ABDOMINAL: soft, non-tender, non-distended, positive bowel sounds   EXTREMITIES: no clubbing, cyanosis, or edema  NEUROLOGICAL: alert and oriented x 3, non-focal  LYMPHATIC: lymphatic: cervical, supraclavicular, axilla, inguinal  SKIN: no rashes or lesions   MUSCULOSKELETAL: no gross joint deformity                          9.7    9.52  )-----------( 383      ( 16 Jan 2025 11:43 )             30.5     01-16    138  |  103  |  43[H]  ----------------------------<  125[H]  4.8   |  20[L]  |  1.66[H]    Ca    9.3      16 Jan 2025 11:43  Phos  3.3     01-16  Mg     2.30     01-16        MEDICATIONS  (STANDING):  allopurinol 100 milliGRAM(s) Oral daily  amLODIPine   Tablet 10 milliGRAM(s) Oral daily  aspirin enteric coated 81 milliGRAM(s) Oral daily  atorvastatin 40 milliGRAM(s) Oral at bedtime  heparin   Injectable 5000 Unit(s) SubCutaneous every 8 hours  insulin glargine Injectable (LANTUS) 29 Unit(s) SubCutaneous at bedtime  insulin lispro (ADMELOG) corrective regimen sliding scale   SubCutaneous three times a day before meals  insulin lispro (ADMELOG) corrective regimen sliding scale   SubCutaneous at bedtime  insulin lispro Injectable (ADMELOG) 10 Unit(s) SubCutaneous three times a day before meals  isosorbide   mononitrate ER Tablet (IMDUR) 60 milliGRAM(s) Oral daily  losartan 50 milliGRAM(s) Oral daily  sodium chloride 0.9%. 1000 milliLiter(s) (50 mL/Hr) IV Continuous <Continuous>  spironolactone 25 milliGRAM(s) Oral daily

## 2025-01-16 NOTE — PROGRESS NOTE ADULT - ASSESSMENT
89M with PMH of prostate CA, T2DM, HTN, HLD, CKD, here for hypoglycemia sxs. In the ER, pt found to have cp with EKG w/ NSR and no significant ischemic changes. Pt was recommended to be admitted by Tele Doc Dr. Perea. Cardiology consulted for further evaluation inpt.     1. Cp, +NST and TTE reviewed   2. HTN   3. HLD   4. CKD     -given +NST, initially planned for [facility], deferred for COVID and rising cr now with metastatic CA, will hold off for now   -bp control, cont norvasc, imduri, losartan, and Hctz   -cont asa and statin   -HR 50-60s, would rec hold bb   -discharge planning   -f/u OP w/ me in clinic     Nitin Lee MD [facility]  Cardiology Attending, Jenn-NS/LOIS  Avaliable on [employer] Team

## 2025-01-16 NOTE — PROGRESS NOTE ADULT - ASSESSMENT
89-year-old male with a past medical history of prostate cancer, diabetes, hypertension, presenting due to concern of hypoglycemia at home.  The patient states he is tired and was not sure what happened.  According to his wife who was in bed with him, she started hearing him gurgling at home.  She turned on the lights and tried to wake him up, the patient was unresponsive.  They called EMS who came and checked a fingerstick which was 41.  He was given 1 amp of D50 with a repeat being 140.  Patient also tolerated p.o.  According to wife he had a similar occurrence yesterday where his fingerstick was in the 40s.  Patient takes combined metformin and glipizide as well as lantus  Denies fever, chills, chest pain, trouble breathing, dysuria.  According to patient he has decreased appetite recently compared to the past.  Endocrine called for further assistance.  Reports long hx of DM.   He states Lantus was increased from 12--> 15 units  checks [facility] in the am and afternoon  reports multiple lows in 30-40s       Poorly controlled T2DM with hyperglycemia  - HbA1c: 7.0  - Home Regimen: combined metformin and glipizide as well as Lantus 12-15 units  - Endocrinologist: unknown    Inpatient plan   - FS goal 100-200 mg/dl: would like to avoid hypoglycemia in elderly pt   - Variable glucose   - fair appetite today   - Continue Lantus 29 units at bedtime  - Continue Admelog 10 units TID AC. Hold if not eating/NPO.   - continue low Admelog correctional scale TID AC  - continue separate low Admelog correctional scale at HS  - FS TID AC & HS ---> q6 if NPO   - hypoglycemia protocol PRN   - consistent carbohydrate diet  - RD consult  - c-Peptide 3.0 with serum glucose 170 - patient is making adequate endogenous insulin.    Discharge plan   - Patient going to rehab.  - Stop glipizide  - STOP metformin  - discharge to rehab on: basal/bolus insulin (doses TBD)   - discharge home on: Lantus + Tradjenta 5 mg daily (please check for insurance coverage) +- Prandin 0.5mg p.o. daily with largest meal ; Final plan tbd based on glucose trend, insulin requirements, and appetite   - Please send prescription for glucose tablets 4G (take 4 tablets) or 15G tablets for blood sugar less than 70 mG/dL, repeat fingerstick in 15 minutes. Call your provider for dose adjustment if needed.   - Patient to call doctor with persistent high or low BG at home.   - Ensure patient has glucometer, test strips and lancets on discharge.  - Ensure patient has insulin pen needles.   - Recommend routine outpatient ophthalmology and podiatry follow up.   - Dr Yovani Kern  [address] [facility] [address], [place] [address]   Appointment with Dr. Kern on 3/20 @ 2:20pm.     HTN  - Outpatient goal BP <130//80 for age  - continue amlodipine, HCTZ, losartan, spironolactone  - Can check urine albumin/creatinine outpatient  - Management per primary team.    HLD  - LDL goal < 70  - LDL 67  - continue statin if no contraindications   - management per primary team          Tammy Horn  Nurse Practitioner  Division of Endocrinology & Diabetes  In house pager #[phone]    If before 9AM or after 6PM, or on weekends/holidays, please call endocrine answering service for assistance ([phone]).For nonurgent matters email [email or web address] for assistance.

## 2025-01-16 NOTE — PROGRESS NOTE ADULT - SUBJECTIVE AND OBJECTIVE BOX
[facility] [place] [facility]   MD CRISTI SESAY MD ANGELA WONG, PA    TEL:  OFFICE: [phone]  From 5pm-7am Answering Service [phone]    -- RENAL FOLLOW UP NOTE ---Date of Service 01-16-25 @ 11:23    Patient is a 89y old  Male who presents with a chief complaint of hypoglycemia (15 Luke 2025 21:55)      Patient seen and examined at bedside. No chest pain/sob    VITALS:  T(F): 98.3 (01-16-25 @ 05:20), Max: 98.4 (01-15-25 @ 21:09)  HR: 63 (01-16-25 @ 05:20)  BP: 143/93 (01-16-25 @ 05:20)  RR: 18 (01-16-25 @ 05:20)  SpO2: 100% (01-16-25 @ 05:20)  Wt(kg): --    01-15 @ 07:01  -  01-16 @ 07:00  --------------------------------------------------------  IN: 870 mL / OUT: 1450 mL / NET: -580 mL    01-16 @ 07:01  -  01-16 @ 11:23  --------------------------------------------------------  IN: 0 mL / OUT: 400 mL / NET: -400 mL          PHYSICAL EXAM:  General: NAD  Neck: No JVD  Respiratory: CTAB, no wheezes, rales or rhonchi  Cardiovascular: S1, S2, RRR  Gastrointestinal: BS+, soft, NT/ND  Extremities: No peripheral edema    Hospital Medications:   MEDICATIONS  (STANDING):  allopurinol 100 milliGRAM(s) Oral daily  amLODIPine   Tablet 10 milliGRAM(s) Oral daily  aspirin enteric coated 81 milliGRAM(s) Oral daily  atorvastatin 40 milliGRAM(s) Oral at bedtime  heparin   Injectable 5000 Unit(s) SubCutaneous every 8 hours  insulin glargine Injectable (LANTUS) 29 Unit(s) SubCutaneous at bedtime  insulin lispro (ADMELOG) corrective regimen sliding scale   SubCutaneous at bedtime  insulin lispro (ADMELOG) corrective regimen sliding scale   SubCutaneous three times a day before meals  insulin lispro Injectable (ADMELOG) 10 Unit(s) SubCutaneous three times a day before meals  isosorbide   mononitrate ER Tablet (IMDUR) 60 milliGRAM(s) Oral daily  losartan 50 milliGRAM(s) Oral daily  sodium chloride 0.9%. 1000 milliLiter(s) (50 mL/Hr) IV Continuous <Continuous>  spironolactone 25 milliGRAM(s) Oral daily      LABS:  01-15    137  |  102  |  53[H]  ----------------------------<  142[H]  4.4   |  20[L]  |  1.80[H]    Ca    9.4      15 Luke 2025 07:40  Phos  3.6     01-15  Mg     2.30     01-15      Creatinine Trend: 1.80 <--, 2.23 <--, 2.27 <--, 2.32 <--, 2.22 <--, 2.23 <--                                9.9    11.08 )-----------( 361      ( 15 Luke 2025 07:40 )             31.2     Urine Studies:  Urinalysis - [01-15-25 @ 07:40]      Color  / Appearance  / SG  / pH       Gluc 142 / Ketone   / Bili  / Urobili        Blood  / Protein  / Leuk Est  / Nitrite       RBC  / WBC  / Hyaline  / Gran  / Sq Epi  / Non Sq Epi  / Bacteria       Lipid: chol 130, , HDL 36, LDL --      [01-01-25 @ 05:30]        RADIOLOGY & ADDITIONAL STUDIES:

## 2025-01-16 NOTE — PROGRESS NOTE ADULT - ASSESSMENT
88 y/o M with pmh of prostate cancer, DM type 2, HTN, p/w hypoglycemia.  Pt reports his insulin glargine  dose was increased from 12 to 15 units recently.  Over the past few days, he reports getting low reading in the morning, but is able to alert his wife who gives him food to bring it  up.  Yesterday AM, pt's wife noticed he was confused and making gurgling sounds while in bed.  She checked FS which was 41.  EMS was called, and pt give D50 en route to ED.  Pt also reports intermittent L sided chest pain for the past several days.  Pain is positional, and improves when pt wears a support belt.  Pain is difficult for patient to describe quality.  No associated dyspnea, dizziness nausea or diaphoresis.  No recent fever or cough.    Pt was evaluated in the ED, and placed in the CDU for monitoring.  He is admitted for further w/u of chest pain and monitoring of glucose.       Problem/Recommendation - 1:  ·  Problem: Chest pain with abnormal stress test .   ·  Recommendation: Cardiology planning cardiac cath out patient  .   On  Aspirin and statin.  Cardiology help appreciated.   < from: TTE W or WO Ultrasound Enhancing Agent (12.31.24 @ 07:33) >    CONCLUSIONS:      1. Left ventricular systolic function is normal with an ejection fraction visually estimated at 50 to 55 %.      < from: Nuclear Stress Test-Exercise.. (12.31.24 @ 08:30) >     1. Qualitative Perfusion:      - medium-sized, moderate to severe defect(s) in the inferior and inferolateral wall suggestive of CAD.   2. The left ventricle is mildly decreased in function and normal in size. The time activity curve suggests diastolic dysfunction.. The post stress left ventricular EF is 45 %. The stress end diastolic volume is 71 ml and systolic volume is 39 ml.   3. Hypokinesis of the inferoseptal wall.     Problem/Recommendation - 2:  ·  Problem: Type 2 diabetes mellitus with hypoglycemia.   ·  Recommendation: Endo help appreciated.     Following  their recommendation.     Problem/Recommendation - 3:  ·  Problem: Essential hypertension.   ·  Recommendation: BP medications with hold parameters.     Problem/Recommendation - 4:  ·  Problem: CKD with KIRBY .   ·  Recommendation: Trending Creatinine & Stable.   Renal help appreciated.       Problem/Recommendation - 5:  ·  Problem: Prostate cancer.   ·  Recommendation: Out patient Follow Up.       Problem/Recommendation - 6:  ·  Problem: COVID 19 infection with Hypoxia .   ·  Recommendation: ID help appreciated.       ON Remdisvir .      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   ·  Recommendation: Pain medication and awaiting MR.     < from: CT Lumbar Spine No Cont (01.09.25 @ 20:52) >  IMPRESSION: Degenerative changes involving the cervical thoracic and   lumbar spine region as well as abnormal lucent lesions as described   above. While these findings could be compatible with metastasis these are   unlikely compatible with prostate metastasis given that they are lytic   lesions at cannot of blastic lesions. Contrast enhanced MRI of the   cervical thoracic and lumbar spine is recommended for further evaluation   if there are no contraindications  < from: MR Thoracic Spine w/wo IV Cont (01.16.25 @ 11:00) >  IMPRESSION:  1. 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 No additional extraosseous-epidural soft   tissue components or leptomeningeal enhancement identified.  2. Additional findings, including those degenerative, described in detail   above.    < end of copied text >    Oncology help appreciated. Cleared for DC       D/W patient and ACP.   Dispo : DC planning to [facility].

## 2025-01-16 NOTE — PROGRESS NOTE ADULT - SUBJECTIVE AND OBJECTIVE BOX
Date of Service  : 01-16-25     INTERVAL HPI/OVERNIGHT EVENTS: I feel fine .  Vital Signs Last 24 Hrs  T(C): 36.7 (16 Jan 2025 12:05), Max: 36.9 (15 Luke 2025 21:09)  T(F): 98.1 (16 Jan 2025 12:05), Max: 98.4 (15 Luke 2025 21:09)  HR: 58 (16 Jan 2025 12:05) (58 - 81)  BP: 139/61 (16 Jan 2025 12:05) (101/64 - 143/93)  BP(mean): --  RR: 18 (16 Jan 2025 12:05) (18 - 18)  SpO2: 97% (16 Jan 2025 12:05) (97% - 100%)    Parameters below as of 16 Jan 2025 12:05  Patient On (Oxygen Delivery Method): room air      I&O's Summary    15 Luke 2025 07:01  -  16 Jan 2025 07:00  --------------------------------------------------------  IN: 870 mL / OUT: 1450 mL / NET: -580 mL    16 Jan 2025 07:01  -  16 Jan 2025 17:44  --------------------------------------------------------  IN: 0 mL / OUT: 400 mL / NET: -400 mL      MEDICATIONS  (STANDING):  allopurinol 100 milliGRAM(s) Oral daily  amLODIPine   Tablet 10 milliGRAM(s) Oral daily  aspirin enteric coated 81 milliGRAM(s) Oral daily  atorvastatin 40 milliGRAM(s) Oral at bedtime  heparin   Injectable 5000 Unit(s) SubCutaneous every 8 hours  insulin glargine Injectable (LANTUS) 29 Unit(s) SubCutaneous at bedtime  insulin lispro (ADMELOG) corrective regimen sliding scale   SubCutaneous three times a day before meals  insulin lispro (ADMELOG) corrective regimen sliding scale   SubCutaneous at bedtime  insulin lispro Injectable (ADMELOG) 10 Unit(s) SubCutaneous three times a day before meals  isosorbide   mononitrate ER Tablet (IMDUR) 60 milliGRAM(s) Oral daily  losartan 50 milliGRAM(s) Oral daily  sodium chloride 0.9%. 1000 milliLiter(s) (50 mL/Hr) IV Continuous <Continuous>  spironolactone 25 milliGRAM(s) Oral daily    MEDICATIONS  (PRN):  acetaminophen     Tablet .. 650 milliGRAM(s) Oral every 6 hours PRN Temp greater or equal to 38C (100.4F), Mild Pain (1 - 3), Moderate Pain (4 - 6)  melatonin 3 milliGRAM(s) Oral at bedtime PRN Insomnia    LABS:                        9.7    9.52  )-----------( 383      ( 16 Jan 2025 11:43 )             30.5     01-16    138  |  103  |  43[H]  ----------------------------<  125[H]  4.8   |  20[L]  |  1.66[H]    Ca    9.3      16 Jan 2025 11:43  Phos  3.3     01-16  Mg     2.30     01-16        Urinalysis Basic - ( 16 Jan 2025 11:43 )    Color: x / Appearance: x / SG: x / pH: x  Gluc: 125 mg/dL / Ketone: x  / Bili: x / Urobili: x   Blood: x / Protein: x / Nitrite: x   Leuk Esterase: x / RBC: x / WBC x   Sq Epi: x / Non Sq Epi: x / Bacteria: x      CAPILLARY BLOOD GLUCOSE      POCT Blood Glucose.: 179 mg/dL (16 Jan 2025 17:24)  POCT Blood Glucose.: 125 mg/dL (16 Jan 2025 12:09)  POCT Blood Glucose.: 132 mg/dL (16 Jan 2025 10:09)  POCT Blood Glucose.: 134 mg/dL (15 Luke 2025 22:36)        Urinalysis Basic - ( 16 Jan 2025 11:43 )    Color: x / Appearance: x / SG: x / pH: x  Gluc: 125 mg/dL / Ketone: x  / Bili: x / Urobili: x   Blood: x / Protein: x / Nitrite: x   Leuk Esterase: x / RBC: x / WBC x   Sq Epi: x / Non Sq Epi: x / Bacteria: x      REVIEW OF SYSTEMS:  CONSTITUTIONAL: No fever, weight loss, or fatigue  EYES: No eye pain, visual disturbances, or discharge  ENMT:  No difficulty hearing, tinnitus, vertigo; No sinus or throat pain  NECK: No pain or stiffness  RESPIRATORY: No cough, wheezing, chills or hemoptysis; No shortness of breath  CARDIOVASCULAR: No chest pain, palpitations, dizziness, or leg swelling  GASTROINTESTINAL: No abdominal or epigastric pain. No nausea, vomiting, or hematemesis; No diarrhea or constipation. No melena or hematochezia.  GENITOURINARY: No dysuria, frequency, hematuria, or incontinence  NEUROLOGICAL: No headaches, memory loss, loss of strength, numbness, or tremors    RADIOLOGY & ADDITIONAL TESTS:    Consultant(s) Notes Reviewed:  [x ] YES  [ ] NO    PHYSICAL EXAM:  GENERAL: NAD, well-groomed, well-developed,not in any distress ,  HEAD:  Atraumatic, Normocephalic  NECK: Supple, No JVD, Normal thyroid  NERVOUS SYSTEM:  Alert & Oriented X3, No focal deficit   CHEST/LUNG: Good air entry bilateral with no  rales, rhonchi, wheezing, or rubs  HEART: Regular rate and rhythm; No murmurs, rubs, or gallops  ABDOMEN: Soft, Nontender, Nondistended; Bowel sounds present  EXTREMITIES:  2+ Peripheral Pulses, No clubbing, cyanosis, or edema    Care Discussed with Consultants/Other Providers [ x] YES  [ ] NO

## 2025-01-16 NOTE — PROGRESS NOTE ADULT - SUBJECTIVE AND OBJECTIVE BOX
Chief Complaint: Type 2 DM    History: Pt seen at bedside. Pt tolerating oral diet. Pt denies nausea and vomiting/any signs of hypoglycemia. Pt reports an adequate appetite.     MEDICATIONS  (STANDING):  allopurinol 100 milliGRAM(s) Oral daily  amLODIPine   Tablet 10 milliGRAM(s) Oral daily  aspirin enteric coated 81 milliGRAM(s) Oral daily  atorvastatin 40 milliGRAM(s) Oral at bedtime  heparin   Injectable 5000 Unit(s) SubCutaneous every 8 hours  insulin glargine Injectable (LANTUS) 29 Unit(s) SubCutaneous at bedtime  insulin lispro (ADMELOG) corrective regimen sliding scale   SubCutaneous at bedtime  insulin lispro (ADMELOG) corrective regimen sliding scale   SubCutaneous three times a day before meals  insulin lispro Injectable (ADMELOG) 10 Unit(s) SubCutaneous three times a day before meals  isosorbide   mononitrate ER Tablet (IMDUR) 60 milliGRAM(s) Oral daily  losartan 50 milliGRAM(s) Oral daily  sodium chloride 0.9%. 1000 milliLiter(s) (50 mL/Hr) IV Continuous <Continuous>  spironolactone 25 milliGRAM(s) Oral daily    MEDICATIONS  (PRN):  acetaminophen     Tablet .. 650 milliGRAM(s) Oral every 6 hours PRN Temp greater or equal to 38C (100.4F), Mild Pain (1 - 3), Moderate Pain (4 - 6)  melatonin 3 milliGRAM(s) Oral at bedtime PRN Insomnia      Allergies: No Known Allergies    Review of Systems:  Respiratory: No SOB, no cough  GI: No nausea, vomiting, abdominal pain  Endocrine: no polyuria, polydipsia    PHYSICAL EXAM:  VITALS: T(C): 36.7 (01-16-25 @ 12:05)  T(F): 98.1 (01-16-25 @ 12:05), Max: 98.4 (01-15-25 @ 21:09)  HR: 58 (01-16-25 @ 12:05) (58 - 81)  BP: 139/61 (01-16-25 @ 12:05) (101/64 - 143/93)  RR:  (18 - 18)  SpO2:  (97% - 100%)  Wt(kg): --  GENERAL: NAD, well-groomed, well-developed  RESPIRATORY: NO labored breathing  GI: Soft, nontender, non distended  PSYCH: Alert and oriented x 3, normal affect, normal mood      CAPILLARY BLOOD GLUCOSE  POCT Blood Glucose.: 179 mg/dL (16 Jan 2025 17:24)  POCT Blood Glucose.: 125 mg/dL (16 Jan 2025 12:09)  POCT Blood Glucose.: 132 mg/dL (16 Jan 2025 10:09)  POCT Blood Glucose.: 134 mg/dL (15 Luke 2025 22:36)    A1C with Estimated Average Glucose (12.30.24 @ 09:36)    A1C with Estimated Average Glucose Result: 7.0   Estimated Average Glucose: 154      01-16    138  |  103  |  43[H]  ----------------------------<  125[H]  4.8   |  20[L]  |  1.66[H]    eGFR: 39[L]    Ca    9.3      01-16  Mg     2.30     01-16  Phos  3.3     01-16    Diet, Consistent Carbohydrate w/Evening Snack (12-30-24 @ 14:06) [Active]

## 2025-01-17 ENCOUNTER — TRANSCRIPTION ENCOUNTER (OUTPATIENT)
Age: 89
End: 2025-01-17

## 2025-01-17 VITALS
RESPIRATION RATE: 18 BRPM | DIASTOLIC BLOOD PRESSURE: 83 MMHG | SYSTOLIC BLOOD PRESSURE: 146 MMHG | HEART RATE: 63 BPM | TEMPERATURE: 98 F | OXYGEN SATURATION: 98 %

## 2025-01-17 LAB
ANION GAP SERPL CALC-SCNC: 13 MMOL/L — SIGNIFICANT CHANGE UP (ref 7–14)
BASOPHILS # BLD AUTO: 0.06 K/UL — SIGNIFICANT CHANGE UP (ref 0–0.2)
BASOPHILS NFR BLD AUTO: 0.7 % — SIGNIFICANT CHANGE UP (ref 0–2)
BUN SERPL-MCNC: 37 MG/DL — HIGH (ref 7–23)
CALCIUM SERPL-MCNC: 9.2 MG/DL — SIGNIFICANT CHANGE UP (ref 8.4–10.5)
CHLORIDE SERPL-SCNC: 103 MMOL/L — SIGNIFICANT CHANGE UP (ref 98–107)
CO2 SERPL-SCNC: 21 MMOL/L — LOW (ref 22–31)
CREAT SERPL-MCNC: 1.65 MG/DL — HIGH (ref 0.5–1.3)
EGFR: 40 ML/MIN/1.73M2 — LOW
EOSINOPHIL # BLD AUTO: 0.88 K/UL — HIGH (ref 0–0.5)
EOSINOPHIL NFR BLD AUTO: 9.7 % — HIGH (ref 0–6)
GLUCOSE BLDC GLUCOMTR-MCNC: 114 MG/DL — HIGH (ref 70–99)
GLUCOSE BLDC GLUCOMTR-MCNC: 124 MG/DL — HIGH (ref 70–99)
GLUCOSE BLDC GLUCOMTR-MCNC: 190 MG/DL — HIGH (ref 70–99)
GLUCOSE SERPL-MCNC: 103 MG/DL — HIGH (ref 70–99)
HCT VFR BLD CALC: 29.9 % — LOW (ref 39–50)
HGB BLD-MCNC: 9.4 G/DL — LOW (ref 13–17)
IANC: 5.45 K/UL — SIGNIFICANT CHANGE UP (ref 1.8–7.4)
IMM GRANULOCYTES NFR BLD AUTO: 2.1 % — HIGH (ref 0–0.9)
LYMPHOCYTES # BLD AUTO: 1.43 K/UL — SIGNIFICANT CHANGE UP (ref 1–3.3)
LYMPHOCYTES # BLD AUTO: 15.8 % — SIGNIFICANT CHANGE UP (ref 13–44)
MAGNESIUM SERPL-MCNC: 2.3 MG/DL — SIGNIFICANT CHANGE UP (ref 1.6–2.6)
MCHC RBC-ENTMCNC: 22.6 PG — LOW (ref 27–34)
MCHC RBC-ENTMCNC: 31.4 G/DL — LOW (ref 32–36)
MCV RBC AUTO: 71.9 FL — LOW (ref 80–100)
MONOCYTES # BLD AUTO: 1.05 K/UL — HIGH (ref 0–0.9)
MONOCYTES NFR BLD AUTO: 11.6 % — SIGNIFICANT CHANGE UP (ref 2–14)
NEUTROPHILS # BLD AUTO: 5.45 K/UL — SIGNIFICANT CHANGE UP (ref 1.8–7.4)
NEUTROPHILS NFR BLD AUTO: 60.1 % — SIGNIFICANT CHANGE UP (ref 43–77)
NRBC # BLD: 0 /100 WBCS — SIGNIFICANT CHANGE UP (ref 0–0)
NRBC # FLD: 0 K/UL — SIGNIFICANT CHANGE UP (ref 0–0)
PHOSPHATE SERPL-MCNC: 3.5 MG/DL — SIGNIFICANT CHANGE UP (ref 2.5–4.5)
PLATELET # BLD AUTO: 345 K/UL — SIGNIFICANT CHANGE UP (ref 150–400)
POTASSIUM SERPL-MCNC: 4.7 MMOL/L — SIGNIFICANT CHANGE UP (ref 3.5–5.3)
POTASSIUM SERPL-SCNC: 4.7 MMOL/L — SIGNIFICANT CHANGE UP (ref 3.5–5.3)
RBC # BLD: 4.16 M/UL — LOW (ref 4.2–5.8)
RBC # FLD: 16.2 % — HIGH (ref 10.3–14.5)
SODIUM SERPL-SCNC: 137 MMOL/L — SIGNIFICANT CHANGE UP (ref 135–145)
WBC # BLD: 9.06 K/UL — SIGNIFICANT CHANGE UP (ref 3.8–10.5)
WBC # FLD AUTO: 9.06 K/UL — SIGNIFICANT CHANGE UP (ref 3.8–10.5)

## 2025-01-17 PROCEDURE — 99233 SBSQ HOSP IP/OBS HIGH 50: CPT

## 2025-01-17 PROCEDURE — 99232 SBSQ HOSP IP/OBS MODERATE 35: CPT

## 2025-01-17 RX ORDER — INSULIN GLARGINE-YFGN 100 [IU]/ML
27 INJECTION, SOLUTION SUBCUTANEOUS AT BEDTIME
Refills: 0 | Status: DISCONTINUED | OUTPATIENT
Start: 2025-01-17 | End: 2025-01-17

## 2025-01-17 RX ORDER — INSULIN GLARGINE-YFGN 100 [IU]/ML
15 INJECTION, SOLUTION SUBCUTANEOUS
Qty: 0 | Refills: 0 | DISCHARGE

## 2025-01-17 RX ORDER — NEBIVOLOL 5 MG/1
1 TABLET ORAL
Refills: 0 | DISCHARGE

## 2025-01-17 RX ORDER — ISOSORBIDE MONONITRATE 120 MG
1 TABLET, EXTENDED RELEASE 24 HR ORAL
Qty: 0 | Refills: 0 | DISCHARGE
Start: 2025-01-17

## 2025-01-17 RX ORDER — ASPIRIN 81 MG
1 TABLET, DELAYED RELEASE (ENTERIC COATED) ORAL
Qty: 0 | Refills: 0 | DISCHARGE
Start: 2025-01-17

## 2025-01-17 RX ORDER — SPIRONOLACTONE 50 MG/1
1 TABLET ORAL
Refills: 0 | DISCHARGE

## 2025-01-17 RX ORDER — OLMESARTAN MEDOXOMIL / AMLODIPINE BESYLATE / HYDROCHLOROTHIAZIDE 20; 5; 12.5 MG/1; MG/1; MG/1
1 TABLET, FILM COATED ORAL
Refills: 0 | DISCHARGE

## 2025-01-17 RX ORDER — LOSARTAN POTASSIUM 100 MG/1
1 TABLET, FILM COATED ORAL
Qty: 0 | Refills: 0 | DISCHARGE
Start: 2025-01-17

## 2025-01-17 RX ORDER — LOSARTAN POTASSIUM 100 MG/1
100 TABLET, FILM COATED ORAL DAILY
Refills: 0 | Status: DISCONTINUED | OUTPATIENT
Start: 2025-01-17 | End: 2025-01-17

## 2025-01-17 RX ORDER — ATORVASTATIN CALCIUM 40 MG/1
1 TABLET, FILM COATED ORAL
Qty: 0 | Refills: 0 | DISCHARGE
Start: 2025-01-17

## 2025-01-17 RX ORDER — INSULIN LISPRO 100/ML
8 VIAL (ML) SUBCUTANEOUS
Qty: 0 | Refills: 0 | DISCHARGE
Start: 2025-01-17

## 2025-01-17 RX ORDER — INSULIN GLARGINE-YFGN 100 [IU]/ML
27 INJECTION, SOLUTION SUBCUTANEOUS
Qty: 0 | Refills: 0 | DISCHARGE
Start: 2025-01-17

## 2025-01-17 RX ORDER — SPIRONOLACTONE 50 MG/1
1 TABLET ORAL
Qty: 0 | Refills: 0 | DISCHARGE
Start: 2025-01-17

## 2025-01-17 RX ADMIN — Medication 81 MILLIGRAM(S): at 12:05

## 2025-01-17 RX ADMIN — HEPARIN SODIUM 5000 UNIT(S): 1000 INJECTION, SOLUTION INTRAVENOUS; SUBCUTANEOUS at 05:17

## 2025-01-17 RX ADMIN — Medication 10 UNIT(S): at 17:48

## 2025-01-17 RX ADMIN — ALLOPURINOL 100 MILLIGRAM(S): 100 TABLET ORAL at 12:05

## 2025-01-17 RX ADMIN — SPIRONOLACTONE 25 MILLIGRAM(S): 50 TABLET ORAL at 05:17

## 2025-01-17 RX ADMIN — Medication 1: at 12:25

## 2025-01-17 RX ADMIN — HEPARIN SODIUM 5000 UNIT(S): 1000 INJECTION, SOLUTION INTRAVENOUS; SUBCUTANEOUS at 15:40

## 2025-01-17 RX ADMIN — Medication 10 UNIT(S): at 12:26

## 2025-01-17 RX ADMIN — LOSARTAN POTASSIUM 50 MILLIGRAM(S): 100 TABLET, FILM COATED ORAL at 05:17

## 2025-01-17 RX ADMIN — Medication 60 MILLIGRAM(S): at 12:06

## 2025-01-17 RX ADMIN — Medication 10 MILLIGRAM(S): at 05:16

## 2025-01-17 NOTE — DISCHARGE NOTE NURSING/CASE MANAGEMENT/SOCIAL WORK - NSDCPEFALRISK_GEN_ALL_CORE
For information on Fall & Injury Prevention, visit: [email or web address] OR  [email or web address] OR  https://www.cdc.gov/steadi/patient.html

## 2025-01-17 NOTE — PROGRESS NOTE ADULT - PROBLEM SELECTOR PROBLEM 3
Prostate cancer
Prostate cancer
Essential hypertension
Prostate cancer
Essential hypertension
Essential hypertension

## 2025-01-17 NOTE — PROGRESS NOTE ADULT - PROBLEM SELECTOR PROBLEM 2
Essential hypertension
Pneumonia due to COVID-19 virus
Essential hypertension
Pneumonia due to COVID-19 virus
Essential hypertension
Pneumonia due to COVID-19 virus

## 2025-01-17 NOTE — PROGRESS NOTE ADULT - PROBLEM SELECTOR PROBLEM 4
Prostate cancer
Type 2 diabetes mellitus with hypoglycemia
Type 2 diabetes mellitus with hypoglycemia
Prostate cancer
Type 2 diabetes mellitus with hypoglycemia
Prostate cancer

## 2025-01-17 NOTE — PROGRESS NOTE ADULT - PROBLEM SELECTOR PLAN 2
completed treatment  continue to monitor
continue present medication
completing remdeivir
continue present treatment

## 2025-01-17 NOTE — PROGRESS NOTE ADULT - PROBLEM SELECTOR PLAN 1
completed remdisivir
continue to monitor

## 2025-01-17 NOTE — PROGRESS NOTE ADULT - SUBJECTIVE AND OBJECTIVE BOX
[facility]   MD CRISTI SESAY MD ANGELA WONG, PA    TEL:  OFFICE: [phone]  From 5pm-7am Answering Service [phone]    -- RENAL FOLLOW UP NOTE ---Date of Service 01-17-25 @ 15:05    Patient is a 89y old  Male who presents with a chief complaint of hypoglycemia (17 Jan 2025 13:27)      Patient seen and examined at bedside. No chest pain/sob    VITALS:  T(F): 98.2 (01-17-25 @ 12:00), Max: 98.3 (01-16-25 @ 21:10)  HR: 61 (01-17-25 @ 12:00)  BP: 150/61 (01-17-25 @ 12:00)  RR: 18 (01-17-25 @ 12:00)  SpO2: 98% (01-17-25 @ 12:00)  Wt(kg): --    01-16 @ 07:01  -  01-17 @ 07:00  --------------------------------------------------------  IN: 720 mL / OUT: 400 mL / NET: 320 mL    01-17 @ 07:01  -  01-17 @ 15:05  --------------------------------------------------------  IN: 240 mL / OUT: 0 mL / NET: 240 mL          PHYSICAL EXAM:  General: NAD  Neck: No JVD  Respiratory: CTAB, no wheezes, rales or rhonchi  Cardiovascular: S1, S2, RRR  Gastrointestinal: BS+, soft, NT/ND  Extremities: No peripheral edema    Hospital Medications:   MEDICATIONS  (STANDING):  allopurinol 100 milliGRAM(s) Oral daily  amLODIPine   Tablet 10 milliGRAM(s) Oral daily  aspirin enteric coated 81 milliGRAM(s) Oral daily  atorvastatin 40 milliGRAM(s) Oral at bedtime  heparin   Injectable 5000 Unit(s) SubCutaneous every 8 hours  insulin glargine Injectable (LANTUS) 27 Unit(s) SubCutaneous at bedtime  insulin lispro (ADMELOG) corrective regimen sliding scale   SubCutaneous at bedtime  insulin lispro (ADMELOG) corrective regimen sliding scale   SubCutaneous three times a day before meals  insulin lispro Injectable (ADMELOG) 10 Unit(s) SubCutaneous three times a day before meals  isosorbide   mononitrate ER Tablet (IMDUR) 60 milliGRAM(s) Oral daily  losartan 50 milliGRAM(s) Oral daily  sodium chloride 0.9%. 1000 milliLiter(s) (50 mL/Hr) IV Continuous <Continuous>  spironolactone 25 milliGRAM(s) Oral daily      LABS:  01-17    137  |  103  |  37[H]  ----------------------------<  103[H]  4.7   |  21[L]  |  1.65[H]    Ca    9.2      17 Jan 2025 05:53  Phos  3.5     01-17  Mg     2.30     01-17      Creatinine Trend: 1.65 <--, 1.66 <--, 1.80 <--, 2.23 <--, 2.27 <--, 2.32 <--, 2.22 <--    Phosphorus: 3.5 mg/dL (01-17 @ 05:53)                              9.4    9.06  )-----------( 345      ( 17 Jan 2025 05:53 )             29.9     Urine Studies:  Urinalysis - [01-17-25 @ 05:53]      Color  / Appearance  / SG  / pH       Gluc 103 / Ketone   / Bili  / Urobili        Blood  / Protein  / Leuk Est  / Nitrite       RBC  / WBC  / Hyaline  / Gran  / Sq Epi  / Non Sq Epi  / Bacteria       Lipid: chol 130, , HDL 36, LDL --      [01-01-25 @ 05:30]        RADIOLOGY & ADDITIONAL STUDIES:

## 2025-01-17 NOTE — PROGRESS NOTE ADULT - SUBJECTIVE AND OBJECTIVE BOX
still has some back pain    REVIEW OF SYSTEMS:    CONSTITUTIONAL: No weakness, fevers or chills, weight loss  EYES/ENT: No visual changes, no throat pain   RESPIRATORY: No cough, wheezing, hemoptysis; No shortness of breath  CARDIOVASCULAR: No chest pain or palpitations  GASTROINTESTINAL: No abdominal, nausea, vomiting, or hematemesis; No diarrhea or constipation. No melena or hematochezia.  All other review of systems is negative unless indicated above.    VITAL SIGNS:    T98  P64    PHYSICAL EXAM:     GENERAL: no acute distress  HEENT: NC/AT, EOMI, neck supple, MMM  RESPIRATORY: LCTAB/L, no rhonchi, rales, or wheezing  CARDIOVASCULAR: RRR, no murmurs, gallops, rubs  ABDOMINAL: soft, non-tender, non-distended, positive bowel sounds   EXTREMITIES: no clubbing, cyanosis, or edema  NEUROLOGICAL: alert and oriented x 3, non-focal  LYMPHATIC: lymphatic: cervical, supraclavicular, axilla, inguinal  SKIN: no rashes or lesions   MUSCULOSKELETAL: no gross joint deformity                          9.4    9.06  )-----------( 345      ( 17 Jan 2025 05:53 )             29.9     01-17    137  |  103  |  37[H]  ----------------------------<  103[H]  4.7   |  21[L]  |  1.65[H]    Ca    9.2      17 Jan 2025 05:53  Phos  3.5     01-17  Mg     2.30     01-17        MEDICATIONS  (STANDING):  allopurinol 100 milliGRAM(s) Oral daily  amLODIPine   Tablet 10 milliGRAM(s) Oral daily  aspirin enteric coated 81 milliGRAM(s) Oral daily  atorvastatin 40 milliGRAM(s) Oral at bedtime  heparin   Injectable 5000 Unit(s) SubCutaneous every 8 hours  insulin glargine Injectable (LANTUS) 27 Unit(s) SubCutaneous at bedtime  insulin lispro (ADMELOG) corrective regimen sliding scale   SubCutaneous at bedtime  insulin lispro (ADMELOG) corrective regimen sliding scale   SubCutaneous three times a day before meals  insulin lispro Injectable (ADMELOG) 10 Unit(s) SubCutaneous three times a day before meals  isosorbide   mononitrate ER Tablet (IMDUR) 60 milliGRAM(s) Oral daily  losartan 100 milliGRAM(s) Oral daily  sodium chloride 0.9%. 1000 milliLiter(s) (50 mL/Hr) IV Continuous <Continuous>  spironolactone 25 milliGRAM(s) Oral daily

## 2025-01-17 NOTE — PROGRESS NOTE ADULT - PROVIDER SPECIALTY LIST ADULT
Cardiology
Endocrinology
Heme/Onc
Infectious Disease
Internal Medicine
Nephrology
Cardiology
Endocrinology
Internal Medicine
Nephrology
Cardiology
Endocrinology
Infectious Disease
Internal Medicine
Nephrology
Cardiology
Endocrinology
Endocrinology
Internal Medicine
Nephrology
Heme/Onc

## 2025-01-17 NOTE — DISCHARGE NOTE NURSING/CASE MANAGEMENT/SOCIAL WORK - NSDCFUADDAPPT_GEN_ALL_CORE_FT
Follow up with PCP within 1-2 weeks of discharge. If you are in need of a general medicine physician and post-discharge medical follow-up for further care/recommendations you may contact the [facility] for an appointment at [phone].     Follow up with cardiology within 1-2 weeks of discharge. If you are in need of a general cardiologist after discharge, you may contact the [facility] for an appointment at [phone].     Follow up with endocrinology. you have an appt at [phone], [place] [address] with Dr. Kern on 3/20 @ 2:20pm.     Follow up with the nephrologist within 1-2 weeks of discharge. If you do not have a kidney doctor, you may follow up at [facility] at [address] [place] [address], 2nd floor, [place] [address]. Call [phone] for an appointment.     Follow up with oncology within 1-2 weeks of discharge.

## 2025-01-17 NOTE — PROGRESS NOTE ADULT - NS ATTEND AMEND GEN_ALL_CORE FT
Scr stable-monitor closely
pending labs
scr stable
Scr stable-monitor closely
as above
as above
Scr stable-monitor with current meds
as above
scr stable today  Monitor BMP daily

## 2025-01-17 NOTE — DISCHARGE NOTE NURSING/CASE MANAGEMENT/SOCIAL WORK - PATIENT PORTAL LINK FT
You can access the FollowMyHealth Patient Portal offered by [facility] by registering at the following website: [email or web address]. By joining [facility]’s FollowMyHealth portal, you will also be able to view your health information using other applications (apps) compatible with our system.

## 2025-01-17 NOTE — PROGRESS NOTE ADULT - SUBJECTIVE AND OBJECTIVE BOX
Date of Service  : 01-17-25     INTERVAL HPI/OVERNIGHT EVENTS: I feel fine .  Vital Signs Last 24 Hrs  T(C): 36.8 (17 Jan 2025 12:00), Max: 36.8 (16 Jan 2025 21:10)  T(F): 98.2 (17 Jan 2025 12:00), Max: 98.3 (16 Jan 2025 21:10)  HR: 61 (17 Jan 2025 12:00) (58 - 61)  BP: 150/61 (17 Jan 2025 12:00) (142/78 - 150/61)  BP(mean): --  RR: 18 (17 Jan 2025 12:00) (17 - 18)  SpO2: 98% (17 Jan 2025 12:00) (98% - 100%)    Parameters below as of 17 Jan 2025 12:00  Patient On (Oxygen Delivery Method): room air      I&O's Summary    16 Jan 2025 07:01  -  17 Jan 2025 07:00  --------------------------------------------------------  IN: 720 mL / OUT: 400 mL / NET: 320 mL    17 Jan 2025 07:01  -  17 Jan 2025 13:27  --------------------------------------------------------  IN: 240 mL / OUT: 0 mL / NET: 240 mL      MEDICATIONS  (STANDING):  allopurinol 100 milliGRAM(s) Oral daily  amLODIPine   Tablet 10 milliGRAM(s) Oral daily  aspirin enteric coated 81 milliGRAM(s) Oral daily  atorvastatin 40 milliGRAM(s) Oral at bedtime  heparin   Injectable 5000 Unit(s) SubCutaneous every 8 hours  insulin glargine Injectable (LANTUS) 27 Unit(s) SubCutaneous at bedtime  insulin lispro (ADMELOG) corrective regimen sliding scale   SubCutaneous at bedtime  insulin lispro (ADMELOG) corrective regimen sliding scale   SubCutaneous three times a day before meals  insulin lispro Injectable (ADMELOG) 10 Unit(s) SubCutaneous three times a day before meals  isosorbide   mononitrate ER Tablet (IMDUR) 60 milliGRAM(s) Oral daily  losartan 50 milliGRAM(s) Oral daily  sodium chloride 0.9%. 1000 milliLiter(s) (50 mL/Hr) IV Continuous <Continuous>  spironolactone 25 milliGRAM(s) Oral daily    MEDICATIONS  (PRN):  acetaminophen     Tablet .. 650 milliGRAM(s) Oral every 6 hours PRN Temp greater or equal to 38C (100.4F), Mild Pain (1 - 3), Moderate Pain (4 - 6)  melatonin 3 milliGRAM(s) Oral at bedtime PRN Insomnia    LABS:                        9.4    9.06  )-----------( 345      ( 17 Jan 2025 05:53 )             29.9     01-17    137  |  103  |  37[H]  ----------------------------<  103[H]  4.7   |  21[L]  |  1.65[H]    Ca    9.2      17 Jan 2025 05:53  Phos  3.5     01-17  Mg     2.30     01-17        Urinalysis Basic - ( 17 Jan 2025 05:53 )    Color: x / Appearance: x / SG: x / pH: x  Gluc: 103 mg/dL / Ketone: x  / Bili: x / Urobili: x   Blood: x / Protein: x / Nitrite: x   Leuk Esterase: x / RBC: x / WBC x   Sq Epi: x / Non Sq Epi: x / Bacteria: x      CAPILLARY BLOOD GLUCOSE      POCT Blood Glucose.: 190 mg/dL (17 Jan 2025 12:12)  POCT Blood Glucose.: 124 mg/dL (17 Jan 2025 08:07)  POCT Blood Glucose.: 194 mg/dL (16 Jan 2025 23:27)  POCT Blood Glucose.: 231 mg/dL (16 Jan 2025 22:09)  POCT Blood Glucose.: 179 mg/dL (16 Jan 2025 17:24)        Urinalysis Basic - ( 17 Jan 2025 05:53 )    Color: x / Appearance: x / SG: x / pH: x  Gluc: 103 mg/dL / Ketone: x  / Bili: x / Urobili: x   Blood: x / Protein: x / Nitrite: x   Leuk Esterase: x / RBC: x / WBC x   Sq Epi: x / Non Sq Epi: x / Bacteria: x      REVIEW OF SYSTEMS:  CONSTITUTIONAL: No fever, weight loss, or fatigue  EYES: No eye pain, visual disturbances, or discharge  ENMT:  No difficulty hearing, tinnitus, vertigo; No sinus or throat pain  NECK: No pain or stiffness  RESPIRATORY: No cough, wheezing, chills or hemoptysis; No shortness of breath  CARDIOVASCULAR: No chest pain, palpitations, dizziness, or leg swelling  GASTROINTESTINAL: No abdominal or epigastric pain. No nausea, vomiting, or hematemesis; No diarrhea or constipation. No melena or hematochezia.  GENITOURINARY: No dysuria, frequency, hematuria, or incontinence  NEUROLOGICAL: No headaches, memory loss, loss of strength, numbness, or tremors      Consultant(s) Notes Reviewed:  [x ] YES  [ ] NO    PHYSICAL EXAM:  GENERAL: NAD, well-groomed, well-developed,not in any distress ,  HEAD:  Atraumatic, Normocephalic  EYES: EOMI, PERRLA, conjunctiva and sclera clear  ENMT: No tonsillar erythema, exudates, or enlargement; Moist mucous membranes, Good dentition, No lesions  NECK: Supple, No JVD, Normal thyroid  NERVOUS SYSTEM:  Alert & Oriented X3, No focal deficit   CHEST/LUNG: Good air entry bilateral with no  rales, rhonchi, wheezing, or rubs  HEART: Regular rate and rhythm; No murmurs, rubs, or gallops  ABDOMEN: Soft, Nontender, Nondistended; Bowel sounds present  EXTREMITIES:  2+ Peripheral Pulses, No clubbing, cyanosis, or edema    Care Discussed with Consultants/Other Providers [ x] YES  [ ] NO

## 2025-01-17 NOTE — PROGRESS NOTE ADULT - REASON FOR ADMISSION
hypoglycemia

## 2025-01-17 NOTE — PROGRESS NOTE ADULT - PROBLEM SELECTOR PLAN 4
outpatient workup
pending MRI spine
continue present treatment
continue present treatment
MRI spine
continue present treatment

## 2025-01-17 NOTE — PROGRESS NOTE ADULT - ASSESSMENT
89M with PMH of prostate CA, T2DM, HTN, HLD, CKD, here for hypoglycemia sxs. In the ER, pt found to have cp with EKG w/ NSR and no significant ischemic changes. Pt was recommended to be admitted by Tele Doc Dr. Perea. Cardiology consulted for further evaluation inpt.     1. Cp, +NST and TTE reviewed   2. HTN   3. HLD   4. CKD     -given +NST, initially planned for [facility], deferred for COVID and rising cr now with metastatic CA, will hold off for now   -bp control, cont norvasc, imduri, losartan, and Hctz   -cont asa and statin   -HR 50-60s, would rec hold bb   -discharge planning   -f/u OP w/ me in clinic     Nitin Lee MD [facility]  Cardiology Attending, Jenn-NS/LOIS  Avaliable on [employer] Team   40w

## 2025-01-17 NOTE — PROGRESS NOTE ADULT - ASSESSMENT
89 y/o M with pmh of prostate cancer, DM type 2, HTN, p/w hypoglycemia.  Pt reports his insulin glargine  dose was increased from 12 to 15 units recently.  Over the past few days, he reports getting low reading in the morning, but is able to alert his wife who gives him food to bring it  up.  Yesterday AM, pt's wife noticed he was confused and making gurgling sounds while in bed.  She checked FS which was 41.  EMS was called, and pt give D50 en route to ED.  Pt also reports intermittent L sided chest pain for the past several days.  Pain is positional, and improves when pt wears a support belt.  Pain is difficult for patient to describe quality.  No associated dyspnea, dizziness nausea or diaphoresis.  No recent fever or cough.    Pt was evaluated in the ED, and placed in the CDU for monitoring.  He is admitted for further w/u of chest pain and monitoring of glucose.       Problem/Recommendation - 1:  ·  Problem: Chest pain with abnormal stress test .   ·  Recommendation: Cardiology planning cardiac cath out patient  .   On  Aspirin and statin.  Cardiology help appreciated.   < from: TTE W or WO Ultrasound Enhancing Agent (12.31.24 @ 07:33) >    CONCLUSIONS:      1. Left ventricular systolic function is normal with an ejection fraction visually estimated at 50 to 55 %.      < from: Nuclear Stress Test-Exercise.. (12.31.24 @ 08:30) >     1. Qualitative Perfusion:      - medium-sized, moderate to severe defect(s) in the inferior and inferolateral wall suggestive of CAD.   2. The left ventricle is mildly decreased in function and normal in size. The time activity curve suggests diastolic dysfunction.. The post stress left ventricular EF is 45 %. The stress end diastolic volume is 71 ml and systolic volume is 39 ml.   3. Hypokinesis of the inferoseptal wall.     Problem/Recommendation - 2:  ·  Problem: Type 2 diabetes mellitus with hypoglycemia.   ·  Recommendation: Endo help appreciated.     Following  their recommendation.     Problem/Recommendation - 3:  ·  Problem: Essential hypertension.   ·  Recommendation: BP medications with hold parameters.     Problem/Recommendation - 4:  ·  Problem: CKD with KIRBY .   ·  Recommendation: Trending Creatinine & Stable.   Renal help appreciated.       Problem/Recommendation - 5:  ·  Problem: Prostate cancer.   ·  Recommendation: Out patient Follow Up.       Problem/Recommendation - 6:  ·  Problem: COVID 19 infection with Hypoxia .   ·  Recommendation: ID help appreciated.       ON Remdisvir .      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   ·  Recommendation: Pain medication and awaiting MR.     < from: CT Lumbar Spine No Cont (01.09.25 @ 20:52) >  IMPRESSION: Degenerative changes involving the cervical thoracic and   lumbar spine region as well as abnormal lucent lesions as described   above. While these findings could be compatible with metastasis these are   unlikely compatible with prostate metastasis given that they are lytic   lesions at cannot of blastic lesions. Contrast enhanced MRI of the   cervical thoracic and lumbar spine is recommended for further evaluation   if there are no contraindications  < from: MR Thoracic Spine w/wo IV Cont (01.16.25 @ 11:00) >  IMPRESSION:  1. 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 No additional extraosseous-epidural soft   tissue components or leptomeningeal enhancement identified.  2. Additional findings, including those degenerative, described in detail   above.    < end of copied text >    Oncology help appreciated. Cleared for DC       D/W patient and ACP.   Dispo : DC planning to [facility] today .D/W wife his condition , diagnosis including spine mets etc and treatment plan.

## 2025-01-17 NOTE — PROGRESS NOTE ADULT - ASSESSMENT
89 y/o M with pmh of prostate cancer, DM type 2, HTN, p/w hypoglycemia. +NST planning for angio. nephrology consulted for elevated scr    CKD stage 3B  possible baseline ~1.5-1.8  Cr plateauing at 2.1-2.3.  +covid; s/p remdesivir  He was on HCTZ, losartan, spironolactone, since held. ( last dose 1/9)  KIRBY likely ATN, renal function improving now  restarted on losartan 50 1/14, scr better restart spironolactone 1/15. scr better  monitor BMP and UO  Group 2 Gadolinium Contrast can be used for MRI.    HTN  still slightly elevated   He was on HCTZ, losartan, spironolactone, since held. ( last dose 1/9)  will restart losartan 50 1/14 increase losartan 100 daily  spironolactone restarted 1/15  C/W current meds.  Low salt diet.  monitor    Proteinuria  mild  UPr/Cr 0.26gm.  Monitor.    COVID-19:  care as per team  s/p remdesivir    Chest Pains  +NST  EF 45%  LHC deferred for now.  f/u cardio

## 2025-01-17 NOTE — PROGRESS NOTE ADULT - PROBLEM SELECTOR PROBLEM 1
Pneumonia due to COVID-19 virus
Pneumonia due to COVID-19 virus
Chest pain
Pneumonia due to COVID-19 virus
Chest pain
Chest pain

## 2025-01-17 NOTE — PHARMACOTHERAPY INTERVENTION NOTE - NSPHARMCOMMPTEDU
Addended by: GITELMAN, DARREN on: 12/13/2021 07:05 PM     Modules accepted: Orders     Patient Education - Discharge Counseling

## 2025-01-17 NOTE — PROGRESS NOTE ADULT - SUBJECTIVE AND OBJECTIVE BOX
Chief Complaint: Type 2 DM     History: Pt seen at bedside. Pt tolerating oral diet. Pt denies nausea and vomiting/any signs of hypoglycemia. Pt reports a fair appetite.   MEDICATIONS  (STANDING):  allopurinol 100 milliGRAM(s) Oral daily  amLODIPine   Tablet 10 milliGRAM(s) Oral daily  aspirin enteric coated 81 milliGRAM(s) Oral daily  atorvastatin 40 milliGRAM(s) Oral at bedtime  heparin   Injectable 5000 Unit(s) SubCutaneous every 8 hours  insulin glargine Injectable (LANTUS) 27 Unit(s) SubCutaneous at bedtime  insulin lispro (ADMELOG) corrective regimen sliding scale   SubCutaneous at bedtime  insulin lispro (ADMELOG) corrective regimen sliding scale   SubCutaneous three times a day before meals  insulin lispro Injectable (ADMELOG) 10 Unit(s) SubCutaneous three times a day before meals  isosorbide   mononitrate ER Tablet (IMDUR) 60 milliGRAM(s) Oral daily  losartan 50 milliGRAM(s) Oral daily  sodium chloride 0.9%. 1000 milliLiter(s) (50 mL/Hr) IV Continuous <Continuous>  spironolactone 25 milliGRAM(s) Oral daily    MEDICATIONS  (PRN):  acetaminophen     Tablet .. 650 milliGRAM(s) Oral every 6 hours PRN Temp greater or equal to 38C (100.4F), Mild Pain (1 - 3), Moderate Pain (4 - 6)  melatonin 3 milliGRAM(s) Oral at bedtime PRN Insomnia      Allergies: No Known Allergies    Review of Systems:  Respiratory: No SOB, no cough  GI: No nausea, vomiting, abdominal pain  Endocrine: no polyuria, polydipsia      PHYSICAL EXAM:  VITALS: T(C): 36.8 (01-17-25 @ 12:00)  T(F): 98.2 (01-17-25 @ 12:00), Max: 98.3 (01-16-25 @ 21:10)  HR: 61 (01-17-25 @ 12:00) (58 - 61)  BP: 150/61 (01-17-25 @ 12:00) (142/78 - 150/61)  RR:  (17 - 18)  SpO2:  (98% - 100%)  Wt(kg): --  GENERAL: NAD, well-groomed, well-developeds  RESPIRATORY: No labored breathing   GI: Soft, nontender, non distended  PSYCH: Alert and oriented x 3, normal affect, normal mood      CAPILLARY BLOOD GLUCOSE  POCT Blood Glucose.: 190 mg/dL (17 Jan 2025 12:12)  POCT Blood Glucose.: 124 mg/dL (17 Jan 2025 08:07)  POCT Blood Glucose.: 194 mg/dL (16 Jan 2025 23:27)  POCT Blood Glucose.: 231 mg/dL (16 Jan 2025 22:09)  POCT Blood Glucose.: 179 mg/dL (16 Jan 2025 17:24)    A1C with Estimated Average Glucose (12.30.24 @ 09:36)    A1C with Estimated Average Glucose Result: 7.0   Estimated Average Glucose: 154      01-17    137  |  103  |  37[H]  ----------------------------<  103[H]  4.7   |  21[L]  |  1.65[H]    eGFR: 40[L]    Ca    9.2      01-17  Mg     2.30     01-17  Phos  3.5     01-17    Diet, Consistent Carbohydrate w/Evening Snack (12-30-24 @ 14:06) [Active]

## 2025-01-17 NOTE — PROGRESS NOTE ADULT - PROBLEM SELECTOR PLAN 3
continue present treatment
follow up w dr.Xinhua Contreras ( oncologist) at [facility]  will not need chemotherapy during [facility]
continue present treatment
pending MRI of spine
continue present treatment
will need outpatent w his  oncologist Diane Shrestha at [facility]

## 2025-01-17 NOTE — DISCHARGE NOTE NURSING/CASE MANAGEMENT/SOCIAL WORK - FINANCIAL ASSISTANCE
[facility] provides services at a reduced cost to those who are determined to be eligible through [facility]’s financial assistance program. Information regarding [facility]’s financial assistance program can be found by going to [email or web address] or by calling [phone].

## 2025-01-17 NOTE — PHARMACOTHERAPY INTERVENTION NOTE - COMMENTS
Discharge medications reviewed with the patient's wife over the phone. Current medication schedule was discussed in detail including: medication name, indication, dose, administration times, treatment duration, side effects, and special instructions. Reviewed new medications, and changes to his diabetes regimen. Questions and concerns were answered and addressed. Patient's wife demonstrated understanding.     New medications: aspirin, atorvastatin, isosorbide mononitrate    Nicholas DonovanD, BCPS  Clinical Pharmacy Specialist  [phone] 
Discharge medications aspirin, amlodipine, atorvastatin, isosorbide mononitrate, losartan and spironolactone were reviewed with patient. Current medication schedule was discussed in detail including: medication name, indication, dose, administration times, treatment duration, side effects, drug interactions, and special instructions. Also discussed diabetes and insulin pen administration. Patient had okay return demonstration with some error in dialing up correct dose. Patient verbalized that he normally uses his eyeglasses that could have contributed. Patient verbalized understanding and was encouraged to continue follow up outpatient.    Rodger Johansen, PharmD, BCPS  Clinical Pharmacy Specialist  Spectra: [phone]

## 2025-01-17 NOTE — PROGRESS NOTE ADULT - SUBJECTIVE AND OBJECTIVE BOX
Cardiology Attending Follow-up Note     Patient seen and examined at bedside.    Overnight Events:     no events   pending dc to rehab today     REVIEW OF SYSTEMS:  Constitutional:     [x ] negative [ ] fevers [ ] chills [ ] weight loss [ ] weight gain  HEENT:                  [x ] negative [ ] dry eyes [ ] eye irritation [ ] postnasal drip [ ] nasal congestion  CV:                         [ x] negative  [ ] chest pain [ ] orthopnea [ ] palpitations [ ] murmur  Resp:                     [ x] negative [ ] cough [ ] shortness of breath [ ] dyspnea [ ] wheezing [ ] sputum [ ]hemoptysis  GI:                          [ x] negative [ ] nausea [ ] vomiting [ ] diarrhea [ ] constipation [ ] abd pain [ ] dysphagia   :                        [ x] negative [ ] dysuria [ ] nocturia [ ] hematuria [ ] increased urinary frequency  Musculoskeletal: [ x] negative [ ] back pain [ ] myalgias [ ] arthralgias [ ] fracture  Skin:                       [ x] negative [ ] rash [ ] itch  Neurological:        [x ] negative [ ] headache [ ] dizziness [ ] syncope [ ] weakness [ ] numbness  Psychiatric:           [ x] negative [ ] anxiety [ ] depression  Endocrine:            [ x] negative [ ] diabetes [ ] thyroid problem  Heme/Lymph:      [ x] negative [ ] anemia [ ] bleeding problem  Allergic/Immune: [ x] negative [ ] itchy eyes [ ] nasal discharge [ ] hives [ ] angioedema    [ x] All other systems negative  [ ] Unable to assess ROS due to    Current Meds:  acetaminophen     Tablet .. 650 milliGRAM(s) Oral every 6 hours PRN  allopurinol 100 milliGRAM(s) Oral daily  amLODIPine   Tablet 10 milliGRAM(s) Oral daily  aspirin enteric coated 81 milliGRAM(s) Oral daily  atorvastatin 40 milliGRAM(s) Oral at bedtime  heparin   Injectable 5000 Unit(s) SubCutaneous every 8 hours  insulin glargine Injectable (LANTUS) 27 Unit(s) SubCutaneous at bedtime  insulin lispro (ADMELOG) corrective regimen sliding scale   SubCutaneous at bedtime  insulin lispro (ADMELOG) corrective regimen sliding scale   SubCutaneous three times a day before meals  insulin lispro Injectable (ADMELOG) 10 Unit(s) SubCutaneous three times a day before meals  isosorbide   mononitrate ER Tablet (IMDUR) 60 milliGRAM(s) Oral daily  losartan 100 milliGRAM(s) Oral daily  melatonin 3 milliGRAM(s) Oral at bedtime PRN  sodium chloride 0.9%. 1000 milliLiter(s) IV Continuous <Continuous>  spironolactone 25 milliGRAM(s) Oral daily      PAST MEDICAL & SURGICAL HISTORY:  HTN (hypertension)      DM2 (diabetes mellitus, type 2)      Prostate cancer      No significant past surgical history          Vitals:  T(F): 98 (01-17), Max: 98.2 (01-17)  HR: 63 (01-17) (60 - 64)  BP: 146/83 (01-17) (136/64 - 150/61)  RR: 18 (01-17)  SpO2: 98% (01-17)  I&O's Summary    16 Jan 2025 07:01  -  17 Jan 2025 07:00  --------------------------------------------------------  IN: 720 mL / OUT: 400 mL / NET: 320 mL    17 Jan 2025 07:01  -  17 Jan 2025 23:07  --------------------------------------------------------  IN: 580 mL / OUT: 1000 mL / NET: -420 mL        Physical Exam:  Appearance: No acute distress  HENT: No JVD   Cardiovascular: RRR, S1/S2, no murmurs  Respiratory: CTABL  Gastrointestinal: soft, NT ND, +BS  Musculoskeletal: No clubbing, no edema   Neurologic: Non-focal  Skin: No rashes, ecchymoses, or cyanosis                          9.4    9.06  )-----------( 345      ( 17 Jan 2025 05:53 )             29.9     01-17    137  |  103  |  37[H]  ----------------------------<  103[H]  4.7   |  21[L]  |  1.65[H]    Ca    9.2      17 Jan 2025 05:53  Phos  3.5     01-17  Mg     2.30     01-17                    Cardiovascular Testings:

## 2025-01-17 NOTE — PROGRESS NOTE ADULT - ASSESSMENT
89-year-old male with a past medical history of prostate cancer, diabetes, hypertension, presenting due to concern of hypoglycemia at home.  The patient states he is tired and was not sure what happened.  According to his wife who was in bed with him, she started hearing him gurgling at home.  She turned on the lights and tried to wake him up, the patient was unresponsive.  They called EMS who came and checked a fingerstick which was 41.  He was given 1 amp of D50 with a repeat being 140.  Patient also tolerated p.o.  According to wife he had a similar occurrence yesterday where his fingerstick was in the 40s.  Patient takes combined metformin and glipizide as well as lantus  Denies fever, chills, chest pain, trouble breathing, dysuria.  According to patient he has decreased appetite recently compared to the past.  Endocrine called for further assistance.  Reports long hx of DM.   He states Lantus was increased from 12--> 15 units  checks FSBG in the am and afternoon  reports multiple lows in 30-40s     Poorly controlled T2DM with hyperglycemia  - HbA1c: 7.0  - Home Regimen: combined metformin and glipizide as well as Lantus 12-15 units  - Endocrinologist: unknown    Inpatient plan   - FS goal 100-200 mg/dl: would like to avoid hypoglycemia in elderly pt   - Variable glucose   - fair appetite today   - Decrease Lantus to 27 units at bedtime  - Continue Admelog 10 units TID AC. Hold if not eating/NPO.   - continue low Admelog correctional scale TID AC  - continue separate low Admelog correctional scale at HS  - FS TID AC & HS ---> q6 if NPO   - hypoglycemia protocol PRN   - consistent carbohydrate diet  - RD consult  - c-Peptide 3.0 with serum glucose 170 - patient is making adequate endogenous insulin.  - Provider to Rn for insulin pen administration  - Unit based pharmacist to see pt prior to discharge     Discharge plan   - Patient going to rehab.  - Stop glipizide  - STOP metformin  - discharge to rehab on: since pt with fair appetite as per RN: Would recommend discharging pt to rehab on Lantus solostar pen 27 units sq qhs and Admelog 8 units TID AC + Admelog low correction scale TID AC and Admelog low correction scale at bedtime   - Final plan from rehab to home to be determined by Rehab consider Lantus solostar pen (dose TBD) + Tradjenta 5 mg daily (please check for insurance coverage); Final plan tbd based by rehab medical team based on glucose trend, insulin requirements, and appetite at rehab; would be cautious as pt came in with hypoglycemia   - Avoid Glp1 as pt has fair appetite  - Please send prescription for glucose tablets 4G (take 4 tablets) or 15G tablets for blood sugar less than 70 mG/dL, repeat fingerstick in 15 minutes. Call your provider for dose adjustment if needed.   - Patient to call doctor with persistent high or low BG at home.   - Ensure patient has glucometer, test strips and lancets on discharge.  - Ensure patient has insulin pen needles.   - Recommend routine outpatient ophthalmology and podiatry follow up.   - Dr Yovani Kern  [address] [facility] [address], [place] [address]   Appointment with Dr. Kern on 3/20 @ 2:20pm.   paul (wife): [phone]  saeid (son): [phone]  As per wife pt administers his insulin on his own.  Wife supervises and helps remind pt to take insulin as prescribed   Review insulin pen administration prior to dc    HTN  - Outpatient goal BP <130//80 for age  - continue amlodipine, HCTZ, losartan, spironolactone  - Can check urine albumin/creatinine outpatient  - Management per primary team.    HLD  - LDL goal < 70  - LDL 67  - continue statin if no contraindications   - management per primary team     D/w Elise Horn  Nurse Practitioner  Division of Endocrinology & Diabetes  In house pager #[phone]    If before 9AM or after 6PM, or on weekends/holidays, please call endocrine answering service for assistance ([phone]).For nonurgent matters email [email or web address] for assistance.    89-year-old male with a past medical history of prostate cancer, diabetes, hypertension, presenting due to concern of hypoglycemia at home.  The patient states he is tired and was not sure what happened.  According to his wife who was in bed with him, she started hearing him gurgling at home.  She turned on the lights and tried to wake him up, the patient was unresponsive.  They called EMS who came and checked a fingerstick which was 41.  He was given 1 amp of D50 with a repeat being 140.  Patient also tolerated p.o.  According to wife he had a similar occurrence yesterday where his fingerstick was in the 40s.  Patient takes combined metformin and glipizide as well as lantus  Denies fever, chills, chest pain, trouble breathing, dysuria.  According to patient he has decreased appetite recently compared to the past.  Endocrine called for further assistance.  Reports long hx of DM.   He states Lantus was increased from 12--> 15 units  checks FSBG in the am and afternoon  reports multiple lows in 30-40s     Poorly controlled T2DM with hyperglycemia  - HbA1c: 7.0  - Home Regimen: combined metformin and glipizide as well as Lantus 12-15 units  - Endocrinologist: unknown    Inpatient plan   - FS goal 100-200 mg/dl: would like to avoid hypoglycemia in elderly pt   - Variable glucose   - fair appetite today   - Decrease Lantus to 27 units at bedtime  - Continue Admelog 10 units TID AC. Hold if not eating/NPO.   - continue low Admelog correctional scale TID AC  - continue separate low Admelog correctional scale at HS  - FS TID AC & HS ---> q6 if NPO   - hypoglycemia protocol PRN   - consistent carbohydrate diet  - RD consult  - c-Peptide 3.0 with serum glucose 170 - patient is making adequate endogenous insulin.  - Provider to Rn for insulin pen administration  - Unit based pharmacist to see pt prior to discharge     Discharge plan   - Patient going to rehab.  - Stop glipizide  - STOP metformin  - discharge to rehab on: since pt with fair appetite as per RN: Would recommend discharging pt to rehab on Lantus solostar pen 27 units sq qhs and Admelog 8 units TID AC + Admelog low correction scale TID AC and Admelog low correction scale at bedtime   - Final plan from rehab to home to be determined by Rehab consider Lantus solostar pen (dose TBD) + Tradjenta 5 mg daily (please check for insurance coverage); Final plan tbd based by rehab medical team based on glucose trend, insulin requirements, and appetite at rehab; would be cautious as pt came in with hypoglycemia   - Avoid Glp1 as pt has fair appetite  - Please send prescription for glucose tablets 4G (take 4 tablets) or 15G tablets for blood sugar less than 70 mG/dL, repeat fingerstick in 15 minutes. Call your provider for dose adjustment if needed.   - Patient to call doctor with persistent high or low BG at home.   - Ensure patient has glucometer, test strips and lancets on discharge.  - Ensure patient has insulin pen needles.   - Recommend routine outpatient ophthalmology and podiatry follow up.   - Dr Yovani Kern  [address] [facility] [address], [place] [address]   Appointment with Dr. Kern on 3/20 @ 2:20pm.   paul (wife): [phone]  saeid (son): [phone] lives in [place]   As per wife pt administers his insulin on his own.  Wife supervises and helps remind pt to take insulin as prescribed   Review insulin pen administration prior to dc    HTN  - Outpatient goal BP <130//80 for age  - continue amlodipine, HCTZ, losartan, spironolactone  - Can check urine albumin/creatinine outpatient  - Management per primary team.    HLD  - LDL goal < 70  - LDL 67  - continue statin if no contraindications   - management per primary team     D/w Elise Horn  Nurse Practitioner  Division of Endocrinology & Diabetes  In house pager #[phone]    If before 9AM or after 6PM, or on weekends/holidays, please call endocrine answering service for assistance ([phone]).For nonurgent matters email [email or web address] for assistance.

## 2025-02-14 ENCOUNTER — APPOINTMENT (OUTPATIENT)
Dept: NEPHROLOGY | Facility: CLINIC | Age: 89
End: 2025-02-14

## 2025-03-19 ENCOUNTER — APPOINTMENT (OUTPATIENT)
Dept: HEMATOLOGY ONCOLOGY | Facility: CLINIC | Age: 89
End: 2025-03-19

## 2025-03-20 ENCOUNTER — APPOINTMENT (OUTPATIENT)
Facility: CLINIC | Age: 89
End: 2025-03-20

## 2025-04-06 PROBLEM — Z79.818 ENCOUNTER FOR MONITORING ANDROGEN DEPRIVATION THERAPY: Status: ACTIVE | Noted: 2024-12-25

## 2025-04-10 ENCOUNTER — OUTPATIENT (OUTPATIENT)
Dept: OUTPATIENT SERVICES | Facility: HOSPITAL | Age: 89
LOS: 1 days | End: 2025-04-10
Payer: MEDICARE

## 2025-04-10 ENCOUNTER — APPOINTMENT (OUTPATIENT)
Dept: UROLOGY | Facility: CLINIC | Age: 89
End: 2025-04-10
Payer: MEDICARE

## 2025-04-10 ENCOUNTER — LABORATORY RESULT (OUTPATIENT)
Age: 89
End: 2025-04-10

## 2025-04-10 VITALS — DIASTOLIC BLOOD PRESSURE: 80 MMHG | SYSTOLIC BLOOD PRESSURE: 153 MMHG | HEART RATE: 77 BPM

## 2025-04-10 DIAGNOSIS — R35.0 FREQUENCY OF MICTURITION: ICD-10-CM

## 2025-04-10 DIAGNOSIS — Z79.818 LONG TERM (CURRENT) USE OF OTHER AGENTS AFFECTING ESTROGEN RECEPTORS AND ESTROGEN LEVELS: ICD-10-CM

## 2025-04-10 PROCEDURE — 96402U: CUSTOM | Mod: NC

## 2025-04-10 PROCEDURE — 99213 OFFICE O/P EST LOW 20 MIN: CPT | Mod: 25

## 2025-04-10 PROCEDURE — 96402 CHEMO HORMON ANTINEOPL SQ/IM: CPT

## 2025-04-10 RX ORDER — LEUPROLIDE ACETATE 45 MG/.375ML
45 INJECTION, SUSPENSION, EXTENDED RELEASE SUBCUTANEOUS
Refills: 0 | Status: COMPLETED | OUTPATIENT
Start: 2025-04-10

## 2025-04-10 RX ADMIN — LEUPROLIDE ACETATE 0 MG: 45 INJECTION, SUSPENSION, EXTENDED RELEASE SUBCUTANEOUS at 00:00

## 2025-04-11 LAB
PSA SERPL-MCNC: 66.8 NG/ML
TESTOST SERPL-MCNC: 5.2 NG/DL

## 2025-04-14 DIAGNOSIS — Z79.818 LONG TERM (CURRENT) USE OF OTHER AGENTS AFFECTING ESTROGEN RECEPTORS AND ESTROGEN LEVELS: ICD-10-CM

## 2025-04-14 DIAGNOSIS — C61 MALIGNANT NEOPLASM OF PROSTATE: ICD-10-CM

## 2025-04-16 ENCOUNTER — NON-APPOINTMENT (OUTPATIENT)
Age: 89
End: 2025-04-16

## 2025-04-16 DIAGNOSIS — C61 MALIGNANT NEOPLASM OF PROSTATE: ICD-10-CM

## 2025-04-22 ENCOUNTER — RESULT REVIEW (OUTPATIENT)
Age: 89
End: 2025-04-22

## 2025-04-24 ENCOUNTER — APPOINTMENT (OUTPATIENT)
Dept: UROLOGY | Facility: CLINIC | Age: 89
End: 2025-04-24

## 2025-04-29 ENCOUNTER — OUTPATIENT (OUTPATIENT)
Dept: OUTPATIENT SERVICES | Facility: HOSPITAL | Age: 89
LOS: 1 days | End: 2025-04-29
Payer: MEDICARE

## 2025-04-29 ENCOUNTER — APPOINTMENT (OUTPATIENT)
Dept: CT IMAGING | Facility: IMAGING CENTER | Age: 89
End: 2025-04-29
Payer: MEDICARE

## 2025-04-29 DIAGNOSIS — Z00.8 ENCOUNTER FOR OTHER GENERAL EXAMINATION: ICD-10-CM

## 2025-04-29 DIAGNOSIS — C61 MALIGNANT NEOPLASM OF PROSTATE: ICD-10-CM

## 2025-04-29 PROCEDURE — 74176 CT ABD & PELVIS W/O CONTRAST: CPT

## 2025-04-29 PROCEDURE — 71250 CT THORAX DX C-: CPT

## 2025-04-29 PROCEDURE — 74176 CT ABD & PELVIS W/O CONTRAST: CPT | Mod: 26

## 2025-04-29 PROCEDURE — 71250 CT THORAX DX C-: CPT | Mod: 26

## 2025-05-13 ENCOUNTER — APPOINTMENT (OUTPATIENT)
Dept: NUCLEAR MEDICINE | Facility: IMAGING CENTER | Age: 89
End: 2025-05-13

## 2025-05-13 ENCOUNTER — OUTPATIENT (OUTPATIENT)
Dept: OUTPATIENT SERVICES | Facility: HOSPITAL | Age: 89
LOS: 1 days | End: 2025-05-13
Payer: MEDICARE

## 2025-05-13 DIAGNOSIS — C61 MALIGNANT NEOPLASM OF PROSTATE: ICD-10-CM

## 2025-05-13 PROCEDURE — 78306 BONE IMAGING WHOLE BODY: CPT

## 2025-05-13 PROCEDURE — A9561: CPT

## 2025-05-13 PROCEDURE — 78306 BONE IMAGING WHOLE BODY: CPT | Mod: 26

## 2025-05-15 ENCOUNTER — OUTPATIENT (OUTPATIENT)
Dept: OUTPATIENT SERVICES | Facility: HOSPITAL | Age: 89
LOS: 1 days | Discharge: HOME | End: 2025-05-15

## 2025-05-15 DIAGNOSIS — C61 MALIGNANT NEOPLASM OF PROSTATE: ICD-10-CM

## 2025-05-19 ENCOUNTER — APPOINTMENT (OUTPATIENT)
Dept: HEMATOLOGY ONCOLOGY | Facility: CLINIC | Age: 89
End: 2025-05-19
Payer: MEDICARE

## 2025-05-19 ENCOUNTER — RESULT REVIEW (OUTPATIENT)
Age: 89
End: 2025-05-19

## 2025-05-19 VITALS
DIASTOLIC BLOOD PRESSURE: 67 MMHG | TEMPERATURE: 98.1 F | WEIGHT: 192 LBS | OXYGEN SATURATION: 95 % | BODY MASS INDEX: 29.19 KG/M2 | RESPIRATION RATE: 16 BRPM | HEART RATE: 86 BPM | SYSTOLIC BLOOD PRESSURE: 156 MMHG

## 2025-05-19 DIAGNOSIS — C61 MALIGNANT NEOPLASM OF PROSTATE: ICD-10-CM

## 2025-05-19 LAB
BASOPHILS # BLD AUTO: 0.03 K/UL — SIGNIFICANT CHANGE UP (ref 0–0.2)
BASOPHILS NFR BLD AUTO: 0.3 % — SIGNIFICANT CHANGE UP (ref 0–2)
EOSINOPHIL # BLD AUTO: 0.32 K/UL — SIGNIFICANT CHANGE UP (ref 0–0.5)
EOSINOPHIL NFR BLD AUTO: 3.2 % — SIGNIFICANT CHANGE UP (ref 0–6)
HCT VFR BLD CALC: 27.8 % — LOW (ref 39–50)
HGB BLD-MCNC: 8.7 G/DL — LOW (ref 13–17)
IMM GRANULOCYTES NFR BLD AUTO: 1.1 % — HIGH (ref 0–0.9)
LYMPHOCYTES # BLD AUTO: 1.91 K/UL — SIGNIFICANT CHANGE UP (ref 1–3.3)
LYMPHOCYTES # BLD AUTO: 18.9 % — SIGNIFICANT CHANGE UP (ref 13–44)
MCHC RBC-ENTMCNC: 21.4 PG — LOW (ref 27–34)
MCHC RBC-ENTMCNC: 31.3 G/DL — LOW (ref 32–36)
MCV RBC AUTO: 68.5 FL — LOW (ref 80–100)
MONOCYTES # BLD AUTO: 1.2 K/UL — HIGH (ref 0–0.9)
MONOCYTES NFR BLD AUTO: 11.9 % — SIGNIFICANT CHANGE UP (ref 2–14)
NEUTROPHILS # BLD AUTO: 6.55 K/UL — SIGNIFICANT CHANGE UP (ref 1.8–7.4)
NEUTROPHILS NFR BLD AUTO: 64.6 % — SIGNIFICANT CHANGE UP (ref 43–77)
NRBC BLD AUTO-RTO: 0 /100 WBCS — SIGNIFICANT CHANGE UP (ref 0–0)
PLATELET # BLD AUTO: 224 K/UL — SIGNIFICANT CHANGE UP (ref 150–400)
RBC # BLD: 4.06 M/UL — LOW (ref 4.2–5.8)
RBC # FLD: 20.7 % — HIGH (ref 10.3–14.5)
WBC # BLD: 10.12 K/UL — SIGNIFICANT CHANGE UP (ref 3.8–10.5)
WBC # FLD AUTO: 10.12 K/UL — SIGNIFICANT CHANGE UP (ref 3.8–10.5)

## 2025-05-19 PROCEDURE — 99214 OFFICE O/P EST MOD 30 MIN: CPT

## 2025-05-19 RX ORDER — AMLODIPINE BESYLATE 10 MG/1
10 TABLET ORAL
Refills: 0 | Status: ACTIVE | COMMUNITY
Start: 2025-05-19

## 2025-05-19 RX ORDER — INSULIN GLARGINE 100 [IU]/ML
100 INJECTION, SOLUTION SUBCUTANEOUS
Refills: 0 | Status: ACTIVE | COMMUNITY
Start: 2025-05-19

## 2025-05-19 RX ORDER — ISOSORBIDE MONONITRATE 60 MG/1
60 TABLET, EXTENDED RELEASE ORAL
Refills: 0 | Status: ACTIVE | COMMUNITY
Start: 2025-05-19

## 2025-05-19 RX ORDER — LOSARTAN POTASSIUM 50 MG/1
50 TABLET, FILM COATED ORAL
Refills: 0 | Status: ACTIVE | COMMUNITY
Start: 2025-05-19

## 2025-05-19 RX ORDER — ATORVASTATIN CALCIUM 40 MG/1
40 TABLET, FILM COATED ORAL
Refills: 0 | Status: ACTIVE | COMMUNITY
Start: 2025-05-19

## 2025-05-19 RX ORDER — INSULIN LISPRO 100 [IU]/ML
100 INJECTION, SOLUTION INTRAVENOUS; SUBCUTANEOUS
Refills: 0 | Status: ACTIVE | COMMUNITY
Start: 2025-05-19

## 2025-05-25 LAB
ALBUMIN SERPL ELPH-MCNC: 3.9 G/DL
ALP BLD-CCNC: 258 U/L
ALT SERPL-CCNC: 11 U/L
ANION GAP SERPL CALC-SCNC: 15 MMOL/L
AST SERPL-CCNC: 40 U/L
BILIRUB SERPL-MCNC: 0.3 MG/DL
BUN SERPL-MCNC: 25 MG/DL
CALCIUM SERPL-MCNC: 8.9 MG/DL
CHLORIDE SERPL-SCNC: 97 MMOL/L
CO2 SERPL-SCNC: 22 MMOL/L
CREAT SERPL-MCNC: 1.49 MG/DL
EGFRCR SERPLBLD CKD-EPI 2021: 45 ML/MIN/1.73M2
GLUCOSE SERPL-MCNC: 182 MG/DL
POTASSIUM SERPL-SCNC: 4 MMOL/L
PROT SERPL-MCNC: 7.1 G/DL
PSA SERPL-MCNC: 76.5 NG/ML
SODIUM SERPL-SCNC: 133 MMOL/L

## 2025-06-09 ENCOUNTER — OUTPATIENT (OUTPATIENT)
Dept: OUTPATIENT SERVICES | Facility: HOSPITAL | Age: 89
LOS: 1 days | Discharge: HOME | End: 2025-06-09

## 2025-06-09 DIAGNOSIS — C61 MALIGNANT NEOPLASM OF PROSTATE: ICD-10-CM

## 2025-06-10 ENCOUNTER — APPOINTMENT (OUTPATIENT)
Dept: HEMATOLOGY ONCOLOGY | Facility: CLINIC | Age: 89
End: 2025-06-10
Payer: MEDICARE

## 2025-06-17 ENCOUNTER — APPOINTMENT (OUTPATIENT)
Dept: HEMATOLOGY ONCOLOGY | Facility: CLINIC | Age: 89
End: 2025-06-17
Payer: MEDICARE

## 2025-06-17 ENCOUNTER — RESULT REVIEW (OUTPATIENT)
Age: 89
End: 2025-06-17

## 2025-06-17 VITALS
OXYGEN SATURATION: 96 % | BODY MASS INDEX: 28.79 KG/M2 | TEMPERATURE: 97.3 F | DIASTOLIC BLOOD PRESSURE: 83 MMHG | HEIGHT: 68 IN | HEART RATE: 70 BPM | SYSTOLIC BLOOD PRESSURE: 178 MMHG | RESPIRATION RATE: 16 BRPM | WEIGHT: 190 LBS

## 2025-06-17 LAB
BASOPHILS # BLD AUTO: 0.04 K/UL — SIGNIFICANT CHANGE UP (ref 0–0.2)
BASOPHILS NFR BLD AUTO: 0.5 % — SIGNIFICANT CHANGE UP (ref 0–2)
EOSINOPHIL # BLD AUTO: 0.46 K/UL — SIGNIFICANT CHANGE UP (ref 0–0.5)
EOSINOPHIL NFR BLD AUTO: 5.8 % — SIGNIFICANT CHANGE UP (ref 0–6)
HCT VFR BLD CALC: 29.7 % — LOW (ref 39–50)
HGB BLD-MCNC: 9.2 G/DL — LOW (ref 13–17)
IMM GRANULOCYTES NFR BLD AUTO: 0.5 % — SIGNIFICANT CHANGE UP (ref 0–0.9)
LYMPHOCYTES # BLD AUTO: 1.85 K/UL — SIGNIFICANT CHANGE UP (ref 1–3.3)
LYMPHOCYTES # BLD AUTO: 23.2 % — SIGNIFICANT CHANGE UP (ref 13–44)
MCHC RBC-ENTMCNC: 21.9 PG — LOW (ref 27–34)
MCHC RBC-ENTMCNC: 31 G/DL — LOW (ref 32–36)
MCV RBC AUTO: 70.7 FL — LOW (ref 80–100)
MONOCYTES # BLD AUTO: 0.76 K/UL — SIGNIFICANT CHANGE UP (ref 0–0.9)
MONOCYTES NFR BLD AUTO: 9.5 % — SIGNIFICANT CHANGE UP (ref 2–14)
NEUTROPHILS # BLD AUTO: 4.84 K/UL — SIGNIFICANT CHANGE UP (ref 1.8–7.4)
NEUTROPHILS NFR BLD AUTO: 60.5 % — SIGNIFICANT CHANGE UP (ref 43–77)
NRBC BLD AUTO-RTO: 0 /100 WBCS — SIGNIFICANT CHANGE UP (ref 0–0)
PLATELET # BLD AUTO: 249 K/UL — SIGNIFICANT CHANGE UP (ref 150–400)
RBC # BLD: 4.2 M/UL — SIGNIFICANT CHANGE UP (ref 4.2–5.8)
RBC # FLD: 23.9 % — HIGH (ref 10.3–14.5)
WBC # BLD: 7.99 K/UL — SIGNIFICANT CHANGE UP (ref 3.8–10.5)
WBC # FLD AUTO: 7.99 K/UL — SIGNIFICANT CHANGE UP (ref 3.8–10.5)

## 2025-06-17 PROCEDURE — 99214 OFFICE O/P EST MOD 30 MIN: CPT

## 2025-06-17 RX ORDER — PREDNISONE 5 MG/1
5 TABLET ORAL DAILY
Qty: 30 | Refills: 5 | Status: ACTIVE | COMMUNITY
Start: 2025-06-17

## 2025-06-17 RX ORDER — CHLORHEXIDINE GLUCONATE 4 %
325 (65 FE) LIQUID (ML) TOPICAL
Refills: 0 | Status: ACTIVE | COMMUNITY
Start: 2025-06-17

## 2025-06-17 RX ORDER — ABIRATERONE ACETATE 250 MG/1
250 TABLET, FILM COATED ORAL DAILY
Refills: 0 | Status: ACTIVE | COMMUNITY
Start: 2025-06-17

## 2025-06-18 LAB
ALBUMIN SERPL ELPH-MCNC: 4 G/DL
ALP BLD-CCNC: 747 U/L
ALT SERPL-CCNC: 15 U/L
ANION GAP SERPL CALC-SCNC: 18 MMOL/L
AST SERPL-CCNC: 19 U/L
BILIRUB SERPL-MCNC: 0.2 MG/DL
BUN SERPL-MCNC: 27 MG/DL
CALCIUM SERPL-MCNC: 9.1 MG/DL
CHLORIDE SERPL-SCNC: 101 MMOL/L
CO2 SERPL-SCNC: 21 MMOL/L
CREAT SERPL-MCNC: 1.44 MG/DL
EGFRCR SERPLBLD CKD-EPI 2021: 47 ML/MIN/1.73M2
GLUCOSE SERPL-MCNC: 144 MG/DL
POTASSIUM SERPL-SCNC: 3.9 MMOL/L
PROT SERPL-MCNC: 6.8 G/DL
PSA SERPL-MCNC: 40.6 NG/ML
SODIUM SERPL-SCNC: 140 MMOL/L

## 2025-06-19 LAB
FERRITIN SERPL-MCNC: 160 NG/ML
FOLATE SERPL-MCNC: 16.6 NG/ML
IRON SATN MFR SERPL: 28 %
IRON SERPL-MCNC: 66 UG/DL
TIBC SERPL-MCNC: 236 UG/DL
UIBC SERPL-MCNC: 170 UG/DL
VIT B12 SERPL-MCNC: 610 PG/ML

## 2025-07-07 ENCOUNTER — RESULT REVIEW (OUTPATIENT)
Age: 89
End: 2025-07-07

## 2025-07-07 ENCOUNTER — APPOINTMENT (OUTPATIENT)
Dept: HEMATOLOGY ONCOLOGY | Facility: CLINIC | Age: 89
End: 2025-07-07
Payer: MEDICARE

## 2025-07-07 VITALS
SYSTOLIC BLOOD PRESSURE: 209 MMHG | HEART RATE: 62 BPM | TEMPERATURE: 97.2 F | RESPIRATION RATE: 16 BRPM | WEIGHT: 192 LBS | BODY MASS INDEX: 29.19 KG/M2 | OXYGEN SATURATION: 96 % | DIASTOLIC BLOOD PRESSURE: 84 MMHG

## 2025-07-07 VITALS — SYSTOLIC BLOOD PRESSURE: 164 MMHG | DIASTOLIC BLOOD PRESSURE: 84 MMHG

## 2025-07-07 LAB
ALBUMIN SERPL ELPH-MCNC: 4.2 G/DL
ALP BLD-CCNC: 628 U/L
ALT SERPL-CCNC: 15 U/L
ANION GAP SERPL CALC-SCNC: 15 MMOL/L
AST SERPL-CCNC: 21 U/L
BASOPHILS # BLD AUTO: 0.04 K/UL — SIGNIFICANT CHANGE UP (ref 0–0.2)
BASOPHILS NFR BLD AUTO: 0.5 % — SIGNIFICANT CHANGE UP (ref 0–2)
BILIRUB SERPL-MCNC: 0.3 MG/DL
BUN SERPL-MCNC: 28 MG/DL
CALCIUM SERPL-MCNC: 9.2 MG/DL
CHLORIDE SERPL-SCNC: 99 MMOL/L
CO2 SERPL-SCNC: 24 MMOL/L
CREAT SERPL-MCNC: 1.39 MG/DL
EGFRCR SERPLBLD CKD-EPI 2021: 49 ML/MIN/1.73M2
EOSINOPHIL # BLD AUTO: 0.36 K/UL — SIGNIFICANT CHANGE UP (ref 0–0.5)
EOSINOPHIL NFR BLD AUTO: 4.3 % — SIGNIFICANT CHANGE UP (ref 0–6)
GLUCOSE SERPL-MCNC: 180 MG/DL
HCT VFR BLD CALC: 32.5 % — LOW (ref 39–50)
HGB BLD-MCNC: 9.9 G/DL — LOW (ref 13–17)
IMM GRANULOCYTES NFR BLD AUTO: 0.7 % — SIGNIFICANT CHANGE UP (ref 0–0.9)
LYMPHOCYTES # BLD AUTO: 1.31 K/UL — SIGNIFICANT CHANGE UP (ref 1–3.3)
LYMPHOCYTES # BLD AUTO: 15.5 % — SIGNIFICANT CHANGE UP (ref 13–44)
MCHC RBC-ENTMCNC: 22.2 PG — LOW (ref 27–34)
MCHC RBC-ENTMCNC: 30.5 G/DL — LOW (ref 32–36)
MCV RBC AUTO: 73 FL — LOW (ref 80–100)
MONOCYTES # BLD AUTO: 0.72 K/UL — SIGNIFICANT CHANGE UP (ref 0–0.9)
MONOCYTES NFR BLD AUTO: 8.5 % — SIGNIFICANT CHANGE UP (ref 2–14)
NEUTROPHILS # BLD AUTO: 5.95 K/UL — SIGNIFICANT CHANGE UP (ref 1.8–7.4)
NEUTROPHILS NFR BLD AUTO: 70.5 % — SIGNIFICANT CHANGE UP (ref 43–77)
NRBC BLD AUTO-RTO: 0 /100 WBCS — SIGNIFICANT CHANGE UP (ref 0–0)
PLATELET # BLD AUTO: 259 K/UL — SIGNIFICANT CHANGE UP (ref 150–400)
POTASSIUM SERPL-SCNC: 4.2 MMOL/L
PROT SERPL-MCNC: 7.3 G/DL
PSA SERPL-MCNC: 35.5 NG/ML
RBC # BLD: 4.45 M/UL — SIGNIFICANT CHANGE UP (ref 4.2–5.8)
RBC # FLD: 23.7 % — HIGH (ref 10.3–14.5)
SODIUM SERPL-SCNC: 138 MMOL/L
WBC # BLD: 8.44 K/UL — SIGNIFICANT CHANGE UP (ref 3.8–10.5)
WBC # FLD AUTO: 8.44 K/UL — SIGNIFICANT CHANGE UP (ref 3.8–10.5)

## 2025-07-07 PROCEDURE — G2211 COMPLEX E/M VISIT ADD ON: CPT

## 2025-07-07 PROCEDURE — 99214 OFFICE O/P EST MOD 30 MIN: CPT

## 2025-07-10 ENCOUNTER — INPATIENT (INPATIENT)
Facility: HOSPITAL | Age: 89
LOS: 5 days | Discharge: HOME | End: 2025-07-16
Attending: STUDENT IN AN ORGANIZED HEALTH CARE EDUCATION/TRAINING PROGRAM | Admitting: STUDENT IN AN ORGANIZED HEALTH CARE EDUCATION/TRAINING PROGRAM
Payer: MEDICARE

## 2025-07-10 VITALS
TEMPERATURE: 98 F | DIASTOLIC BLOOD PRESSURE: 85 MMHG | SYSTOLIC BLOOD PRESSURE: 125 MMHG | WEIGHT: 179.02 LBS | HEART RATE: 63 BPM | RESPIRATION RATE: 20 BRPM | OXYGEN SATURATION: 99 % | HEIGHT: 68 IN

## 2025-07-10 DIAGNOSIS — K62.5 HEMORRHAGE OF ANUS AND RECTUM: ICD-10-CM

## 2025-07-10 LAB
ALBUMIN SERPL ELPH-MCNC: 3.2 G/DL — LOW (ref 3.3–5)
ALP SERPL-CCNC: 375 U/L — HIGH (ref 40–120)
ALT FLD-CCNC: 10 U/L — SIGNIFICANT CHANGE UP (ref 4–41)
ANION GAP SERPL CALC-SCNC: 15 MMOL/L — HIGH (ref 7–14)
APTT BLD: 27.1 SEC — SIGNIFICANT CHANGE UP (ref 26.1–36.8)
AST SERPL-CCNC: 22 U/L — SIGNIFICANT CHANGE UP (ref 4–40)
BASOPHILS # BLD AUTO: 0.05 K/UL — SIGNIFICANT CHANGE UP (ref 0–0.2)
BASOPHILS NFR BLD AUTO: 0.4 % — SIGNIFICANT CHANGE UP (ref 0–2)
BILIRUB SERPL-MCNC: <0.2 MG/DL — SIGNIFICANT CHANGE UP (ref 0.2–1.2)
BLD GP AB SCN SERPL QL: NEGATIVE — SIGNIFICANT CHANGE UP
BLOOD GAS VENOUS COMPREHENSIVE RESULT: SIGNIFICANT CHANGE UP
BUN SERPL-MCNC: 52 MG/DL — HIGH (ref 7–23)
CALCIUM SERPL-MCNC: 8.1 MG/DL — LOW (ref 8.4–10.5)
CHLORIDE SERPL-SCNC: 102 MMOL/L — SIGNIFICANT CHANGE UP (ref 98–107)
CO2 SERPL-SCNC: 20 MMOL/L — LOW (ref 22–31)
CREAT SERPL-MCNC: 2 MG/DL — HIGH (ref 0.5–1.3)
EGFR: 32 ML/MIN/1.73M2 — LOW
EGFR: 32 ML/MIN/1.73M2 — LOW
EOSINOPHIL # BLD AUTO: 0.1 K/UL — SIGNIFICANT CHANGE UP (ref 0–0.5)
EOSINOPHIL NFR BLD AUTO: 0.7 % — SIGNIFICANT CHANGE UP (ref 0–6)
GLUCOSE BLDC GLUCOMTR-MCNC: 136 MG/DL — HIGH (ref 70–99)
GLUCOSE SERPL-MCNC: 157 MG/DL — HIGH (ref 70–99)
HCT VFR BLD CALC: 19.3 % — CRITICAL LOW (ref 39–50)
HGB BLD-MCNC: 6.1 G/DL — CRITICAL LOW (ref 13–17)
IMM GRANULOCYTES # BLD AUTO: 0.21 K/UL — HIGH (ref 0–0.07)
IMM GRANULOCYTES NFR BLD AUTO: 1.5 % — HIGH (ref 0–0.9)
INR BLD: 1.03 RATIO — SIGNIFICANT CHANGE UP (ref 0.85–1.16)
LYMPHOCYTES # BLD AUTO: 1.71 K/UL — SIGNIFICANT CHANGE UP (ref 1–3.3)
LYMPHOCYTES NFR BLD AUTO: 12.5 % — LOW (ref 13–44)
MCHC RBC-ENTMCNC: 22.7 PG — LOW (ref 27–34)
MCHC RBC-ENTMCNC: 31.6 G/DL — LOW (ref 32–36)
MCV RBC AUTO: 71.7 FL — LOW (ref 80–100)
MONOCYTES # BLD AUTO: 1.13 K/UL — HIGH (ref 0–0.9)
MONOCYTES NFR BLD AUTO: 8.2 % — SIGNIFICANT CHANGE UP (ref 2–14)
NEUTROPHILS # BLD AUTO: 10.51 K/UL — HIGH (ref 1.8–7.4)
NEUTROPHILS NFR BLD AUTO: 76.7 % — SIGNIFICANT CHANGE UP (ref 43–77)
NRBC # BLD AUTO: 0.02 K/UL — HIGH (ref 0–0)
NRBC # FLD: 0.02 K/UL — HIGH (ref 0–0)
NRBC BLD AUTO-RTO: 0 /100 WBCS — SIGNIFICANT CHANGE UP (ref 0–0)
OB PNL STL: POSITIVE
PLATELET # BLD AUTO: 219 K/UL — SIGNIFICANT CHANGE UP (ref 150–400)
PMV BLD: 9 FL — SIGNIFICANT CHANGE UP (ref 7–13)
POTASSIUM SERPL-MCNC: 4.7 MMOL/L — SIGNIFICANT CHANGE UP (ref 3.5–5.3)
POTASSIUM SERPL-SCNC: 4.7 MMOL/L — SIGNIFICANT CHANGE UP (ref 3.5–5.3)
PROT SERPL-MCNC: 5.8 G/DL — LOW (ref 6–8.3)
PROTHROM AB SERPL-ACNC: 11.9 SEC — SIGNIFICANT CHANGE UP (ref 9.9–13.4)
RBC # BLD: 2.69 M/UL — LOW (ref 4.2–5.8)
RBC # FLD: 23.2 % — HIGH (ref 10.3–14.5)
RH IG SCN BLD-IMP: POSITIVE — SIGNIFICANT CHANGE UP
RH IG SCN BLD-IMP: POSITIVE — SIGNIFICANT CHANGE UP
SODIUM SERPL-SCNC: 137 MMOL/L — SIGNIFICANT CHANGE UP (ref 135–145)
WBC # BLD: 13.71 K/UL — HIGH (ref 3.8–10.5)
WBC # FLD AUTO: 13.71 K/UL — HIGH (ref 3.8–10.5)

## 2025-07-10 PROCEDURE — 99223 1ST HOSP IP/OBS HIGH 75: CPT

## 2025-07-10 PROCEDURE — 99285 EMERGENCY DEPT VISIT HI MDM: CPT

## 2025-07-10 PROCEDURE — 36410 VNPNXR 3YR/> PHY/QHP DX/THER: CPT

## 2025-07-10 PROCEDURE — 74174 CTA ABD&PLVS W/CONTRAST: CPT | Mod: 26

## 2025-07-10 RX ORDER — INSULIN LISPRO 100 U/ML
INJECTION, SOLUTION INTRAVENOUS; SUBCUTANEOUS EVERY 4 HOURS
Refills: 0 | Status: DISCONTINUED | OUTPATIENT
Start: 2025-07-10 | End: 2025-07-11

## 2025-07-10 RX ORDER — DEXTROSE 50 % IN WATER 50 %
25 SYRINGE (ML) INTRAVENOUS ONCE
Refills: 0 | Status: DISCONTINUED | OUTPATIENT
Start: 2025-07-10 | End: 2025-07-16

## 2025-07-10 RX ORDER — SODIUM CHLORIDE 9 G/1000ML
1000 INJECTION, SOLUTION INTRAVENOUS
Refills: 0 | Status: DISCONTINUED | OUTPATIENT
Start: 2025-07-10 | End: 2025-07-16

## 2025-07-10 RX ORDER — GLUCAGON 3 MG/1
1 POWDER NASAL ONCE
Refills: 0 | Status: DISCONTINUED | OUTPATIENT
Start: 2025-07-10 | End: 2025-07-16

## 2025-07-10 RX ORDER — DEXTROSE 50 % IN WATER 50 %
12.5 SYRINGE (ML) INTRAVENOUS ONCE
Refills: 0 | Status: DISCONTINUED | OUTPATIENT
Start: 2025-07-10 | End: 2025-07-16

## 2025-07-10 RX ORDER — DEXTROSE 50 % IN WATER 50 %
15 SYRINGE (ML) INTRAVENOUS ONCE
Refills: 0 | Status: DISCONTINUED | OUTPATIENT
Start: 2025-07-10 | End: 2025-07-16

## 2025-07-10 RX ADMIN — Medication 80 MILLIGRAM(S): at 23:13

## 2025-07-10 NOTE — H&P ADULT - NSHPPHYSICALEXAM_GEN_ALL_CORE
Vital Signs Last 24 Hrs  T(C): 36.8 (11 Jul 2025 03:04), Max: 37.2 (10 Jul 2025 16:00)  T(F): 98.3 (11 Jul 2025 03:04), Max: 99 (10 Jul 2025 16:00)  HR: 71 (11 Jul 2025 03:04) (63 - 84)  BP: 140/81 (11 Jul 2025 03:04) (125/85 - 140/81)  BP(mean): 82 (10 Jul 2025 16:00) (82 - 82)  RR: 15 (11 Jul 2025 03:04) (15 - 27)  SpO2: 100% (11 Jul 2025 03:04) (96% - 100%)    Parameters below as of 11 Jul 2025 03:04  Patient On (Oxygen Delivery Method): room air

## 2025-07-10 NOTE — H&P ADULT - PROBLEM SELECTOR PLAN 3
Baseline Scr=1.6 in January, 2025.  Currently Scr=2  Likely hemodynamically drive given severe anemia     -Monitor response to 2u pRBC  -Monitor renal function daily  -Strict I/Os  -Renally dose medications  -Hold Losartan

## 2025-07-10 NOTE — ED PROVIDER NOTE - CLINICAL SUMMARY MEDICAL DECISION MAKING FREE TEXT BOX
Patient with history of metastatic prostate cancer, currently on aspirin, presents for 2 or 3 reported episodes of bloody bowel movement.  Patient is currently hemodynamically stable, well-appearing, has no complaints, on exam, stool is bright red blood mixed with maroon.  Will pursue CT angiogram of abdomen/pelvis to evaluate for active lower GI bleed.  Given that he is hemodynamically stable, no indication to give DDAVP to reverse aspirin  at this time.  Will monitor closely for recurrent large bloody bowel movements and for hemodynamic stability.  Will check labs to evaluate for anemia.  Patient will require admission for monitoring evaluation of lower GI bleed in setting of metastatic prostate cancer.

## 2025-07-10 NOTE — H&P ADULT - TIME BILLING
Complex NH patient with multiple comorbidities s and cancer   Reviewed admission imaging reports, and admission labs prior to the encounter with  the patient   Reviewed Triage and ED course/ documentation   Performed full physical exam and ROS  Obtained list of home medications and performed home medications reconciliation on EMR  Discussed prognosis, treatment and further plan with the patient  in simple terms   Ordered or reviewed admission medications and placed or reviewed all hospitalization admission orders  Managed (continued, held or adjusted doses) multiple home medications, many of which required discussion with the pharmacy   Ordered  or reviewed orders of  new imaging and new lab w/up  Requested new consultations including::: GI

## 2025-07-10 NOTE — ED ADULT NURSE REASSESSMENT NOTE - NS ED NURSE REASSESS COMMENT FT1
Patient educated about reason for blood transfusion, Consent confirmed and in chart, patient is aware of blood type and was reviewed by dual RN. Patient educated about s/s of blood transfusion reaction, including but not limited to hives, itching, back pain, flushing, heart palpitations. Instructed to notify RN if any of the symptoms were present within first 15 minute and at any point thereafter. Patient verbalized understanding of teaching. Will monitor patient x 15 min for any s/s of reaction, patient attached to cardiac monitor and stable at this time.

## 2025-07-10 NOTE — ED ADULT NURSE NOTE - OBJECTIVE STATEMENT
Pt from [facility] sent to ED for blood in stool. Pt denies complaints at present. Vitally stable. Mild tachypnea noted. Pt denies chest pain/SOB. 20g IV placed in L hand, labs drawn as ordered. MD Madhu Lawrence aware that pt will need Ultrasoud guided IV placed by MD prior to ct angio. MD motley still in progress, Awaiting further orders. Safety maintained.

## 2025-07-10 NOTE — H&P ADULT - ASSESSMENT
BONES: Diffuse sclerotic osseous metastatic disease without significant   change.    IMPRESSION:  No evidence of active GI bleed. 89-year-old male, NH resident, ambulatory with a walker,  with medical history of hypertension, Gout, constipation, hyperlipidemia, type 2 diabetes, gen weakness, iridocyclitis, metastatic prostate cancer on Abirateron and Prednisone (previously on radiation therapy) a/w acute GIB c/b acute blood loss anemia requiring transfusion of pRBC and KIRBY on CKD III.

## 2025-07-10 NOTE — ED PROVIDER NOTE - OBJECTIVE STATEMENT
89-year-old male with past medical history of hypertension, hyperlipidemia, type 2 diabetes, metastatic prostate cancer on Xgeva,  previously on radiation therapy, presents for bloody bowel movements noticed by staff caring for him at [facility].  her verbal report from NP there, they noticed bloody bowel movements last night as well as this morning.  Patient has no complaints, denies any abdominal or rectal pain, denies any chest pain or shortness of breath, denies any fevers.  He has no known history of prior rectal bleeding

## 2025-07-10 NOTE — H&P ADULT - HISTORY OF PRESENT ILLNESS
89-year-old male with medical history of hypertension, hyperlipidemia, type 2 diabetes, metastatic prostate cancer on Xgeva,  previously on radiation therapy, presents for bloody bowel movements noticed by staff caring for him at [facility].  her verbal report from NP there, they noticed bloody bowel movements last night as well as this morning.  Patient has no complaints, denies any abdominal or rectal pain, denies any chest pain or shortness of breath, denies any fevers.  He has no known history of prior rectal bleeding 89-year-old male, NH resident, ambulatory with a walker,  with medical history of hypertension, Gout, constipation, hyperlipidemia, type 2 diabetes, gen weakness, iridocyclitis, metastatic prostate cancer on Abirateron and Prednisone,  previously on radiation therapy, presents for bloody bowel movements noticed by staff caring for him at [facility]. Per collateral (NH NP) staff noticed bloody bowel movements last night as well as this morning.  Patient has no complaints, reports no abdominal or rectal pain, and no chest pain or shortness of breath,  fevers.  He has no known history of prior rectal bleeding.  Of note, per transfer documentation from NH, pt on regular consistency diet and thin fluids.   89-year-old male, NH resident, ambulatory with a walker,  with medical history of hypertension, Gout, constipation, hyperlipidemia, type 2 diabetes, gen weakness, iridocyclitis, metastatic prostate cancer on Abirateron and Prednisone,  previously on radiation therapy, presents for bloody bowel movements noticed by staff caring for him at [facility]. Per collateral (NH NP) staff noticed bloody bowel movements last night as well as this morning.  Patient has no complaints, reports no abdominal or rectal pain, and no chest pain or shortness of breath,  fevers.  He has no known history of prior rectal bleeding.  Of note, per transfer documentation from [facility], pt on regular consistency diet and thin fluids.

## 2025-07-10 NOTE — ED PROVIDER NOTE - PROGRESS NOTE DETAILS
Here hemoglobin 6.1, hematocrit 19.3 down from 07/07/2025 9.9 and 32.5 respectively.  On exam, patient weak appearing with pale conjunctiva bilaterally.  CT angio abdomen pelvis without active GI bleed.  Will admit patient for further workup, GI consult, and possible IR/surgical intervention

## 2025-07-10 NOTE — H&P ADULT - PROBLEM SELECTOR PLAN 5
-Hold pre meal insulin while NPO  -ISS Q4H while NPO  -Lantus 12u x 1 dose now while NPO  -Hypoglycemia protocolol   -Restart regular insulin regimen once can eat

## 2025-07-10 NOTE — H&P ADULT - NSHPLABSRESULTS_GEN_ALL_CORE
.    -Personally INTERPRETED EKG: NSR bpm, QTc=, no acute Tw or ST changes, no PACs or PVCs    -Personally INTERPRETED CXR: clear lungs, no pleural effusions, no PTX    -Personally reviewed the following labs below:    15:50 - VBG - pH: 7.37  | pCO2: 42    | pO2: 43    | Lactate: 2.3                          6.1    13.71 )-----------( 219      ( 10 Jul 2025 15:50 )             19.3   07-10    137  |  102  |  52[H]  ----------------------------<  157[H]  4.7   |  20[L]  |  2.00[H]    Ca    8.1[L]      10 Jul 2025 15:50    TPro  5.8[L]  /  Alb  3.2[L]  /  TBili  <0.2  /  DBili  x   /  AST  22  /  ALT  10  /  AlkPhos  375[H]  07-10        -Personally reviewed:     CT ANGIO ABD PELV (W)AW IC   ORDERED BY: CHRISTINA CHEN   PROCEDURE DATE:  07/10/2025    INTERPRETATION:  CLINICAL INFORMATION: Hematochezia. Metastatic prostate cancer, large bloody bowel movements. Evaluate GI bleed.  COMPARISON: CT abdomen pelvis 4/29/2025.    FINDINGS:  LOWER CHEST: Within normal limits.  LIVER: Within normal limits.  BILE DUCTS: Normal caliber.  GALLBLADDER: Within normal limits.  SPLEEN: Within normal limits.  PANCREAS: Within normal limits.  ADRENALS: Nodular bilateral adrenal gland thickening, unchanged from   prior exam.  KIDNEYS/URETERS: No hydronephrosis. Bilateral renal cysts.  BLADDER: Within normal limits.  REPRODUCTIVE ORGANS: Prostate fiducial markers.  BOWEL: No bowel obstruction. Appendix is normal. No evidence of contrast   extravasation.  PERITONEUM/RETROPERITONEUM: Within normal limits.  VESSELS: Atherosclerotic changes. 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  LYMPH NODES: No lymphadenopathy.  ABDOMINAL WALL: Small fat-containing supraumbilical hernia. Small   umbilical hernia containing knuckle of sigmoid colon. Small   fat-containing left inguinal hernia.  BONES: Diffuse sclerotic osseous metastatic disease without significant   change.    IMPRESSION:  No evidence of active GI bleed. .    -Personally INTERPRETED EKG: NSR 69 bpm, IQx=132, no acute Tw or ST changes, no PACs or PVCs    -Personally INTERPRETED CXR: clear lungs, no pleural effusions, no PTX    -Personally reviewed the following labs below:    15:50 - VBG - pH: 7.37  | pCO2: 42    | pO2: 43    | Lactate: 2.3                          6.1    13.71 )-----------( 219      ( 10 Jul 2025 15:50 )             19.3   07-10    137  |  102  |  52[H]  ----------------------------<  157[H]  4.7   |  20[L]  |  2.00[H]    Ca    8.1[L]      10 Jul 2025 15:50    TPro  5.8[L]  /  Alb  3.2[L]  /  TBili  <0.2  /  DBili  x   /  AST  22  /  ALT  10  /  AlkPhos  375[H]  07-10        -Personally reviewed:     CT ANGIO ABD PELV (W)AW IC   ORDERED BY: CHRISTINA CHEN   PROCEDURE DATE:  07/10/2025    INTERPRETATION:  CLINICAL INFORMATION: Hematochezia. Metastatic prostate cancer, large bloody bowel movements. Evaluate GI bleed.  COMPARISON: CT abdomen pelvis 4/29/2025.    FINDINGS:  LOWER CHEST: Within normal limits.  LIVER: Within normal limits.  BILE DUCTS: Normal caliber.  GALLBLADDER: Within normal limits.  SPLEEN: Within normal limits.  PANCREAS: Within normal limits.  ADRENALS: Nodular bilateral adrenal gland thickening, unchanged from   prior exam.  KIDNEYS/URETERS: No hydronephrosis. Bilateral renal cysts.  BLADDER: Within normal limits.  REPRODUCTIVE ORGANS: Prostate fiducial markers.  BOWEL: No bowel obstruction. Appendix is normal. No evidence of contrast   extravasation.  PERITONEUM/RETROPERITONEUM: Within normal limits.  VESSELS: Atherosclerotic changes. 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  LYMPH NODES: No lymphadenopathy.  ABDOMINAL WALL: Small fat-containing supraumbilical hernia. Small   umbilical hernia containing knuckle of sigmoid colon. Small   fat-containing left inguinal hernia.  BONES: Diffuse sclerotic osseous metastatic disease without significant   change.    IMPRESSION:  No evidence of active GI bleed.

## 2025-07-10 NOTE — H&P ADULT - PROBLEM SELECTOR PLAN 1
Upper vs Lower GIB. Pt poor historian,   Occult blood (+)  CT A/P: No evidence of active GI bleed.    -Hold all non-essential oral agents  -Hold ASA  -VS q4h  -Requested formal GI c/s for EGD and possible colonoscopy   -Monitor Hgb q12h  -Fall and aspiration precautions   -Hold antihypertensives

## 2025-07-10 NOTE — H&P ADULT - PROBLEM SELECTOR PLAN 2
Baseline Hgb appx 10  Currently Hgb=6  Due to acute GIB    -Transfuse 2u pRBC  -Monitor Hgb q12h  -Plan as above

## 2025-07-10 NOTE — ED PROVIDER NOTE - PHYSICAL EXAMINATION
Physical Exam  GEN: Alert and oriented x 3, in no acute distress, speaking full clear sentences  HEENT: NC/AT, PERRL, EOMI, normal oropharynx  NECK: Supple, nontender, FROM  CV: RRR, no m/r/g  PULM: CTA bilat, no wheezing/rales/rhonchi  ABD: Soft, nontender, nondistended. No organomegaly  RECTAL: performed w/ chaperone. External exam normal. Stool BRB mixed w/ maroon stool, no melena  EXTR: FROM to all extremities, nontender, no edema  SKIN: Warm, dry, no rash  NEURO: AOx3, speaking full clear sentences, BEEBE 5/5 strength, ambulating with stable gait

## 2025-07-10 NOTE — H&P ADULT - PROBLEM SELECTOR PLAN 4
CT A/P: BONES: Diffuse sclerotic osseous metastatic disease without significant change.    -c/w Prednisone 5mg daily  -c/w Abiraterone

## 2025-07-11 DIAGNOSIS — Z29.9 ENCOUNTER FOR PROPHYLACTIC MEASURES, UNSPECIFIED: ICD-10-CM

## 2025-07-11 DIAGNOSIS — C61 MALIGNANT NEOPLASM OF PROSTATE: ICD-10-CM

## 2025-07-11 DIAGNOSIS — E11.65 TYPE 2 DIABETES MELLITUS WITH HYPERGLYCEMIA: ICD-10-CM

## 2025-07-11 DIAGNOSIS — I10 ESSENTIAL (PRIMARY) HYPERTENSION: ICD-10-CM

## 2025-07-11 DIAGNOSIS — N17.9 ACUTE KIDNEY FAILURE, UNSPECIFIED: ICD-10-CM

## 2025-07-11 DIAGNOSIS — D62 ACUTE POSTHEMORRHAGIC ANEMIA: ICD-10-CM

## 2025-07-11 DIAGNOSIS — K92.2 GASTROINTESTINAL HEMORRHAGE, UNSPECIFIED: ICD-10-CM

## 2025-07-11 LAB
A1C WITH ESTIMATED AVERAGE GLUCOSE RESULT: 5.9 % — HIGH (ref 4–5.6)
ANION GAP SERPL CALC-SCNC: 15 MMOL/L — HIGH (ref 7–14)
BASOPHILS # BLD AUTO: 0.06 K/UL — SIGNIFICANT CHANGE UP (ref 0–0.2)
BASOPHILS NFR BLD AUTO: 0.5 % — SIGNIFICANT CHANGE UP (ref 0–2)
BUN SERPL-MCNC: 55 MG/DL — HIGH (ref 7–23)
CALCIUM SERPL-MCNC: 8.6 MG/DL — SIGNIFICANT CHANGE UP (ref 8.4–10.5)
CHLORIDE SERPL-SCNC: 104 MMOL/L — SIGNIFICANT CHANGE UP (ref 98–107)
CO2 SERPL-SCNC: 20 MMOL/L — LOW (ref 22–31)
CREAT SERPL-MCNC: 1.88 MG/DL — HIGH (ref 0.5–1.3)
EGFR: 34 ML/MIN/1.73M2 — LOW
EGFR: 34 ML/MIN/1.73M2 — LOW
EOSINOPHIL # BLD AUTO: 0.41 K/UL — SIGNIFICANT CHANGE UP (ref 0–0.5)
EOSINOPHIL NFR BLD AUTO: 3.4 % — SIGNIFICANT CHANGE UP (ref 0–6)
ESTIMATED AVERAGE GLUCOSE: 123 — SIGNIFICANT CHANGE UP
GLUCOSE BLDC GLUCOMTR-MCNC: 107 MG/DL — HIGH (ref 70–99)
GLUCOSE BLDC GLUCOMTR-MCNC: 128 MG/DL — HIGH (ref 70–99)
GLUCOSE SERPL-MCNC: 91 MG/DL — SIGNIFICANT CHANGE UP (ref 70–99)
HCT VFR BLD CALC: 27.3 % — LOW (ref 39–50)
HGB BLD-MCNC: 8.8 G/DL — LOW (ref 13–17)
IMM GRANULOCYTES # BLD AUTO: 0.12 K/UL — HIGH (ref 0–0.07)
IMM GRANULOCYTES NFR BLD AUTO: 1 % — HIGH (ref 0–0.9)
LYMPHOCYTES # BLD AUTO: 2.06 K/UL — SIGNIFICANT CHANGE UP (ref 1–3.3)
LYMPHOCYTES NFR BLD AUTO: 17.3 % — SIGNIFICANT CHANGE UP (ref 13–44)
MAGNESIUM SERPL-MCNC: 2.1 MG/DL — SIGNIFICANT CHANGE UP (ref 1.6–2.6)
MCHC RBC-ENTMCNC: 23.2 PG — LOW (ref 27–34)
MCHC RBC-ENTMCNC: 32.2 G/DL — SIGNIFICANT CHANGE UP (ref 32–36)
MCV RBC AUTO: 71.8 FL — LOW (ref 80–100)
MONOCYTES # BLD AUTO: 1.07 K/UL — HIGH (ref 0–0.9)
MONOCYTES NFR BLD AUTO: 9 % — SIGNIFICANT CHANGE UP (ref 2–14)
NEUTROPHILS # BLD AUTO: 8.18 K/UL — HIGH (ref 1.8–7.4)
NEUTROPHILS NFR BLD AUTO: 68.8 % — SIGNIFICANT CHANGE UP (ref 43–77)
NRBC # BLD AUTO: 0.02 K/UL — HIGH (ref 0–0)
NRBC # FLD: 0.02 K/UL — HIGH (ref 0–0)
NRBC BLD AUTO-RTO: 0 /100 WBCS — SIGNIFICANT CHANGE UP (ref 0–0)
PHOSPHATE SERPL-MCNC: 4.4 MG/DL — SIGNIFICANT CHANGE UP (ref 2.5–4.5)
PLATELET # BLD AUTO: 230 K/UL — SIGNIFICANT CHANGE UP (ref 150–400)
PMV BLD: 9.8 FL — SIGNIFICANT CHANGE UP (ref 7–13)
POTASSIUM SERPL-MCNC: 4 MMOL/L — SIGNIFICANT CHANGE UP (ref 3.5–5.3)
POTASSIUM SERPL-SCNC: 4 MMOL/L — SIGNIFICANT CHANGE UP (ref 3.5–5.3)
RBC # BLD: 3.8 M/UL — LOW (ref 4.2–5.8)
RBC # FLD: 21.6 % — HIGH (ref 10.3–14.5)
SODIUM SERPL-SCNC: 139 MMOL/L — SIGNIFICANT CHANGE UP (ref 135–145)
WBC # BLD: 11.9 K/UL — HIGH (ref 3.8–10.5)
WBC # FLD AUTO: 11.9 K/UL — HIGH (ref 3.8–10.5)

## 2025-07-11 PROCEDURE — 99233 SBSQ HOSP IP/OBS HIGH 50: CPT

## 2025-07-11 PROCEDURE — 93010 ELECTROCARDIOGRAM REPORT: CPT

## 2025-07-11 PROCEDURE — 99222 1ST HOSP IP/OBS MODERATE 55: CPT | Mod: GC

## 2025-07-11 RX ORDER — LOTEPREDNOL ETABONATE 5 MG/ML
2 SUSPENSION/ DROPS OPHTHALMIC
Refills: 0 | DISCHARGE

## 2025-07-11 RX ORDER — ABIRATERONE ACETATE 500 MG/1
500 TABLET, FILM COATED ORAL
Refills: 0 | Status: DISCONTINUED | OUTPATIENT
Start: 2025-07-11 | End: 2025-07-16

## 2025-07-11 RX ORDER — PREDNISOLONE 5 MG
1 TABLET ORAL
Refills: 0 | DISCHARGE

## 2025-07-11 RX ORDER — ABIRATERONE ACETATE 500 MG/1
2 TABLET, FILM COATED ORAL
Refills: 0 | DISCHARGE

## 2025-07-11 RX ORDER — PREDNISONE 20 MG/1
5 TABLET ORAL ONCE
Refills: 0 | Status: DISCONTINUED | OUTPATIENT
Start: 2025-07-11 | End: 2025-07-11

## 2025-07-11 RX ORDER — SENNA 187 MG
2 TABLET ORAL
Refills: 0 | DISCHARGE

## 2025-07-11 RX ORDER — POLYETHYLENE GLYCOL 3350 17 G/17G
17 POWDER, FOR SOLUTION ORAL
Refills: 0 | DISCHARGE

## 2025-07-11 RX ORDER — DOCUSATE SODIUM 100 MG
2 CAPSULE ORAL
Refills: 0 | DISCHARGE

## 2025-07-11 RX ORDER — INSULIN LISPRO 100 U/ML
INJECTION, SOLUTION INTRAVENOUS; SUBCUTANEOUS
Refills: 0 | Status: DISCONTINUED | OUTPATIENT
Start: 2025-07-11 | End: 2025-07-14

## 2025-07-11 RX ORDER — PREDNISONE 20 MG/1
5 TABLET ORAL DAILY
Refills: 0 | Status: DISCONTINUED | OUTPATIENT
Start: 2025-07-11 | End: 2025-07-16

## 2025-07-11 RX ORDER — INSULIN GLARGINE-YFGN 100 [IU]/ML
12 INJECTION, SOLUTION SUBCUTANEOUS AT BEDTIME
Refills: 0 | Status: COMPLETED | OUTPATIENT
Start: 2025-07-11 | End: 2025-07-11

## 2025-07-11 RX ADMIN — ABIRATERONE ACETATE 500 MILLIGRAM(S): 500 TABLET, FILM COATED ORAL at 05:59

## 2025-07-11 RX ADMIN — Medication 40 MILLIGRAM(S): at 10:38

## 2025-07-11 RX ADMIN — PREDNISONE 5 MILLIGRAM(S): 20 TABLET ORAL at 05:59

## 2025-07-11 RX ADMIN — Medication 40 MILLIGRAM(S): at 21:17

## 2025-07-11 RX ADMIN — INSULIN GLARGINE-YFGN 12 UNIT(S): 100 INJECTION, SOLUTION SUBCUTANEOUS at 01:22

## 2025-07-11 RX ADMIN — INSULIN LISPRO 2: 100 INJECTION, SOLUTION INTRAVENOUS; SUBCUTANEOUS at 21:45

## 2025-07-11 RX ADMIN — Medication 1 DROP(S): at 12:43

## 2025-07-11 RX ADMIN — INSULIN LISPRO 1: 100 INJECTION, SOLUTION INTRAVENOUS; SUBCUTANEOUS at 17:59

## 2025-07-11 NOTE — CONSULT NOTE ADULT - ASSESSMENT
89-year-old male, NH resident, ambulatory with a walker, with medical history of HTN, Gout, constipation, hyperlipidemia, T2DM, gen weakness, iridocyclitis, metastatic prostate cancer on Abirateron and Prednisone, previously on radiation therapy, presents for bloody bowel movements noticed by staff caring for him at [facility]. Per collateral (NH NP) staff noticed bloody bowel movements last night as well as this morning.  Patient has no complaints, reports no abdominal or rectal pain, and no chest pain or shortness of breath, fevers.  He has no known history of prior rectal bleeding.  Nephrology consulted for CKD.    A/P  CKD 3B:  Baseline S.Cr. 1.6-2.  Admitted w/ S.Cr. 2.  S.Cr. at baseline.  S/P CT A/P w/ IVC on 7/10 - No hydronephrosis. Bilateral renal cysts.  Monitor for NATY.  Monitor BMP and UO.    Anemia:  Sec to GIB vs. CKD.  S/P 2 units of pRBC.  Hgb improved.  Check iron studies.  GI following.  Monitor Hgb.  Transfuse for Hgb <8    HTN:  BP fluctuating; acceptable.  Resume home meds.  Low sodium diet.  Monitor BP.    CKD - MBD:  Check PTH.  Monitor PO4 and Ca daily.    GIB:  GI following.  CT A/P w/ no evidence of GIB.  Pending colonoscopy on Monday.

## 2025-07-11 NOTE — DIETITIAN INITIAL EVALUATION ADULT - PERTINENT MEDS FT
MEDICATIONS  (STANDING):  abiraterone 500 milliGRAM(s) Oral <User Schedule>  dextrose 5%. 1000 milliLiter(s) (100 mL/Hr) IV Continuous <Continuous>  dextrose 5%. 1000 milliLiter(s) (50 mL/Hr) IV Continuous <Continuous>  dextrose 50% Injectable 25 Gram(s) IV Push once  dextrose 50% Injectable 12.5 Gram(s) IV Push once  dextrose 50% Injectable 25 Gram(s) IV Push once  glucagon  Injectable 1 milliGRAM(s) IntraMuscular once  insulin lispro (ADMELOG) corrective regimen sliding scale   SubCutaneous every 4 hours  pantoprazole  Injectable 40 milliGRAM(s) IV Push every 12 hours  prednisoLONE acetate 1% Suspension 1 Drop(s) Right EYE daily  predniSONE   Tablet 5 milliGRAM(s) Oral daily    MEDICATIONS  (PRN):  dextrose Oral Gel 15 Gram(s) Oral once PRN Blood Glucose LESS THAN 70 milliGRAM(s)/deciliter

## 2025-07-11 NOTE — DIETITIAN INITIAL EVALUATION ADULT - ADD RECOMMEND
1. As medically feasible, gradually advance PO diet as tolerated at medical team's discretion  2. Defer fluid management to medical team  3. Monitor weights, labs, BM's, skin integrity, diet progression

## 2025-07-11 NOTE — DIETITIAN INITIAL EVALUATION ADULT - OTHER CALCULATIONS
IBW: 154 lb +/- 10%, %%  Per [address], noted the following weight history: 81.kg (7/10), 86.2kg (6/17), 92.1kg (12/30)  Objective weight measurements suggest 10.9kg (12%) weight loss x6 months period of time (significant)

## 2025-07-11 NOTE — DIETITIAN INITIAL EVALUATION ADULT - ORAL INTAKE PTA/DIET HISTORY
Per NH transfer sheets: No known food allergies or intolerances. Height 68inches, weight 192lb. Supplementation with ferrous sulfate. Diet order no added salt, no concentrated sweets, regular textures, thin liquids.    Patient seen at bedside today, resting comfortably in bed. Confirmed no known food allergies or intolerances. Patient reports his appetite was "very good" while at the NH, consumed most of the food provided at his meals.

## 2025-07-11 NOTE — CONSULT NOTE ADULT - ATTENDING COMMENTS
GI consulted for hematochezia, anemia.   Hgb 6.1 on admission   CTA negative for active bleeidng  Last colonoscopy > 10 yrs ago   DDx includes radiation proctitis, diverticular bleeding (diverticulosis seen on prior imaging, AVM, colitis, malignancy.     Recommendations   Plan for colonoscopy on monday   trend h/h  monitor bowel movements  if recurrent hematochezia, please obtain repeat CTA and IR c/s   GI to follow, please call w questions

## 2025-07-11 NOTE — PATIENT PROFILE ADULT - FALL HARM RISK - RISK INTERVENTIONS

## 2025-07-11 NOTE — DIETITIAN INITIAL EVALUATION ADULT - PERTINENT LABORATORY DATA
07-11    139  |  104  |  55[H]  ----------------------------<  91  4.0   |  20[L]  |  1.88[H]    Ca    8.6      11 Jul 2025 05:55  Phos  4.4     07-11  Mg     2.10     07-11    TPro  5.8[L]  /  Alb  3.2[L]  /  TBili  <0.2  /  DBili  x   /  AST  22  /  ALT  10  /  AlkPhos  375[H]  07-10  POCT Blood Glucose.: 129 mg/dL (07-11-25 @ 09:20)  A1C with Estimated Average Glucose Result: 5.9 % (07-11-25 @ 05:55)  A1C with Estimated Average Glucose Result: 7.0 % (12-30-24 @ 09:36)

## 2025-07-11 NOTE — DIETITIAN INITIAL EVALUATION ADULT - REASON INDICATOR FOR ASSESSMENT
Nutrition Risk Screen for Oncology    Per chart, patient is an 89y Male with PMH HTN, gout, constipation, HLD, T2DM, generalized weakness, iridocyclitis, metastatic prostate cancer who presented from NH to [facility] for acute GIB c/b acute blood loss anemia requiring transfusion of pRBC and KIRBY on CKD III.

## 2025-07-11 NOTE — CONSULT NOTE ADULT - ASSESSMENT
HUSSEIN MCCARTHY is a 89year old male with HTN, Gout, Constipation, HLD, T2DM, metastatic prostate cancer (s/p radiation) presenting for BRBPR.     #Hematochezia  #Severe Microcytic Anemia  Anemia at baseline now with acute drop likely 2/2 LGIB with ACD. Clinical presentation most consistent with lower GI bleeding given hemodynamic stability and BUIN near baseline. DDX includes hemorrhage secondary to radiation proctitis, diverticulosis with hemorrhage, angiectasias, malignancy, colitis, hemorrhoids, or SCAD [segmental colitis associated with diverticulosis]. Last colonoscopy over 10 years ago    Recommendations:  -Please add on, Iron studies prior to transfusion  - Will plan for EGD/colonoscopy +/- Capsule endoscopy on ***  - Please make NPO after midnight. If bowel prep not yet complete after midnight, patient can drink prep until completed.   - Please place patient on clear liquid diet   - Trend CBC, keep Hgb >7, plt > 50k  - Monitor BMs for evidence of overt Gi bleeding (melena, hematochezia) HUSSEIN MCCARTHY is a 89year old male with HTN, Gout, Constipation, HLD, T2DM, metastatic prostate cancer (s/p radiation) presenting for BRBPR.     #Hematochezia  #Severe Microcytic Anemia  Anemia at baseline now with acute drop likely 2/2 LGIB with ACD. Clinical presentation most consistent with lower GI bleeding given hemodynamic stability and BUN near baseline. DDX includes hemorrhage secondary to radiation proctitis, diverticulosis with hemorrhage, angiectasias, malignancy, colitis, hemorrhoids, or SCAD [segmental colitis associated with diverticulosis]. Last colonoscopy over 10 years ago    Recommendations:  -Please add on Iron studies to labs drawn prior to transfusion  - ***PPI  - Will plan for colonoscopy on Monday  - Please start Golytely on Sunday morning and make NPO after midnight on Sunday evening. If bowel prep not yet complete after midnight, patient can drink prep until completed.   - Trend CBC, keep Hgb >7, plt > 50k  - Monitor BMs for evidence of overt GI bleeding (melena, hematochezia) HUSSEIN MCCARTHY is a 89year old male with HTN, Gout, Constipation, HLD, T2DM, metastatic prostate cancer (s/p radiation) presenting for BRBPR.     Anemia at baseline now with acute drop likely 2/2 LGIB with ACD. Clinical presentation most consistent with lower GI bleeding given hemodynamic stability and BUN near baseline. DDX includes hemorrhage secondary to radiation proctitis, diverticulosis with hemorrhage, or hemorrhoids, less likely angiectasias, malignancy, colitis, hemorrhoids, or SCAD [segmental colitis associated with diverticulosis]. Last colonoscopy over 10 years ago.    #Hematochezia  #Severe Microcytic Anemia  Recommendations:  -Please add on Iron studies to labs drawn prior to transfusion  - Will plan for colonoscopy on Monday  - Please start Golytely on Sunday morning and make NPO after midnight on Sunday evening. If bowel prep not yet complete after midnight, patient can drink prep until completed.   - Trend CBC, keep Hgb >7, plt > 50k  - Monitor BMs for evidence of overt GI bleeding (melena, hematochezia)    Note incomplete until finalized by attending signature/attestation.    Dakotah Doty MD  GI/Hepatology Fellow, PGY-4    MONDAY-FRIDAY 8AM-5PM:  Please message via [employer] or email [email or web address]     On Weekends/Holidays (All day) and Weekdays after 5 PM to 8 AM  For nonurgent consults please email:  Please email [email or web address]  For urgent consults:  Please contact on call GI team. See Amion schedule ([facility]), [employer] paging system ([facility]), or call hospital  ([facility])

## 2025-07-11 NOTE — CONSULT NOTE ADULT - SUBJECTIVE AND OBJECTIVE BOX
[facility] [place] [facility]   MD CRISTI SESAY MD ANGELA WONG, PA QIAN CHEN, NP      TEL:  OFFICE: [phone]  From 5pm-7am answering service [phone]    --- INITIAL RENAL CONSULT NOTE ---date of service 07-11-25 @ 13:47    HPI:  89-year-old male, NH resident, ambulatory with a walker, with medical history of HTN, Gout, constipation, hyperlipidemia, T2DM, gen weakness, iridocyclitis, metastatic prostate cancer on Abirateron and Prednisone, previously on radiation therapy, presents for bloody bowel movements noticed by staff caring for him at [facility]. Per collateral (NH NP) staff noticed bloody bowel movements last night as well as this morning.  Patient has no complaints, reports no abdominal or rectal pain, and no chest pain or shortness of breath, fevers.  He has no known history of prior rectal bleeding.      Allergies:  No Known Allergies      PAST MEDICAL & SURGICAL HISTORY:  HTN (hypertension)    DM2 (diabetes mellitus, type 2)    Prostate cancer    No significant past surgical history        Home Medications Reviewed    Hospital Medications:   MEDICATIONS  (STANDING):  abiraterone 500 milliGRAM(s) Oral <User Schedule>  dextrose 5%. 1000 milliLiter(s) (100 mL/Hr) IV Continuous <Continuous>  dextrose 5%. 1000 milliLiter(s) (50 mL/Hr) IV Continuous <Continuous>  dextrose 50% Injectable 25 Gram(s) IV Push once  dextrose 50% Injectable 12.5 Gram(s) IV Push once  dextrose 50% Injectable 25 Gram(s) IV Push once  glucagon  Injectable 1 milliGRAM(s) IntraMuscular once  insulin lispro (ADMELOG) corrective regimen sliding scale   SubCutaneous every 4 hours  pantoprazole  Injectable 40 milliGRAM(s) IV Push every 12 hours  prednisoLONE acetate 1% Suspension 1 Drop(s) Right EYE daily  predniSONE   Tablet 5 milliGRAM(s) Oral daily      SOCIAL HISTORY:  Denies ETOh, Smoking,     FAMILY HISTORY:  No pertinent family history in first degree relatives      REVIEW OF SYSTEMS:  CONSTITUTIONAL: No weakness, fevers or chills  EYES/ENT: No visual changes;  No vertigo or throat pain   NECK: No pain or stiffness  RESPIRATORY: No cough, wheezing, hemoptysis; No shortness of breath  CARDIOVASCULAR: No chest pain or palpitations.  GASTROINTESTINAL: Per HPI  GENITOURINARY: No dysuria, frequency, foamy urine, urinary urgency, incontinence or hematuria  NEUROLOGICAL: No numbness or weakness  SKIN: No itching, burning, rashes, or lesions   VASCULAR: No bilateral lower extremity edema.   All other review of systems is negative unless indicated above.    VITALS:  T(F): 99 (07-11-25 @ 12:54), Max: 99 (07-10-25 @ 16:00)  HR: 70 (07-11-25 @ 12:54)  BP: 155/68 (07-11-25 @ 12:54)  RR: 17 (07-11-25 @ 12:54)  SpO2: 96% (07-11-25 @ 12:54)  Wt(kg): --    Height (cm): 172.7 (07-10 @ 14:46)  Weight (kg): 81.2 (07-10 @ 14:46)  BMI (kg/m2): 27.2 (07-10 @ 14:46)  BSA (m2): 1.95 (07-10 @ 14:46)    PHYSICAL EXAM:  General: NAD  HEENT: anicteric sclera, oropharynx clear, MMM  Neck: No JVD  Respiratory: CTAB, no wheezes, rales or rhonchi  Cardiovascular: S1, S2, RRR  Gastrointestinal: BS+, soft, NT/ND  Extremities: No cyanosis or clubbing. No peripheral edema  Neurological: A/O x 3, no focal deficits  Psychiatric: Normal mood, normal affect  : No CVA tenderness. No jacques.   Skin: No rashes      LABS:  07-11    139  |  104  |  55[H]  ----------------------------<  91  4.0   |  20[L]  |  1.88[H]    Ca    8.6      11 Jul 2025 05:55  Phos  4.4     07-11  Mg     2.10     07-11    TPro  5.8[L]  /  Alb  3.2[L]  /  TBili  <0.2  /  DBili      /  AST  22  /  ALT  10  /  AlkPhos  375[H]  07-10    Creatinine Trend: 1.88 <--, 2.00 <--                        8.8    11.90 )-----------( 230      ( 11 Jul 2025 05:55 )             27.3     Urine Studies:  Urinalysis Basic - ( 11 Jul 2025 05:55 )    Color:  / Appearance:  / SG:  / pH:   Gluc: 91 mg/dL / Ketone:   / Bili:  / Urobili:    Blood:  / Protein:  / Nitrite:    Leuk Esterase:  / RBC:  / WBC    Sq Epi:  / Non Sq Epi:  / Bacteria:           RADIOLOGY & ADDITIONAL STUDIES:

## 2025-07-11 NOTE — CONSULT NOTE ADULT - SUBJECTIVE AND OBJECTIVE BOX
HPI:  HUSSEIN MCCARTHY is a 89year old male with HTN, Gout, Constipation, HLD, T2DM, metastatic prostate cancer (s/p radiation) presenting for BRBPR.     The patient is presenting from NH 2/2 bloody bowel movements starting in the evening on 7/9 continuing into 7/11. He  has no known history of blood bowel movements in the past. Reports that he now has not had a bowel movement since the night prior. He is unaware of the bleeding but reports that he was told about it at the NH. BAseline HgB in June of ~9 now with HgB 6.1 on presentation ot the ED. Vital sign WNL and he recieved 2u pRBC. He has a history of prostate cancer with radiation. Patient without abdominal pain or rectal pain at this time.     Otherwise, patient denies fevers, chills, weight loss, dysphagia, odynophagia, early satiety, poor oral intake, abdominal pain, nausea, vomiting, diarrhea, melena, hematemesis, hematochezia, change in stool caliber, or family history of GI-related cancers.    ROS:   Negative other than HPI    PMHX/PSHX:    HTN, Gout, Constipation, HLD, T2DM, metastatic prostate cancer (s/p radiation)    No significant past surgical history    Allergies:  No Known Allergies    Home Medications: reviewed  Hospital Medications:  abiraterone 500 milliGRAM(s) Oral <User Schedule>  dextrose 5%. 1000 milliLiter(s) IV Continuous <Continuous>  dextrose 5%. 1000 milliLiter(s) IV Continuous <Continuous>  dextrose 50% Injectable 25 Gram(s) IV Push once  dextrose 50% Injectable 12.5 Gram(s) IV Push once  dextrose 50% Injectable 25 Gram(s) IV Push once  dextrose Oral Gel 15 Gram(s) Oral once PRN  glucagon  Injectable 1 milliGRAM(s) IntraMuscular once  insulin lispro (ADMELOG) corrective regimen sliding scale   SubCutaneous every 4 hours  pantoprazole  Injectable 40 milliGRAM(s) IV Push every 12 hours  prednisoLONE acetate 1% Suspension 1 Drop(s) Right EYE daily  predniSONE   Tablet 5 milliGRAM(s) Oral daily      Social History:   Tobacco: denies  Alcohol: denies  Recreational drugs: denies    Family history:    No pertinent family history in first degree relatives    Denies family history of colon cancer/polyps, stomach cancer/polyps, pancreatic cancer/masses, liver cancer/disease, ovarian cancer and endometrial cancer.    PHYSICAL EXAM:   Vital Signs:  Vital Signs Last 24 Hrs  T(C): 36.8 (11 Jul 2025 07:16), Max: 37.2 (10 Jul 2025 16:00)  T(F): 98.2 (11 Jul 2025 07:16), Max: 99 (10 Jul 2025 16:00)  HR: 71 (11 Jul 2025 07:16) (63 - 84)  BP: 143/61 (11 Jul 2025 07:16) (125/85 - 162/77)  BP(mean): 82 (10 Jul 2025 16:00) (82 - 82)  RR: 16 (11 Jul 2025 07:16) (15 - 27)  SpO2: 98% (11 Jul 2025 07:16) (96% - 100%)    Parameters below as of 11 Jul 2025 07:16  Patient On (Oxygen Delivery Method): room air      Daily Height in cm: 172.72 (10 Jul 2025 14:46)    Daily     GENERAL: no acute distress  NEURO: alert   HEENT: NCAT, no conjunctival pallor appreciated; anicteric sclera  CHEST: breathing comfortably  CARDIAC: regular rate,  ABDOMEN: soft, nontender, no rebound or guarding; rectal exam performed ***: Normal rectal tone and coordination; No hard stool or blood in the rectum; Brown stool smear on gloved finger.   EXTREMITIES: warm, well perfused  SKIN: no lesions noted    LABS: reviewed                        8.8    11.90 )-----------( 230      ( 11 Jul 2025 05:55 )             27.3     07-11    139  |  104  |  55[H]  ----------------------------<  91  4.0   |  20[L]  |  1.88[H]    Ca    8.6      11 Jul 2025 05:55  Phos  4.4     07-11  Mg     2.10     07-11    TPro  5.8[L]  /  Alb  3.2[L]  /  TBili  <0.2  /  DBili  x   /  AST  22  /  ALT  10  /  AlkPhos  375[H]  07-10    LIVER FUNCTIONS - ( 10 Jul 2025 15:50 )  Alb: 3.2 g/dL / Pro: 5.8 g/dL / ALK PHOS: 375 U/L / ALT: 10 U/L / AST: 22 U/L / GGT: x     HPI:  HUSSEIN MCCARTHY is a 89year old male with HTN, Gout, Constipation, HLD, T2DM, metastatic prostate cancer (s/p radiation) presenting for BRBPR.     The patient is presenting from NH 2/2 bloody bowel movements starting in the evening on 7/9 continuing into 7/11. He has no known history of blood bowel movements in the past. Reports that he now has not had a bowel movement since the night prior. He is unaware of the bleeding but reports that he was told about it at the NH. Baseline HgB in June of ~9 now with HgB 6.1 on presentation ot the ED. Vital sign WNL and he recieved 2u pRBC. He has a history of prostate cancer with radiation. Patient without abdominal pain or rectal pain at this time.     Otherwise, patient denies fevers, chills, weight loss, dysphagia, odynophagia, early satiety, poor oral intake, abdominal pain, nausea, vomiting, diarrhea, melena, hematemesis, hematochezia, change in stool caliber, or family history of GI-related cancers.    ROS:   Negative other than HPI    PMHX/PSHX:    HTN, Gout, Constipation, HLD, T2DM, metastatic prostate cancer (s/p radiation)    No significant past surgical history    Allergies:  No Known Allergies    Home Medications: reviewed  Hospital Medications:  abiraterone 500 milliGRAM(s) Oral <User Schedule>  dextrose 5%. 1000 milliLiter(s) IV Continuous <Continuous>  dextrose 5%. 1000 milliLiter(s) IV Continuous <Continuous>  dextrose 50% Injectable 25 Gram(s) IV Push once  dextrose 50% Injectable 12.5 Gram(s) IV Push once  dextrose 50% Injectable 25 Gram(s) IV Push once  dextrose Oral Gel 15 Gram(s) Oral once PRN  glucagon  Injectable 1 milliGRAM(s) IntraMuscular once  insulin lispro (ADMELOG) corrective regimen sliding scale   SubCutaneous every 4 hours  pantoprazole  Injectable 40 milliGRAM(s) IV Push every 12 hours  prednisoLONE acetate 1% Suspension 1 Drop(s) Right EYE daily  predniSONE   Tablet 5 milliGRAM(s) Oral daily      Social History:   Tobacco: denies  Alcohol: denies  Recreational drugs: denies    Family history:    No pertinent family history in first degree relatives    Denies family history of colon cancer/polyps, stomach cancer/polyps, pancreatic cancer/masses, liver cancer/disease, ovarian cancer and endometrial cancer.    PHYSICAL EXAM:   Vital Signs:  Vital Signs Last 24 Hrs  T(C): 36.8 (11 Jul 2025 07:16), Max: 37.2 (10 Jul 2025 16:00)  T(F): 98.2 (11 Jul 2025 07:16), Max: 99 (10 Jul 2025 16:00)  HR: 71 (11 Jul 2025 07:16) (63 - 84)  BP: 143/61 (11 Jul 2025 07:16) (125/85 - 162/77)  BP(mean): 82 (10 Jul 2025 16:00) (82 - 82)  RR: 16 (11 Jul 2025 07:16) (15 - 27)  SpO2: 98% (11 Jul 2025 07:16) (96% - 100%)    Parameters below as of 11 Jul 2025 07:16  Patient On (Oxygen Delivery Method): room air      Daily Height in cm: 172.72 (10 Jul 2025 14:46)    Daily     GENERAL: no acute distress  NEURO: alert   HEENT: NCAT, no conjunctival pallor appreciated; anicteric sclera  CHEST: breathing comfortably  CARDIAC: regular rate,  ABDOMEN: soft, nontender, no rebound or guarding; rectal exam performed ***: Normal rectal tone and coordination; No hard stool or blood in the rectum; Brown stool smear on gloved finger.   EXTREMITIES: warm, well perfused  SKIN: no lesions noted    LABS: reviewed                        8.8    11.90 )-----------( 230      ( 11 Jul 2025 05:55 )             27.3     07-11    139  |  104  |  55[H]  ----------------------------<  91  4.0   |  20[L]  |  1.88[H]    Ca    8.6      11 Jul 2025 05:55  Phos  4.4     07-11  Mg     2.10     07-11    TPro  5.8[L]  /  Alb  3.2[L]  /  TBili  <0.2  /  DBili  x   /  AST  22  /  ALT  10  /  AlkPhos  375[H]  07-10    LIVER FUNCTIONS - ( 10 Jul 2025 15:50 )  Alb: 3.2 g/dL / Pro: 5.8 g/dL / ALK PHOS: 375 U/L / ALT: 10 U/L / AST: 22 U/L / GGT: x     HPI:  HUSSEIN MCCARTHY is a 89year old male with HTN, Gout, Constipation, HLD, T2DM, metastatic prostate cancer (s/p radiation) presenting for BRBPR.     The patient is presenting from NH 2/2 bloody bowel movements starting in the evening on 7/9 continuing into the morning of 7/10. He has no known history of blood bowel movements in the past. Reports that he now has not had a bowel movement in over 24 hours. He is unaware of the bleeding but reports that he was told about it at the NH. Baseline HgB in June of ~9 now with HgB 6.1 on presentation ot the ED. Vital sign WNL and he recieved 2u pRBC. He has a history of prostate cancer with radiation. Patient without abdominal pain or rectal pain at this time.     Otherwise, patient denies fevers, chills, weight loss, dysphagia, odynophagia, early satiety, poor oral intake, abdominal pain, nausea, vomiting, diarrhea, melena, hematemesis, hematochezia, change in stool caliber, or family history of GI-related cancers.    ROS:   Negative other than HPI    PMHX/PSHX:    HTN, Gout, Constipation, HLD, T2DM, metastatic prostate cancer (s/p radiation)    No significant past surgical history    Allergies:  No Known Allergies    Home Medications: reviewed  Hospital Medications:  abiraterone 500 milliGRAM(s) Oral <User Schedule>  dextrose 5%. 1000 milliLiter(s) IV Continuous <Continuous>  dextrose 5%. 1000 milliLiter(s) IV Continuous <Continuous>  dextrose 50% Injectable 25 Gram(s) IV Push once  dextrose 50% Injectable 12.5 Gram(s) IV Push once  dextrose 50% Injectable 25 Gram(s) IV Push once  dextrose Oral Gel 15 Gram(s) Oral once PRN  glucagon  Injectable 1 milliGRAM(s) IntraMuscular once  insulin lispro (ADMELOG) corrective regimen sliding scale   SubCutaneous every 4 hours  pantoprazole  Injectable 40 milliGRAM(s) IV Push every 12 hours  prednisoLONE acetate 1% Suspension 1 Drop(s) Right EYE daily  predniSONE   Tablet 5 milliGRAM(s) Oral daily      Social History:   Tobacco: denies  Alcohol: denies  Recreational drugs: denies    Family history:    No pertinent family history in first degree relatives    Denies family history of colon cancer/polyps, stomach cancer/polyps, pancreatic cancer/masses, liver cancer/disease, ovarian cancer and endometrial cancer.    PHYSICAL EXAM:   Vital Signs:  Vital Signs Last 24 Hrs  T(C): 36.8 (11 Jul 2025 07:16), Max: 37.2 (10 Jul 2025 16:00)  T(F): 98.2 (11 Jul 2025 07:16), Max: 99 (10 Jul 2025 16:00)  HR: 71 (11 Jul 2025 07:16) (63 - 84)  BP: 143/61 (11 Jul 2025 07:16) (125/85 - 162/77)  BP(mean): 82 (10 Jul 2025 16:00) (82 - 82)  RR: 16 (11 Jul 2025 07:16) (15 - 27)  SpO2: 98% (11 Jul 2025 07:16) (96% - 100%)    Parameters below as of 11 Jul 2025 07:16  Patient On (Oxygen Delivery Method): room air      Daily Height in cm: 172.72 (10 Jul 2025 14:46)    Daily     GENERAL: no acute distress  NEURO: alert   HEENT: NCAT, no conjunctival pallor appreciated; anicteric sclera  CHEST: breathing comfortably  CARDIAC: regular rate,  ABDOMEN: soft, nontender, no rebound or guarding  EXTREMITIES: warm, well perfused  SKIN: no lesions noted    LABS: reviewed                        8.8    11.90 )-----------( 230      ( 11 Jul 2025 05:55 )             27.3     07-11    139  |  104  |  55[H]  ----------------------------<  91  4.0   |  20[L]  |  1.88[H]    Ca    8.6      11 Jul 2025 05:55  Phos  4.4     07-11  Mg     2.10     07-11    TPro  5.8[L]  /  Alb  3.2[L]  /  TBili  <0.2  /  DBili  x   /  AST  22  /  ALT  10  /  AlkPhos  375[H]  07-10    LIVER FUNCTIONS - ( 10 Jul 2025 15:50 )  Alb: 3.2 g/dL / Pro: 5.8 g/dL / ALK PHOS: 375 U/L / ALT: 10 U/L / AST: 22 U/L / GGT: x

## 2025-07-11 NOTE — DIETITIAN INITIAL EVALUATION ADULT - OTHER INFO
Patient is currently NPO with allowance for ice chips / sips of water. Diet advancement at medical team's discretion. Patient informed of NPO status with diet progression as medically feasible. Patient verbalized understanding. Patient denies any chewing or swallowing difficulty with regular solids or thin liquids. No report of GI distress (nausea, vomiting, diarrhea, constipation). Last BM 7/10/25 per RN flowsheet documentation. Not noted to be on a bowel regimen. Nutrition education deferred as patient resides at a Nursing Home which has staff dietitians.    Despite patient's report of good PO intake prior to admission, likely meeting <75% estimated energy needs for >1 month secondary to hypermetabolic illness (PMH metastatic prostate cancer). Patient presents with significant weight loss (>10% x6 months). As such, patient meets criteria for severe protein calorie malnutrition in the context of chronic illness as this time.

## 2025-07-11 NOTE — DIETITIAN NUTRITION RISK NOTIFICATION - ADDITIONAL COMMENTS/DIETITIAN RECOMMENDATIONS
Please see Dietitian Initial Assessment for complete recommendations.    Cherelle Guerrero MS RDN CDN  On [employer] Teams (Preferred), Pager #[phone]

## 2025-07-12 PROCEDURE — 99233 SBSQ HOSP IP/OBS HIGH 50: CPT

## 2025-07-12 RX ORDER — POLYETHYLENE GLYCOL-3350 AND ELECTROLYTES 236; 6.74; 5.86; 2.97; 22.74 G/274.31G; G/274.31G; G/274.31G; G/274.31G; G/274.31G
4000 POWDER, FOR SOLUTION ORAL ONCE
Refills: 0 | Status: COMPLETED | OUTPATIENT
Start: 2025-07-13 | End: 2025-07-13

## 2025-07-12 RX ORDER — ACETAMINOPHEN 500 MG/5ML
1000 LIQUID (ML) ORAL ONCE
Refills: 0 | Status: COMPLETED | OUTPATIENT
Start: 2025-07-12 | End: 2025-07-12

## 2025-07-12 RX ADMIN — Medication 1 DROP(S): at 11:21

## 2025-07-12 RX ADMIN — Medication 40 MILLIGRAM(S): at 11:22

## 2025-07-12 RX ADMIN — Medication 1000 MILLIGRAM(S): at 09:48

## 2025-07-12 RX ADMIN — PREDNISONE 5 MILLIGRAM(S): 20 TABLET ORAL at 05:18

## 2025-07-12 RX ADMIN — Medication 40 MILLIGRAM(S): at 22:36

## 2025-07-12 RX ADMIN — INSULIN LISPRO 1: 100 INJECTION, SOLUTION INTRAVENOUS; SUBCUTANEOUS at 12:49

## 2025-07-12 RX ADMIN — Medication 400 MILLIGRAM(S): at 08:48

## 2025-07-12 RX ADMIN — ABIRATERONE ACETATE 500 MILLIGRAM(S): 500 TABLET, FILM COATED ORAL at 06:11

## 2025-07-12 RX ADMIN — INSULIN LISPRO 1: 100 INJECTION, SOLUTION INTRAVENOUS; SUBCUTANEOUS at 17:49

## 2025-07-12 NOTE — PHYSICAL THERAPY INITIAL EVALUATION ADULT - ADDITIONAL COMMENTS
Pt lives in a NH gets assistance as needed, no falls, uses RW to ambulate.    Patients Current SpO2: 99%

## 2025-07-12 NOTE — PHYSICAL THERAPY INITIAL EVALUATION ADULT - LEVEL OF INDEPENDENCE: STAND/SIT, REHAB EVAL
Pt A&Ox4, lethargic and quiet. Wakes to voice and sometimes opens eyes spontaneously. Regular diet, no appetite, 1500 ml fluid restriction.  Na still low at 127. NS running @75 through PIV and pt receiving sodium tablets to correct. Headache pain 1-4/10 and of secondary importance compared to facial pain of up to 7/10 and described as 'shooting'. Gave toradol with mild effect, tylenol with limited effect, oral oxycodone with moderate effect. Headache preventing sleep. Stby with gb/walker, up to commode only during night. Neuros intact, sensitive to light, no nasal drainage. Neurology following, discharge pending improvement.     minimum assist (75% patients effort)

## 2025-07-12 NOTE — PHYSICAL THERAPY INITIAL EVALUATION ADULT - PERTINENT HX OF CURRENT PROBLEM, REHAB EVAL
89-year-old male, NH resident, ambulatory with a walker,  with medical history of hypertension, Gout, constipation, hyperlipidemia, type 2 diabetes, gen weakness, iridocyclitis, metastatic prostate cancer on Abirateron and Prednisone,  previously on radiation therapy, presents for bloody bowel movements noticed by staff caring for him at [facility]. Per collateral (NH NP) staff noticed bloody bowel movements last night as well as this morning.  Patient has no complaints, reports no abdominal or rectal pain, and no chest pain or shortness of breath,  fevers.  He has no known history of prior rectal bleeding.

## 2025-07-13 LAB
ADD ON TEST-SPECIMEN IN LAB: SIGNIFICANT CHANGE UP
ALBUMIN SERPL ELPH-MCNC: 3.6 G/DL — SIGNIFICANT CHANGE UP (ref 3.3–5)
ALP SERPL-CCNC: 389 U/L — HIGH (ref 40–120)
ALT FLD-CCNC: 8 U/L — SIGNIFICANT CHANGE UP (ref 4–41)
ANION GAP SERPL CALC-SCNC: 11 MMOL/L — SIGNIFICANT CHANGE UP (ref 7–14)
AST SERPL-CCNC: 14 U/L — SIGNIFICANT CHANGE UP (ref 4–40)
BASOPHILS # BLD AUTO: 0.04 K/UL — SIGNIFICANT CHANGE UP (ref 0–0.2)
BASOPHILS NFR BLD AUTO: 0.3 % — SIGNIFICANT CHANGE UP (ref 0–2)
BILIRUB SERPL-MCNC: 0.4 MG/DL — SIGNIFICANT CHANGE UP (ref 0.2–1.2)
BLD GP AB SCN SERPL QL: NEGATIVE — SIGNIFICANT CHANGE UP
BUN SERPL-MCNC: 22 MG/DL — SIGNIFICANT CHANGE UP (ref 7–23)
CALCIUM SERPL-MCNC: 8.7 MG/DL — SIGNIFICANT CHANGE UP (ref 8.4–10.5)
CHLORIDE SERPL-SCNC: 103 MMOL/L — SIGNIFICANT CHANGE UP (ref 98–107)
CO2 SERPL-SCNC: 24 MMOL/L — SIGNIFICANT CHANGE UP (ref 22–31)
CREAT SERPL-MCNC: 1.46 MG/DL — HIGH (ref 0.5–1.3)
EGFR: 46 ML/MIN/1.73M2 — LOW
EGFR: 46 ML/MIN/1.73M2 — LOW
EOSINOPHIL # BLD AUTO: 0.63 K/UL — HIGH (ref 0–0.5)
EOSINOPHIL NFR BLD AUTO: 5.2 % — SIGNIFICANT CHANGE UP (ref 0–6)
GLUCOSE SERPL-MCNC: 121 MG/DL — HIGH (ref 70–99)
HCT VFR BLD CALC: 25.7 % — LOW (ref 39–50)
HCT VFR BLD CALC: 27.1 % — LOW (ref 39–50)
HCT VFR BLD CALC: 28.4 % — LOW (ref 39–50)
HGB BLD-MCNC: 8.2 G/DL — LOW (ref 13–17)
HGB BLD-MCNC: 8.6 G/DL — LOW (ref 13–17)
HGB BLD-MCNC: 8.9 G/DL — LOW (ref 13–17)
IMM GRANULOCYTES # BLD AUTO: 0.08 K/UL — HIGH (ref 0–0.07)
IMM GRANULOCYTES NFR BLD AUTO: 0.7 % — SIGNIFICANT CHANGE UP (ref 0–0.9)
LYMPHOCYTES # BLD AUTO: 1.84 K/UL — SIGNIFICANT CHANGE UP (ref 1–3.3)
LYMPHOCYTES NFR BLD AUTO: 15.2 % — SIGNIFICANT CHANGE UP (ref 13–44)
MAGNESIUM SERPL-MCNC: 2.2 MG/DL — SIGNIFICANT CHANGE UP (ref 1.6–2.6)
MCHC RBC-ENTMCNC: 23.5 PG — LOW (ref 27–34)
MCHC RBC-ENTMCNC: 23.5 PG — LOW (ref 27–34)
MCHC RBC-ENTMCNC: 23.7 PG — LOW (ref 27–34)
MCHC RBC-ENTMCNC: 31.3 G/DL — LOW (ref 32–36)
MCHC RBC-ENTMCNC: 31.7 G/DL — LOW (ref 32–36)
MCHC RBC-ENTMCNC: 31.9 G/DL — LOW (ref 32–36)
MCV RBC AUTO: 73.6 FL — LOW (ref 80–100)
MCV RBC AUTO: 74.7 FL — LOW (ref 80–100)
MCV RBC AUTO: 74.9 FL — LOW (ref 80–100)
MONOCYTES # BLD AUTO: 1.2 K/UL — HIGH (ref 0–0.9)
MONOCYTES NFR BLD AUTO: 9.9 % — SIGNIFICANT CHANGE UP (ref 2–14)
NEUTROPHILS # BLD AUTO: 8.28 K/UL — HIGH (ref 1.8–7.4)
NEUTROPHILS NFR BLD AUTO: 68.7 % — SIGNIFICANT CHANGE UP (ref 43–77)
NRBC # BLD AUTO: 0 K/UL — SIGNIFICANT CHANGE UP (ref 0–0)
NRBC # FLD: 0 K/UL — SIGNIFICANT CHANGE UP (ref 0–0)
NRBC BLD AUTO-RTO: 0 /100 WBCS — SIGNIFICANT CHANGE UP (ref 0–0)
PHOSPHATE SERPL-MCNC: 3.2 MG/DL — SIGNIFICANT CHANGE UP (ref 2.5–4.5)
PLATELET # BLD AUTO: 247 K/UL — SIGNIFICANT CHANGE UP (ref 150–400)
PLATELET # BLD AUTO: 261 K/UL — SIGNIFICANT CHANGE UP (ref 150–400)
PLATELET # BLD AUTO: 267 K/UL — SIGNIFICANT CHANGE UP (ref 150–400)
PMV BLD: 8.5 FL — SIGNIFICANT CHANGE UP (ref 7–13)
PMV BLD: 8.8 FL — SIGNIFICANT CHANGE UP (ref 7–13)
PMV BLD: 9.1 FL — SIGNIFICANT CHANGE UP (ref 7–13)
POTASSIUM SERPL-MCNC: 4.1 MMOL/L — SIGNIFICANT CHANGE UP (ref 3.5–5.3)
POTASSIUM SERPL-SCNC: 4.1 MMOL/L — SIGNIFICANT CHANGE UP (ref 3.5–5.3)
PROT SERPL-MCNC: 6.6 G/DL — SIGNIFICANT CHANGE UP (ref 6–8.3)
RBC # BLD: 3.49 M/UL — LOW (ref 4.2–5.8)
RBC # BLD: 3.63 M/UL — LOW (ref 4.2–5.8)
RBC # BLD: 3.79 M/UL — LOW (ref 4.2–5.8)
RBC # FLD: 21.7 % — HIGH (ref 10.3–14.5)
RBC # FLD: 21.9 % — HIGH (ref 10.3–14.5)
RBC # FLD: 22.2 % — HIGH (ref 10.3–14.5)
RH IG SCN BLD-IMP: POSITIVE — SIGNIFICANT CHANGE UP
SODIUM SERPL-SCNC: 138 MMOL/L — SIGNIFICANT CHANGE UP (ref 135–145)
WBC # BLD: 11.74 K/UL — HIGH (ref 3.8–10.5)
WBC # BLD: 12.07 K/UL — HIGH (ref 3.8–10.5)
WBC # BLD: 9.71 K/UL — SIGNIFICANT CHANGE UP (ref 3.8–10.5)
WBC # FLD AUTO: 11.74 K/UL — HIGH (ref 3.8–10.5)
WBC # FLD AUTO: 12.07 K/UL — HIGH (ref 3.8–10.5)
WBC # FLD AUTO: 9.71 K/UL — SIGNIFICANT CHANGE UP (ref 3.8–10.5)

## 2025-07-13 PROCEDURE — 99233 SBSQ HOSP IP/OBS HIGH 50: CPT

## 2025-07-13 RX ORDER — POLYETHYLENE GLYCOL-3350 AND ELECTROLYTES 236; 6.74; 5.86; 2.97; 22.74 G/274.31G; G/274.31G; G/274.31G; G/274.31G; G/274.31G
4000 POWDER, FOR SOLUTION ORAL ONCE
Refills: 0 | Status: DISCONTINUED | OUTPATIENT
Start: 2025-07-13 | End: 2025-07-13

## 2025-07-13 RX ORDER — POLYETHYLENE GLYCOL-3350 AND ELECTROLYTES 236; 6.74; 5.86; 2.97; 22.74 G/274.31G; G/274.31G; G/274.31G; G/274.31G; G/274.31G
2000 POWDER, FOR SOLUTION ORAL ONCE
Refills: 0 | Status: COMPLETED | OUTPATIENT
Start: 2025-07-13 | End: 2025-07-13

## 2025-07-13 RX ORDER — AMLODIPINE BESYLATE 10 MG/1
5 TABLET ORAL DAILY
Refills: 0 | Status: DISCONTINUED | OUTPATIENT
Start: 2025-07-13 | End: 2025-07-14

## 2025-07-13 RX ADMIN — PREDNISONE 5 MILLIGRAM(S): 20 TABLET ORAL at 06:57

## 2025-07-13 RX ADMIN — Medication 1 DROP(S): at 11:38

## 2025-07-13 RX ADMIN — Medication 40 MILLIGRAM(S): at 11:36

## 2025-07-13 RX ADMIN — Medication 40 MILLIGRAM(S): at 23:03

## 2025-07-13 RX ADMIN — ABIRATERONE ACETATE 500 MILLIGRAM(S): 500 TABLET, FILM COATED ORAL at 05:22

## 2025-07-13 RX ADMIN — POLYETHYLENE GLYCOL-3350 AND ELECTROLYTES 4000 MILLILITER(S): 236; 6.74; 5.86; 2.97; 22.74 POWDER, FOR SOLUTION ORAL at 06:57

## 2025-07-13 RX ADMIN — Medication 1 APPLICATION(S): at 11:39

## 2025-07-13 RX ADMIN — INSULIN LISPRO 1: 100 INJECTION, SOLUTION INTRAVENOUS; SUBCUTANEOUS at 12:45

## 2025-07-13 RX ADMIN — AMLODIPINE BESYLATE 5 MILLIGRAM(S): 10 TABLET ORAL at 12:45

## 2025-07-13 NOTE — PROGRESS NOTE ADULT - PROBLEM SELECTOR PLAN 6
Hold antihypertensives given active GIB requiring transfusion   Monitor BP closely q4h

## 2025-07-13 NOTE — PROGRESS NOTE ADULT - PROBLEM SELECTOR PLAN 5
- On basal/bolus at home. FSG controlled without receiving any insulin  - Pt on CLD in preparation for colonoscopy  - Will continue on ISS for now  - Monitor FSG and resume long acting insulin if needed

## 2025-07-13 NOTE — PROGRESS NOTE ADULT - PROBLEM SELECTOR PLAN 2
secondary to GI bleed     -Transfuse 2u pRBC  -Monitor Hgb q12h  -Plan as above
secondary to GI bleed   -Transfused 2u pRBC  -Monitor H/H  -Plan as above
secondary to GI bleed     -Transfuse 2u pRBC  -Monitor Hgb q12h  -Plan as above

## 2025-07-13 NOTE — PROGRESS NOTE ADULT - PROBLEM SELECTOR PROBLEM 6
Health Maintenance   Topic Date Due    Cervical cancer screen  Never done    Lung Cancer Screening &/or Counseling  Never done    Colorectal Cancer Screen  10/21/2020    DTaP/Tdap/Td vaccine (1 - Tdap) 03/29/2025 (Originally 6/23/1987)    Hepatitis B vaccine (1 of 3 - 19+ 3-dose series) 10/01/2025 (Originally 6/23/1987)    Flu vaccine (1) 10/01/2025 (Originally 8/1/2024)    Shingles vaccine (1 of 2) 10/01/2025 (Originally 6/23/2019)    Pneumococcal 0-64 years Vaccine (1 of 2 - PCV) 10/01/2025 (Originally 6/23/1975)    COVID-19 Vaccine (1 - 2023-24 season) 10/01/2025 (Originally 9/1/2024)    Hepatitis C screen  10/01/2025 (Originally 6/23/1987)    HIV screen  10/01/2025 (Originally 6/23/1984)    Depression Monitoring  03/29/2025    Breast cancer screen  10/03/2025    Lipids  09/24/2029    Hepatitis A vaccine  Aged Out    Hib vaccine  Aged Out    Polio vaccine  Aged Out    Meningococcal (ACWY) vaccine  Aged Out    Diabetes screen  Discontinued             (applicable per patient's age: Cancer Screenings, Depression Screening, Fall Risk Screening, Immunizations)    Hemoglobin A1C (%)   Date Value   09/24/2024 5.5   09/29/2023 5.4     AST (U/L)   Date Value   09/24/2024 14     ALT (U/L)   Date Value   09/24/2024 12     BUN (mg/dL)   Date Value   09/24/2024 15      (goal A1C is < 7)   (goal LDL is <100) need 30-50% reduction from baseline     BP Readings from Last 3 Encounters:   10/01/24 112/68   03/29/24 118/62   09/29/23 120/74    (goal /80)      All Future Testing planned in [facility]:  Lab Frequency Next Occurrence   MOISES MEGHAN DIGITAL SCREEN BILATERAL Once 10/01/2024       Next Visit Date:  Future Appointments   Date Time Provider Department Center   4/4/2025  7:40 AM Alex Gonzalez, APRN - CNP [facility]            Patient Active Problem List:     Dysthymia     Dyslipidemia     Congenital hypothyroidism without goiter     Constipation     Metabolic syndrome     Chronic allergic rhinitis    
HTN (hypertension)

## 2025-07-13 NOTE — CHART NOTE - NSCHARTNOTEFT_GEN_A_CORE
Brief GI Note:     Tentatively plan for colonoscopy tomorrow.   CLD today, make NPO after midnight.   Continue with bowel prep (started this AM).   Obtain early AM labs    Flora Mace  GI/Hepatology Fellow    MONDAY-FRIDAY 8AM-5PM:  Pager# [phone] ([facility]) or [phone] ([facility])    NON-URGENT CONSULTS:  Please email [email or web address]  AT NIGHT AND ON WEEKENDS:  Contact on-call GI fellow from 5pm-8am and on weekends/holidays

## 2025-07-13 NOTE — PROGRESS NOTE ADULT - PROBLEM SELECTOR PLAN 1
Upper vs Lower GIB. Pt poor historian,   Occult blood (+)  CT A/P: No evidence of active GI bleed.    -Hold all non-essential oral agents  -Hold ASA  -VS q4h  - GI consulted, plan for colonoscopy on 7/14  -Monitor Hgb q12h  -Fall and aspiration precautions   -Hold antihypertensives

## 2025-07-13 NOTE — PROGRESS NOTE ADULT - PROBLEM SELECTOR PLAN 3
Baseline Scr=1.6 in January, 2025.  Currently Scr=2  Likely hemodynamically driven secondary to severe anemia       -Monitor renal function daily  -Strict I/Os  -Renally dose medications  -Hold Losartan
Likely hemodynamically driven secondary to severe anemia       -Monitor renal function daily  -Strict I/Os  -Renally dose medications  -Hold Losartan
Baseline Scr=1.6 in January, 2025.  Currently Scr=2  Likely hemodynamically driven secondary to severe anemia       -Monitor renal function daily  -Strict I/Os  -Renally dose medications  -Hold Losartan

## 2025-07-14 LAB
ALBUMIN SERPL ELPH-MCNC: 3.5 G/DL — SIGNIFICANT CHANGE UP (ref 3.3–5)
ALP SERPL-CCNC: 381 U/L — HIGH (ref 40–120)
ALT FLD-CCNC: 10 U/L — SIGNIFICANT CHANGE UP (ref 4–41)
ANION GAP SERPL CALC-SCNC: 14 MMOL/L — SIGNIFICANT CHANGE UP (ref 7–14)
APTT BLD: 28 SEC — SIGNIFICANT CHANGE UP (ref 26.1–36.8)
AST SERPL-CCNC: 17 U/L — SIGNIFICANT CHANGE UP (ref 4–40)
BASOPHILS # BLD AUTO: 0.04 K/UL — SIGNIFICANT CHANGE UP (ref 0–0.2)
BASOPHILS NFR BLD AUTO: 0.4 % — SIGNIFICANT CHANGE UP (ref 0–2)
BILIRUB SERPL-MCNC: 0.4 MG/DL — SIGNIFICANT CHANGE UP (ref 0.2–1.2)
BUN SERPL-MCNC: 12 MG/DL — SIGNIFICANT CHANGE UP (ref 7–23)
CALCIUM SERPL-MCNC: 8.7 MG/DL — SIGNIFICANT CHANGE UP (ref 8.4–10.5)
CHLORIDE SERPL-SCNC: 102 MMOL/L — SIGNIFICANT CHANGE UP (ref 98–107)
CO2 SERPL-SCNC: 26 MMOL/L — SIGNIFICANT CHANGE UP (ref 22–31)
CREAT SERPL-MCNC: 1.24 MG/DL — SIGNIFICANT CHANGE UP (ref 0.5–1.3)
EGFR: 56 ML/MIN/1.73M2 — LOW
EGFR: 56 ML/MIN/1.73M2 — LOW
EOSINOPHIL # BLD AUTO: 0.45 K/UL — SIGNIFICANT CHANGE UP (ref 0–0.5)
EOSINOPHIL NFR BLD AUTO: 4.7 % — SIGNIFICANT CHANGE UP (ref 0–6)
GLUCOSE BLDC GLUCOMTR-MCNC: 106 MG/DL — HIGH (ref 70–99)
GLUCOSE BLDC GLUCOMTR-MCNC: 111 MG/DL — HIGH (ref 70–99)
GLUCOSE BLDC GLUCOMTR-MCNC: 133 MG/DL — HIGH (ref 70–99)
GLUCOSE BLDC GLUCOMTR-MCNC: 140 MG/DL — HIGH (ref 70–99)
GLUCOSE SERPL-MCNC: 94 MG/DL — SIGNIFICANT CHANGE UP (ref 70–99)
HCT VFR BLD CALC: 27.8 % — LOW (ref 39–50)
HGB BLD-MCNC: 8.5 G/DL — LOW (ref 13–17)
IMM GRANULOCYTES # BLD AUTO: 0.05 K/UL — SIGNIFICANT CHANGE UP (ref 0–0.07)
IMM GRANULOCYTES NFR BLD AUTO: 0.5 % — SIGNIFICANT CHANGE UP (ref 0–0.9)
INR BLD: 1.06 RATIO — SIGNIFICANT CHANGE UP (ref 0.85–1.16)
LYMPHOCYTES # BLD AUTO: 1.59 K/UL — SIGNIFICANT CHANGE UP (ref 1–3.3)
LYMPHOCYTES NFR BLD AUTO: 16.7 % — SIGNIFICANT CHANGE UP (ref 13–44)
MAGNESIUM SERPL-MCNC: 2 MG/DL — SIGNIFICANT CHANGE UP (ref 1.6–2.6)
MCHC RBC-ENTMCNC: 23 PG — LOW (ref 27–34)
MCHC RBC-ENTMCNC: 30.6 G/DL — LOW (ref 32–36)
MCV RBC AUTO: 75.3 FL — LOW (ref 80–100)
MONOCYTES # BLD AUTO: 0.89 K/UL — SIGNIFICANT CHANGE UP (ref 0–0.9)
MONOCYTES NFR BLD AUTO: 9.3 % — SIGNIFICANT CHANGE UP (ref 2–14)
NEUTROPHILS # BLD AUTO: 6.5 K/UL — SIGNIFICANT CHANGE UP (ref 1.8–7.4)
NEUTROPHILS NFR BLD AUTO: 68.4 % — SIGNIFICANT CHANGE UP (ref 43–77)
NRBC # BLD AUTO: 0 K/UL — SIGNIFICANT CHANGE UP (ref 0–0)
NRBC # FLD: 0 K/UL — SIGNIFICANT CHANGE UP (ref 0–0)
NRBC BLD AUTO-RTO: 0 /100 WBCS — SIGNIFICANT CHANGE UP (ref 0–0)
PHOSPHATE SERPL-MCNC: 2.8 MG/DL — SIGNIFICANT CHANGE UP (ref 2.5–4.5)
PLATELET # BLD AUTO: 269 K/UL — SIGNIFICANT CHANGE UP (ref 150–400)
PMV BLD: 8.7 FL — SIGNIFICANT CHANGE UP (ref 7–13)
POTASSIUM SERPL-MCNC: 3.3 MMOL/L — LOW (ref 3.5–5.3)
POTASSIUM SERPL-SCNC: 3.3 MMOL/L — LOW (ref 3.5–5.3)
PROT SERPL-MCNC: 6.7 G/DL — SIGNIFICANT CHANGE UP (ref 6–8.3)
PROTHROM AB SERPL-ACNC: 12.3 SEC — SIGNIFICANT CHANGE UP (ref 9.9–13.4)
RBC # BLD: 3.69 M/UL — LOW (ref 4.2–5.8)
RBC # FLD: 22 % — HIGH (ref 10.3–14.5)
SODIUM SERPL-SCNC: 142 MMOL/L — SIGNIFICANT CHANGE UP (ref 135–145)
WBC # BLD: 9.52 K/UL — SIGNIFICANT CHANGE UP (ref 3.8–10.5)
WBC # FLD AUTO: 9.52 K/UL — SIGNIFICANT CHANGE UP (ref 3.8–10.5)

## 2025-07-14 PROCEDURE — 99233 SBSQ HOSP IP/OBS HIGH 50: CPT

## 2025-07-14 PROCEDURE — 45378 DIAGNOSTIC COLONOSCOPY: CPT | Mod: GC

## 2025-07-14 RX ORDER — IRON SUCROSE 20 MG/ML
200 INJECTION, SOLUTION INTRAVENOUS EVERY 24 HOURS
Refills: 0 | Status: DISCONTINUED | OUTPATIENT
Start: 2025-07-14 | End: 2025-07-16

## 2025-07-14 RX ORDER — INSULIN LISPRO 100 U/ML
INJECTION, SOLUTION INTRAVENOUS; SUBCUTANEOUS EVERY 6 HOURS
Refills: 0 | Status: DISCONTINUED | OUTPATIENT
Start: 2025-07-14 | End: 2025-07-14

## 2025-07-14 RX ORDER — AMLODIPINE BESYLATE 10 MG/1
10 TABLET ORAL DAILY
Refills: 0 | Status: DISCONTINUED | OUTPATIENT
Start: 2025-07-15 | End: 2025-07-16

## 2025-07-14 RX ORDER — INSULIN LISPRO 100 U/ML
INJECTION, SOLUTION INTRAVENOUS; SUBCUTANEOUS AT BEDTIME
Refills: 0 | Status: DISCONTINUED | OUTPATIENT
Start: 2025-07-14 | End: 2025-07-16

## 2025-07-14 RX ORDER — INSULIN LISPRO 100 U/ML
INJECTION, SOLUTION INTRAVENOUS; SUBCUTANEOUS
Refills: 0 | Status: DISCONTINUED | OUTPATIENT
Start: 2025-07-14 | End: 2025-07-16

## 2025-07-14 RX ORDER — AMLODIPINE BESYLATE 10 MG/1
5 TABLET ORAL ONCE
Refills: 0 | Status: COMPLETED | OUTPATIENT
Start: 2025-07-14 | End: 2025-07-14

## 2025-07-14 RX ADMIN — PREDNISONE 5 MILLIGRAM(S): 20 TABLET ORAL at 05:25

## 2025-07-14 RX ADMIN — Medication 100 MILLIEQUIVALENT(S): at 11:03

## 2025-07-14 RX ADMIN — AMLODIPINE BESYLATE 5 MILLIGRAM(S): 10 TABLET ORAL at 07:51

## 2025-07-14 RX ADMIN — ABIRATERONE ACETATE 500 MILLIGRAM(S): 500 TABLET, FILM COATED ORAL at 06:21

## 2025-07-14 RX ADMIN — IRON SUCROSE 110 MILLIGRAM(S): 20 INJECTION, SOLUTION INTRAVENOUS at 20:39

## 2025-07-14 RX ADMIN — Medication 40 MILLIGRAM(S): at 10:16

## 2025-07-14 RX ADMIN — AMLODIPINE BESYLATE 5 MILLIGRAM(S): 10 TABLET ORAL at 05:25

## 2025-07-14 RX ADMIN — Medication 100 MILLIEQUIVALENT(S): at 10:10

## 2025-07-14 RX ADMIN — Medication 100 MILLIEQUIVALENT(S): at 15:32

## 2025-07-14 RX ADMIN — Medication 40 MILLIGRAM(S): at 22:11

## 2025-07-14 NOTE — PROVIDER CONTACT NOTE (OTHER) - SITUATION
Pt BP is elevated 166/82, Manual is 150/80.
Patient found pooping blood, vital signs done and /87.
Pt had episode of dark red liquid BM, and is refusing to take another bottle of prep for colonoscopy on Monday. Pt educated on the importance of bowel prep for clear imaging- Pt stated that he had
Pt BP elevated throughout shift. Night ACP told to monitor. Amlodipine 5mg given in AM. repeat BP is 186/102

## 2025-07-14 NOTE — CHART NOTE - NSCHARTNOTEFT_GEN_A_CORE
Brief GI Note:    s/p colonoscopy today. Source of anemia and hematochezia likely 2/2 resolved diverticular bleeding. No signs of active bleeding or old blood.     Recommendations:  - Return patient to hospital mg for ongoing care.   - Resume regular diet.   - No further inpatient GI work up  - GI will sign off at this time, however please call back with questions or should any worsening/persistent issues arise.     Jared Del Valle, PGY-5  Gastroenterology & Hepatology Fellow  Available on TEAMS  Long range pager #: [phone]  Short range pager#: [phone]    For non-urgent consults, please send email to [email or web address] (for [facility]) or [email or web address] (for [facility])

## 2025-07-14 NOTE — PROVIDER CONTACT NOTE (OTHER) - BACKGROUND
enough. Pt admitted for GI bleed and anemia
Pt admitted for GI bleed
Pt is admitted for GI Bleed Hx of metastatic prostate CA.
Patient admitted for GI bleed, no s/s of bleeding since admission.

## 2025-07-14 NOTE — PROVIDER CONTACT NOTE (OTHER) - ACTION/TREATMENT ORDERED:
ACP notified, education provided, CBC collected.
ACP notified. continue to monitor.
second dose of amlodipine 5mg PO administered
5mg PO norvasc ordered. Repeat CBC ordered.

## 2025-07-14 NOTE — PROVIDER CONTACT NOTE (OTHER) - RECOMMENDATIONS
Provider notified. Repeat CBC d/c large amount of blood lost. BP medication for HTN.
ACP notified
ACP notified
ACP notified,

## 2025-07-14 NOTE — PROVIDER CONTACT NOTE (OTHER) - ASSESSMENT
Pt denies any headache, dizziness, or any other symptoms. States that he feels fine.
Pt has no headache, and no other signs and symptoms. Pt states he feels fine
Large amount of blood found in stool. Dark red in color. /87, vital signs otherwise stable.
Pt is stable,

## 2025-07-14 NOTE — PROVIDER CONTACT NOTE (OTHER) - REASON
Pt BP is elevated
Pt had dark red liquid BM and refusing second bottle of bowel prep for colonoscopy
GI bleed, hypertension
HTN /102 1 hour after amlodipine given

## 2025-07-15 LAB
ALBUMIN SERPL ELPH-MCNC: 3.1 G/DL — LOW (ref 3.3–5)
ALP SERPL-CCNC: 318 U/L — HIGH (ref 40–120)
ALT FLD-CCNC: 8 U/L — SIGNIFICANT CHANGE UP (ref 4–41)
ANION GAP SERPL CALC-SCNC: 16 MMOL/L — HIGH (ref 7–14)
AST SERPL-CCNC: 16 U/L — SIGNIFICANT CHANGE UP (ref 4–40)
BASOPHILS # BLD AUTO: 0.04 K/UL — SIGNIFICANT CHANGE UP (ref 0–0.2)
BASOPHILS NFR BLD AUTO: 0.5 % — SIGNIFICANT CHANGE UP (ref 0–2)
BILIRUB SERPL-MCNC: 0.2 MG/DL — SIGNIFICANT CHANGE UP (ref 0.2–1.2)
BUN SERPL-MCNC: 14 MG/DL — SIGNIFICANT CHANGE UP (ref 7–23)
CALCIUM SERPL-MCNC: 8.3 MG/DL — LOW (ref 8.4–10.5)
CHLORIDE SERPL-SCNC: 104 MMOL/L — SIGNIFICANT CHANGE UP (ref 98–107)
CO2 SERPL-SCNC: 20 MMOL/L — LOW (ref 22–31)
CREAT SERPL-MCNC: 1.45 MG/DL — HIGH (ref 0.5–1.3)
EGFR: 46 ML/MIN/1.73M2 — LOW
EGFR: 46 ML/MIN/1.73M2 — LOW
EOSINOPHIL # BLD AUTO: 0.45 K/UL — SIGNIFICANT CHANGE UP (ref 0–0.5)
EOSINOPHIL NFR BLD AUTO: 5.4 % — SIGNIFICANT CHANGE UP (ref 0–6)
GLUCOSE BLDC GLUCOMTR-MCNC: 102 MG/DL — HIGH (ref 70–99)
GLUCOSE BLDC GLUCOMTR-MCNC: 136 MG/DL — HIGH (ref 70–99)
GLUCOSE BLDC GLUCOMTR-MCNC: 169 MG/DL — HIGH (ref 70–99)
GLUCOSE BLDC GLUCOMTR-MCNC: 208 MG/DL — HIGH (ref 70–99)
GLUCOSE SERPL-MCNC: 89 MG/DL — SIGNIFICANT CHANGE UP (ref 70–99)
HCT VFR BLD CALC: 26.5 % — LOW (ref 39–50)
HGB BLD-MCNC: 8.2 G/DL — LOW (ref 13–17)
IMM GRANULOCYTES # BLD AUTO: 0.04 K/UL — SIGNIFICANT CHANGE UP (ref 0–0.07)
IMM GRANULOCYTES NFR BLD AUTO: 0.5 % — SIGNIFICANT CHANGE UP (ref 0–0.9)
LYMPHOCYTES # BLD AUTO: 1.21 K/UL — SIGNIFICANT CHANGE UP (ref 1–3.3)
LYMPHOCYTES NFR BLD AUTO: 14.4 % — SIGNIFICANT CHANGE UP (ref 13–44)
MAGNESIUM SERPL-MCNC: 1.8 MG/DL — SIGNIFICANT CHANGE UP (ref 1.6–2.6)
MCHC RBC-ENTMCNC: 23.1 PG — LOW (ref 27–34)
MCHC RBC-ENTMCNC: 30.9 G/DL — LOW (ref 32–36)
MCV RBC AUTO: 74.6 FL — LOW (ref 80–100)
MONOCYTES # BLD AUTO: 0.76 K/UL — SIGNIFICANT CHANGE UP (ref 0–0.9)
MONOCYTES NFR BLD AUTO: 9 % — SIGNIFICANT CHANGE UP (ref 2–14)
NEUTROPHILS # BLD AUTO: 5.9 K/UL — SIGNIFICANT CHANGE UP (ref 1.8–7.4)
NEUTROPHILS NFR BLD AUTO: 70.2 % — SIGNIFICANT CHANGE UP (ref 43–77)
NRBC # BLD AUTO: 0 K/UL — SIGNIFICANT CHANGE UP (ref 0–0)
NRBC # FLD: 0 K/UL — SIGNIFICANT CHANGE UP (ref 0–0)
NRBC BLD AUTO-RTO: 0 /100 WBCS — SIGNIFICANT CHANGE UP (ref 0–0)
PHOSPHATE SERPL-MCNC: 3.1 MG/DL — SIGNIFICANT CHANGE UP (ref 2.5–4.5)
PLATELET # BLD AUTO: 257 K/UL — SIGNIFICANT CHANGE UP (ref 150–400)
PMV BLD: 8.7 FL — SIGNIFICANT CHANGE UP (ref 7–13)
POTASSIUM SERPL-MCNC: 3.6 MMOL/L — SIGNIFICANT CHANGE UP (ref 3.5–5.3)
POTASSIUM SERPL-SCNC: 3.6 MMOL/L — SIGNIFICANT CHANGE UP (ref 3.5–5.3)
PROT SERPL-MCNC: 6 G/DL — SIGNIFICANT CHANGE UP (ref 6–8.3)
RBC # BLD: 3.55 M/UL — LOW (ref 4.2–5.8)
RBC # FLD: 21.5 % — HIGH (ref 10.3–14.5)
SODIUM SERPL-SCNC: 140 MMOL/L — SIGNIFICANT CHANGE UP (ref 135–145)
WBC # BLD: 8.4 K/UL — SIGNIFICANT CHANGE UP (ref 3.8–10.5)
WBC # FLD AUTO: 8.4 K/UL — SIGNIFICANT CHANGE UP (ref 3.8–10.5)

## 2025-07-15 PROCEDURE — 99232 SBSQ HOSP IP/OBS MODERATE 35: CPT

## 2025-07-15 RX ADMIN — IRON SUCROSE 110 MILLIGRAM(S): 20 INJECTION, SOLUTION INTRAVENOUS at 18:06

## 2025-07-15 RX ADMIN — INSULIN LISPRO 1: 100 INJECTION, SOLUTION INTRAVENOUS; SUBCUTANEOUS at 18:06

## 2025-07-15 RX ADMIN — ABIRATERONE ACETATE 500 MILLIGRAM(S): 500 TABLET, FILM COATED ORAL at 06:28

## 2025-07-15 RX ADMIN — INSULIN LISPRO 2: 100 INJECTION, SOLUTION INTRAVENOUS; SUBCUTANEOUS at 13:55

## 2025-07-15 RX ADMIN — Medication 1 APPLICATION(S): at 11:58

## 2025-07-15 RX ADMIN — Medication 40 MILLIGRAM(S): at 09:28

## 2025-07-15 RX ADMIN — PREDNISONE 5 MILLIGRAM(S): 20 TABLET ORAL at 06:27

## 2025-07-15 RX ADMIN — AMLODIPINE BESYLATE 10 MILLIGRAM(S): 10 TABLET ORAL at 06:28

## 2025-07-15 RX ADMIN — Medication 40 MILLIGRAM(S): at 21:49

## 2025-07-15 NOTE — PROGRESS NOTE ADULT - NUTRITIONAL ASSESSMENT
This patient has been assessed with a concern for Malnutrition and has been determined to have a diagnosis/diagnoses of Severe protein-calorie malnutrition.    This patient is being managed with:   Diet Clear Liquid-  Consistent Carbohydrate {Evening Snack} (CSTCHOSN)  Entered: Jul 12 2025 12:33PM    Diet NPO-  NPO for Procedure/Test     NPO Start Date: 13-Jul-2025   NPO Start Time: 23:59  Except Medications  Entered: Jul 12 2025 12:31PM  
This patient has been assessed with a concern for Malnutrition and has been determined to have a diagnosis/diagnoses of Severe protein-calorie malnutrition.    This patient is being managed with:   Diet Regular-  Consistent Carbohydrate {Evening Snack} (CSTCHOSN)  Entered: Jul 14 2025  5:51PM  
This patient has been assessed with a concern for Malnutrition and has been determined to have a diagnosis/diagnoses of Severe protein-calorie malnutrition.    This patient is being managed with:   Diet Clear Liquid-  Entered: Jul 11 2025 12:58PM  
This patient has been assessed with a concern for Malnutrition and has been determined to have a diagnosis/diagnoses of Severe protein-calorie malnutrition.    This patient is being managed with:   Diet Clear Liquid-  Consistent Carbohydrate {Evening Snack} (CSTCHOSN)  Entered: Jul 12 2025 12:33PM    Diet NPO-  NPO for Procedure/Test     NPO Start Date: 13-Jul-2025   NPO Start Time: 23:59  Except Medications  Entered: Jul 12 2025 12:31PM  
This patient has been assessed with a concern for Malnutrition and has been determined to have a diagnosis/diagnoses of Severe protein-calorie malnutrition.    This patient is being managed with:   Diet Clear Liquid-  Consistent Carbohydrate {Evening Snack} (CSTCHOSN)  Entered: Jul 12 2025 12:33PM    Diet NPO-  NPO for Procedure/Test     NPO Start Date: 13-Jul-2025   NPO Start Time: 23:59  Except Medications  Entered: Jul 12 2025 12:31PM

## 2025-07-15 NOTE — PROGRESS NOTE ADULT - TIME BILLING
minutes spent on total encounter; more than 50% of the visit was spent counseling and / or coordinating care by the attending physician.  The necessity of the time spent during the encounter on this date of service was due to:     review of laboratory data, radiology results, consultants' recommendations, documentation in [place], discussion with patient/ACP and interdisciplinary staff (such as , social workers, etc). Interventions were performed as documented above.
minutes spent on total encounter; more than 50% of the visit was spent counseling and / or coordinating care by the attending physician.  The necessity of the time spent during the encounter on this date of service was due to:     review of laboratory data, radiology results, consultants' recommendations, documentation in [place], discussion with patient/ACP and interdisciplinary staff (such as , social workers, etc). Interventions were performed as documented above.
Reviewing laboratory data, consultants' recommendations, documentation in [place], performing medically appropriate examinations/evaluations, discussion with patient/family/RN/ACP/Residents and interdisciplinary staff (such as , social workers, etc), counseling patient/family/care giver, ordering medically appropriate medication, tests, or procedures
Reviewing laboratory data, consultants' recommendations, documentation in [place], performing medically appropriate examinations/evaluations, discussion with patient/family/RN/ACP/Residents and interdisciplinary staff (such as , social workers, etc), counseling patient/family/care giver, ordering medically appropriate medication, tests, or procedures
Reviewing laboratory data, consultants' recommendations, documentation in [place], performing medically appropriate examinations/evaluations, discussion with patient/family/RN/ACP/Residents and interdisciplinary staff (such as , social workers, etc), counseling patient/family/care giver, ordering medically appropriate medication, tests, or procedures

## 2025-07-16 ENCOUNTER — TRANSCRIPTION ENCOUNTER (OUTPATIENT)
Age: 89
End: 2025-07-16

## 2025-07-16 VITALS
HEART RATE: 72 BPM | DIASTOLIC BLOOD PRESSURE: 71 MMHG | OXYGEN SATURATION: 100 % | TEMPERATURE: 98 F | RESPIRATION RATE: 18 BRPM | SYSTOLIC BLOOD PRESSURE: 162 MMHG

## 2025-07-16 LAB
ALBUMIN SERPL ELPH-MCNC: 3 G/DL — LOW (ref 3.3–5)
ALP SERPL-CCNC: 300 U/L — HIGH (ref 40–120)
ALT FLD-CCNC: 8 U/L — SIGNIFICANT CHANGE UP (ref 4–41)
ANION GAP SERPL CALC-SCNC: 13 MMOL/L — SIGNIFICANT CHANGE UP (ref 7–14)
AST SERPL-CCNC: 16 U/L — SIGNIFICANT CHANGE UP (ref 4–40)
BASOPHILS # BLD AUTO: 0.04 K/UL — SIGNIFICANT CHANGE UP (ref 0–0.2)
BASOPHILS NFR BLD AUTO: 0.5 % — SIGNIFICANT CHANGE UP (ref 0–2)
BILIRUB SERPL-MCNC: <0.2 MG/DL — SIGNIFICANT CHANGE UP (ref 0.2–1.2)
BUN SERPL-MCNC: 19 MG/DL — SIGNIFICANT CHANGE UP (ref 7–23)
CALCIUM SERPL-MCNC: 8.1 MG/DL — LOW (ref 8.4–10.5)
CHLORIDE SERPL-SCNC: 104 MMOL/L — SIGNIFICANT CHANGE UP (ref 98–107)
CO2 SERPL-SCNC: 23 MMOL/L — SIGNIFICANT CHANGE UP (ref 22–31)
CREAT SERPL-MCNC: 1.5 MG/DL — HIGH (ref 0.5–1.3)
EGFR: 44 ML/MIN/1.73M2 — LOW
EGFR: 44 ML/MIN/1.73M2 — LOW
EOSINOPHIL # BLD AUTO: 0.46 K/UL — SIGNIFICANT CHANGE UP (ref 0–0.5)
EOSINOPHIL NFR BLD AUTO: 5.2 % — SIGNIFICANT CHANGE UP (ref 0–6)
GLUCOSE BLDC GLUCOMTR-MCNC: 121 MG/DL — HIGH (ref 70–99)
GLUCOSE BLDC GLUCOMTR-MCNC: 206 MG/DL — HIGH (ref 70–99)
GLUCOSE SERPL-MCNC: 107 MG/DL — HIGH (ref 70–99)
HCT VFR BLD CALC: 25.4 % — LOW (ref 39–50)
HGB BLD-MCNC: 7.8 G/DL — LOW (ref 13–17)
IMM GRANULOCYTES # BLD AUTO: 0.07 K/UL — SIGNIFICANT CHANGE UP (ref 0–0.07)
IMM GRANULOCYTES NFR BLD AUTO: 0.8 % — SIGNIFICANT CHANGE UP (ref 0–0.9)
LYMPHOCYTES # BLD AUTO: 1.43 K/UL — SIGNIFICANT CHANGE UP (ref 1–3.3)
LYMPHOCYTES NFR BLD AUTO: 16.2 % — SIGNIFICANT CHANGE UP (ref 13–44)
MAGNESIUM SERPL-MCNC: 1.8 MG/DL — SIGNIFICANT CHANGE UP (ref 1.6–2.6)
MCHC RBC-ENTMCNC: 23.3 PG — LOW (ref 27–34)
MCHC RBC-ENTMCNC: 30.7 G/DL — LOW (ref 32–36)
MCV RBC AUTO: 75.8 FL — LOW (ref 80–100)
MONOCYTES # BLD AUTO: 0.81 K/UL — SIGNIFICANT CHANGE UP (ref 0–0.9)
MONOCYTES NFR BLD AUTO: 9.2 % — SIGNIFICANT CHANGE UP (ref 2–14)
NEUTROPHILS # BLD AUTO: 6.04 K/UL — SIGNIFICANT CHANGE UP (ref 1.8–7.4)
NEUTROPHILS NFR BLD AUTO: 68.1 % — SIGNIFICANT CHANGE UP (ref 43–77)
NRBC # BLD AUTO: 0 K/UL — SIGNIFICANT CHANGE UP (ref 0–0)
NRBC # FLD: 0 K/UL — SIGNIFICANT CHANGE UP (ref 0–0)
NRBC BLD AUTO-RTO: 0 /100 WBCS — SIGNIFICANT CHANGE UP (ref 0–0)
PHOSPHATE SERPL-MCNC: 2.9 MG/DL — SIGNIFICANT CHANGE UP (ref 2.5–4.5)
PLATELET # BLD AUTO: 272 K/UL — SIGNIFICANT CHANGE UP (ref 150–400)
PMV BLD: 8.8 FL — SIGNIFICANT CHANGE UP (ref 7–13)
POTASSIUM SERPL-MCNC: 3.2 MMOL/L — LOW (ref 3.5–5.3)
POTASSIUM SERPL-SCNC: 3.2 MMOL/L — LOW (ref 3.5–5.3)
PROT SERPL-MCNC: 5.9 G/DL — LOW (ref 6–8.3)
RBC # BLD: 3.35 M/UL — LOW (ref 4.2–5.8)
RBC # FLD: 21.3 % — HIGH (ref 10.3–14.5)
SODIUM SERPL-SCNC: 140 MMOL/L — SIGNIFICANT CHANGE UP (ref 135–145)
WBC # BLD: 8.85 K/UL — SIGNIFICANT CHANGE UP (ref 3.8–10.5)
WBC # FLD AUTO: 8.85 K/UL — SIGNIFICANT CHANGE UP (ref 3.8–10.5)

## 2025-07-16 PROCEDURE — 99239 HOSP IP/OBS DSCHRG MGMT >30: CPT

## 2025-07-16 RX ORDER — LOSARTAN POTASSIUM 100 MG/1
1 TABLET, FILM COATED ORAL
Refills: 0 | DISCHARGE

## 2025-07-16 RX ADMIN — Medication 40 MILLIGRAM(S): at 09:42

## 2025-07-16 RX ADMIN — ABIRATERONE ACETATE 500 MILLIGRAM(S): 500 TABLET, FILM COATED ORAL at 06:29

## 2025-07-16 RX ADMIN — AMLODIPINE BESYLATE 10 MILLIGRAM(S): 10 TABLET ORAL at 06:29

## 2025-07-16 RX ADMIN — PREDNISONE 5 MILLIGRAM(S): 20 TABLET ORAL at 06:31

## 2025-07-16 RX ADMIN — Medication 40 MILLIEQUIVALENT(S): at 09:41

## 2025-07-16 RX ADMIN — INSULIN LISPRO 2: 100 INJECTION, SOLUTION INTRAVENOUS; SUBCUTANEOUS at 13:08

## 2025-07-16 RX ADMIN — Medication 1 APPLICATION(S): at 09:44

## 2025-07-16 NOTE — DISCHARGE NOTE PROVIDER - NSFOLLOWUPCLINICS_GEN_ALL_ED_FT
[facility]  Gastroenterology  [address] [place] [address] [address]  [place] [address]  Phone: [phone]  Fax:

## 2025-07-16 NOTE — DISCHARGE NOTE PROVIDER - DETAILS OF MALNUTRITION DIAGNOSIS/DIAGNOSES
This patient has been assessed with a concern for Malnutrition and was treated during this hospitalization for the following Nutrition diagnosis/diagnoses:     -  07/11/2025: Severe protein-calorie malnutrition

## 2025-07-16 NOTE — DISCHARGE NOTE NURSING/CASE MANAGEMENT/SOCIAL WORK - NSDPFAC_GEN_ALL_CORE
[facility] ([address] [place] [address])

## 2025-07-16 NOTE — DISCHARGE NOTE PROVIDER - HOSPITAL COURSE
89-year-old male, NH resident, hx of HTN, Gout, constipation, HLD, DM2, gen weakness, iridocyclitis, metastatic prostate cancer on Abirateron and Prednisone (previously on radiation therapy) a/w acute GIB c/b acute blood loss anemia requiring transfusion of pRBC and KIRBY on CKD III.      GIB  - Upper versus lower GIB. Patient is a poor historian. Fecal occult blood test positive. CT abdomen/pelvis (CT A/P): No evidence of active GI bleed. Holding all non-essential oral agents. Holding ASA. Vital signs every 4 hours. Gastroenterology (GI) consulted; plan for colonoscopy on 7/14/25. Monitoring hemoglobin (Hgb) every 12 hours. Fall and aspiration precautions in place. Holding antihypertensives.  Scope 7/14, likely diverticular bleed, GI signed off and cleared for DC.     Acute Blood Loss Anemia  -Secondary to GIB. Two units of pRBCs transfused. Monitoring H/H. stable.      Acute Kidney Injury (KIRBY) Superimposed on Chronic Kidney Disease (CKD)  Likely hemodynamically driven, secondary to severe anemia. Monitoring renal function daily. Strict intake and output (I/O) monitoring. Renally dosing medications. Holding losartan.     Prostate Cancer Metastatic to Bone   CT A/P: Diffuse sclerotic osseous metastatic disease without significant change. Continuing prednisone 5mg daily and abiraterone.    Type 2 Diabetes Mellitus  On basal/bolus insulin at home. Fingerstick glucose (FSG) controlled without receiving insulin during this admission. Patient on a clear liquid diet (CLD) in preparation for colonoscopy. Will continue on an insulin sliding scale (ISS) for now. Monitor FSG and resume long-acting insulin if needed.  - A1C found to be 5.9 and finger sticks stable this admission, will hold home insulin, at discretion of facility to restart.    Hypertension   Holding antihypertensives due to active GIB requiring transfusion. Monitoring blood pressure (BP) closely every 4 hours.  - hold BP meds on dc, at discretion of facility when to resume.     On 7/16/25, case was discussed with Dr. Arriaga, patient is medically cleared and optimized for discharge today. All medications were reviewed with attending, and sent to mutually agreed upon pharmacy.   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      GIB  - Upper versus lower GIB. Patient is a poor historian. Fecal occult blood test positive. CT abdomen/pelvis (CT A/P): No evidence of active GI bleed. Holding all non-essential oral agents. Holding ASA. Vital signs every 4 hours. Gastroenterology (GI) consulted; plan for colonoscopy on 7/14/25. Monitoring hemoglobin (Hgb) every 12 hours. Fall and aspiration precautions in place. Holding antihypertensives.  Scope 7/14, likely diverticular bleed, GI signed off and cleared for DC.     Acute Blood Loss Anemia  -Secondary to GIB. Two units of pRBCs transfused. Monitoring H/H. stable.      Acute Kidney Injury (KIRBY) Superimposed on Chronic Kidney Disease (CKD)  Likely hemodynamically driven, secondary to severe anemia. Monitoring renal function daily. Strict intake and output (I/O) monitoring. Renally dosing medications. Holding losartan.     Prostate Cancer Metastatic to Bone   CT A/P: Diffuse sclerotic osseous metastatic disease without significant change. Continuing prednisone 5mg daily and abiraterone.    Type 2 Diabetes Mellitus  On basal/bolus insulin at home. Fingerstick glucose (FSG) controlled without receiving insulin during this admission. Patient on a clear liquid diet (CLD) in preparation for colonoscopy. Will continue on an insulin sliding scale (ISS) for now. Monitor FSG and resume long-acting insulin if needed.  - A1C found to be 5.9 and finger sticks stable this admission, will hold home insulin, at discretion of facility to restart.    Hypertension   Holding antihypertensives due to active GIB requiring transfusion. Monitoring blood pressure (BP) closely every 4 hours.  - hold BP meds on dc, at discretion of facility when to resume.     On 7/16/25, case was discussed with Dr. Arriaga, patient is medically cleared and optimized for discharge today. All medications were reviewed with attending, and sent to mutually agreed upon pharmacy.

## 2025-07-16 NOTE — DISCHARGE NOTE PROVIDER - PROVIDER TOKENS
PROVIDER:[TOKEN:[[id number]]],FREE:[LAST:[primary care doctor],PHONE:[(   )    -],FAX:[(   )    -]],PROVIDER:[TOKEN:[[id number]]] PROVIDER:[TOKEN:[[id number]]],PROVIDER:[TOKEN:[[id number]]],FREE:[LAST:[primary care doctor],PHONE:[(   )    -],FAX:[(   )    -]],PROVIDER:[TOKEN:[[id number]]]

## 2025-07-16 NOTE — DISCHARGE NOTE PROVIDER - NSDCMRMEDTOKEN_GEN_ALL_CORE_FT
abiraterone 250 mg oral tablet: 2 tab(s) orally once a day  allopurinol 100 mg oral tablet: 1 tab(s) orally once a day  amLODIPine 10 mg oral tablet: 1 tab(s) orally once a day  aspirin 81 mg oral delayed release tablet: 1 tab(s) orally once a day  atorvastatin 40 mg oral tablet: 1 tab(s) orally once a day (at bedtime)  Colace 100 mg oral capsule: 2 cap(s) orally once a day  insulin glargine 100 units/mL subcutaneous solution: 27 unit(s) subcutaneous once a day (at bedtime)  insulin lispro 100 units/mL injectable solution: 8 unit(s) injectable 3 times a day (before meals)  isosorbide mononitrate 60 mg oral tablet, extended release: 1 tab(s) orally once a day  losartan 100 mg oral tablet: 1 tab(s) orally once a day  loteprednol 0.5% ophthalmic suspension: 2 drop(s) in each affected eye once a day  polyethylene glycol 3350 oral kit: 17 gram(s) orally once a day  prednisoLONE 5 mg oral tablet: 1 tab(s) orally once a day  senna (sennosides) 8.6 mg oral tablet: 2 tab(s) orally once a day (at bedtime)   abiraterone 250 mg oral tablet: 2 tab(s) orally once a day  amLODIPine 10 mg oral tablet: 1 tab(s) orally once a day  atorvastatin 40 mg oral tablet: 1 tab(s) orally once a day (at bedtime)  Colace 100 mg oral capsule: 2 cap(s) orally once a day  loteprednol 0.5% ophthalmic suspension: 2 drop(s) in each affected eye once a day  polyethylene glycol 3350 oral kit: 17 gram(s) orally once a day  prednisoLONE 5 mg oral tablet: 1 tab(s) orally once a day  senna (sennosides) 8.6 mg oral tablet: 2 tab(s) orally once a day (at bedtime)

## 2025-07-16 NOTE — DISCHARGE NOTE PROVIDER - CARE PROVIDERS DIRECT ADDRESSES
,[email or web address],DirectAddress_Unknown [email or web address],DirectAddress_Unknown,DirectAddress_Unknown,[email or web address]

## 2025-07-16 NOTE — DISCHARGE NOTE PROVIDER - NSDCCPCAREPLAN_GEN_ALL_CORE_FT
PRINCIPAL DISCHARGE DIAGNOSIS  Diagnosis: RB (rectal bleeding)  Assessment and Plan of Treatment: You came to the hospital due to a GI bleed. GI was consulted and performed a colonoscopy which showed no active bleed and the bleeding from earlier is likely diverticular bleed and has stablized. Please follow up with the GI clinic in 1-2 weeks.      SECONDARY DISCHARGE DIAGNOSES  Diagnosis: Acute blood loss anemia  Assessment and Plan of Treatment: YOu were noted to have anemia from the GI bleed, you were given a unit of blood and your blood levels stablized. Please have repeat blood work done in 1 week to check for resolution of this condition.    Diagnosis: HTN (hypertension)  Assessment and Plan of Treatment: Some of your BP meds were held on admission due to your GI bleed. Your blood pressure was stable during this admission. Please follow up with your [facility] doctor in 1 week to check your blood pressure and if or when to resume your blood pressure medications.    Diagnosis: Acute kidney injury superimposed on CKD  Assessment and Plan of Treatment: You had acute kidney injury and it resolved during your hospital course. Please follow up with your nephrologist in 1-2 weeks to check for resolution of this condition.    Diagnosis: Type 2 diabetes mellitus with hyperglycemia  Assessment and Plan of Treatment: Your home standing insulin was held in your hospital stay. Your A1C was found to be 5.9, please follow up with your primary care doctor on insulin adjustment.     PRINCIPAL DISCHARGE DIAGNOSIS  Diagnosis: RB (rectal bleeding)  Assessment and Plan of Treatment: You came to the hospital due to a GI bleed. GI was consulted and performed a colonoscopy which showed no active bleed and the bleeding from earlier is likely diverticular bleed and has stablized. Please follow up with the GI clinic in 1-2 weeks.      SECONDARY DISCHARGE DIAGNOSES  Diagnosis: Acute blood loss anemia  Assessment and Plan of Treatment: YOu were noted to have anemia from the GI bleed, you were given a unit of blood and your blood levels stablized. Please have repeat blood work done in 1 week to check for resolution of this condition.    Diagnosis: HTN (hypertension)  Assessment and Plan of Treatment: Some of your BP meds were held on admission due to your GI bleed. Your blood pressure was stable during this admission. [facility] week to check your blood pressure and if or when to resume your blood pressure medications.    Diagnosis: Acute kidney injury superimposed on CKD  Assessment and Plan of Treatment: You had acute kidney injury and it resolved during your hospital course. Please follow up with your nephrologist in 1-2 weeks to check for resolution of this condition.  YOur Losartan was held due to this condition, please follow up withyour [facility]/nephrology doctor on when or if to resume.    Diagnosis: Type 2 diabetes mellitus with hyperglycemia  Assessment and Plan of Treatment: Your home standing insulin was held in your hospital stay. Your A1C was found to be 5.9, please follow up with your primary care doctor on insulin adjustment.

## 2025-07-16 NOTE — DISCHARGE NOTE NURSING/CASE MANAGEMENT/SOCIAL WORK - NSDCPEFALRISK_GEN_ALL_CORE
For information on Fall & Injury Prevention, visit: [email or web address] OR  [email or web address] OR  https://www.cdc.gov/steadi/patient.html

## 2025-07-16 NOTE — DISCHARGE NOTE NURSING/CASE MANAGEMENT/SOCIAL WORK - PATIENT PORTAL LINK FT
You can access the FollowMyHealth Patient Portal offered by [facility] by registering at the following website: [email or web address]. By joining [facility]’s FollowMyHealth portal, you will also be able to view your health information using other applications (apps) compatible with our system.

## 2025-07-16 NOTE — DISCHARGE NOTE NURSING/CASE MANAGEMENT/SOCIAL WORK - FINANCIAL ASSISTANCE
[facility] provides services at a reduced cost to those who are determined to be eligible through [facility]’s financial assistance program. Information regarding [facility]’s financial assistance program can be found by going to [email or web address] or by calling [phone].

## 2025-07-16 NOTE — DISCHARGE NOTE PROVIDER - ATTENDING DISCHARGE PHYSICAL EXAMINATION:
CONSTITUTIONAL: NAD, pleasant  RESPIRATORY: Normal respiratory effort; no respiratory distress, CTAB  CARDIOVASCULAR: No visible JVD, No lower extremity edema; S1S2, no m,r,g  ABDOMEN: Not guarding, does not appear distended, BS+  MUSCLOSKELETAL: no clubbing or cyanosis of digits; no joint swelling   PSYCH: calm and pleasant

## 2025-07-16 NOTE — DISCHARGE NOTE PROVIDER - NSDCFUSCHEDAPPT_GEN_ALL_CORE_FT
Yvonne Suarez  [facility]  Scheduled Appointment: 07/29/2025    Yomaira Figueroa  [facility]  Scheduled Appointment: 08/18/2025    Lucila Contreras  [facility]  Scheduled Appointment: 09/19/2025    [facility]  UROLOGY [address] [place] [address]  Scheduled Appointment: 10/14/2025    Duarte Daniels  [facility]  UROLOGY [address] [place] [address] R  Scheduled Appointment: 10/14/2025

## 2025-07-16 NOTE — PROGRESS NOTE ADULT - ASSESSMENT
89-year-old male, NH resident, ambulatory with a walker,  with medical history of hypertension, Gout, constipation, hyperlipidemia, type 2 diabetes, gen weakness, iridocyclitis, metastatic prostate cancer on Abirateron and Prednisone (previously on radiation therapy) a/w acute GIB c/b acute blood loss anemia requiring transfusion of pRBC and KIRBY on CKD III. 
89-year-old male, NH resident, ambulatory with a walker, with medical history of HTN, Gout, constipation, hyperlipidemia, T2DM, gen weakness, iridocyclitis, metastatic prostate cancer on Abirateron and Prednisone, previously on radiation therapy, presents for bloody bowel movements noticed by staff caring for him at [facility]. Per collateral (NH NP) staff noticed bloody bowel movements last night as well as this morning.  Patient has no complaints, reports no abdominal or rectal pain, and no chest pain or shortness of breath, fevers.  He has no known history of prior rectal bleeding.  Nephrology consulted for CKD.    A/P  CKD 3B:  Baseline S.Cr. 1.6-2.  Admitted w/ S.Cr. 2.  S.Cr. better than baseline  S/P CT A/P w/ IVC on 7/10 - No hydronephrosis. Bilateral renal cysts  Monitor for NATY.  Monitor BMP and UO.    Anemia:  Sec to GIB vs. CKD.  S/P 2 units of pRBC.  Hgb improved  Tsat low - can give IV iron 200 mg daily for 3 days  Guiac +  GI following - colonoscopy today  Monitor Hgb.  Transfuse for Hgb <8    HTN:  BP suboptimal  Resume home meds  Low sodium diet  Monitor BP    Hypokalemia:  Supplemented with KCl ivpb 10 meq x3 doses  Monitor K    CKD - MBD:  Check PTH.  Monitor PO4 and Ca daily.    GIB:  GI following.  CT A/P w/ no evidence of GIB.  Colonoscopy today
89-year-old male, NH resident, ambulatory with a walker, with medical history of HTN, Gout, constipation, hyperlipidemia, T2DM, gen weakness, iridocyclitis, metastatic prostate cancer on Abirateron and Prednisone, previously on radiation therapy, presents for bloody bowel movements noticed by staff caring for him at [facility]. Per collateral (NH NP) staff noticed bloody bowel movements last night as well as this morning.  Patient has no complaints, reports no abdominal or rectal pain, and no chest pain or shortness of breath, fevers.  He has no known history of prior rectal bleeding.  Nephrology consulted for CKD.    A/P  CKD 3B:  Baseline S.Cr. 1.6-2.  Admitted w/ S.Cr. 2.  S.Cr. better than baseline  S/P CT A/P w/ IVC on 7/10 - No hydronephrosis. Bilateral renal cysts  Monitor for NATY.  Monitor BMP and UO.    Anemia:  Sec to GIB vs. CKD.  S/P 2 units of pRBC.  Hgb improved  Tsat low - can give IV iron 200 mg daily for 3 days  Guiac +  GI following  Monitor Hgb.  Transfuse for Hgb <8    HTN:  BP acceptable  Resume home meds  Low sodium diet  Monitor BP    Hypokalemia:  Supplemented   better  Monitor K    CKD - MBD:  Check PTH.  Monitor PO4 and Ca daily.    GIB:  GI following.  CT A/P w/ no evidence of GIB.  s/p colonoscopy 7/14. Source of anemia and hematochezia likely 2/2 resolved diverticular bleeding
89-year-old male, NH resident, ambulatory with a walker, with medical history of HTN, Gout, constipation, hyperlipidemia, T2DM, gen weakness, iridocyclitis, metastatic prostate cancer on Abirateron and Prednisone, previously on radiation therapy, presents for bloody bowel movements noticed by staff caring for him at [facility]. Per collateral (NH NP) staff noticed bloody bowel movements last night as well as this morning.  Patient has no complaints, reports no abdominal or rectal pain, and no chest pain or shortness of breath, fevers.  He has no known history of prior rectal bleeding.  Nephrology consulted for CKD.    A/P  CKD 3B:  Baseline S.Cr. 1.6-2.  Admitted w/ S.Cr. 2.  S.Cr. better than baseline  S/P CT A/P w/ IVC on 7/10 - No hydronephrosis. Bilateral renal cysts  Monitor for NATY.  Monitor BMP and UO.    Anemia:  Sec to GIB vs. CKD.  S/P 2 units of pRBC.  Hgb improved  Tsat low - can give IV iron 200 mg daily for 3 days  Guiac +  GI following  Monitor Hgb.  Transfuse for Hgb <8    HTN:  BP acceptable  Resume home meds  Low sodium diet  Monitor BP    Hypokalemia:  Supplemented   better  Monitor K    CKD - MBD:  Check PTH.  Monitor PO4 and Ca daily.    GIB:  GI following.  CT A/P w/ no evidence of GIB.  s/p colonoscopy 7/14. Source of anemia and hematochezia likely 2/2 resolved diverticular bleeding
An 89-year-old male, nursing home resident, ambulatory with a walker, with a past medical history of hypertension, gout, constipation, hyperlipidemia, type 2 diabetes, generalized weakness, iridocyclitis, and metastatic prostate cancer on abiraterone and prednisone (previously treated with radiation therapy) presents with acute gastrointestinal bleeding (GIB) complicated by acute blood loss anemia requiring packed red blood cell (pRBC) transfusion and acute kidney injury (KIRBY) superimposed on chronic kidney disease (CKD) stage III.    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 Patient is a poor historian. Fecal occult blood test positive. CT abdomen/pelvis (CT A/P): No evidence of active GI bleed. Holding all non-essential oral agents. Holding ASA. Vital signs every 4 hours. Gastroenterology (GI) consulted; plan for colonoscopy on 7/14/25. Monitoring hemoglobin (Hgb) every 12 hours. Fall and aspiration precautions in place. Holding antihypertensives.  Scope 7/14, likely diverticular bleed    Problem: Acute Blood Loss Anemia  Plan: Secondary to GIB. Two units of pRBCs transfused. Monitoring H/H. Plan as above for GIB.    Problem: Acute Kidney Injury (KIRBY) Superimposed on Chronic Kidney Disease (CKD)  Plan: Likely hemodynamically driven, secondary to severe anemia. Monitoring renal function daily. Strict intake and output (I/O) monitoring. Renally dosing medications. Holding losartan.    Problem: Prostate Cancer Metastatic to Bone  Plan: CT A/P: Diffuse sclerotic osseous metastatic disease without significant change. Continuing prednisone 5mg daily and abiraterone.    Problem: Type 2 Diabetes Mellitus  Plan: On basal/bolus insulin at home. Fingerstick glucose (FSG) controlled without receiving insulin during this admission. Patient on a clear liquid diet (CLD) in preparation for colonoscopy. Will continue on an insulin sliding scale (ISS) for now. Monitor FSG and resume long-acting insulin if needed.    Problem: Hypertension  Plan: Holding antihypertensives due to active GIB requiring transfusion. Monitoring blood pressure (BP) closely every 4 hours.    Problem: Deep Vein Thrombosis (DVT) Prophylaxis/hypercoagulable state  Plan: Holding subcutaneous heparin due to GIB. Sequential compression devices (SCDs) in place.    
An 89-year-old male, nursing home resident, ambulatory with a walker, with a past medical history of hypertension, gout, constipation, hyperlipidemia, type 2 diabetes, generalized weakness, iridocyclitis, and metastatic prostate cancer on abiraterone and prednisone (previously treated with radiation therapy) presents with acute gastrointestinal bleeding (GIB) complicated by acute blood loss anemia requiring packed red blood cell (pRBC) transfusion and acute kidney injury (KIRBY) superimposed on chronic kidney disease (CKD) stage III.    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 Patient is a poor historian. Fecal occult blood test positive. CT abdomen/pelvis (CT A/P): No evidence of active GI bleed. Holding all non-essential oral agents. Holding ASA. Vital signs every 4 hours. Gastroenterology (GI) consulted; plan for colonoscopy on 7/14/25. Monitoring hemoglobin (Hgb) every 12 hours. Fall and aspiration precautions in place. Holding antihypertensives.  Scope 7/14, follow up reports/path     Problem: Acute Blood Loss Anemia  Plan: Secondary to GIB. Two units of pRBCs transfused. Monitoring H/H. Plan as above for GIB.    Problem: Acute Kidney Injury (KIRBY) Superimposed on Chronic Kidney Disease (CKD)  Plan: Likely hemodynamically driven, secondary to severe anemia. Monitoring renal function daily. Strict intake and output (I/O) monitoring. Renally dosing medications. Holding losartan.    Problem: Prostate Cancer Metastatic to Bone  Plan: CT A/P: Diffuse sclerotic osseous metastatic disease without significant change. Continuing prednisone 5mg daily and abiraterone.    Problem: Type 2 Diabetes Mellitus  Plan: On basal/bolus insulin at home. Fingerstick glucose (FSG) controlled without receiving insulin during this admission. Patient on a clear liquid diet (CLD) in preparation for colonoscopy. Will continue on an insulin sliding scale (ISS) for now. Monitor FSG and resume long-acting insulin if needed.    Problem: Hypertension  Plan: Holding antihypertensives due to active GIB requiring transfusion. Monitoring blood pressure (BP) closely every 4 hours.    Problem: Deep Vein Thrombosis (DVT) Prophylaxis/hypercoagulable state  Plan: Holding subcutaneous heparin due to GIB. Sequential compression devices (SCDs) in place.    Problem: Nutrition/Severe Protein-Calorie Malnutrition  Plan: Patient assessed and found to have severe protein-calorie malnutrition. Currently on a clear liquid diet with consistent carbohydrates and an evening snack in preparation for colonoscopy. Was briefly NPO except for medications. (Need to reassess and optimize nutritional plan after colonoscopy, especially given the severe protein-calorie malnutrition and NPO status. A clear liquid diet is not sufficient for long-term nutritional support.)
89-year-old male, NH resident, ambulatory with a walker, with medical history of HTN, Gout, constipation, hyperlipidemia, T2DM, gen weakness, iridocyclitis, metastatic prostate cancer on Abirateron and Prednisone, previously on radiation therapy, presents for bloody bowel movements noticed by staff caring for him at [facility]. Per collateral (NH NP) staff noticed bloody bowel movements last night as well as this morning.  Patient has no complaints, reports no abdominal or rectal pain, and no chest pain or shortness of breath, fevers.  He has no known history of prior rectal bleeding.  Nephrology consulted for CKD.    A/P  CKD 3B:  Baseline S.Cr. 1.6-2.  Admitted w/ S.Cr. 2.  S.Cr. at baseline.  S/P CT A/P w/ IVC on 7/10 - No hydronephrosis. Bilateral renal cysts.  - Monitor for NATY.  - Monitor BMP and UO.    Anemia:  Sec to GIB vs. CKD.  S/P 2 units of pRBC.  Hgb improved.  Check iron studies.  GI following.  Monitor Hgb.  Transfuse for Hgb <8    HTN:  BP fluctuating; acceptable.  Resume home meds.  Low sodium diet.  Monitor BP.    CKD - MBD:  Check PTH.  Monitor PO4 and Ca daily.    GIB:  GI following.  CT A/P w/ no evidence of GIB.  Pending colonoscopy on Monday.
89-year-old male, NH resident, ambulatory with a walker,  with medical history of hypertension, Gout, constipation, hyperlipidemia, type 2 diabetes, gen weakness, iridocyclitis, metastatic prostate cancer on Abirateron and Prednisone (previously on radiation therapy) a/w acute GIB c/b acute blood loss anemia requiring transfusion of pRBC and KIRBY on CKD III.       Active problems:  Prostate cancer mets to bone on chemo  GIB  Acute blood loss anemia requiring transfusion  KIRBY on CKD  HTN  T2DM   Severe protein-calorie malnutrition  Hypercoag state secondary to neoplasm   Immunosuppressed 
89-year-old male, NH resident, ambulatory with a walker,  with medical history of hypertension, Gout, constipation, hyperlipidemia, type 2 diabetes, gen weakness, iridocyclitis, metastatic prostate cancer on Abirateron and Prednisone (previously on radiation therapy) a/w acute GIB c/b acute blood loss anemia requiring transfusion of pRBC and KIRBY on CKD III.

## 2025-07-16 NOTE — PROGRESS NOTE ADULT - REASON FOR ADMISSION
Episode of bloody stool, referred by NH

## 2025-07-16 NOTE — DISCHARGE NOTE PROVIDER - CARE PROVIDER_API CALL
Aaron Montenegro ()  Nephrology  [address], [address]  [place] [address]  Phone: [phone]  Fax: [phone]  Follow Up Time:     primary care doctor,   Phone: (   )    -  Fax: (   )    -  Follow Up Time:     Yvonne Suarez ()  Nurse Practitioner [address] [address]  Phone: [phone]  Fax: [phone]  Follow Up Time:    Aaron Montenegro)  Nephrology  [address], [address]  [place] [address]  Phone: [phone]  Fax: [phone]  Follow Up Time:     Yvonne Suarez ()  Nurse Practitioner Adult [facility]  [address] [place] [address]  Phone: [phone]  Fax: [phone]  Follow Up Time:     primary care doctor,   Phone: (   )    -  Fax: (   )    -  Follow Up Time:     Lucila Contreras)  Medical Oncology  [address] [place] [id number]  Phone: [phone]  Fax: [phone]  Follow Up Time:

## 2025-07-16 NOTE — PROGRESS NOTE ADULT - SUBJECTIVE AND OBJECTIVE BOX
[facility]   MD CRISTI SESAY MD ANGELA WONG, PA    TEL:  OFFICE: [phone]  From 5pm-7am Answering Service [phone]    -- RENAL FOLLOW UP NOTE ---Date of Service 07-15-25 @ 15:31    Patient is a 89y old  Male who presents with a chief complaint of Episode of bloody stool, referred by NH (14 Jul 2025 17:22)      Patient seen and examined at bedside. No chest pain/sob    VITALS:  T(F): 97.7 (07-15-25 @ 15:21), Max: 98.9 (07-15-25 @ 09:31)  HR: 80 (07-15-25 @ 15:21)  BP: 150/78 (07-15-25 @ 15:21)  RR: 18 (07-15-25 @ 15:21)  SpO2: 98% (07-15-25 @ 15:21)  Wt(kg): --    07-15 @ 07:01  -  07-15 @ 15:31  --------------------------------------------------------  IN: 240 mL / OUT: 300 mL / NET: -60 mL          PHYSICAL EXAM:  General: NAD  Neck: No JVD  Respiratory: CTAB, no wheezes, rales or rhonchi  Cardiovascular: S1, S2, RRR  Gastrointestinal: BS+, soft, NT/ND  Extremities: No peripheral edema    Hospital Medications:   MEDICATIONS  (STANDING):  abiraterone 500 milliGRAM(s) Oral <User Schedule>  amLODIPine   Tablet 10 milliGRAM(s) Oral daily  chlorhexidine 2% Cloths 1 Application(s) Topical daily  dextrose 5%. 1000 milliLiter(s) (100 mL/Hr) IV Continuous <Continuous>  dextrose 5%. 1000 milliLiter(s) (50 mL/Hr) IV Continuous <Continuous>  dextrose 50% Injectable 25 Gram(s) IV Push once  dextrose 50% Injectable 12.5 Gram(s) IV Push once  dextrose 50% Injectable 25 Gram(s) IV Push once  glucagon  Injectable 1 milliGRAM(s) IntraMuscular once  insulin lispro (ADMELOG) corrective regimen sliding scale   SubCutaneous three times a day before meals  insulin lispro (ADMELOG) corrective regimen sliding scale   SubCutaneous at bedtime  iron sucrose IVPB 200 milliGRAM(s) IV Intermittent every 24 hours  pantoprazole  Injectable 40 milliGRAM(s) IV Push every 12 hours  predniSONE   Tablet 5 milliGRAM(s) Oral daily      LABS:  07-15    140  |  104  |  14  ----------------------------<  89  3.6   |  20[L]  |  1.45[H]    Ca    8.3[L]      15 Jul 2025 06:22  Phos  3.1     07-15  Mg     1.80     07-15    TPro  6.0  /  Alb  3.1[L]  /  TBili  0.2  /  DBili      /  AST  16  /  ALT  8   /  AlkPhos  318[H]  07-15    Creatinine Trend: 1.45 <--, 1.24 <--, 1.46 <--, 1.88 <--, 2.00 <--    Albumin: 3.1 g/dL (07-15 @ 06:22)  Phosphorus: 3.1 mg/dL (07-15 @ 06:22)                              8.2    8.40  )-----------( 257      ( 15 Jul 2025 06:22 )             26.5     Urine Studies:  Urinalysis - [07-15-25 @ 06:22]      Color  / Appearance  / SG  / pH       Gluc 89 / Ketone   / Bili  / Urobili        Blood  / Protein  / Leuk Est  / Nitrite       RBC  / WBC  / Hyaline  / Gran  / Sq Epi  / Non Sq Epi  / Bacteria       Iron 28, TIBC 214, %sat 13      [07-13-25 @ 06:40]  Ferritin 167      [07-13-25 @ 06:40]  Lipid: chol 130, , HDL 36, LDL --      [01-01-25 @ 05:30]        RADIOLOGY & ADDITIONAL STUDIES:  
[facility]   MD CRISTI SESAY MD ANGELA WONG, PA    TEL:  OFFICE: [phone]  From 5pm-7am Answering Service [phone]    -- RENAL FOLLOW UP NOTE ---Date of Service 07-16-25 @ 16:15    Patient is a 89y old  Male who presents with a chief complaint of Episode of bloody stool, referred by NH (16 Jul 2025 12:28)      Patient seen and examined at bedside. No chest pain/sob    VITALS:  T(F): 98 (07-16-25 @ 13:55), Max: 98.3 (07-16-25 @ 10:00)  HR: 72 (07-16-25 @ 13:55)  BP: 162/71 (07-16-25 @ 13:55)  RR: 18 (07-16-25 @ 13:55)  SpO2: 100% (07-16-25 @ 13:55)  Wt(kg): --    07-15 @ 07:01  -  07-16 @ 07:00  --------------------------------------------------------  IN: 440 mL / OUT: 500 mL / NET: -60 mL          PHYSICAL EXAM:  General: NAD  Neck: No JVD  Respiratory: CTAB, no wheezes, rales or rhonchi  Cardiovascular: S1, S2, RRR  Gastrointestinal: BS+, soft, NT/ND  Extremities: No peripheral edema    Hospital Medications:   MEDICATIONS  (STANDING):  abiraterone 500 milliGRAM(s) Oral <User Schedule>  amLODIPine   Tablet 10 milliGRAM(s) Oral daily  chlorhexidine 2% Cloths 1 Application(s) Topical daily  dextrose 5%. 1000 milliLiter(s) (100 mL/Hr) IV Continuous <Continuous>  dextrose 5%. 1000 milliLiter(s) (50 mL/Hr) IV Continuous <Continuous>  dextrose 50% Injectable 25 Gram(s) IV Push once  dextrose 50% Injectable 12.5 Gram(s) IV Push once  dextrose 50% Injectable 25 Gram(s) IV Push once  glucagon  Injectable 1 milliGRAM(s) IntraMuscular once  insulin lispro (ADMELOG) corrective regimen sliding scale   SubCutaneous three times a day before meals  insulin lispro (ADMELOG) corrective regimen sliding scale   SubCutaneous at bedtime  iron sucrose IVPB 200 milliGRAM(s) IV Intermittent every 24 hours  pantoprazole  Injectable 40 milliGRAM(s) IV Push every 12 hours  predniSONE   Tablet 5 milliGRAM(s) Oral daily      LABS:  07-16    140  |  104  |  19  ----------------------------<  107[H]  3.2[L]   |  23  |  1.50[H]    Ca    8.1[L]      16 Jul 2025 06:00  Phos  2.9     07-16  Mg     1.80     07-16    TPro  5.9[L]  /  Alb  3.0[L]  /  TBili  <0.2  /  DBili      /  AST  16  /  ALT  8   /  AlkPhos  300[H]  07-16    Creatinine Trend: 1.50 <--, 1.45 <--, 1.24 <--, 1.46 <--, 1.88 <--, 2.00 <--    Albumin: 3.0 g/dL (07-16 @ 06:00)  Phosphorus: 2.9 mg/dL (07-16 @ 06:00)                              7.8    8.85  )-----------( 272      ( 16 Jul 2025 06:00 )             25.4     Urine Studies:  Urinalysis - [07-16-25 @ 06:00]      Color  / Appearance  / SG  / pH       Gluc 107 / Ketone   / Bili  / Urobili        Blood  / Protein  / Leuk Est  / Nitrite       RBC  / WBC  / Hyaline  / Gran  / Sq Epi  / Non Sq Epi  / Bacteria       Iron 28, TIBC 214, %sat 13      [07-13-25 @ 06:40]  Ferritin 167      [07-13-25 @ 06:40]  Lipid: chol 130, , HDL 36, LDL --      [01-01-25 @ 05:30]        RADIOLOGY & ADDITIONAL STUDIES:  
Osiel Arriaga MD  Academic Hospitalist  Pager [phone]  Email: [email or web address]  Available on [employer] Teams        PROGRESS NOTE:     Patient is a 89y old  Male who presents with a chief complaint of Episode of bloody stool, referred by NH (14 Jul 2025 15:32)      SUBJECTIVE / OVERNIGHT EVENTS:  Patient seen and examined this morning. Seen before scope. No new complaints.   ADDITIONAL REVIEW OF SYSTEMS:  No f/c    MEDICATIONS  (STANDING):  abiraterone 500 milliGRAM(s) Oral <User Schedule>  chlorhexidine 2% Cloths 1 Application(s) Topical daily  dextrose 5%. 1000 milliLiter(s) (100 mL/Hr) IV Continuous <Continuous>  dextrose 5%. 1000 milliLiter(s) (50 mL/Hr) IV Continuous <Continuous>  dextrose 50% Injectable 25 Gram(s) IV Push once  dextrose 50% Injectable 12.5 Gram(s) IV Push once  dextrose 50% Injectable 25 Gram(s) IV Push once  glucagon  Injectable 1 milliGRAM(s) IntraMuscular once  insulin lispro (ADMELOG) corrective regimen sliding scale   SubCutaneous every 6 hours  pantoprazole  Injectable 40 milliGRAM(s) IV Push every 12 hours  predniSONE   Tablet 5 milliGRAM(s) Oral daily    MEDICATIONS  (PRN):  dextrose Oral Gel 15 Gram(s) Oral once PRN Blood Glucose LESS THAN 70 milliGRAM(s)/deciliter      CAPILLARY BLOOD GLUCOSE      POCT Blood Glucose.: 133 mg/dL (14 Jul 2025 14:35)  POCT Blood Glucose.: 140 mg/dL (14 Jul 2025 12:11)  POCT Blood Glucose.: 119 mg/dL (14 Jul 2025 08:02)  POCT Blood Glucose.: 104 mg/dL (13 Jul 2025 22:01)  POCT Blood Glucose.: 115 mg/dL (13 Jul 2025 17:52)    I&O's Summary    13 Jul 2025 07:01  -  14 Jul 2025 07:00  --------------------------------------------------------  IN: 1900 mL / OUT: 1250 mL / NET: 650 mL        PHYSICAL EXAM:  Vital Signs Last 24 Hrs  T(C): 36.9 (14 Jul 2025 16:46), Max: 36.9 (13 Jul 2025 20:34)  T(F): 98.5 (14 Jul 2025 16:46), Max: 98.5 (14 Jul 2025 16:46)  HR: 64 (14 Jul 2025 16:46) (63 - 79)  BP: 158/72 (14 Jul 2025 16:46) (108/73 - 192/90)  BP(mean): --  RR: 18 (14 Jul 2025 16:46) (14 - 23)  SpO2: 99% (14 Jul 2025 16:46) (95% - 99%)    Parameters below as of 14 Jul 2025 16:46  Patient On (Oxygen Delivery Method): room air        CONSTITUTIONAL: NAD, pleasant  RESPIRATORY: Normal respiratory effort; no respiratory distress, CTAB  CARDIOVASCULAR: No visible JVD, No lower extremity edema; S1S2, no m,r,g  ABDOMEN: Not guarding, does not appear distended, BS+  MUSCLOSKELETAL: no clubbing or cyanosis of digits; no joint swelling   PSYCH: calm and pleasant    LABS:                        8.5    9.52  )-----------( 269      ( 14 Jul 2025 05:44 )             27.8     07-14    142  |  102  |  12  ----------------------------<  94  3.3[L]   |  26  |  1.24    Ca    8.7      14 Jul 2025 05:44  Phos  2.8     07-14  Mg     2.00     07-14    TPro  6.7  /  Alb  3.5  /  TBili  0.4  /  DBili  x   /  AST  17  /  ALT  10  /  AlkPhos  381[H]  07-14    PT/INR - ( 14 Jul 2025 05:44 )   PT: 12.3 sec;   INR: 1.06 ratio         PTT - ( 14 Jul 2025 05:44 )  PTT:28.0 sec      Urinalysis Basic - ( 14 Jul 2025 05:44 )    Color: x / Appearance: x / SG: x / pH: x  Gluc: 94 mg/dL / Ketone: x  / Bili: x / Urobili: x   Blood: x / Protein: x / Nitrite: x   Leuk Esterase: x / RBC: x / WBC x   Sq Epi: x / Non Sq Epi: x / Bacteria: x          RADIOLOGY & ADDITIONAL TESTS:  Results Reviewed:   Imaging Personally Reviewed:  Electrocardiogram Personally Reviewed:    COORDINATION OF CARE:  Care Discussed with Consultants/Other Providers [Y/N]:  Prior or Outpatient Records Reviewed [Y/N]:  
[facility]   MD CRISTI SESAY MD MARIA SANTIAGO, NP    TEL:  OFFICE: [phone]  From 5pm-7am Answering Service [phone]    -- RENAL FOLLOW UP NOTE ---Date of Service 07-14-25 @ 15:32    Patient is a 89y old  Male who presents with a chief complaint of Episode of bloody stool, referred by NH       Patient seen and examined at bedside. No chest pain/sob    VITALS:  T(F): 97.7 (07-14-25 @ 14:23), Max: 98.7 (07-13-25 @ 16:34)  HR: 65 (07-14-25 @ 14:53)  BP: 162/68 (07-14-25 @ 14:53)  RR: 20 (07-14-25 @ 14:53)  SpO2: 97% (07-14-25 @ 14:53)  Wt(kg): --    07-13 @ 07:01  -  07-14 @ 07:00  --------------------------------------------------------  IN: 1900 mL / OUT: 1250 mL / NET: 650 mL          PHYSICAL EXAM:  General: NAD  Neck: No JVD  Respiratory: CTAB, no wheezes, rales or rhonchi  Cardiovascular: S1, S2, RRR  Gastrointestinal: BS+, soft, NT/ND  Extremities: No peripheral edema    Hospital Medications:   MEDICATIONS  (STANDING):  abiraterone 500 milliGRAM(s) Oral <User Schedule>  chlorhexidine 2% Cloths 1 Application(s) Topical daily  dextrose 5%. 1000 milliLiter(s) (100 mL/Hr) IV Continuous <Continuous>  dextrose 5%. 1000 milliLiter(s) (50 mL/Hr) IV Continuous <Continuous>  dextrose 50% Injectable 25 Gram(s) IV Push once  dextrose 50% Injectable 12.5 Gram(s) IV Push once  dextrose 50% Injectable 25 Gram(s) IV Push once  glucagon  Injectable 1 milliGRAM(s) IntraMuscular once  insulin lispro (ADMELOG) corrective regimen sliding scale   SubCutaneous every 6 hours  pantoprazole  Injectable 40 milliGRAM(s) IV Push every 12 hours  potassium chloride  10 mEq/100 mL IVPB 10 milliEquivalent(s) IV Intermittent every 1 hour  predniSONE   Tablet 5 milliGRAM(s) Oral daily      LABS:  07-14    142  |  102  |  12  ----------------------------<  94  3.3[L]   |  26  |  1.24    Ca    8.7      14 Jul 2025 05:44  Phos  2.8     07-14  Mg     2.00     07-14    TPro  6.7  /  Alb  3.5  /  TBili  0.4  /  DBili      /  AST  17  /  ALT  10  /  AlkPhos  381[H]  07-14    Creatinine Trend: 1.24 <--, 1.46 <--, 1.88 <--, 2.00 <--    Albumin: 3.5 g/dL (07-14 @ 05:44)  Phosphorus: 2.8 mg/dL (07-14 @ 05:44)                              8.5    9.52  )-----------( 269      ( 14 Jul 2025 05:44 )             27.8     Urine Studies:  Urinalysis - [07-14-25 @ 05:44]      Color  / Appearance  / SG  / pH       Gluc 94 / Ketone   / Bili  / Urobili        Blood  / Protein  / Leuk Est  / Nitrite       RBC  / WBC  / Hyaline  / Gran  / Sq Epi  / Non Sq Epi  / Bacteria       Iron 28, TIBC 214, %sat 13      [07-13-25 @ 06:40]  Ferritin 167      [07-13-25 @ 06:40]  Lipid: chol 130, , HDL 36, LDL --      [01-01-25 @ 05:30]        RADIOLOGY & ADDITIONAL STUDIES:  
Osiel Arriaga MD  Academic Hospitalist  Pager [phone]  Email: [email or web address]  Available on [employer] Teams        PROGRESS NOTE:     Patient is a 89y old  Male who presents with a chief complaint of Episode of bloody stool, referred by NH (15 Jul 2025 15:29)      SUBJECTIVE / OVERNIGHT EVENTS:  Patient seen and examined this morning. No acute events overnight.  ADDITIONAL REVIEW OF SYSTEMS:  No f/c    MEDICATIONS  (STANDING):  abiraterone 500 milliGRAM(s) Oral <User Schedule>  amLODIPine   Tablet 10 milliGRAM(s) Oral daily  chlorhexidine 2% Cloths 1 Application(s) Topical daily  dextrose 5%. 1000 milliLiter(s) (100 mL/Hr) IV Continuous <Continuous>  dextrose 5%. 1000 milliLiter(s) (50 mL/Hr) IV Continuous <Continuous>  dextrose 50% Injectable 25 Gram(s) IV Push once  dextrose 50% Injectable 12.5 Gram(s) IV Push once  dextrose 50% Injectable 25 Gram(s) IV Push once  glucagon  Injectable 1 milliGRAM(s) IntraMuscular once  insulin lispro (ADMELOG) corrective regimen sliding scale   SubCutaneous three times a day before meals  insulin lispro (ADMELOG) corrective regimen sliding scale   SubCutaneous at bedtime  iron sucrose IVPB 200 milliGRAM(s) IV Intermittent every 24 hours  pantoprazole  Injectable 40 milliGRAM(s) IV Push every 12 hours  predniSONE   Tablet 5 milliGRAM(s) Oral daily    MEDICATIONS  (PRN):  dextrose Oral Gel 15 Gram(s) Oral once PRN Blood Glucose LESS THAN 70 milliGRAM(s)/deciliter      CAPILLARY BLOOD GLUCOSE      POCT Blood Glucose.: 208 mg/dL (15 Jul 2025 13:52)  POCT Blood Glucose.: 102 mg/dL (15 Jul 2025 09:16)  POCT Blood Glucose.: 111 mg/dL (14 Jul 2025 21:55)  POCT Blood Glucose.: 106 mg/dL (14 Jul 2025 17:44)    I&O's Summary    15 Jul 2025 07:01  -  15 Jul 2025 17:29  --------------------------------------------------------  IN: 240 mL / OUT: 300 mL / NET: -60 mL        PHYSICAL EXAM:  Vital Signs Last 24 Hrs  T(C): 36.5 (15 Jul 2025 15:21), Max: 37.2 (15 Jul 2025 09:31)  T(F): 97.7 (15 Jul 2025 15:21), Max: 98.9 (15 Jul 2025 09:31)  HR: 80 (15 Jul 2025 15:21) (72 - 94)  BP: 150/78 (15 Jul 2025 15:21) (147/71 - 175/77)  BP(mean): --  RR: 18 (15 Jul 2025 15:21) (17 - 18)  SpO2: 98% (15 Jul 2025 15:21) (96% - 98%)    Parameters below as of 15 Jul 2025 15:21  Patient On (Oxygen Delivery Method): room air        CONSTITUTIONAL: NAD, pleasant  RESPIRATORY: Normal respiratory effort; no respiratory distress, CTAB  CARDIOVASCULAR: No visible JVD, No lower extremity edema; S1S2, no m,r,g  ABDOMEN: Not guarding, does not appear distended, BS+  MUSCLOSKELETAL: no clubbing or cyanosis of digits; no joint swelling   PSYCH: calm and pleasant    LABS:                        8.2    8.40  )-----------( 257      ( 15 Jul 2025 06:22 )             26.5     07-15    140  |  104  |  14  ----------------------------<  89  3.6   |  20[L]  |  1.45[H]    Ca    8.3[L]      15 Jul 2025 06:22  Phos  3.1     07-15  Mg     1.80     07-15    TPro  6.0  /  Alb  3.1[L]  /  TBili  0.2  /  DBili  x   /  AST  16  /  ALT  8   /  AlkPhos  318[H]  07-15    PT/INR - ( 14 Jul 2025 05:44 )   PT: 12.3 sec;   INR: 1.06 ratio         PTT - ( 14 Jul 2025 05:44 )  PTT:28.0 sec      Urinalysis Basic - ( 15 Jul 2025 06:22 )    Color: x / Appearance: x / SG: x / pH: x  Gluc: 89 mg/dL / Ketone: x  / Bili: x / Urobili: x   Blood: x / Protein: x / Nitrite: x   Leuk Esterase: x / RBC: x / WBC x   Sq Epi: x / Non Sq Epi: x / Bacteria: x          RADIOLOGY & ADDITIONAL TESTS:  Results Reviewed:   Imaging Personally Reviewed:  Electrocardiogram Personally Reviewed:    COORDINATION OF CARE:  Care Discussed with Consultants/Other Providers [Y/N]:  Prior or Outpatient Records Reviewed [Y/N]:  
Patient is a 89y old  Male who presents with a chief complaint of Episode of bloody stool, referred by NH (11 Jul 2025 13:47)      SUBJECTIVE / OVERNIGHT EVENTS: No acute events overnight. Pt has no new complaints     ADDITIONAL REVIEW OF SYSTEMS:    MEDICATIONS  (STANDING):  abiraterone 500 milliGRAM(s) Oral <User Schedule>  dextrose 5%. 1000 milliLiter(s) (100 mL/Hr) IV Continuous <Continuous>  dextrose 5%. 1000 milliLiter(s) (50 mL/Hr) IV Continuous <Continuous>  dextrose 50% Injectable 25 Gram(s) IV Push once  dextrose 50% Injectable 12.5 Gram(s) IV Push once  dextrose 50% Injectable 25 Gram(s) IV Push once  glucagon  Injectable 1 milliGRAM(s) IntraMuscular once  insulin lispro (ADMELOG) corrective regimen sliding scale   SubCutaneous Before meals and at bedtime  pantoprazole  Injectable 40 milliGRAM(s) IV Push every 12 hours  prednisoLONE acetate 1% Suspension 1 Drop(s) Right EYE daily  predniSONE   Tablet 5 milliGRAM(s) Oral daily    MEDICATIONS  (PRN):  dextrose Oral Gel 15 Gram(s) Oral once PRN Blood Glucose LESS THAN 70 milliGRAM(s)/deciliter      CAPILLARY BLOOD GLUCOSE      POCT Blood Glucose.: 129 mg/dL (11 Jul 2025 09:20)  POCT Blood Glucose.: 107 mg/dL (11 Jul 2025 05:56)  POCT Blood Glucose.: 128 mg/dL (11 Jul 2025 01:12)  POCT Blood Glucose.: 136 mg/dL (10 Jul 2025 22:26)    I&O's Summary      PHYSICAL EXAM:  Vital Signs Last 24 Hrs  T(C): 37.2 (11 Jul 2025 12:54), Max: 37.2 (11 Jul 2025 12:54)  T(F): 99 (11 Jul 2025 12:54), Max: 99 (11 Jul 2025 12:54)  HR: 70 (11 Jul 2025 12:54) (66 - 84)  BP: 155/68 (11 Jul 2025 12:54) (131/60 - 162/77)  BP(mean): --  RR: 17 (11 Jul 2025 12:54) (15 - 27)  SpO2: 96% (11 Jul 2025 12:54) (96% - 100%)    Parameters below as of 11 Jul 2025 12:54  Patient On (Oxygen Delivery Method): room air      CONSTITUTIONAL: NAD  EYES: PERRLA; conjunctiva and sclera clear  ENMT: Moist oral mucosa, no pharyngeal injection or exudates; normal dentition  NECK: Supple, no palpable masses; no thyromegaly  RESPIRATORY: Normal respiratory effort; lungs are clear to auscultation bilaterally  CARDIOVASCULAR: Regular rate and rhythm, normal S1 and S2, no murmur/rub/gallop; No lower extremity edema; Peripheral pulses are 2+ bilaterally  ABDOMEN: Nontender to palpation, normoactive bowel sounds, no rebound/guarding; No hepatosplenomegaly  MUSCULOSKELETAL:  No clubbing or cyanosis of digits; no joint swelling or tenderness to palpation  PSYCH: A+O to person, place, and time; affect appropriate  NEUROLOGY: CN 2-12 are intact and symmetric; no gross sensory deficits   SKIN: No rashes; no palpable lesions    LABS:                        8.8    11.90 )-----------( 230      ( 11 Jul 2025 05:55 )             27.3     07-11    139  |  104  |  55[H]  ----------------------------<  91  4.0   |  20[L]  |  1.88[H]    Ca    8.6      11 Jul 2025 05:55  Phos  4.4     07-11  Mg     2.10     07-11    TPro  5.8[L]  /  Alb  3.2[L]  /  TBili  <0.2  /  DBili  x   /  AST  22  /  ALT  10  /  AlkPhos  375[H]  07-10    PT/INR - ( 10 Jul 2025 17:30 )   PT: 11.9 sec;   INR: 1.03 ratio         PTT - ( 10 Jul 2025 17:30 )  PTT:27.1 sec      Urinalysis Basic - ( 11 Jul 2025 05:55 )    Color: x / Appearance: x / SG: x / pH: x  Gluc: 91 mg/dL / Ketone: x  / Bili: x / Urobili: x   Blood: x / Protein: x / Nitrite: x   Leuk Esterase: x / RBC: x / WBC x   Sq Epi: x / Non Sq Epi: x / Bacteria: x          RADIOLOGY & ADDITIONAL TESTS:  Results Reviewed:   Imaging Personally Reviewed:  Electrocardiogram Personally Reviewed:    COORDINATION OF CARE:  Care Discussed with Consultants/Other Providers [Y/N]:  Prior or Outpatient Records Reviewed [Y/N]:  
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 Followed for KIRBY.    HEALTH ISSUES - PROBLEM Dx:  GI bleed    Acute blood loss anemia    Acute kidney injury superimposed on CKD    HTN (hypertension)    Type 2 diabetes mellitus with hyperglycemia    Prostate cancer metastatic to bone    Encounter for deep vein thrombosis (DVT) prophylaxis          MEDICATIONS  (STANDING):  abiraterone 500 milliGRAM(s) Oral <User Schedule>  dextrose 5%. 1000 milliLiter(s) (100 mL/Hr) IV Continuous <Continuous>  dextrose 5%. 1000 milliLiter(s) (50 mL/Hr) IV Continuous <Continuous>  dextrose 50% Injectable 25 Gram(s) IV Push once  dextrose 50% Injectable 12.5 Gram(s) IV Push once  dextrose 50% Injectable 25 Gram(s) IV Push once  glucagon  Injectable 1 milliGRAM(s) IntraMuscular once  insulin lispro (ADMELOG) corrective regimen sliding scale   SubCutaneous Before meals and at bedtime  pantoprazole  Injectable 40 milliGRAM(s) IV Push every 12 hours  prednisoLONE acetate 1% Suspension 1 Drop(s) Right EYE daily  predniSONE   Tablet 5 milliGRAM(s) Oral daily    MEDICATIONS  (PRN):  dextrose Oral Gel 15 Gram(s) Oral once PRN Blood Glucose LESS THAN 70 milliGRAM(s)/deciliter    Vital Signs Last 24 Hrs  T(C): 36.7 (12 Jul 2025 13:36), Max: 36.8 (12 Jul 2025 05:22)  T(F): 98.1 (12 Jul 2025 13:36), Max: 98.2 (12 Jul 2025 05:22)  HR: 70 (12 Jul 2025 13:36) (61 - 77)  BP: 162/68 (12 Jul 2025 13:36) (135/61 - 162/68)  BP(mean): --  RR: 18 (12 Jul 2025 13:36) (18 - 18)  SpO2: 99% (12 Jul 2025 13:36) (99% - 99%)    Parameters below as of 12 Jul 2025 13:36  Patient On (Oxygen Delivery Method): room air      Daily     Daily     PHYSICAL EXAM:  Constitutional: He appears comfortable and not distressed. Not diaphoretic.    Neck:  The thyroid is normal. Trachea is midline.     Breasts: Normal examination.    Respiratory: The lungs are clear to auscultation. No dullness and expansion is normal.    Cardiovascular: S1 and S2 are normal. No mummurs, rubs or gallops are present.    Gastrointestinal: The abdomen is soft. No tenderness is present.     Genitourinary: The bladder is not distended. No CVA tenderness is present.    Extremities: No edema is noted. No deformities are present.    Neurological: Tone, power and sensation are normal.     Skin: No leasions are seen  or palpated.    Lymph Nodes: No lymphadenopathy is present.                                8.8    11.90 )-----------( 230      ( 11 Jul 2025 05:55 )             27.3     07-11    139  |  104  |  55[H]  ----------------------------<  91  4.0   |  20[L]  |  1.88[H]    Ca    8.6      11 Jul 2025 05:55  Phos  4.4     07-11  Mg     2.10     07-11    Creatinine: 1.88 mg/dL (07.11.25 @ 05:55)   Creatinine: 2.00 mg/dL (07.10.25 @ 15:50)     < from: CT Angio Abdomen and Pelvis w/ IV Cont (07.10.25 @ 18:07) >  KIDNEYS/URETERS: No hydronephrosis. Bilateral renal cysts.    < end of copied text >  
Patient is a 89y old  Male who presents with a chief complaint of Episode of bloody stool, referred by NH (11 Jul 2025 17:28)      SUBJECTIVE / OVERNIGHT EVENTS: No acute events overnight. Pt has no new complaints     ADDITIONAL REVIEW OF SYSTEMS:    MEDICATIONS  (STANDING):  abiraterone 500 milliGRAM(s) Oral <User Schedule>  dextrose 5%. 1000 milliLiter(s) (100 mL/Hr) IV Continuous <Continuous>  dextrose 5%. 1000 milliLiter(s) (50 mL/Hr) IV Continuous <Continuous>  dextrose 50% Injectable 25 Gram(s) IV Push once  dextrose 50% Injectable 12.5 Gram(s) IV Push once  dextrose 50% Injectable 25 Gram(s) IV Push once  glucagon  Injectable 1 milliGRAM(s) IntraMuscular once  insulin lispro (ADMELOG) corrective regimen sliding scale   SubCutaneous Before meals and at bedtime  pantoprazole  Injectable 40 milliGRAM(s) IV Push every 12 hours  prednisoLONE acetate 1% Suspension 1 Drop(s) Right EYE daily  predniSONE   Tablet 5 milliGRAM(s) Oral daily    MEDICATIONS  (PRN):  dextrose Oral Gel 15 Gram(s) Oral once PRN Blood Glucose LESS THAN 70 milliGRAM(s)/deciliter      CAPILLARY BLOOD GLUCOSE      POCT Blood Glucose.: 182 mg/dL (12 Jul 2025 12:15)  POCT Blood Glucose.: 133 mg/dL (12 Jul 2025 08:23)  POCT Blood Glucose.: 116 mg/dL (12 Jul 2025 06:49)  POCT Blood Glucose.: 250 mg/dL (11 Jul 2025 21:35)  POCT Blood Glucose.: 191 mg/dL (11 Jul 2025 17:36)    I&O's Summary    11 Jul 2025 07:01  -  12 Jul 2025 07:00  --------------------------------------------------------  IN: 950 mL / OUT: 900 mL / NET: 50 mL    12 Jul 2025 07:01  -  12 Jul 2025 14:53  --------------------------------------------------------  IN: 400 mL / OUT: 400 mL / NET: 0 mL        PHYSICAL EXAM:  Vital Signs Last 24 Hrs  T(C): 36.7 (12 Jul 2025 13:36), Max: 36.8 (12 Jul 2025 05:22)  T(F): 98.1 (12 Jul 2025 13:36), Max: 98.2 (12 Jul 2025 05:22)  HR: 70 (12 Jul 2025 13:36) (61 - 77)  BP: 162/68 (12 Jul 2025 13:36) (135/61 - 162/68)  BP(mean): --  RR: 18 (12 Jul 2025 13:36) (18 - 18)  SpO2: 99% (12 Jul 2025 13:36) (99% - 99%)    Parameters below as of 12 Jul 2025 13:36  Patient On (Oxygen Delivery Method): room air      CONSTITUTIONAL: NAD  NECK: Supple, no palpable masses; no thyromegaly  RESPIRATORY: Normal respiratory effort; lungs are clear to auscultation bilaterally  CARDIOVASCULAR: Regular rate and rhythm, normal S1 and S2, no murmur/rub/gallop; No lower extremity edema; Peripheral pulses are 2+ bilaterally  ABDOMEN: Nontender to palpation, normoactive bowel sounds, no rebound/guarding; No hepatosplenomegaly  MUSCULOSKELETAL:  No clubbing or cyanosis of digits; no joint swelling or tenderness to palpation  PSYCH: A+O to person, place, and time; affect appropriate  NEUROLOGY: CN 2-12 are intact and symmetric; no gross sensory deficits   SKIN: No rashes; no palpable lesions    LABS:                        8.8    11.90 )-----------( 230      ( 11 Jul 2025 05:55 )             27.3     07-11    139  |  104  |  55[H]  ----------------------------<  91  4.0   |  20[L]  |  1.88[H]    Ca    8.6      11 Jul 2025 05:55  Phos  4.4     07-11  Mg     2.10     07-11    TPro  5.8[L]  /  Alb  3.2[L]  /  TBili  <0.2  /  DBili  x   /  AST  22  /  ALT  10  /  AlkPhos  375[H]  07-10    PT/INR - ( 10 Jul 2025 17:30 )   PT: 11.9 sec;   INR: 1.03 ratio         PTT - ( 10 Jul 2025 17:30 )  PTT:27.1 sec      Urinalysis Basic - ( 11 Jul 2025 05:55 )    Color: x / Appearance: x / SG: x / pH: x  Gluc: 91 mg/dL / Ketone: x  / Bili: x / Urobili: x   Blood: x / Protein: x / Nitrite: x   Leuk Esterase: x / RBC: x / WBC x   Sq Epi: x / Non Sq Epi: x / Bacteria: x          RADIOLOGY & ADDITIONAL TESTS:  Results Reviewed:   Imaging Personally Reviewed:  Electrocardiogram Personally Reviewed:    COORDINATION OF CARE:  Care Discussed with Consultants/Other Providers [Y/N]:  Prior or Outpatient Records Reviewed [Y/N]:  
Patient is a 89y old  Male who presents with a chief complaint of Episode of bloody stool, referred by [facility] (12 Jul 2025 19:31)      SUBJECTIVE / OVERNIGHT EVENTS:    ADDITIONAL REVIEW OF SYSTEMS:    MEDICATIONS  (STANDING):  abiraterone 500 milliGRAM(s) Oral <User Schedule>  amLODIPine   Tablet 5 milliGRAM(s) Oral daily  chlorhexidine 2% Cloths 1 Application(s) Topical daily  dextrose 5%. 1000 milliLiter(s) (100 mL/Hr) IV Continuous <Continuous>  dextrose 5%. 1000 milliLiter(s) (50 mL/Hr) IV Continuous <Continuous>  dextrose 50% Injectable 25 Gram(s) IV Push once  dextrose 50% Injectable 12.5 Gram(s) IV Push once  dextrose 50% Injectable 25 Gram(s) IV Push once  glucagon  Injectable 1 milliGRAM(s) IntraMuscular once  insulin lispro (ADMELOG) corrective regimen sliding scale   SubCutaneous Before meals and at bedtime  pantoprazole  Injectable 40 milliGRAM(s) IV Push every 12 hours  polyethylene glycol/electrolyte Solution 2000 milliLiter(s) Oral once  predniSONE   Tablet 5 milliGRAM(s) Oral daily    MEDICATIONS  (PRN):  dextrose Oral Gel 15 Gram(s) Oral once PRN Blood Glucose LESS THAN 70 milliGRAM(s)/deciliter      CAPILLARY BLOOD GLUCOSE      POCT Blood Glucose.: 158 mg/dL (13 Jul 2025 12:19)  POCT Blood Glucose.: 141 mg/dL (13 Jul 2025 08:31)  POCT Blood Glucose.: 110 mg/dL (12 Jul 2025 22:31)  POCT Blood Glucose.: 195 mg/dL (12 Jul 2025 17:31)    I&O's Summary    12 Jul 2025 07:01  -  13 Jul 2025 07:00  --------------------------------------------------------  IN: 1190 mL / OUT: 1700 mL / NET: -510 mL    13 Jul 2025 07:01  -  13 Jul 2025 14:51  --------------------------------------------------------  IN: 850 mL / OUT: 550 mL / NET: 300 mL        PHYSICAL EXAM:  Vital Signs Last 24 Hrs  T(C): 36.7 (13 Jul 2025 12:35), Max: 36.7 (12 Jul 2025 20:59)  T(F): 98.1 (13 Jul 2025 12:35), Max: 98.1 (12 Jul 2025 20:59)  HR: 64 (13 Jul 2025 12:35) (62 - 68)  BP: 183/87 (13 Jul 2025 12:35) (147/64 - 183/87)  BP(mean): --  RR: 18 (13 Jul 2025 12:35) (18 - 18)  SpO2: 100% (13 Jul 2025 12:35) (98% - 100%)    Parameters below as of 13 Jul 2025 12:35  Patient On (Oxygen Delivery Method): room air      CONSTITUTIONAL: NAD  NECK: Supple, no palpable masses; no thyromegaly  RESPIRATORY: Normal respiratory effort; lungs are clear to auscultation bilaterally  CARDIOVASCULAR: Regular rate and rhythm, normal S1 and S2, no murmur/rub/gallop; No lower extremity edema; Peripheral pulses are 2+ bilaterally  ABDOMEN: Nontender to palpation, normoactive bowel sounds, no rebound/guarding; No hepatosplenomegaly  MUSCULOSKELETAL:  No clubbing or cyanosis of digits; no joint swelling or tenderness to palpation  PSYCH: A+O to person, place, and time; affect appropriate  NEUROLOGY: CN 2-12 are intact and symmetric; no gross sensory deficits   SKIN: No rashes; no palpable lesions    LABS:                        8.6    11.74 )-----------( 267      ( 13 Jul 2025 13:16 )             27.1     07-13    138  |  103  |  22  ----------------------------<  121[H]  4.1   |  24  |  1.46[H]    Ca    8.7      13 Jul 2025 06:40  Phos  3.2     07-13  Mg     2.20     07-13    TPro  6.6  /  Alb  3.6  /  TBili  0.4  /  DBili  x   /  AST  14  /  ALT  8   /  AlkPhos  389[H]  07-13          Urinalysis Basic - ( 13 Jul 2025 06:40 )    Color: x / Appearance: x / SG: x / pH: x  Gluc: 121 mg/dL / Ketone: x  / Bili: x / Urobili: x   Blood: x / Protein: x / Nitrite: x   Leuk Esterase: x / RBC: x / WBC x   Sq Epi: x / Non Sq Epi: x / Bacteria: x          RADIOLOGY & ADDITIONAL TESTS:  Results Reviewed:   Imaging Personally Reviewed:  Electrocardiogram Personally Reviewed:    COORDINATION OF CARE:  Care Discussed with Consultants/Other Providers [Y/N]:  Prior or Outpatient Records Reviewed [Y/N]:

## 2025-07-16 NOTE — PROGRESS NOTE ADULT - PROVIDER SPECIALTY LIST ADULT
Hospitalist
Hospitalist
Nephrology
Hospitalist
Hospitalist
Nephrology
Hospitalist

## 2025-07-16 NOTE — DISCHARGE NOTE NURSING/CASE MANAGEMENT/SOCIAL WORK - NSDCCRNAME_GEN_ALL_CORE_FT
[facility] ([id number] [place] [address])

## 2025-07-29 ENCOUNTER — RESULT REVIEW (OUTPATIENT)
Age: 89
End: 2025-07-29

## 2025-07-29 ENCOUNTER — APPOINTMENT (OUTPATIENT)
Dept: HEMATOLOGY ONCOLOGY | Facility: CLINIC | Age: 89
End: 2025-07-29
Payer: MEDICARE

## 2025-07-29 VITALS
RESPIRATION RATE: 18 BRPM | DIASTOLIC BLOOD PRESSURE: 91 MMHG | OXYGEN SATURATION: 97 % | HEIGHT: 68 IN | HEART RATE: 63 BPM | SYSTOLIC BLOOD PRESSURE: 183 MMHG | WEIGHT: 187 LBS | TEMPERATURE: 97.2 F | BODY MASS INDEX: 28.34 KG/M2

## 2025-07-29 DIAGNOSIS — Z92.21 PERSONAL HISTORY OF ANTINEOPLASTIC CHEMOTHERAPY: ICD-10-CM

## 2025-07-29 DIAGNOSIS — C61 MALIGNANT NEOPLASM OF PROSTATE: ICD-10-CM

## 2025-07-29 DIAGNOSIS — Z79.818 LONG TERM (CURRENT) USE OF OTHER AGENTS AFFECTING ESTROGEN RECEPTORS AND ESTROGEN LEVELS: ICD-10-CM

## 2025-07-29 DIAGNOSIS — I10 ESSENTIAL (PRIMARY) HYPERTENSION: ICD-10-CM

## 2025-07-29 LAB
BASOPHILS # BLD AUTO: 0.03 K/UL — SIGNIFICANT CHANGE UP (ref 0–0.2)
BASOPHILS NFR BLD AUTO: 0.3 % — SIGNIFICANT CHANGE UP (ref 0–2)
EOSINOPHIL # BLD AUTO: 0.47 K/UL — SIGNIFICANT CHANGE UP (ref 0–0.5)
EOSINOPHIL NFR BLD AUTO: 4.7 % — SIGNIFICANT CHANGE UP (ref 0–6)
HCT VFR BLD CALC: 29.9 % — LOW (ref 39–50)
HGB BLD-MCNC: 9.3 G/DL — LOW (ref 13–17)
IMM GRANULOCYTES NFR BLD AUTO: 0.4 % — SIGNIFICANT CHANGE UP (ref 0–0.9)
LYMPHOCYTES # BLD AUTO: 1.25 K/UL — SIGNIFICANT CHANGE UP (ref 1–3.3)
LYMPHOCYTES # BLD AUTO: 12.5 % — LOW (ref 13–44)
MCHC RBC-ENTMCNC: 23.5 PG — LOW (ref 27–34)
MCHC RBC-ENTMCNC: 31.1 G/DL — LOW (ref 32–36)
MCV RBC AUTO: 75.7 FL — LOW (ref 80–100)
MONOCYTES # BLD AUTO: 0.6 K/UL — SIGNIFICANT CHANGE UP (ref 0–0.9)
MONOCYTES NFR BLD AUTO: 6 % — SIGNIFICANT CHANGE UP (ref 2–14)
NEUTROPHILS # BLD AUTO: 7.61 K/UL — HIGH (ref 1.8–7.4)
NEUTROPHILS NFR BLD AUTO: 76.1 % — SIGNIFICANT CHANGE UP (ref 43–77)
NRBC BLD AUTO-RTO: 0 /100 WBCS — SIGNIFICANT CHANGE UP (ref 0–0)
PLATELET # BLD AUTO: 286 K/UL — SIGNIFICANT CHANGE UP (ref 150–400)
RBC # BLD: 3.95 M/UL — LOW (ref 4.2–5.8)
RBC # FLD: 20.6 % — HIGH (ref 10.3–14.5)
WBC # BLD: 10 K/UL — SIGNIFICANT CHANGE UP (ref 3.8–10.5)
WBC # FLD AUTO: 10 K/UL — SIGNIFICANT CHANGE UP (ref 3.8–10.5)

## 2025-07-29 PROCEDURE — 99214 OFFICE O/P EST MOD 30 MIN: CPT

## 2025-07-29 PROCEDURE — G2211 COMPLEX E/M VISIT ADD ON: CPT

## 2025-07-30 VITALS — DIASTOLIC BLOOD PRESSURE: 80 MMHG | SYSTOLIC BLOOD PRESSURE: 165 MMHG

## 2025-07-30 LAB
ALBUMIN SERPL ELPH-MCNC: 4.3 G/DL
ALP BLD-CCNC: 325 U/L
ALT SERPL-CCNC: 14 U/L
ANION GAP SERPL CALC-SCNC: 18 MMOL/L
AST SERPL-CCNC: 24 U/L
BILIRUB SERPL-MCNC: 0.3 MG/DL
BUN SERPL-MCNC: 27 MG/DL
CALCIUM SERPL-MCNC: 9.3 MG/DL
CHLORIDE SERPL-SCNC: 97 MMOL/L
CO2 SERPL-SCNC: 22 MMOL/L
CREAT SERPL-MCNC: 1.37 MG/DL
EGFRCR SERPLBLD CKD-EPI 2021: 50 ML/MIN/1.73M2
GLUCOSE SERPL-MCNC: 230 MG/DL
POTASSIUM SERPL-SCNC: 3.7 MMOL/L
PROT SERPL-MCNC: 7.1 G/DL
PSA SERPL-MCNC: 40.6 NG/ML
SODIUM SERPL-SCNC: 138 MMOL/L

## 2025-08-15 PROBLEM — D64.9 ANEMIA: Status: ACTIVE | Noted: 2025-07-29

## 2025-08-18 ENCOUNTER — APPOINTMENT (OUTPATIENT)
Dept: HEMATOLOGY ONCOLOGY | Facility: CLINIC | Age: 89
End: 2025-08-18
Payer: MEDICARE

## 2025-08-18 DIAGNOSIS — C61 MALIGNANT NEOPLASM OF PROSTATE: ICD-10-CM

## 2025-08-28 ENCOUNTER — RESULT REVIEW (OUTPATIENT)
Age: 89
End: 2025-08-28

## 2025-08-28 ENCOUNTER — APPOINTMENT (OUTPATIENT)
Dept: HEMATOLOGY ONCOLOGY | Facility: CLINIC | Age: 89
End: 2025-08-28
Payer: MEDICARE

## 2025-08-28 VITALS
DIASTOLIC BLOOD PRESSURE: 80 MMHG | BODY MASS INDEX: 29.04 KG/M2 | HEART RATE: 62 BPM | OXYGEN SATURATION: 99 % | TEMPERATURE: 97.2 F | SYSTOLIC BLOOD PRESSURE: 174 MMHG | RESPIRATION RATE: 18 BRPM | WEIGHT: 191 LBS

## 2025-08-28 DIAGNOSIS — C61 MALIGNANT NEOPLASM OF PROSTATE: ICD-10-CM

## 2025-08-28 DIAGNOSIS — D64.9 ANEMIA, UNSPECIFIED: ICD-10-CM

## 2025-08-28 PROCEDURE — 99215 OFFICE O/P EST HI 40 MIN: CPT

## 2025-08-28 RX ORDER — POLYETHYLENE GLYCOL 3350 17 G/17G
17 POWDER, FOR SOLUTION ORAL
Refills: 0 | Status: ACTIVE | COMMUNITY

## 2025-08-28 RX ORDER — DICLOFENAC SODIUM 1 %
1 GEL (GRAM) TOPICAL
Refills: 0 | Status: ACTIVE | COMMUNITY

## 2025-08-28 RX ORDER — LACTULOSE 10 G/15ML
10 SOLUTION ORAL
Refills: 0 | Status: ACTIVE | COMMUNITY

## 2025-08-28 RX ORDER — LOTEPREDNOL ETABONATE 5 MG/G
0.5 GEL OPHTHALMIC
Refills: 0 | Status: ACTIVE | COMMUNITY

## 2025-08-28 RX ORDER — ACETAMINOPHEN 325 MG/1
325 TABLET ORAL
Refills: 0 | Status: ACTIVE | COMMUNITY

## 2025-08-28 RX ORDER — SENNOSIDES 8.6 MG/1
8.6 TABLET ORAL
Refills: 0 | Status: ACTIVE | COMMUNITY

## 2025-09-03 LAB
ALBUMIN SERPL ELPH-MCNC: 4.4 G/DL
ALP BLD-CCNC: 252 U/L
ALT SERPL-CCNC: 13 U/L
ANION GAP SERPL CALC-SCNC: 20 MMOL/L
AST SERPL-CCNC: 23 U/L
BILIRUB SERPL-MCNC: 0.3 MG/DL
BUN SERPL-MCNC: 22 MG/DL
CALCIUM SERPL-MCNC: 9.4 MG/DL
CHLORIDE SERPL-SCNC: 97 MMOL/L
CO2 SERPL-SCNC: 20 MMOL/L
CREAT SERPL-MCNC: 1.23 MG/DL
EGFRCR SERPLBLD CKD-EPI 2021: 56 ML/MIN/1.73M2
FERRITIN SERPL-MCNC: 150 NG/ML
FOLATE SERPL-MCNC: 17.8 NG/ML
GLUCOSE SERPL-MCNC: 243 MG/DL
IRON SATN MFR SERPL: 19 %
IRON SERPL-MCNC: 52 UG/DL
POTASSIUM SERPL-SCNC: 3.7 MMOL/L
PROT SERPL-MCNC: 7.4 G/DL
PSA SERPL-MCNC: 38.4 NG/ML
SODIUM SERPL-SCNC: 137 MMOL/L
TESTOST SERPL-MCNC: <2.5 NG/DL
TIBC SERPL-MCNC: 268 UG/DL
UIBC SERPL-MCNC: 216 UG/DL
VIT B12 SERPL-MCNC: 501 PG/ML

## 2025-09-19 ENCOUNTER — RESULT REVIEW (OUTPATIENT)
Age: 89
End: 2025-09-19

## 2025-09-19 ENCOUNTER — APPOINTMENT (OUTPATIENT)
Dept: HEMATOLOGY ONCOLOGY | Facility: CLINIC | Age: 89
End: 2025-09-19
Payer: MEDICARE

## 2025-09-19 VITALS
HEART RATE: 74 BPM | BODY MASS INDEX: 29.03 KG/M2 | DIASTOLIC BLOOD PRESSURE: 93 MMHG | WEIGHT: 190.92 LBS | RESPIRATION RATE: 16 BRPM | SYSTOLIC BLOOD PRESSURE: 199 MMHG | OXYGEN SATURATION: 95 % | TEMPERATURE: 97 F

## 2025-09-19 VITALS — DIASTOLIC BLOOD PRESSURE: 77 MMHG | SYSTOLIC BLOOD PRESSURE: 196 MMHG

## 2025-09-19 DIAGNOSIS — C61 MALIGNANT NEOPLASM OF PROSTATE: ICD-10-CM

## 2025-09-19 PROCEDURE — 99214 OFFICE O/P EST MOD 30 MIN: CPT

## 2025-09-24 LAB
ALBUMIN SERPL ELPH-MCNC: 4.1 G/DL
ALP BLD-CCNC: 202 U/L
ALT SERPL-CCNC: 14 U/L
ANION GAP SERPL CALC-SCNC: 16 MMOL/L
AST SERPL-CCNC: 21 U/L
BILIRUB SERPL-MCNC: 0.2 MG/DL
BUN SERPL-MCNC: 27 MG/DL
CALCIUM SERPL-MCNC: 9.4 MG/DL
CHLORIDE SERPL-SCNC: 96 MMOL/L
CO2 SERPL-SCNC: 26 MMOL/L
CREAT SERPL-MCNC: 1.54 MG/DL
EGFRCR SERPLBLD CKD-EPI 2021: 43 ML/MIN/1.73M2
GLUCOSE SERPL-MCNC: 295 MG/DL
POTASSIUM SERPL-SCNC: 4.3 MMOL/L
PROT SERPL-MCNC: 7.1 G/DL
PSA SERPL-MCNC: 39.6 NG/ML
SODIUM SERPL-SCNC: 137 MMOL/L
TESTOST SERPL-MCNC: <2.5 NG/DL

## (undated) DEVICE — DRSG CURITY GAUZE SPONGE 4 X 4" 12-PLY NON-STERILE

## (undated) DEVICE — CATH IV SAFE BC 22G X 1" (BLUE)

## (undated) DEVICE — BASIN EMESIS 10IN GRADUATED MAUVE

## (undated) DEVICE — TUBING SUCTION NONCONDUCTIVE 6MM X 12FT

## (undated) DEVICE — DRSG BANDAID 0.75X3"

## (undated) DEVICE — TUBING IV SET GRAVITY 3Y 100" MACRO

## (undated) DEVICE — BIOPSY FORCEP RADIAL JAW 4 STANDARD WITH NEEDLE

## (undated) DEVICE — PACK IV START WITH CHG

## (undated) DEVICE — GOWN LG

## (undated) DEVICE — DRSG 2X2

## (undated) DEVICE — SALIVA EJECTOR (BLUE)

## (undated) DEVICE — CONTAINER FORMALIN 80ML YELLOW

## (undated) DEVICE — ELCTR GROUNDING PAD ADULT COVIDIEN

## (undated) DEVICE — BIOPSY FORCEP COLD DISP

## (undated) DEVICE — ELCTR ECG CONDUCTIVE ADHESIVE

## (undated) DEVICE — TUBING MEDI-VAC W MAXIGRIP CONNECTORS 1/4"X6'

## (undated) DEVICE — LUBRICATING JELLY HR ONE SHOT 3G